# Patient Record
Sex: MALE | Race: ASIAN | NOT HISPANIC OR LATINO
[De-identification: names, ages, dates, MRNs, and addresses within clinical notes are randomized per-mention and may not be internally consistent; named-entity substitution may affect disease eponyms.]

---

## 2017-03-20 ENCOUNTER — APPOINTMENT (OUTPATIENT)
Dept: UROLOGY | Facility: CLINIC | Age: 72
End: 2017-03-20

## 2017-03-21 LAB
APPEARANCE: ABNORMAL
BACTERIA: NEGATIVE
BILIRUBIN URINE: NEGATIVE
BLOOD URINE: ABNORMAL
COLOR: ABNORMAL
CORE LAB FLUID CYTOLOGY: NORMAL
GLUCOSE QUALITATIVE U: NORMAL
HYALINE CASTS: 1 /LPF
KETONES URINE: NEGATIVE
LEUKOCYTE ESTERASE URINE: NEGATIVE
MICROSCOPIC-UA: NORMAL
NITRITE URINE: NEGATIVE
PH URINE: 6
PROTEIN URINE: 100
RED BLOOD CELLS URINE: 666 /HPF
SPECIFIC GRAVITY URINE: 1.01
SQUAMOUS EPITHELIAL CELLS: 4 /HPF
UROBILINOGEN URINE: NORMAL
WHITE BLOOD CELLS URINE: 14 /HPF

## 2017-03-22 ENCOUNTER — FORM ENCOUNTER (OUTPATIENT)
Age: 72
End: 2017-03-22

## 2017-03-22 LAB — BACTERIA UR CULT: NORMAL

## 2017-03-23 ENCOUNTER — OUTPATIENT (OUTPATIENT)
Dept: OUTPATIENT SERVICES | Facility: HOSPITAL | Age: 72
LOS: 1 days | End: 2017-03-23
Payer: MEDICAID

## 2017-03-23 ENCOUNTER — APPOINTMENT (OUTPATIENT)
Dept: CT IMAGING | Facility: IMAGING CENTER | Age: 72
End: 2017-03-23

## 2017-03-23 ENCOUNTER — APPOINTMENT (OUTPATIENT)
Dept: UROLOGY | Facility: CLINIC | Age: 72
End: 2017-03-23

## 2017-03-23 VITALS
HEART RATE: 92 BPM | RESPIRATION RATE: 18 BRPM | SYSTOLIC BLOOD PRESSURE: 145 MMHG | DIASTOLIC BLOOD PRESSURE: 74 MMHG | TEMPERATURE: 97.9 F

## 2017-03-23 DIAGNOSIS — R35.0 FREQUENCY OF MICTURITION: ICD-10-CM

## 2017-03-23 DIAGNOSIS — R31.0 GROSS HEMATURIA: ICD-10-CM

## 2017-03-23 PROCEDURE — 74178 CT ABD&PLV WO CNTR FLWD CNTR: CPT

## 2017-03-23 PROCEDURE — 52000 CYSTOURETHROSCOPY: CPT

## 2017-03-24 ENCOUNTER — MESSAGE (OUTPATIENT)
Age: 72
End: 2017-03-24

## 2017-03-28 ENCOUNTER — APPOINTMENT (OUTPATIENT)
Dept: UROLOGY | Facility: CLINIC | Age: 72
End: 2017-03-28

## 2017-03-30 DIAGNOSIS — N40.1 BENIGN PROSTATIC HYPERPLASIA WITH LOWER URINARY TRACT SYMPTOMS: ICD-10-CM

## 2017-03-30 DIAGNOSIS — R31.0 GROSS HEMATURIA: ICD-10-CM

## 2017-03-30 DIAGNOSIS — N30.20 OTHER CHRONIC CYSTITIS WITHOUT HEMATURIA: ICD-10-CM

## 2017-03-30 DIAGNOSIS — R39.89 OTHER SYMPTOMS AND SIGNS INVOLVING THE GENITOURINARY SYSTEM: ICD-10-CM

## 2017-04-06 ENCOUNTER — APPOINTMENT (OUTPATIENT)
Dept: NEUROLOGY | Facility: CLINIC | Age: 72
End: 2017-04-06

## 2017-04-13 ENCOUNTER — APPOINTMENT (OUTPATIENT)
Dept: UROLOGY | Facility: CLINIC | Age: 72
End: 2017-04-13

## 2017-04-13 PROCEDURE — 88112 CYTOPATH CELL ENHANCE TECH: CPT | Mod: 26

## 2017-04-13 RX ORDER — NAPROXEN 500 MG/1
500 TABLET ORAL
Qty: 60 | Refills: 0 | Status: COMPLETED | COMMUNITY
Start: 2017-02-22

## 2017-04-13 RX ORDER — LEVOTHYROXINE SODIUM 0.05 MG/1
50 TABLET ORAL
Qty: 30 | Refills: 0 | Status: ACTIVE | COMMUNITY
Start: 2016-10-18

## 2017-04-13 RX ORDER — GABAPENTIN 300 MG/1
300 CAPSULE ORAL
Qty: 30 | Refills: 0 | Status: COMPLETED | COMMUNITY
Start: 2016-09-29

## 2017-04-13 RX ORDER — RANITIDINE 150 MG/1
150 TABLET ORAL
Qty: 60 | Refills: 0 | Status: ACTIVE | COMMUNITY
Start: 2016-07-13

## 2017-04-13 RX ORDER — SULFAMETHOXAZOLE AND TRIMETHOPRIM 800; 160 MG/1; MG/1
800-160 TABLET ORAL
Qty: 14 | Refills: 0 | Status: COMPLETED | COMMUNITY
Start: 2017-03-20 | End: 2017-04-13

## 2017-04-13 RX ORDER — AMOXICILLIN 500 MG/1
500 TABLET, FILM COATED ORAL
Qty: 14 | Refills: 0 | Status: COMPLETED | COMMUNITY
Start: 2017-04-08

## 2017-04-14 ENCOUNTER — MESSAGE (OUTPATIENT)
Age: 72
End: 2017-04-14

## 2017-04-15 LAB
APPEARANCE: CLEAR
BACTERIA UR CULT: NORMAL
BACTERIA: NEGATIVE
BILIRUBIN URINE: NEGATIVE
BLOOD URINE: ABNORMAL
COLOR: YELLOW
CORE LAB FLUID CYTOLOGY: NORMAL
GLUCOSE QUALITATIVE U: NORMAL MG/DL
HYALINE CASTS: 4 /LPF
KETONES URINE: NEGATIVE
LEUKOCYTE ESTERASE URINE: NEGATIVE
MICROSCOPIC-UA: NORMAL
NITRITE URINE: NEGATIVE
PH URINE: 6
PROTEIN URINE: NEGATIVE MG/DL
RED BLOOD CELLS URINE: 6 /HPF
SPECIFIC GRAVITY URINE: 1.01
SQUAMOUS EPITHELIAL CELLS: 0 /HPF
UROBILINOGEN URINE: NORMAL MG/DL
WHITE BLOOD CELLS URINE: 2 /HPF

## 2017-04-19 ENCOUNTER — EMERGENCY (EMERGENCY)
Facility: HOSPITAL | Age: 72
LOS: 1 days | Discharge: HOME | End: 2017-04-19
Attending: EMERGENCY MEDICINE
Payer: MEDICARE

## 2017-04-19 VITALS
SYSTOLIC BLOOD PRESSURE: 119 MMHG | OXYGEN SATURATION: 98 % | TEMPERATURE: 98 F | RESPIRATION RATE: 20 BRPM | HEART RATE: 105 BPM | WEIGHT: 164.02 LBS | DIASTOLIC BLOOD PRESSURE: 55 MMHG | HEIGHT: 64 IN

## 2017-04-19 DIAGNOSIS — H26.9 UNSPECIFIED CATARACT: ICD-10-CM

## 2017-04-19 DIAGNOSIS — J45.901 UNSPECIFIED ASTHMA WITH (ACUTE) EXACERBATION: ICD-10-CM

## 2017-04-19 DIAGNOSIS — E03.9 HYPOTHYROIDISM, UNSPECIFIED: ICD-10-CM

## 2017-04-19 DIAGNOSIS — I10 ESSENTIAL (PRIMARY) HYPERTENSION: ICD-10-CM

## 2017-04-19 LAB
ALBUMIN SERPL ELPH-MCNC: 3.3 G/DL — LOW (ref 3.5–5)
ALP SERPL-CCNC: 75 U/L — SIGNIFICANT CHANGE UP (ref 40–120)
ALT FLD-CCNC: 31 U/L DA — SIGNIFICANT CHANGE UP (ref 10–60)
ANION GAP SERPL CALC-SCNC: 6 MMOL/L — SIGNIFICANT CHANGE UP (ref 5–17)
AST SERPL-CCNC: 17 U/L — SIGNIFICANT CHANGE UP (ref 10–40)
BASOPHILS # BLD AUTO: 0.1 K/UL — SIGNIFICANT CHANGE UP (ref 0–0.2)
BASOPHILS NFR BLD AUTO: 0.7 % — SIGNIFICANT CHANGE UP (ref 0–2)
BILIRUB SERPL-MCNC: 0.2 MG/DL — SIGNIFICANT CHANGE UP (ref 0.2–1.2)
BUN SERPL-MCNC: 17 MG/DL — SIGNIFICANT CHANGE UP (ref 7–18)
CALCIUM SERPL-MCNC: 8.9 MG/DL — SIGNIFICANT CHANGE UP (ref 8.4–10.5)
CHLORIDE SERPL-SCNC: 105 MMOL/L — SIGNIFICANT CHANGE UP (ref 96–108)
CO2 SERPL-SCNC: 30 MMOL/L — SIGNIFICANT CHANGE UP (ref 22–31)
CREAT SERPL-MCNC: 1.11 MG/DL — SIGNIFICANT CHANGE UP (ref 0.5–1.3)
EOSINOPHIL # BLD AUTO: 0.2 K/UL — SIGNIFICANT CHANGE UP (ref 0–0.5)
EOSINOPHIL NFR BLD AUTO: 2 % — SIGNIFICANT CHANGE UP (ref 0–6)
GLUCOSE SERPL-MCNC: 125 MG/DL — HIGH (ref 70–99)
HCT VFR BLD CALC: 42.8 % — SIGNIFICANT CHANGE UP (ref 39–50)
HGB BLD-MCNC: 13 G/DL — SIGNIFICANT CHANGE UP (ref 13–17)
LACTATE SERPL-SCNC: 1.5 MMOL/L — SIGNIFICANT CHANGE UP (ref 0.7–2)
LYMPHOCYTES # BLD AUTO: 1.4 K/UL — SIGNIFICANT CHANGE UP (ref 1–3.3)
LYMPHOCYTES # BLD AUTO: 15.6 % — SIGNIFICANT CHANGE UP (ref 13–44)
MCHC RBC-ENTMCNC: 25.6 PG — LOW (ref 27–34)
MCHC RBC-ENTMCNC: 30.4 GM/DL — LOW (ref 32–36)
MCV RBC AUTO: 84.4 FL — SIGNIFICANT CHANGE UP (ref 80–100)
MONOCYTES # BLD AUTO: 0.5 K/UL — SIGNIFICANT CHANGE UP (ref 0–0.9)
MONOCYTES NFR BLD AUTO: 5.7 % — SIGNIFICANT CHANGE UP (ref 2–14)
NEUTROPHILS # BLD AUTO: 6.8 K/UL — SIGNIFICANT CHANGE UP (ref 1.8–7.4)
NEUTROPHILS NFR BLD AUTO: 76 % — SIGNIFICANT CHANGE UP (ref 43–77)
PLATELET # BLD AUTO: 207 K/UL — SIGNIFICANT CHANGE UP (ref 150–400)
POTASSIUM SERPL-MCNC: 4 MMOL/L — SIGNIFICANT CHANGE UP (ref 3.5–5.3)
POTASSIUM SERPL-SCNC: 4 MMOL/L — SIGNIFICANT CHANGE UP (ref 3.5–5.3)
PROT SERPL-MCNC: 7.5 G/DL — SIGNIFICANT CHANGE UP (ref 6–8.3)
RBC # BLD: 5.07 M/UL — SIGNIFICANT CHANGE UP (ref 4.2–5.8)
RBC # FLD: 14.2 % — SIGNIFICANT CHANGE UP (ref 10.3–14.5)
SODIUM SERPL-SCNC: 141 MMOL/L — SIGNIFICANT CHANGE UP (ref 135–145)
WBC # BLD: 9 K/UL — SIGNIFICANT CHANGE UP (ref 3.8–10.5)
WBC # FLD AUTO: 9 K/UL — SIGNIFICANT CHANGE UP (ref 3.8–10.5)

## 2017-04-19 PROCEDURE — 99285 EMERGENCY DEPT VISIT HI MDM: CPT | Mod: 25

## 2017-04-19 PROCEDURE — 71020: CPT | Mod: 26

## 2017-04-20 PROCEDURE — 94640 AIRWAY INHALATION TREATMENT: CPT

## 2017-04-20 PROCEDURE — 80053 COMPREHEN METABOLIC PANEL: CPT

## 2017-04-20 PROCEDURE — 71046 X-RAY EXAM CHEST 2 VIEWS: CPT

## 2017-04-20 PROCEDURE — 99284 EMERGENCY DEPT VISIT MOD MDM: CPT | Mod: 25

## 2017-04-20 PROCEDURE — 93005 ELECTROCARDIOGRAM TRACING: CPT

## 2017-04-20 PROCEDURE — 83605 ASSAY OF LACTIC ACID: CPT

## 2017-04-20 PROCEDURE — 85027 COMPLETE CBC AUTOMATED: CPT

## 2017-04-20 RX ORDER — ALBUTEROL 90 UG/1
2.5 AEROSOL, METERED ORAL ONCE
Qty: 0 | Refills: 0 | Status: COMPLETED | OUTPATIENT
Start: 2017-04-20 | End: 2017-04-20

## 2017-04-20 RX ORDER — IPRATROPIUM/ALBUTEROL SULFATE 18-103MCG
3 AEROSOL WITH ADAPTER (GRAM) INHALATION ONCE
Qty: 0 | Refills: 0 | Status: COMPLETED | OUTPATIENT
Start: 2017-04-20 | End: 2017-04-20

## 2017-04-20 RX ADMIN — Medication 3 MILLILITER(S): at 00:53

## 2017-04-20 RX ADMIN — Medication 50 MILLIGRAM(S): at 01:31

## 2017-04-20 RX ADMIN — ALBUTEROL 2.5 MILLIGRAM(S): 90 AEROSOL, METERED ORAL at 01:31

## 2017-04-20 NOTE — ED PROVIDER NOTE - MEDICAL DECISION MAKING DETAILS
72 y/o F pt with h/o asthma with apparently treated PNA. Pt shows no sign of infection, but is in no SOB. Will treat for asthma exacerbation with albuterol, prednisone, CXR and reassess.

## 2017-04-20 NOTE — ED PROVIDER NOTE - PMH
Abdominal mass    Age-related cataract    Asthma    Cystitis    Hemangioma    Hypertension  x 5 years  Hypothyroid

## 2017-04-20 NOTE — ED PROVIDER NOTE - PROGRESS NOTE DETAILS
Pt states he is feeling somewhat better. Will order duonebs. Improving, now with some wheeze. Feels much better s/p albuterol.

## 2017-04-20 NOTE — ED PROVIDER NOTE - OBJECTIVE STATEMENT
72 y/o M pt with PMHx asthma, HTN, HLD, presents to ED c/o SOB today. Pt reports being treated for PNA and completed a 7 day course of amoxicillin with PMD. Pt has productive cough, not compliant with medication but has medicine at home. Pt denies fever, chills, CP, nausea, vomiting, diarrhea, recent travel, sick contacts, or any other complaints. NKDA. 70 y/o M pt with PMHx asthma, HTN, HLD, presents to ED c/o SOB today. Pt reports being treated for PNA and completed a 7 day course of amoxicillin via PMD. Pt has productive cough.  Has albuterol at home but has not been using it. Takes meds otherwise, including Advair.  Pt denies fever, chills, CP, nausea, vomiting, diarrhea, recent travel, sick contacts, or any other complaints. NKDA.

## 2017-05-08 ENCOUNTER — APPOINTMENT (OUTPATIENT)
Dept: UROLOGY | Facility: CLINIC | Age: 72
End: 2017-05-08

## 2017-05-10 LAB
APPEARANCE: CLEAR
BACTERIA UR CULT: NORMAL
BACTERIA: NEGATIVE
BILIRUBIN URINE: NEGATIVE
BLOOD URINE: NEGATIVE
COLOR: YELLOW
GLUCOSE QUALITATIVE U: NORMAL MG/DL
HYALINE CASTS: 0 /LPF
KETONES URINE: NEGATIVE
LEUKOCYTE ESTERASE URINE: NEGATIVE
MICROSCOPIC-UA: NORMAL
NITRITE URINE: NEGATIVE
PH URINE: 7.5
PROTEIN URINE: ABNORMAL MG/DL
RED BLOOD CELLS URINE: 1 /HPF
SPECIFIC GRAVITY URINE: 1.01
SQUAMOUS EPITHELIAL CELLS: 1 /HPF
URINE COMMENTS: NORMAL
UROBILINOGEN URINE: NORMAL MG/DL
WHITE BLOOD CELLS URINE: 3 /HPF

## 2017-05-11 LAB — CORE LAB FLUID CYTOLOGY: NORMAL

## 2017-08-23 ENCOUNTER — MEDICATION RENEWAL (OUTPATIENT)
Age: 72
End: 2017-08-23

## 2017-09-05 ENCOUNTER — APPOINTMENT (OUTPATIENT)
Dept: NEUROLOGY | Facility: CLINIC | Age: 72
End: 2017-09-05
Payer: MEDICARE

## 2017-09-05 VITALS
HEART RATE: 100 BPM | SYSTOLIC BLOOD PRESSURE: 130 MMHG | BODY MASS INDEX: 30.83 KG/M2 | WEIGHT: 174 LBS | DIASTOLIC BLOOD PRESSURE: 78 MMHG | HEIGHT: 63 IN

## 2017-09-05 PROCEDURE — 99215 OFFICE O/P EST HI 40 MIN: CPT

## 2017-10-23 ENCOUNTER — APPOINTMENT (OUTPATIENT)
Dept: UROLOGY | Facility: CLINIC | Age: 72
End: 2017-10-23
Payer: MEDICARE

## 2017-10-30 ENCOUNTER — APPOINTMENT (OUTPATIENT)
Dept: UROLOGY | Facility: CLINIC | Age: 72
End: 2017-10-30
Payer: MEDICARE

## 2017-10-30 LAB
APPEARANCE: CLEAR
BACTERIA: NEGATIVE
BILIRUBIN URINE: NEGATIVE
BLOOD URINE: NEGATIVE
COLOR: YELLOW
GLUCOSE QUALITATIVE U: NEGATIVE MG/DL
HYALINE CASTS: 1 /LPF
KETONES URINE: NEGATIVE
LEUKOCYTE ESTERASE URINE: NEGATIVE
MICROSCOPIC-UA: NORMAL
NITRITE URINE: NEGATIVE
PH URINE: 6
PROTEIN URINE: NEGATIVE MG/DL
RED BLOOD CELLS URINE: 1 /HPF
SPECIFIC GRAVITY URINE: 1.02
SQUAMOUS EPITHELIAL CELLS: 0 /HPF
UROBILINOGEN URINE: NEGATIVE MG/DL
WHITE BLOOD CELLS URINE: 2 /HPF

## 2017-10-30 PROCEDURE — 99214 OFFICE O/P EST MOD 30 MIN: CPT

## 2017-11-01 LAB — BACTERIA UR CULT: NORMAL

## 2017-11-03 LAB — CORE LAB FLUID CYTOLOGY: NORMAL

## 2017-11-08 LAB
APPEARANCE: CLEAR
BACTERIA: NEGATIVE
BILIRUBIN URINE: NEGATIVE
BLOOD URINE: NEGATIVE
COLOR: YELLOW
GLUCOSE QUALITATIVE U: NEGATIVE MG/DL
KETONES URINE: NEGATIVE
LEUKOCYTE ESTERASE URINE: NEGATIVE
MICROSCOPIC-UA: NORMAL
NITRITE URINE: NEGATIVE
PH URINE: 6.5
PROTEIN URINE: NEGATIVE MG/DL
RED BLOOD CELLS URINE: 2 /HPF
SPECIFIC GRAVITY URINE: 1.01
SQUAMOUS EPITHELIAL CELLS: 1 /HPF
UROBILINOGEN URINE: NEGATIVE MG/DL
WHITE BLOOD CELLS URINE: 1 /HPF

## 2017-11-10 LAB — CORE LAB FLUID CYTOLOGY: NORMAL

## 2017-11-15 ENCOUNTER — APPOINTMENT (OUTPATIENT)
Dept: UROLOGY | Facility: CLINIC | Age: 72
End: 2017-11-15

## 2017-12-18 ENCOUNTER — APPOINTMENT (OUTPATIENT)
Dept: NEUROLOGY | Facility: CLINIC | Age: 72
End: 2017-12-18
Payer: MEDICARE

## 2017-12-18 VITALS
HEART RATE: 90 BPM | HEIGHT: 63 IN | DIASTOLIC BLOOD PRESSURE: 78 MMHG | WEIGHT: 174 LBS | SYSTOLIC BLOOD PRESSURE: 145 MMHG | BODY MASS INDEX: 30.83 KG/M2

## 2017-12-18 DIAGNOSIS — R51 HEADACHE: ICD-10-CM

## 2017-12-18 PROCEDURE — 99215 OFFICE O/P EST HI 40 MIN: CPT

## 2018-01-22 ENCOUNTER — APPOINTMENT (OUTPATIENT)
Dept: UROLOGY | Facility: CLINIC | Age: 73
End: 2018-01-22
Payer: MEDICARE

## 2018-01-22 PROCEDURE — 99214 OFFICE O/P EST MOD 30 MIN: CPT

## 2018-01-24 LAB
ALBUMIN SERPL ELPH-MCNC: 4.3 G/DL
ALP BLD-CCNC: 66 U/L
ALT SERPL-CCNC: 21 U/L
ANION GAP SERPL CALC-SCNC: 14 MMOL/L
AST SERPL-CCNC: 24 U/L
BASOPHILS # BLD AUTO: 0.02 K/UL
BASOPHILS NFR BLD AUTO: 0.3 %
BILIRUB SERPL-MCNC: 0.3 MG/DL
BUN SERPL-MCNC: 21 MG/DL
CALCIUM SERPL-MCNC: 10.1 MG/DL
CHLORIDE SERPL-SCNC: 97 MMOL/L
CO2 SERPL-SCNC: 30 MMOL/L
CORE LAB FLUID CYTOLOGY: NORMAL
CREAT SERPL-MCNC: 0.99 MG/DL
EOSINOPHIL # BLD AUTO: 0.12 K/UL
EOSINOPHIL NFR BLD AUTO: 1.5 %
GLUCOSE SERPL-MCNC: 86 MG/DL
HCT VFR BLD CALC: 46.9 %
HGB BLD-MCNC: 14.2 G/DL
IMM GRANULOCYTES NFR BLD AUTO: 0.3 %
LYMPHOCYTES # BLD AUTO: 2.77 K/UL
LYMPHOCYTES NFR BLD AUTO: 34.8 %
MAN DIFF?: NORMAL
MCHC RBC-ENTMCNC: 26.1 PG
MCHC RBC-ENTMCNC: 30.3 GM/DL
MCV RBC AUTO: 86.1 FL
MONOCYTES # BLD AUTO: 0.77 K/UL
MONOCYTES NFR BLD AUTO: 9.7 %
NEUTROPHILS # BLD AUTO: 4.25 K/UL
NEUTROPHILS NFR BLD AUTO: 53.4 %
PLATELET # BLD AUTO: 231 K/UL
POTASSIUM SERPL-SCNC: 4.8 MMOL/L
PROT SERPL-MCNC: 7.4 G/DL
PSA SERPL-MCNC: 0.39 NG/ML
RBC # BLD: 5.45 M/UL
RBC # FLD: 15.1 %
SODIUM SERPL-SCNC: 141 MMOL/L
TESTOST SERPL-MCNC: 328 NG/DL
WBC # FLD AUTO: 7.95 K/UL

## 2018-01-25 LAB — BACTERIA UR CULT: NORMAL

## 2018-02-02 LAB
APPEARANCE: CLEAR
BACTERIA: NEGATIVE
BILIRUBIN URINE: NEGATIVE
BLOOD URINE: ABNORMAL
COLOR: YELLOW
GLUCOSE QUALITATIVE U: NEGATIVE MG/DL
HYALINE CASTS: 0 /LPF
KETONES URINE: NEGATIVE
LEUKOCYTE ESTERASE URINE: NEGATIVE
MICROSCOPIC-UA: NORMAL
NITRITE URINE: NEGATIVE
PH URINE: 6.5
PROTEIN URINE: NEGATIVE MG/DL
RED BLOOD CELLS URINE: 7 /HPF
SPECIFIC GRAVITY URINE: 1.02
SQUAMOUS EPITHELIAL CELLS: 1 /HPF
UROBILINOGEN URINE: NEGATIVE MG/DL
WHITE BLOOD CELLS URINE: 4 /HPF

## 2018-02-07 LAB
APPEARANCE: CLEAR
BACTERIA UR CULT: NORMAL
BACTERIA: NEGATIVE
BILIRUBIN URINE: NEGATIVE
BLOOD URINE: NEGATIVE
COLOR: YELLOW
CORE LAB FLUID CYTOLOGY: NORMAL
GLUCOSE QUALITATIVE U: NEGATIVE MG/DL
HYALINE CASTS: 4 /LPF
KETONES URINE: NEGATIVE
LEUKOCYTE ESTERASE URINE: NEGATIVE
MICROSCOPIC-UA: NORMAL
NITRITE URINE: NEGATIVE
PH URINE: 5.5
PROTEIN URINE: NEGATIVE MG/DL
RED BLOOD CELLS URINE: 3 /HPF
SPECIFIC GRAVITY URINE: 1.02
SQUAMOUS EPITHELIAL CELLS: 1 /HPF
UROBILINOGEN URINE: NEGATIVE MG/DL
WHITE BLOOD CELLS URINE: 15 /HPF

## 2018-03-01 ENCOUNTER — APPOINTMENT (OUTPATIENT)
Dept: UROLOGY | Facility: CLINIC | Age: 73
End: 2018-03-01
Payer: MEDICARE

## 2018-03-01 LAB
APPEARANCE: CLEAR
BACTERIA: NEGATIVE
BILIRUBIN URINE: NEGATIVE
BLOOD URINE: NEGATIVE
COLOR: YELLOW
GLUCOSE QUALITATIVE U: NEGATIVE MG/DL
HYALINE CASTS: 2 /LPF
KETONES URINE: NEGATIVE
LEUKOCYTE ESTERASE URINE: NEGATIVE
MICROSCOPIC-UA: NORMAL
NITRITE URINE: NEGATIVE
PH URINE: 6
PROTEIN URINE: NEGATIVE MG/DL
RED BLOOD CELLS URINE: 0 /HPF
SPECIFIC GRAVITY URINE: 1.01
SQUAMOUS EPITHELIAL CELLS: 1 /HPF
UROBILINOGEN URINE: NEGATIVE MG/DL
WHITE BLOOD CELLS URINE: 8 /HPF

## 2018-03-01 PROCEDURE — 99214 OFFICE O/P EST MOD 30 MIN: CPT

## 2018-03-01 RX ORDER — DESIPRAMINE 10 MG/1
10 TABLET, FILM COATED ORAL
Qty: 180 | Refills: 0 | Status: COMPLETED | COMMUNITY
Start: 2017-04-13 | End: 2018-03-01

## 2018-03-02 LAB — CORE LAB FLUID CYTOLOGY: NORMAL

## 2018-03-04 LAB — BACTERIA UR CULT: NORMAL

## 2018-04-10 ENCOUNTER — APPOINTMENT (OUTPATIENT)
Dept: UROLOGY | Facility: CLINIC | Age: 73
End: 2018-04-10
Payer: MEDICARE

## 2018-04-10 PROCEDURE — 99214 OFFICE O/P EST MOD 30 MIN: CPT

## 2018-04-21 LAB
APPEARANCE: CLEAR
BACTERIA UR CULT: NORMAL
BACTERIA: NEGATIVE
BASOPHILS # BLD AUTO: 0.01 K/UL
BASOPHILS NFR BLD AUTO: 0.1 %
BILIRUBIN URINE: NEGATIVE
BLOOD URINE: ABNORMAL
COLOR: YELLOW
CORE LAB FLUID CYTOLOGY: NORMAL
EOSINOPHIL # BLD AUTO: 0.13 K/UL
EOSINOPHIL NFR BLD AUTO: 1.9 %
FERRITIN SERPL-MCNC: 53 NG/ML
GLUCOSE QUALITATIVE U: NEGATIVE MG/DL
HCT VFR BLD CALC: 41.4 %
HGB A MFR BLD: 97.5 %
HGB A2 MFR BLD: 2.5 %
HGB BLD-MCNC: 13.4 G/DL
HGB FRACT BLD-IMP: NORMAL
IMM GRANULOCYTES NFR BLD AUTO: 0 %
IRON SATN MFR SERPL: 17 %
IRON SERPL-MCNC: 58 UG/DL
KETONES URINE: NEGATIVE
LEUKOCYTE ESTERASE URINE: NEGATIVE
LYMPHOCYTES # BLD AUTO: 2.33 K/UL
LYMPHOCYTES NFR BLD AUTO: 34.1 %
MAN DIFF?: NORMAL
MCHC RBC-ENTMCNC: 26.6 PG
MCHC RBC-ENTMCNC: 32.4 GM/DL
MCV RBC AUTO: 82.1 FL
MICROSCOPIC-UA: NORMAL
MONOCYTES # BLD AUTO: 0.8 K/UL
MONOCYTES NFR BLD AUTO: 11.7 %
NEUTROPHILS # BLD AUTO: 3.56 K/UL
NEUTROPHILS NFR BLD AUTO: 52.2 %
NITRITE URINE: NEGATIVE
PH URINE: 6.5
PLATELET # BLD AUTO: 213 K/UL
PROTEIN URINE: NEGATIVE MG/DL
RBC # BLD: 5.04 M/UL
RBC # FLD: 15.8 %
RED BLOOD CELLS URINE: 4 /HPF
SPECIFIC GRAVITY URINE: 1.01
SQUAMOUS EPITHELIAL CELLS: 0 /HPF
TIBC SERPL-MCNC: 347 UG/DL
UIBC SERPL-MCNC: 289 UG/DL
UROBILINOGEN URINE: NEGATIVE MG/DL
WBC # FLD AUTO: 6.83 K/UL
WHITE BLOOD CELLS URINE: 1 /HPF

## 2018-05-15 ENCOUNTER — MEDICATION RENEWAL (OUTPATIENT)
Age: 73
End: 2018-05-15

## 2018-05-23 ENCOUNTER — APPOINTMENT (OUTPATIENT)
Dept: UROLOGY | Facility: CLINIC | Age: 73
End: 2018-05-23
Payer: MEDICARE

## 2018-05-23 VITALS — HEART RATE: 92 BPM | DIASTOLIC BLOOD PRESSURE: 73 MMHG | SYSTOLIC BLOOD PRESSURE: 115 MMHG

## 2018-05-23 PROCEDURE — 99214 OFFICE O/P EST MOD 30 MIN: CPT

## 2018-05-26 LAB
APPEARANCE: CLEAR
BACTERIA UR CULT: NORMAL
BACTERIA: NEGATIVE
BILIRUBIN URINE: NEGATIVE
BLOOD URINE: NEGATIVE
COLOR: YELLOW
GLUCOSE QUALITATIVE U: NEGATIVE MG/DL
HYALINE CASTS: 1 /LPF
KETONES URINE: NEGATIVE
LEUKOCYTE ESTERASE URINE: NEGATIVE
MICROSCOPIC-UA: NORMAL
NITRITE URINE: NEGATIVE
PH URINE: 5.5
PROTEIN URINE: NEGATIVE MG/DL
RED BLOOD CELLS URINE: 1 /HPF
SPECIFIC GRAVITY URINE: 1.02
SQUAMOUS EPITHELIAL CELLS: 1 /HPF
UROBILINOGEN URINE: NEGATIVE MG/DL
WHITE BLOOD CELLS URINE: 4 /HPF

## 2018-05-29 LAB — CORE LAB FLUID CYTOLOGY: NORMAL

## 2018-07-12 ENCOUNTER — APPOINTMENT (OUTPATIENT)
Dept: NEUROLOGY | Facility: CLINIC | Age: 73
End: 2018-07-12
Payer: MEDICARE

## 2018-07-12 VITALS
BODY MASS INDEX: 30.83 KG/M2 | HEIGHT: 63 IN | SYSTOLIC BLOOD PRESSURE: 123 MMHG | DIASTOLIC BLOOD PRESSURE: 65 MMHG | HEART RATE: 95 BPM | WEIGHT: 174 LBS

## 2018-07-12 DIAGNOSIS — R29.898 OTHER SYMPTOMS AND SIGNS INVOLVING THE MUSCULOSKELETAL SYSTEM: ICD-10-CM

## 2018-07-12 DIAGNOSIS — M54.9 DORSALGIA, UNSPECIFIED: ICD-10-CM

## 2018-07-12 PROCEDURE — 99215 OFFICE O/P EST HI 40 MIN: CPT

## 2018-07-13 ENCOUNTER — OTHER (OUTPATIENT)
Age: 73
End: 2018-07-13

## 2018-07-23 ENCOUNTER — FORM ENCOUNTER (OUTPATIENT)
Age: 73
End: 2018-07-23

## 2018-07-24 ENCOUNTER — OUTPATIENT (OUTPATIENT)
Dept: OUTPATIENT SERVICES | Facility: HOSPITAL | Age: 73
LOS: 1 days | End: 2018-07-24
Payer: COMMERCIAL

## 2018-07-24 ENCOUNTER — APPOINTMENT (OUTPATIENT)
Dept: MRI IMAGING | Facility: IMAGING CENTER | Age: 73
End: 2018-07-24
Payer: MEDICARE

## 2018-07-24 DIAGNOSIS — R29.898 OTHER SYMPTOMS AND SIGNS INVOLVING THE MUSCULOSKELETAL SYSTEM: ICD-10-CM

## 2018-07-24 PROCEDURE — 72148 MRI LUMBAR SPINE W/O DYE: CPT | Mod: 26

## 2018-07-24 PROCEDURE — 72148 MRI LUMBAR SPINE W/O DYE: CPT

## 2018-08-28 ENCOUNTER — APPOINTMENT (OUTPATIENT)
Dept: UROLOGY | Facility: CLINIC | Age: 73
End: 2018-08-28
Payer: MEDICARE

## 2018-08-28 VITALS — DIASTOLIC BLOOD PRESSURE: 69 MMHG | SYSTOLIC BLOOD PRESSURE: 117 MMHG | HEART RATE: 89 BPM | RESPIRATION RATE: 18 BRPM

## 2018-08-28 PROCEDURE — 99214 OFFICE O/P EST MOD 30 MIN: CPT

## 2018-09-08 LAB
ALBUMIN SERPL ELPH-MCNC: 4.4 G/DL
ALP BLD-CCNC: 61 U/L
ALT SERPL-CCNC: 26 U/L
ANION GAP SERPL CALC-SCNC: 14 MMOL/L
AST SERPL-CCNC: 28 U/L
BACTERIA UR CULT: NORMAL
BASOPHILS # BLD AUTO: 0.02 K/UL
BASOPHILS NFR BLD AUTO: 0.2 %
BILIRUB SERPL-MCNC: 0.3 MG/DL
BUN SERPL-MCNC: 27 MG/DL
CALCIUM SERPL-MCNC: 9.7 MG/DL
CHLORIDE SERPL-SCNC: 100 MMOL/L
CO2 SERPL-SCNC: 24 MMOL/L
CORE LAB FLUID CYTOLOGY: NORMAL
CREAT SERPL-MCNC: 1.16 MG/DL
EOSINOPHIL # BLD AUTO: 0.2 K/UL
EOSINOPHIL NFR BLD AUTO: 2.3 %
GLUCOSE SERPL-MCNC: 88 MG/DL
HCT VFR BLD CALC: 43.3 %
HGB BLD-MCNC: 13.4 G/DL
IMM GRANULOCYTES NFR BLD AUTO: 0.2 %
LYMPHOCYTES # BLD AUTO: 2.8 K/UL
LYMPHOCYTES NFR BLD AUTO: 32.7 %
MAN DIFF?: NORMAL
MCHC RBC-ENTMCNC: 26.4 PG
MCHC RBC-ENTMCNC: 30.9 GM/DL
MCV RBC AUTO: 85.4 FL
MONOCYTES # BLD AUTO: 0.93 K/UL
MONOCYTES NFR BLD AUTO: 10.9 %
NEUTROPHILS # BLD AUTO: 4.59 K/UL
NEUTROPHILS NFR BLD AUTO: 53.7 %
PLATELET # BLD AUTO: 232 K/UL
POTASSIUM SERPL-SCNC: 4.6 MMOL/L
PROT SERPL-MCNC: 7.1 G/DL
PSA SERPL-MCNC: 0.27 NG/ML
RBC # BLD: 5.07 M/UL
RBC # FLD: 15.6 %
SODIUM SERPL-SCNC: 138 MMOL/L
TESTOST SERPL-MCNC: 223.5 NG/DL
WBC # FLD AUTO: 8.56 K/UL

## 2018-09-12 LAB
APPEARANCE: CLEAR
BACTERIA: NEGATIVE
BILIRUBIN URINE: NEGATIVE
BLOOD URINE: ABNORMAL
COLOR: YELLOW
GLUCOSE QUALITATIVE U: NEGATIVE MG/DL
HYALINE CASTS: 0 /LPF
KETONES URINE: NEGATIVE
LEUKOCYTE ESTERASE URINE: NEGATIVE
MICROSCOPIC-UA: NORMAL
NITRITE URINE: NEGATIVE
PH URINE: 6
PROTEIN URINE: NEGATIVE MG/DL
RED BLOOD CELLS URINE: 5 /HPF
SPECIFIC GRAVITY URINE: 1.02
SQUAMOUS EPITHELIAL CELLS: 1 /HPF
UROBILINOGEN URINE: NEGATIVE MG/DL
WHITE BLOOD CELLS URINE: 2 /HPF

## 2018-09-18 LAB
APPEARANCE: CLEAR
BACTERIA: NEGATIVE
BILIRUBIN URINE: NEGATIVE
BLOOD URINE: NEGATIVE
COLOR: YELLOW
GLUCOSE QUALITATIVE U: NEGATIVE MG/DL
HYALINE CASTS: 2 /LPF
KETONES URINE: NEGATIVE
LEUKOCYTE ESTERASE URINE: NEGATIVE
MICROSCOPIC-UA: NORMAL
NITRITE URINE: NEGATIVE
PH URINE: 6
PROTEIN URINE: NEGATIVE MG/DL
RED BLOOD CELLS URINE: 3 /HPF
SPECIFIC GRAVITY URINE: 1.02
SQUAMOUS EPITHELIAL CELLS: 0 /HPF
UROBILINOGEN URINE: NEGATIVE MG/DL
WHITE BLOOD CELLS URINE: 4 /HPF

## 2019-01-02 ENCOUNTER — APPOINTMENT (OUTPATIENT)
Dept: UROLOGY | Facility: CLINIC | Age: 74
End: 2019-01-02

## 2019-01-25 ENCOUNTER — EMERGENCY (EMERGENCY)
Facility: HOSPITAL | Age: 74
LOS: 1 days | Discharge: HOME | End: 2019-01-25
Attending: EMERGENCY MEDICINE
Payer: COMMERCIAL

## 2019-01-25 VITALS
HEART RATE: 89 BPM | TEMPERATURE: 98 F | RESPIRATION RATE: 18 BRPM | SYSTOLIC BLOOD PRESSURE: 130 MMHG | DIASTOLIC BLOOD PRESSURE: 76 MMHG | OXYGEN SATURATION: 97 % | WEIGHT: 173.94 LBS

## 2019-01-25 PROCEDURE — 99283 EMERGENCY DEPT VISIT LOW MDM: CPT

## 2019-01-25 PROCEDURE — 99283 EMERGENCY DEPT VISIT LOW MDM: CPT | Mod: 25

## 2019-01-25 NOTE — ED PROVIDER NOTE - OBJECTIVE STATEMENT
74 y/o M pt with a PMHx of Asthma, Bronchitis Cystitis, Hemangioma, HTN, and Hypothyroidism, and a PSHx of a Craniotomy, and b/l cataract surgery presents to ED with wife c/o cough and congestion x5 days with 2 episodes of hemoptysis. Per pt, he went to [facility] on 01/22/19 where he tested positive for the flu and had an X-ray performed showing PNA; pt was started on a Z-jyothi and steroids. Pt reports that his cough has persisted since with yellow colored sputum, but there were 2 instances where he noticed red droplets in his sputum prompting his visit to the ED. Pt denies use of blood thinners, use of ASA, CP, sore throat, fevers, or any other acute complaints. NKDA. 74 y/o M pt with a PMHx of Asthma, Bronchitis Cystitis, Hemangioma, HTN, and Hypothyroidism, and a PSHx of a Craniotomy, and b/l cataract surgery presents to ED with wife c/o cough and congestion x5 days with 2 episodes of hemoptysis. Per pt, he went to [facility] on 01/22/19 where he tested positive for the flu and had an X-ray performed showing PNA; pt was started on a Z-jyothi, tamiflu, and steroids. Pt reports that his cough has persisted since with yellow colored sputum, but there were 2 instances where he noticed red droplets in his sputum prompting his visit to the ED. Pt denies use of blood thinners, use of ASA, CP, sore throat, fevers, or any other acute complaints. NKDA.

## 2019-01-25 NOTE — ED PROVIDER NOTE - MEDICAL DECISION MAKING DETAILS
[facility] pt with minor hemoptysis- already diagnosed with flu and CAP. well appearing normal exam, normal vitals. coughing in ed. stable for dc home. f/u PMD. given strict return precautions.

## 2019-01-25 NOTE — ED PROVIDER NOTE - PMH
Abdominal mass    Age-related cataract    Asthma    Bronchitis    Cystitis    Hemangioma    Hypertension  x 5 years  Hypothyroid

## 2019-03-07 ENCOUNTER — APPOINTMENT (OUTPATIENT)
Dept: UROLOGY | Facility: CLINIC | Age: 74
End: 2019-03-07
Payer: MEDICARE

## 2019-03-07 PROCEDURE — 99214 OFFICE O/P EST MOD 30 MIN: CPT

## 2019-03-07 NOTE — ASSESSMENT
[FreeTextEntry1] : Mr Carrizales is a 73 year old man presented for follow up of gross hematuria, urinary frequency and urgency. The patient takes tamsulosin 0.4 mg, one half hour after meal twice daily, finasteride 5 mg once daily, and desipramine 10 mg at bedtime. The patient returned to the office 03/20/17 having gross hematuria with blood clot. Dr Holly Bernardo started the patient on Bactrim DS. He eventually went to the ER 03/21/17 where he had a Spann catheter placed. His creatinine in the ER was 1.01. Cystoscopy 03/23/17 found catheter trauma. Prostate protruded modest distance into the bladder. Bladder has a small capacity. Diffuse oozing of blood from bladder neck and trigone. Scraped up mucosa most likely from introduction of telescope. No bladder stones or tumors. Bladder inflammation from catheter. CT urogram was normal and found no etiology of the gross hematuria.\par 3/1/18: The patient returned and reported he has pain in his rectum area and penis. PSA from 1/24/18 was 0.39 ng/mL. 2/15/18 Creatinine was 1.01 mg/dL, hemoglobin was 14.1 g/dL. He received and abdomen ultrasound which shows a fatty liver, gallstones, and obscured pancreas. He denies any gross hematuria. He noted one day ago his GI physician put banding on his hemorrhoids. \par 4/10/18: The patient returned and reported he finished his antibiotics from last visit and noted he had another hemorrhoid and put another band by his GI. He noted pain still persists. UA from 3/1/18 shows 8 WBC/HPF. CBC from 3/1/18 showed slight anemia. \par 5/23/18: The patient returned and reported he has been taking Bactrim for his most recent prostatitis starting one week ago. PSA from  1/22/18 was 0.39 ng/mL. Currently taking Tamsulosin and Finasteride. \par 8/28/18: The patient returned and reported his urination is satisfactory. Currently taking tamsulosin and finasteride. Wakes up 3-4 times during the night to urinate. Noted he had a recent flare up of dysuria 3 days ago and took 2 days of Cipro and symptom have cleared up. \par \par 3/7/19: The patient returned today and reported that he had another flare up of prostatitis and took 2 days of Cipro for his symptoms to clear. Complains of lingering dysuria. I reminded the patient that with Cipro, there is a side effect of tendonitis. Denies gross hematuria. Currently taking Trimethoprim, Tamsulosin and Finasteride. PSA from 2/15/19 was 0.04 ng/mL.\par \par I prescribed the patient Ciprofloxacin 500 mg, one tablet twice daily. I informed the patient that Ciprofloxacin may cause sensitivity to direct sunlight and tendonitis.\par I renewed the patient Finasteride 5 mg, one tablet daily.\par I renewed the patient Tamsulosin 0.4 mg, one tablet twice daily one half hour after a meal.\par \par The patient produced a urine sample which will be sent for urinalysis, urine cytology, and urine culture.\par \par The patient will return in 2 weeks for a follow up and ZENA.

## 2019-03-07 NOTE — HISTORY OF PRESENT ILLNESS
[FreeTextEntry1] : Mr Carrizales is a 73 year old man presented for follow up of gross hematuria, urinary frequency and urgency. The patient takes tamsulosin 0.4 mg, one half hour after meal twice daily, finasteride 5 mg once daily, and desipramine 10 mg at bedtime. The patient returned to the office 03/20/17 having gross hematuria with blood clot. Dr Holly Bernardo started the patient on Bactrim DS. He eventually went to the ER 03/21/17 where he had a Spann catheter placed. His creatinine in the ER was 1.01. Cystoscopy 03/23/17 found catheter trauma. Prostate protruded modest distance into the bladder. Bladder has a small capacity. Diffuse oozing of blood from bladder neck and trigone. Scraped up mucosa most likely from introduction of telescope. No bladder stones or tumors. Bladder inflammation from catheter. CT urogram was normal and found no etiology of the gross hematuria.\par 3/1/18: The patient returned and reported he has pain in his rectum area and penis. PSA from 1/24/18 was 0.39 ng/mL. 2/15/18 Creatinine was 1.01 mg/dL, hemoglobin was 14.1 g/dL. He received and abdomen ultrasound which shows a fatty liver, gallstones, and obscured pancreas. He denies any gross hematuria. He noted one day ago his GI physician put banding on his hemorrhoids. \par 4/10/18: The patient returned and reported he finished his antibiotics from last visit and noted he had another hemorrhoid and put another band by his GI. He noted pain still persists. UA from 3/1/18 shows 8 WBC/HPF. CBC from 3/1/18 showed slight anemia. \par 5/23/18: The patient returned and reported he has been taking Bactrim for his most recent prostatitis starting one week ago. PSA from  1/22/18 was 0.39 ng/mL. Currently taking Tamsulosin and Finasteride. \par 8/28/18: The patient returned and reported his urination is satisfactory. Currently taking tamsulosin and finasteride. Wakes up 3-4 times during the night to urinate. Noted he had a recent flare up of dysuria 3 days ago and took 2 days of Cipro and symptom have cleared up. \par \par 3/7/19: The patient returned today and reported that he had another flare up of prostatitis and took 2 days of Cipro for his symptoms to clear. Complains of lingering dysuria. I reminded the patient that with Cipro, there is a side effect of tendonitis. Denies gross hematuria. Currently taking Trimethoprim, Tamsulosin and Finasteride. PSA from 2/15/19 was 0.04 ng/mL.

## 2019-03-07 NOTE — PHYSICAL EXAM
[General Appearance - Well Developed] : well developed [General Appearance - Well Nourished] : well nourished [Normal Appearance] : normal appearance [General Appearance - In No Acute Distress] : no acute distress [Abdomen Soft] : soft [Abdomen Tenderness] : non-tender [Abdomen Mass (___ Cm)] : no abdominal mass palpated [Skin Color & Pigmentation] : normal skin color and pigmentation [] : no respiratory distress [Respiration, Rhythm And Depth] : normal respiratory rhythm and effort [Exaggerated Use Of Accessory Muscles For Inspiration] : no accessory muscle use [Oriented To Time, Place, And Person] : oriented to person, place, and time [Affect] : the affect was normal [Mood] : the mood was normal [Normal Station and Gait] : the gait and station were normal for the patient's age [No Focal Deficits] : no focal deficits [No Palpable Adenopathy] : no palpable adenopathy [Cervical Lymph Nodes Enlarged Posterior Bilaterally] : posterior cervical [Cervical Lymph Nodes Enlarged Anterior Bilaterally] : anterior cervical [Supraclavicular Lymph Nodes Enlarged Bilaterally] : supraclavicular [Heart Rate And Rhythm] : Heart rate and rhythm were normal [Edema] : no peripheral edema [FreeTextEntry1] : No submandibular gland tenderness.\par

## 2019-03-07 NOTE — ADDENDUM
[FreeTextEntry1] : This note was authored by Fely Gorman working as a scribe for Dr. Collins Duron.\par I, Dr. Collins Duron, have reviewed the content of this note and confirm it is true and accurate. I personally performed the history and physical examination and made all the decisions.\par 3/7/19\par

## 2019-03-09 LAB — BACTERIA UR CULT: NORMAL

## 2019-03-10 LAB
APPEARANCE: CLEAR
BACTERIA: ABNORMAL
BILIRUBIN URINE: NEGATIVE
BLOOD URINE: ABNORMAL
COLOR: YELLOW
GLUCOSE QUALITATIVE U: NEGATIVE
HYALINE CASTS: 0 /LPF
KETONES URINE: NEGATIVE
LEUKOCYTE ESTERASE URINE: NEGATIVE
MICROSCOPIC-UA: NORMAL
NITRITE URINE: NEGATIVE
PH URINE: 6
PROTEIN URINE: NORMAL
RED BLOOD CELLS URINE: 9 /HPF
SPECIFIC GRAVITY URINE: 1.02
SQUAMOUS EPITHELIAL CELLS: 2 /HPF
URINE CYTOLOGY: NORMAL
UROBILINOGEN URINE: NORMAL
WHITE BLOOD CELLS URINE: 4 /HPF

## 2019-04-04 ENCOUNTER — APPOINTMENT (OUTPATIENT)
Dept: UROLOGY | Facility: CLINIC | Age: 74
End: 2019-04-04
Payer: MEDICARE

## 2019-04-04 PROCEDURE — 99214 OFFICE O/P EST MOD 30 MIN: CPT

## 2019-04-04 NOTE — HISTORY OF PRESENT ILLNESS
[FreeTextEntry1] : Mr Carrizales is a 73 year old man presented for follow up of gross hematuria, urinary frequency and urgency. The patient takes tamsulosin 0.4 mg, one half hour after meal twice daily, finasteride 5 mg once daily, and desipramine 10 mg at bedtime. The patient returned to the office 03/20/17 having gross hematuria with blood clot. Dr Holly Bernardo started the patient on Bactrim DS. He eventually went to the ER 03/21/17 where he had a Spann catheter placed. His creatinine in the ER was 1.01. Cystoscopy 03/23/17 found catheter trauma. Prostate protruded modest distance into the bladder. Bladder has a small capacity. Diffuse oozing of blood from bladder neck and trigone. Scraped up mucosa most likely from introduction of telescope. No bladder stones or tumors. Bladder inflammation from catheter. CT urogram was normal and found no etiology of the gross hematuria.\par 3/1/18: The patient returned and reported he has pain in his rectum area and penis. PSA from 1/24/18 was 0.39 ng/mL. 2/15/18 Creatinine was 1.01 mg/dL, hemoglobin was 14.1 g/dL. He received and abdomen ultrasound which shows a fatty liver, gallstones, and obscured pancreas. He denies any gross hematuria. He noted one day ago his GI physician put banding on his hemorrhoids. \par 4/10/18: The patient returned and reported he finished his antibiotics from last visit and noted he had another hemorrhoid and put another band by his GI. He noted pain still persists. UA from 3/1/18 shows 8 WBC/HPF. CBC from 3/1/18 showed slight anemia. \par 5/23/18: The patient returned and reported he has been taking Bactrim for his most recent prostatitis starting one week ago. PSA from  1/22/18 was 0.39 ng/mL. Currently taking Tamsulosin and Finasteride. \par 8/28/18: The patient returned and reported his urination is satisfactory. Currently taking tamsulosin and finasteride. Wakes up 3-4 times during the night to urinate. Noted he had a recent flare up of dysuria 3 days ago and took 2 days of Cipro and symptom have cleared up. \par 3/7/19: The patient returned and reported that he had another flare up of prostatitis and took 2 days of Cipro for his symptoms to clear. Complained of lingering dysuria. I reminded the patient that with Cipro, there is a side effect of tendonitis. Denied gross hematuria. Currently taking Trimethoprim, Tamsulosin and Finasteride. PSA from 2/15/19 was 0.04 ng/mL.\par \par 4/4/19: The patient returned today and reported that he another flare up of prostatitis. Complains of pain when he stands up. His pain is relieved while sitting down. Occasionally has dysuria. Wakes up twice during the night to urinate. Complains of urinary urgency during the night. Takes Tamsulosin BID, Trimethoprim and Finasteride.

## 2019-04-04 NOTE — ASSESSMENT
[FreeTextEntry1] : Mr Carrizales is a 73 year old man presented for follow up of gross hematuria, urinary frequency and urgency. The patient takes tamsulosin 0.4 mg, one half hour after meal twice daily, finasteride 5 mg once daily, and desipramine 10 mg at bedtime. The patient returned to the office 03/20/17 having gross hematuria with blood clot. Dr Holly Bernardo started the patient on Bactrim DS. He eventually went to the ER 03/21/17 where he had a Spann catheter placed. His creatinine in the ER was 1.01. Cystoscopy 03/23/17 found catheter trauma. Prostate protruded modest distance into the bladder. Bladder has a small capacity. Diffuse oozing of blood from bladder neck and trigone. Scraped up mucosa most likely from introduction of telescope. No bladder stones or tumors. Bladder inflammation from catheter. CT urogram was normal and found no etiology of the gross hematuria.\par 3/1/18: The patient returned and reported he has pain in his rectum area and penis. PSA from 1/24/18 was 0.39 ng/mL. 2/15/18 Creatinine was 1.01 mg/dL, hemoglobin was 14.1 g/dL. He received and abdomen ultrasound which shows a fatty liver, gallstones, and obscured pancreas. He denies any gross hematuria. He noted one day ago his GI physician put banding on his hemorrhoids. \par 4/10/18: The patient returned and reported he finished his antibiotics from last visit and noted he had another hemorrhoid and put another band by his GI. He noted pain still persists. UA from 3/1/18 shows 8 WBC/HPF. CBC from 3/1/18 showed slight anemia. \par 5/23/18: The patient returned and reported he has been taking Bactrim for his most recent prostatitis starting one week ago. PSA from  1/22/18 was 0.39 ng/mL. Currently taking Tamsulosin and Finasteride. \par 8/28/18: The patient returned and reported his urination is satisfactory. Currently taking tamsulosin and finasteride. Wakes up 3-4 times during the night to urinate. Noted he had a recent flare up of dysuria 3 days ago and took 2 days of Cipro and symptom have cleared up. \par 3/7/19: The patient returned and reported that he had another flare up of prostatitis and took 2 days of Cipro for his symptoms to clear. Complained of lingering dysuria. I reminded the patient that with Cipro, there is a side effect of tendonitis. Denied gross hematuria. Currently taking Trimethoprim, Tamsulosin and Finasteride. PSA from 2/15/19 was 0.04 ng/mL.\par \par 4/4/19: The patient returned today and reported that he another flare up of prostatitis. Complains of pain when he stands up. His pain is relieved while sitting down. Occasionally has dysuria. Wakes up twice during the night to urinate. Complains of urinary urgency during the night. Takes Tamsulosin BID, Trimethoprim and Finasteride.\par \par I renewed the patient Sulfamethoxazole -Trimethoprim 800 -160 mg, one tablet twice daily. The patient will discontinue use of Trimethoprim while he takes Bactrim.\par \par The patient produced a urine sample which will be sent for urinalysis, urine cytology, and urine culture.\par \par The patient will undergo a transrectal US prostate.\par \par The patient will return in 2-3 weeks for an US.

## 2019-04-04 NOTE — REVIEW OF SYSTEMS
[Constipation] : constipation [Dysuria] : dysuria [Convulsions] : convulsions [Erectile Dysfunction] : erectile dysfunction [Nocturia] : nocturia [Feeling Poorly] : not feeling poorly [Feeling Tired] : not feeling tired [Eyesight Problems] : no eyesight problems [Loss Of Hearing] : no hearing loss [Chest Pain] : no chest pain [Cough] : no cough [Incontinence] : no incontinence [Confused] : no confusion [Anxiety] : no anxiety [Easy Bleeding] : no tendency for easy bleeding

## 2019-04-04 NOTE — ADDENDUM
[FreeTextEntry1] : This note was authored by Fely Gorman working as a scribe for Dr. Collins Duron.\par I, Dr. Collins Duron, have reviewed the content of this note and confirm it is true and accurate. I personally performed the history and physical examination and made all the decisions.\par 4/4/19\par

## 2019-04-04 NOTE — PHYSICAL EXAM
[General Appearance - Well Developed] : well developed [General Appearance - Well Nourished] : well nourished [Normal Appearance] : normal appearance [General Appearance - In No Acute Distress] : no acute distress [Abdomen Soft] : soft [Abdomen Tenderness] : non-tender [Abdomen Mass (___ Cm)] : no abdominal mass palpated [Skin Color & Pigmentation] : normal skin color and pigmentation [Heart Rate And Rhythm] : Heart rate and rhythm were normal [Edema] : no peripheral edema [] : no respiratory distress [Respiration, Rhythm And Depth] : normal respiratory rhythm and effort [Exaggerated Use Of Accessory Muscles For Inspiration] : no accessory muscle use [Oriented To Time, Place, And Person] : oriented to person, place, and time [Affect] : the affect was normal [Mood] : the mood was normal [Normal Station and Gait] : the gait and station were normal for the patient's age [No Focal Deficits] : no focal deficits [No Palpable Adenopathy] : no palpable adenopathy [Cervical Lymph Nodes Enlarged Posterior Bilaterally] : posterior cervical [Cervical Lymph Nodes Enlarged Anterior Bilaterally] : anterior cervical [Supraclavicular Lymph Nodes Enlarged Bilaterally] : supraclavicular [FreeTextEntry1] : No submandibular gland tenderness.\par

## 2019-04-10 ENCOUNTER — CLINICAL ADVICE (OUTPATIENT)
Age: 74
End: 2019-04-10

## 2019-04-10 ENCOUNTER — RX CHANGE (OUTPATIENT)
Age: 74
End: 2019-04-10

## 2019-04-13 LAB
APPEARANCE: CLEAR
BACTERIA UR CULT: NORMAL
BACTERIA: NEGATIVE
BILIRUBIN URINE: NEGATIVE
BLOOD URINE: NEGATIVE
COLOR: NORMAL
GLUCOSE QUALITATIVE U: NEGATIVE
HYALINE CASTS: 0 /LPF
KETONES URINE: NEGATIVE
LEUKOCYTE ESTERASE URINE: NEGATIVE
MICROSCOPIC-UA: NORMAL
NITRITE URINE: NEGATIVE
PH URINE: 6
PROTEIN URINE: NEGATIVE
RED BLOOD CELLS URINE: 2 /HPF
SPECIFIC GRAVITY URINE: 1.01
SQUAMOUS EPITHELIAL CELLS: 0 /HPF
UROBILINOGEN URINE: NORMAL
WHITE BLOOD CELLS URINE: 4 /HPF

## 2019-04-17 ENCOUNTER — APPOINTMENT (OUTPATIENT)
Dept: COLORECTAL SURGERY | Facility: CLINIC | Age: 74
End: 2019-04-17
Payer: MEDICARE

## 2019-04-17 VITALS
HEIGHT: 64 IN | BODY MASS INDEX: 30.05 KG/M2 | HEART RATE: 64 BPM | DIASTOLIC BLOOD PRESSURE: 60 MMHG | SYSTOLIC BLOOD PRESSURE: 130 MMHG | WEIGHT: 176 LBS | RESPIRATION RATE: 14 BRPM

## 2019-04-17 DIAGNOSIS — K64.9 UNSPECIFIED HEMORRHOIDS: ICD-10-CM

## 2019-04-17 PROCEDURE — 99203 OFFICE O/P NEW LOW 30 MIN: CPT | Mod: 25

## 2019-04-17 PROCEDURE — 45300 PROCTOSIGMOIDOSCOPY DX: CPT

## 2019-04-17 RX ORDER — LEVOFLOXACIN 500 MG/1
500 TABLET, FILM COATED ORAL DAILY
Qty: 10 | Refills: 0 | Status: DISCONTINUED | COMMUNITY
Start: 2018-04-10 | End: 2019-04-17

## 2019-04-17 RX ORDER — CIPROFLOXACIN HYDROCHLORIDE 500 MG/1
500 TABLET, FILM COATED ORAL
Qty: 28 | Refills: 0 | Status: DISCONTINUED | COMMUNITY
Start: 2019-03-07 | End: 2019-04-17

## 2019-04-17 NOTE — REVIEW OF SYSTEMS
[As Noted in HPI] : as noted in HPI [Negative] : Endocrine [Chest Pain] : no chest pain [Shortness Of Breath] : no shortness of breath [Easy Bleeding] : no tendency for easy bleeding [Easy Bruising] : no tendency for easy bruising [FreeTextEntry8] : frequent episodes of prostatitis.

## 2019-04-17 NOTE — ASSESSMENT
[FreeTextEntry1] : 73-year-old gentleman presents with a chief complaint of anal pain. Patient has supposedly been treated by a gastroenterologist and has seen 2 colorectal surgeons for his anal pain. He has received hemorrhoidal banding. He did have a colonoscopy where a benign polyp was removed in the not too distant past. The patient states that his pain is worse with bowel movements. He denies rectal bleeding.\par \par Inspection of the anorectal area is unremarkable. On digital exam pain is elicited anteriorly in the anal canal. Sigmoidoscopy to 10 cm is unremarkable.  On anoscopy he appears to have an anterior midline fissure.\par \par I've asked him to increase his fluid and fiber intake. I have prescribed Anusol suppositories and nifedipine ointment.\par \par

## 2019-04-17 NOTE — HISTORY OF PRESENT ILLNESS
[FreeTextEntry1] : 72yo male with one year hx "hemorrhoidal" pain (denies perianal tissue swelling, rectal bleeding).  Hemorrhoidal banding x2 performed by GI without improvement.  Approx 4 months F/U with colorectal surgeon.  Additional banding performed.  Anal physiology studies revealed muscle weakness (biofeedback)\par \par Presents with persistent pain following daily BM (hx constipation, takes daily psyllium husk)\par \par Last colonoscopy 2016.

## 2019-04-17 NOTE — PHYSICAL EXAM
[Normal Heart Sounds] : normal heart sounds [Normal Rate and Rhythm] : normal rate and rhythm [No Edema] : No edema [Alert] : alert [Oriented to Place] : oriented to place [Oriented to Person] : oriented to person [Oriented to Time] : oriented to time [Calm] : calm [de-identified] : protruberant +BS NT/ND periumbilical scar [de-identified] : NC/AT [de-identified] : mild wheezing [de-identified] : +ROM [de-identified] : intact

## 2019-04-24 ENCOUNTER — APPOINTMENT (OUTPATIENT)
Dept: UROLOGY | Facility: CLINIC | Age: 74
End: 2019-04-24

## 2019-05-08 ENCOUNTER — APPOINTMENT (OUTPATIENT)
Dept: COLORECTAL SURGERY | Facility: CLINIC | Age: 74
End: 2019-05-08
Payer: MEDICARE

## 2019-05-08 PROCEDURE — 99213 OFFICE O/P EST LOW 20 MIN: CPT

## 2019-05-09 ENCOUNTER — APPOINTMENT (OUTPATIENT)
Dept: UROLOGY | Facility: CLINIC | Age: 74
End: 2019-05-09
Payer: MEDICARE

## 2019-05-09 ENCOUNTER — MOBILE ON CALL (OUTPATIENT)
Age: 74
End: 2019-05-09

## 2019-05-09 ENCOUNTER — OUTPATIENT (OUTPATIENT)
Dept: OUTPATIENT SERVICES | Facility: HOSPITAL | Age: 74
LOS: 1 days | End: 2019-05-09
Payer: COMMERCIAL

## 2019-05-09 DIAGNOSIS — R35.0 FREQUENCY OF MICTURITION: ICD-10-CM

## 2019-05-09 PROCEDURE — 76872 US TRANSRECTAL: CPT

## 2019-05-09 PROCEDURE — 99214 OFFICE O/P EST MOD 30 MIN: CPT | Mod: 25

## 2019-05-09 PROCEDURE — 76872 US TRANSRECTAL: CPT | Mod: 26

## 2019-05-09 NOTE — REVIEW OF SYSTEMS
[Constipation] : constipation [Dysuria] : dysuria [Nocturia] : nocturia [Convulsions] : convulsions [Erectile Dysfunction] : erectile dysfunction [Feeling Poorly] : not feeling poorly [Feeling Tired] : not feeling tired [Eyesight Problems] : no eyesight problems [Loss Of Hearing] : no hearing loss [Chest Pain] : no chest pain [Cough] : no cough [Incontinence] : no incontinence [Confused] : no confusion [Anxiety] : no anxiety [Easy Bleeding] : no tendency for easy bleeding

## 2019-05-09 NOTE — HISTORY OF PRESENT ILLNESS
[FreeTextEntry1] : Pleasant 73-year-old gentleman who presents for follow-up visit. I saw him approximately one month ago with anal pain and an anal fissure. He has been treated with Anusol suppositories and nifedipine ointment.\par \par Prior to treatment he described his pain as 10 out of 10. Currently he rates it at 4-5/10. Pain is worse with hard bowel movements.\par \par Inspection of the anorectal area is unremarkable. On digital exam some pain is elicited anteriorly.\par \par Patient's symptoms are obviously improved. I discussed surgery with him only if he can longer deal with the discomfort. This was reviewed with the patient's wife who is a physician. We discussed a sphincterotomy and time to healing. They will consider their options.

## 2019-05-09 NOTE — ASSESSMENT
[FreeTextEntry1] : Mr Carrizales is a 73 year old man presented for follow up of gross hematuria, urinary frequency and urgency. The patient takes tamsulosin 0.4 mg, one half hour after meal twice daily, finasteride 5 mg once daily, and desipramine 10 mg at bedtime. The patient returned to the office 03/20/17 having gross hematuria with blood clot. Dr Holly Bernardo started the patient on Bactrim DS. He eventually went to the ER 03/21/17 where he had a Spann catheter placed. His creatinine in the ER was 1.01. Cystoscopy 03/23/17 found catheter trauma. Prostate protruded modest distance into the bladder. Bladder has a small capacity. Diffuse oozing of blood from bladder neck and trigone. Scraped up mucosa most likely from introduction of telescope. No bladder stones or tumors. Bladder inflammation from catheter. CT urogram was normal and found no etiology of the gross hematuria.\par 3/1/18: The patient returned and reported he has pain in his rectum area and penis. PSA from 1/24/18 was 0.39 ng/mL. 2/15/18 Creatinine was 1.01 mg/dL, hemoglobin was 14.1 g/dL. He received and abdomen ultrasound which shows a fatty liver, gallstones, and obscured pancreas. He denies any gross hematuria. He noted one day ago his GI physician put banding on his hemorrhoids. \par 4/10/18: The patient returned and reported he finished his antibiotics from last visit and noted he had another hemorrhoid and put another band by his GI. He noted pain still persists. UA from 3/1/18 shows 8 WBC/HPF. CBC from 3/1/18 showed slight anemia. \par 5/23/18: The patient returned and reported he has been taking Bactrim for his most recent prostatitis starting one week ago. PSA from  1/22/18 was 0.39 ng/mL. Currently taking Tamsulosin and Finasteride. \par 8/28/18: The patient returned and reported his urination is satisfactory. Currently taking tamsulosin and finasteride. Wakes up 3-4 times during the night to urinate. Noted he had a recent flare up of dysuria 3 days ago and took 2 days of Cipro and symptom have cleared up. \par 3/7/19: The patient returned and reported that he had another flare up of prostatitis and took 2 days of Cipro for his symptoms to clear. Complained of lingering dysuria. I reminded the patient that with Cipro, there is a side effect of tendonitis. Denied gross hematuria. Currently taking Trimethoprim, Tamsulosin and Finasteride. PSA from 2/15/19 was 0.04 ng/mL.\par 4/4/19: The patient returned and reported that he another flare up of prostatitis. Complained of pain when he stands up. His pain is relieved while sitting down. Occasionally has dysuria. Wakes up twice during the night to urinate. Complained of urinary urgency during the night. Takes Tamsulosin BID, Trimethoprim and Finasteride.\par \par 5/9/19: The patient returned today for an US. Transrectal US of the prostate findings showed a prostate volume of 23.4 cc, a hypoechoic area at the left apex 1.37 x 1.25 cm in transverse image, unremarkable seminal vesicles, and no rectal masses. The hypoechoic are is suspicious for prostate cancer, but the patient has a low PSA. I recommended that the patient undergo a transperineal biopsy for the hyperechoic region, but due to an anal fissure, the patient would like to wait. Notes that he consulted with a colon surgeon about his anal fissure and is currently taking stool softeners. His urination is adequate. Complains of nocturia. Wakes up 5-6 times during the night to urinate. Complains of pain at the tip of his penis. Takes Tamsulosin BID. UA from 4/11/19 was normal. Culture from 4/11/19 showed no growth. Notes that he took Bactrim and ended up getting a rash on his LEs.\par \par The patient produced a urine sample which will be sent for urinalysis, urine cytology, and urine culture.\par Blood work today included comprehensive metabolic panel, CBC, PSA, and testosterone.\par \par The patient will return in one month for a follow up.

## 2019-05-09 NOTE — ADDENDUM
[FreeTextEntry1] : This note was authored by Fely Gorman working as a scribe for Dr. Collins Duron.\par I, Dr. Collins Duron, have reviewed the content of this note and confirm it is true and accurate. I personally performed the history and physical examination and made all the decisions.\par 5/9/19\par

## 2019-05-09 NOTE — PHYSICAL EXAM
[General Appearance - Well Developed] : well developed [Normal Appearance] : normal appearance [General Appearance - Well Nourished] : well nourished [General Appearance - In No Acute Distress] : no acute distress [Abdomen Soft] : soft [Abdomen Tenderness] : non-tender [Abdomen Mass (___ Cm)] : no abdominal mass palpated [Heart Rate And Rhythm] : Heart rate and rhythm were normal [Skin Color & Pigmentation] : normal skin color and pigmentation [] : no respiratory distress [Edema] : no peripheral edema [Oriented To Time, Place, And Person] : oriented to person, place, and time [Respiration, Rhythm And Depth] : normal respiratory rhythm and effort [Exaggerated Use Of Accessory Muscles For Inspiration] : no accessory muscle use [Mood] : the mood was normal [Normal Station and Gait] : the gait and station were normal for the patient's age [Affect] : the affect was normal [No Focal Deficits] : no focal deficits [No Palpable Adenopathy] : no palpable adenopathy [Cervical Lymph Nodes Enlarged Anterior Bilaterally] : anterior cervical [Cervical Lymph Nodes Enlarged Posterior Bilaterally] : posterior cervical [Supraclavicular Lymph Nodes Enlarged Bilaterally] : supraclavicular [FreeTextEntry1] : Smile Symmetric. Tongue is Midline. hand  5/5 bilaterally\par \par

## 2019-05-09 NOTE — PHYSICAL EXAM
[General Appearance - Well Developed] : well developed [Normal Appearance] : normal appearance [General Appearance - Well Nourished] : well nourished [General Appearance - In No Acute Distress] : no acute distress [Abdomen Soft] : soft [Abdomen Mass (___ Cm)] : no abdominal mass palpated [Abdomen Tenderness] : non-tender [Heart Rate And Rhythm] : Heart rate and rhythm were normal [Skin Color & Pigmentation] : normal skin color and pigmentation [Edema] : no peripheral edema [] : no respiratory distress [Respiration, Rhythm And Depth] : normal respiratory rhythm and effort [Oriented To Time, Place, And Person] : oriented to person, place, and time [Exaggerated Use Of Accessory Muscles For Inspiration] : no accessory muscle use [Normal Station and Gait] : the gait and station were normal for the patient's age [Mood] : the mood was normal [Affect] : the affect was normal [No Focal Deficits] : no focal deficits [No Palpable Adenopathy] : no palpable adenopathy [Cervical Lymph Nodes Enlarged Posterior Bilaterally] : posterior cervical [Cervical Lymph Nodes Enlarged Anterior Bilaterally] : anterior cervical [Supraclavicular Lymph Nodes Enlarged Bilaterally] : supraclavicular [FreeTextEntry1] : Smile Symmetric. Tongue is Midline. hand  5/5 bilaterally\par \par

## 2019-05-12 LAB
ALBUMIN SERPL ELPH-MCNC: 4.3 G/DL
ALP BLD-CCNC: 61 U/L
ALT SERPL-CCNC: 26 U/L
ANION GAP SERPL CALC-SCNC: 13 MMOL/L
APPEARANCE: CLEAR
AST SERPL-CCNC: 23 U/L
BACTERIA UR CULT: NORMAL
BACTERIA: NEGATIVE
BASOPHILS # BLD AUTO: 0.04 K/UL
BASOPHILS NFR BLD AUTO: 0.6 %
BILIRUB SERPL-MCNC: 0.3 MG/DL
BILIRUBIN URINE: NEGATIVE
BLOOD URINE: NEGATIVE
BUN SERPL-MCNC: 20 MG/DL
CALCIUM SERPL-MCNC: 9.6 MG/DL
CHLORIDE SERPL-SCNC: 100 MMOL/L
CO2 SERPL-SCNC: 24 MMOL/L
COLOR: NORMAL
CREAT SERPL-MCNC: 1.11 MG/DL
EOSINOPHIL # BLD AUTO: 0.18 K/UL
EOSINOPHIL NFR BLD AUTO: 2.7 %
GLUCOSE QUALITATIVE U: NEGATIVE
GLUCOSE SERPL-MCNC: 95 MG/DL
HCT VFR BLD CALC: 41.4 %
HGB BLD-MCNC: 12.2 G/DL
HYALINE CASTS: 1 /LPF
IMM GRANULOCYTES NFR BLD AUTO: 0.2 %
KETONES URINE: NEGATIVE
LEUKOCYTE ESTERASE URINE: NEGATIVE
LYMPHOCYTES # BLD AUTO: 2.16 K/UL
LYMPHOCYTES NFR BLD AUTO: 32.6 %
MAN DIFF?: NORMAL
MCHC RBC-ENTMCNC: 25.5 PG
MCHC RBC-ENTMCNC: 29.5 GM/DL
MCV RBC AUTO: 86.6 FL
MICROSCOPIC-UA: NORMAL
MONOCYTES # BLD AUTO: 0.61 K/UL
MONOCYTES NFR BLD AUTO: 9.2 %
NEUTROPHILS # BLD AUTO: 3.62 K/UL
NEUTROPHILS NFR BLD AUTO: 54.7 %
NITRITE URINE: NEGATIVE
PH URINE: 6
PLATELET # BLD AUTO: 236 K/UL
POTASSIUM SERPL-SCNC: 4.6 MMOL/L
PROT SERPL-MCNC: 7 G/DL
PROTEIN URINE: NEGATIVE
PSA SERPL-MCNC: 0.69 NG/ML
RBC # BLD: 4.78 M/UL
RBC # FLD: 15.2 %
RED BLOOD CELLS URINE: 1 /HPF
SODIUM SERPL-SCNC: 137 MMOL/L
SPECIFIC GRAVITY URINE: 1.01
SQUAMOUS EPITHELIAL CELLS: 0 /HPF
TESTOST SERPL-MCNC: 267 NG/DL
URINE CYTOLOGY: NORMAL
UROBILINOGEN URINE: NORMAL
WBC # FLD AUTO: 6.62 K/UL
WHITE BLOOD CELLS URINE: 3 /HPF

## 2019-05-14 DIAGNOSIS — K60.2 ANAL FISSURE, UNSPECIFIED: ICD-10-CM

## 2019-05-14 DIAGNOSIS — R39.15 URGENCY OF URINATION: ICD-10-CM

## 2019-05-14 DIAGNOSIS — K62.89 OTHER SPECIFIED DISEASES OF ANUS AND RECTUM: ICD-10-CM

## 2019-05-14 DIAGNOSIS — R31.29 OTHER MICROSCOPIC HEMATURIA: ICD-10-CM

## 2019-05-14 DIAGNOSIS — R93.89 ABNORMAL FINDINGS ON DIAGNOSTIC IMAGING OF OTHER SPECIFIED BODY STRUCTURES: ICD-10-CM

## 2019-05-14 DIAGNOSIS — N41.9 INFLAMMATORY DISEASE OF PROSTATE, UNSPECIFIED: ICD-10-CM

## 2019-05-14 DIAGNOSIS — N40.1 BENIGN PROSTATIC HYPERPLASIA WITH LOWER URINARY TRACT SYMPTOMS: ICD-10-CM

## 2019-05-14 DIAGNOSIS — N30.20 OTHER CHRONIC CYSTITIS WITHOUT HEMATURIA: ICD-10-CM

## 2019-05-14 DIAGNOSIS — R35.1 NOCTURIA: ICD-10-CM

## 2019-05-20 ENCOUNTER — MESSAGE (OUTPATIENT)
Age: 74
End: 2019-05-20

## 2019-06-10 ENCOUNTER — APPOINTMENT (OUTPATIENT)
Dept: UROLOGY | Facility: CLINIC | Age: 74
End: 2019-06-10
Payer: MEDICARE

## 2019-06-10 PROCEDURE — 99214 OFFICE O/P EST MOD 30 MIN: CPT

## 2019-06-10 NOTE — ASSESSMENT
[FreeTextEntry1] : Mr Carrizales is a 73 year old man presented for follow up of gross hematuria, urinary frequency and urgency. The patient takes tamsulosin 0.4 mg, one half hour after meal twice daily, finasteride 5 mg once daily, and desipramine 10 mg at bedtime. The patient returned to the office 03/20/17 having gross hematuria with blood clot. Dr Holly Bernardo started the patient on Bactrim DS. He eventually went to the ER 03/21/17 where he had a Spann catheter placed. His creatinine in the ER was 1.01. Cystoscopy 03/23/17 found catheter trauma. Prostate protruded modest distance into the bladder. Bladder has a small capacity. Diffuse oozing of blood from bladder neck and trigone. Scraped up mucosa most likely from introduction of telescope. No bladder stones or tumors. Bladder inflammation from catheter. CT urogram was normal and found no etiology of the gross hematuria.\par 3/1/18: The patient returned and reported he has pain in his rectum area and penis. PSA from 1/24/18 was 0.39 ng/mL. 2/15/18 Creatinine was 1.01 mg/dL, hemoglobin was 14.1 g/dL. He received and abdomen ultrasound which shows a fatty liver, gallstones, and obscured pancreas. He denies any gross hematuria. He noted one day ago his GI physician put banding on his hemorrhoids. \par 4/10/18: The patient returned and reported he finished his antibiotics from last visit and noted he had another hemorrhoid and put another band by his GI. He noted pain still persists. UA from 3/1/18 shows 8 WBC/HPF. CBC from 3/1/18 showed slight anemia. \par 5/23/18: The patient returned and reported he has been taking Bactrim for his most recent prostatitis starting one week ago. PSA from  1/22/18 was 0.39 ng/mL. Currently taking Tamsulosin and Finasteride. \par 8/28/18: The patient returned and reported his urination is satisfactory. Currently taking tamsulosin and finasteride. Wakes up 3-4 times during the night to urinate. Noted he had a recent flare up of dysuria 3 days ago and took 2 days of Cipro and symptom have cleared up. \par 3/7/19: The patient returned and reported that he had another flare up of prostatitis and took 2 days of Cipro for his symptoms to clear. Complained of lingering dysuria. I reminded the patient that with Cipro, there is a side effect of tendonitis. Denied gross hematuria. Currently taking Trimethoprim, Tamsulosin and Finasteride. PSA from 2/15/19 was 0.04 ng/mL.\par 4/4/19: The patient returned and reported that he another flare up of prostatitis. Complained of pain when he stands up. His pain is relieved while sitting down. Occasionally has dysuria. Wakes up twice during the night to urinate. Complained of urinary urgency during the night. Takes Tamsulosin BID, Trimethoprim and Finasteride.\par 5/9/19: The patient returned today for an US. Transrectal US of the prostate findings showed a prostate volume of 23.4 cc, a hypoechoic area at the left apex 1.37 x 1.25 cm in transverse image, unremarkable seminal vesicles, and no rectal masses. The hypoechoic are is suspicious for prostate cancer, but the patient has a low PSA. I recommended that the patient undergo a transperineal biopsy for the hyperechoic region, but due to an anal fissure, the patient would like to wait. Notes that he consulted with a colon surgeon about his anal fissure and is currently taking stool softeners. His urination is adequate. Complains of nocturia. Wakes up 5-6 times during the night to urinate. Complains of pain at the tip of his penis. Takes Tamsulosin BID. UA from 4/11/19 was normal. Culture from 4/11/19 showed no growth. Notes that he took Bactrim and ended up getting a rash on his LEs.\par \par 6/10/19: Patient presents today for a follow up. Today he reports that he is scheduled for an anal fissure operation for 6/21/19. It has been advised that he will need 4 weeks to heal following the operation. Regarding his urination he reports that during the day there are no urinary issues. Nocturia is reported and he will have to wake up 5-6x a night to urinate. Finasteride and Tamsulosin are taken as directed. \par \par The patient produced a urine sample which will be sent for urinalysis, urine cytology and urine culture. \par A blood test is to be completed next week due to desmopressin being initiated. \par \par Desmopressin is to be initiated at this time. I believe this will help decrease the frequency of the reported nocturia.  \par \par A biopsy of the prostate is still to be completed. \par \par He is to follow up 3 weeks after the surgery for an office visit and then another week after that to have the biopsy completed.

## 2019-06-10 NOTE — REVIEW OF SYSTEMS
[Constipation] : constipation [Dysuria] : dysuria [Nocturia] : nocturia [Convulsions] : convulsions [Erectile Dysfunction] : erectile dysfunction [Feeling Poorly] : not feeling poorly [Eyesight Problems] : no eyesight problems [Feeling Tired] : not feeling tired [Loss Of Hearing] : no hearing loss [Chest Pain] : no chest pain [Confused] : no confusion [Cough] : no cough [Incontinence] : no incontinence [Anxiety] : no anxiety [Easy Bleeding] : no tendency for easy bleeding

## 2019-06-10 NOTE — ADDENDUM
[FreeTextEntry1] : This note was authored by Tin Jarrett working as scribe for Dr. Collins Duron. I, Dr. Collins Duron, have reviewed the content of this note and confirm it is true and accurate. I personally performed the history and physical examination and made all the decisions.\par 6/10/19

## 2019-06-10 NOTE — HISTORY OF PRESENT ILLNESS
[FreeTextEntry1] : Patient with low PSA . Hypoechoic area present and patient agreed to prostate Bx after he has  his fistula  repaired on June 21st.\par \par Currently on Flomax twice a day and Proscar. Stat s that he has to strain to initiate urination and has often slow  urine flow. Bothersome nocturia times 5-6.

## 2019-06-10 NOTE — PHYSICAL EXAM
[General Appearance - Well Developed] : well developed [General Appearance - Well Nourished] : well nourished [General Appearance - In No Acute Distress] : no acute distress [Normal Appearance] : normal appearance [Abdomen Tenderness] : non-tender [Abdomen Soft] : soft [Abdomen Mass (___ Cm)] : no abdominal mass palpated [Skin Color & Pigmentation] : normal skin color and pigmentation [Heart Rate And Rhythm] : Heart rate and rhythm were normal [] : no respiratory distress [Respiration, Rhythm And Depth] : normal respiratory rhythm and effort [Exaggerated Use Of Accessory Muscles For Inspiration] : no accessory muscle use [Oriented To Time, Place, And Person] : oriented to person, place, and time [Normal Station and Gait] : the gait and station were normal for the patient's age [Mood] : the mood was normal [Affect] : the affect was normal [No Focal Deficits] : no focal deficits [No Palpable Adenopathy] : no palpable adenopathy [Cervical Lymph Nodes Enlarged Posterior Bilaterally] : posterior cervical [Cervical Lymph Nodes Enlarged Anterior Bilaterally] : anterior cervical [Supraclavicular Lymph Nodes Enlarged Bilaterally] : supraclavicular [FreeTextEntry1] : No submandibular gland tenderness.\par

## 2019-06-10 NOTE — HISTORY OF PRESENT ILLNESS
[FreeTextEntry1] : Mr Carrizales is a 73 year old man presented for follow up of gross hematuria, urinary frequency and urgency. The patient takes tamsulosin 0.4 mg, one half hour after meal twice daily, finasteride 5 mg once daily, and desipramine 10 mg at bedtime. The patient returned to the office 03/20/17 having gross hematuria with blood clot. Dr Holly Bernardo started the patient on Bactrim DS. He eventually went to the ER 03/21/17 where he had a Spann catheter placed. His creatinine in the ER was 1.01. Cystoscopy 03/23/17 found catheter trauma. Prostate protruded modest distance into the bladder. Bladder has a small capacity. Diffuse oozing of blood from bladder neck and trigone. Scraped up mucosa most likely from introduction of telescope. No bladder stones or tumors. Bladder inflammation from catheter. CT urogram was normal and found no etiology of the gross hematuria.\par 3/1/18: The patient returned and reported he has pain in his rectum area and penis. PSA from 1/24/18 was 0.39 ng/mL. 2/15/18 Creatinine was 1.01 mg/dL, hemoglobin was 14.1 g/dL. He received and abdomen ultrasound which shows a fatty liver, gallstones, and obscured pancreas. He denies any gross hematuria. He noted one day ago his GI physician put banding on his hemorrhoids. \par 4/10/18: The patient returned and reported he finished his antibiotics from last visit and noted he had another hemorrhoid and put another band by his GI. He noted pain still persists. UA from 3/1/18 shows 8 WBC/HPF. CBC from 3/1/18 showed slight anemia. \par 5/23/18: The patient returned and reported he has been taking Bactrim for his most recent prostatitis starting one week ago. PSA from  1/22/18 was 0.39 ng/mL. Currently taking Tamsulosin and Finasteride. \par 8/28/18: The patient returned and reported his urination is satisfactory. Currently taking tamsulosin and finasteride. Wakes up 3-4 times during the night to urinate. Noted he had a recent flare up of dysuria 3 days ago and took 2 days of Cipro and symptom have cleared up. \par 3/7/19: The patient returned and reported that he had another flare up of prostatitis and took 2 days of Cipro for his symptoms to clear. Complained of lingering dysuria. I reminded the patient that with Cipro, there is a side effect of tendonitis. Denied gross hematuria. Currently taking Trimethoprim, Tamsulosin and Finasteride. PSA from 2/15/19 was 0.04 ng/mL.\par 4/4/19: The patient returned and reported that he another flare up of prostatitis. Complained of pain when he stands up. His pain is relieved while sitting down. Occasionally has dysuria. Wakes up twice during the night to urinate. Complained of urinary urgency during the night. Takes Tamsulosin BID, Trimethoprim and Finasteride.\par 5/9/19: The patient returned today for an US. Transrectal US of the prostate findings showed a prostate volume of 23.4 cc, a hypoechoic area at the left apex 1.37 x 1.25 cm in transverse image, unremarkable seminal vesicles, and no rectal masses. The hypoechoic are is suspicious for prostate cancer, but the patient has a low PSA. I recommended that the patient undergo a transperineal biopsy for the hyperechoic region, but due to an anal fissure, the patient would like to wait. Notes that he consulted with a colon surgeon about his anal fissure and is currently taking stool softeners. His urination is adequate. Complains of nocturia. Wakes up 5-6 times during the night to urinate. Complains of pain at the tip of his penis. Takes Tamsulosin BID. UA from 4/11/19 was normal. Culture from 4/11/19 showed no growth. Notes that he took Bactrim and ended up getting a rash on his LEs.\par \par 6/10/19: Patient presents today for a follow up. Today he reports that he is scheduled for an anal fissure operation for 6/21/19. It has been advised that he will need 4 weeks to heal following the operation. Regarding his urination he reports that during the day there are no urinary issues. Nocturia is reported and he will have to wake up 5-6x a night to urinate. Finasteride and Tamsulosin are taken as directed.

## 2019-06-12 ENCOUNTER — OUTPATIENT (OUTPATIENT)
Dept: OUTPATIENT SERVICES | Facility: HOSPITAL | Age: 74
LOS: 1 days | End: 2019-06-12
Payer: MEDICARE

## 2019-06-12 VITALS
OXYGEN SATURATION: 97 % | HEART RATE: 79 BPM | DIASTOLIC BLOOD PRESSURE: 76 MMHG | TEMPERATURE: 98 F | HEIGHT: 64 IN | SYSTOLIC BLOOD PRESSURE: 129 MMHG | RESPIRATION RATE: 16 BRPM | WEIGHT: 175.05 LBS

## 2019-06-12 DIAGNOSIS — K60.2 ANAL FISSURE, UNSPECIFIED: ICD-10-CM

## 2019-06-12 DIAGNOSIS — Z01.818 ENCOUNTER FOR OTHER PREPROCEDURAL EXAMINATION: ICD-10-CM

## 2019-06-12 DIAGNOSIS — Z90.49 ACQUIRED ABSENCE OF OTHER SPECIFIED PARTS OF DIGESTIVE TRACT: Chronic | ICD-10-CM

## 2019-06-12 DIAGNOSIS — E03.9 HYPOTHYROIDISM, UNSPECIFIED: ICD-10-CM

## 2019-06-12 DIAGNOSIS — I10 ESSENTIAL (PRIMARY) HYPERTENSION: ICD-10-CM

## 2019-06-12 LAB
ANION GAP SERPL CALC-SCNC: 10 MMOL/L — SIGNIFICANT CHANGE UP (ref 5–17)
BUN SERPL-MCNC: 15 MG/DL — SIGNIFICANT CHANGE UP (ref 7–23)
CALCIUM SERPL-MCNC: 10 MG/DL — SIGNIFICANT CHANGE UP (ref 8.4–10.5)
CHLORIDE SERPL-SCNC: 102 MMOL/L — SIGNIFICANT CHANGE UP (ref 96–108)
CO2 SERPL-SCNC: 27 MMOL/L — SIGNIFICANT CHANGE UP (ref 22–31)
CREAT SERPL-MCNC: 0.96 MG/DL — SIGNIFICANT CHANGE UP (ref 0.5–1.3)
GLUCOSE SERPL-MCNC: 111 MG/DL — HIGH (ref 70–99)
HCT VFR BLD CALC: 43.2 % — SIGNIFICANT CHANGE UP (ref 39–50)
HGB BLD-MCNC: 13.1 G/DL — SIGNIFICANT CHANGE UP (ref 13–17)
MCHC RBC-ENTMCNC: 25.3 PG — LOW (ref 27–34)
MCHC RBC-ENTMCNC: 30.3 GM/DL — LOW (ref 32–36)
MCV RBC AUTO: 83.6 FL — SIGNIFICANT CHANGE UP (ref 80–100)
PLATELET # BLD AUTO: 255 K/UL — SIGNIFICANT CHANGE UP (ref 150–400)
POTASSIUM SERPL-MCNC: 4.2 MMOL/L — SIGNIFICANT CHANGE UP (ref 3.5–5.3)
POTASSIUM SERPL-SCNC: 4.2 MMOL/L — SIGNIFICANT CHANGE UP (ref 3.5–5.3)
RBC # BLD: 5.17 M/UL — SIGNIFICANT CHANGE UP (ref 4.2–5.8)
RBC # FLD: 15.4 % — HIGH (ref 10.3–14.5)
SODIUM SERPL-SCNC: 139 MMOL/L — SIGNIFICANT CHANGE UP (ref 135–145)
WBC # BLD: 7.73 K/UL — SIGNIFICANT CHANGE UP (ref 3.8–10.5)
WBC # FLD AUTO: 7.73 K/UL — SIGNIFICANT CHANGE UP (ref 3.8–10.5)

## 2019-06-12 PROCEDURE — 80048 BASIC METABOLIC PNL TOTAL CA: CPT

## 2019-06-12 PROCEDURE — G0463: CPT

## 2019-06-12 PROCEDURE — 85027 COMPLETE CBC AUTOMATED: CPT

## 2019-06-12 RX ORDER — CELECOXIB 200 MG/1
200 CAPSULE ORAL ONCE
Refills: 0 | Status: DISCONTINUED | OUTPATIENT
Start: 2019-06-21 | End: 2019-07-06

## 2019-06-12 RX ORDER — LIDOCAINE HCL 20 MG/ML
0.2 VIAL (ML) INJECTION ONCE
Refills: 0 | Status: DISCONTINUED | OUTPATIENT
Start: 2019-06-21 | End: 2019-07-06

## 2019-06-12 RX ORDER — ACETAMINOPHEN 500 MG
1000 TABLET ORAL ONCE
Refills: 0 | Status: DISCONTINUED | OUTPATIENT
Start: 2019-06-21 | End: 2019-07-06

## 2019-06-12 RX ORDER — SODIUM CHLORIDE 9 MG/ML
3 INJECTION INTRAMUSCULAR; INTRAVENOUS; SUBCUTANEOUS EVERY 8 HOURS
Refills: 0 | Status: DISCONTINUED | OUTPATIENT
Start: 2019-06-21 | End: 2019-07-06

## 2019-06-12 NOTE — H&P PST ADULT - NSANTHOSAYNRD_GEN_A_CORE
No. RHEA screening performed.  STOP BANG Legend: 0-2 = LOW Risk; 3-4 = INTERMEDIATE Risk; 5-8 = HIGH Risk

## 2019-06-12 NOTE — H&P PST ADULT - NEGATIVE NEUROLOGICAL SYMPTOMS
no paresthesias/no focal seizures/no loss of sensation/no difficulty walking/no loss of consciousness/no generalized seizures/no tremors/no hemiparesis/no facial palsy/no weakness/no syncope/no vertigo/no headache/no confusion

## 2019-06-12 NOTE — REVIEW OF SYSTEMS
[Constipation] : constipation [Convulsions] : convulsions [Erectile Dysfunction] : erectile dysfunction [Dysuria] : dysuria [Feeling Poorly] : not feeling poorly [Feeling Tired] : not feeling tired [Eyesight Problems] : no eyesight problems [Loss Of Hearing] : no hearing loss no [Chest Pain] : no chest pain [Cough] : no cough [Incontinence] : no incontinence [Confused] : no confusion [Anxiety] : no anxiety [Easy Bleeding] : no tendency for easy bleeding

## 2019-06-12 NOTE — H&P PST ADULT - NSICDXPASTSURGICALHX_GEN_ALL_CORE_FT
PAST SURGICAL HISTORY:  History of appendectomy 2012    History of craniotomy, resection of meningioma Jan 2012    S/P B/L cataract surgery 2002

## 2019-06-12 NOTE — H&P PST ADULT - HISTORY OF PRESENT ILLNESS
74 yo male with PMH of HTN, hypothyroidism, asthma( Stable), s/p meningioma resection(2012) c/o rectal pain & pressure x 3 months. Pt had surgical consult revealed anal fissure. Pt scheduled for sphincterotomy on 06/21/2019. Pt denies any fever, chills or rectal bleeding.

## 2019-06-12 NOTE — H&P PST ADULT - NSICDXFAMILYHX_GEN_ALL_CORE_FT
FAMILY HISTORY:  Mother  Still living? Unknown  Family history of throat cancer, Age at diagnosis: Age Unknown    Grandparent  Still living? Unknown  Family history of asthma, Age at diagnosis: Age Unknown

## 2019-06-12 NOTE — H&P PST ADULT - NSICDXPROBLEM_GEN_ALL_CORE_FT
PROBLEM DIAGNOSES  Problem: Anal fissure  Assessment and Plan: Sphincterotomy  labs- CBC, BMP  Pre op instructions discussed    Problem: Hypothyroidism  Assessment and Plan: continue with levothyroxine    Problem: Benign hypertension  Assessment and Plan: Continue with metoprolol

## 2019-06-12 NOTE — H&P PST ADULT - NSICDXPASTMEDICALHX_GEN_ALL_CORE_FT
PAST MEDICAL HISTORY:  Age-related cataract     Asthma controlled- 2/2 cold weather    Bronchitis 03/2019    Cystitis     Hemangioma benign - stable last Neuro eval on 12/2019    Hypertension x 5 years    Hypothyroid     Overactive bladder PAST MEDICAL HISTORY:  Age-related cataract     Asthma controlled- 2/2 cold weather    Bronchitis 03/2019    Cystitis     Generalized OA     Hemangioma benign - stable last Neuro eval on 12/2019    Hypertension x 5 years    Hypothyroid     Overactive bladder

## 2019-06-19 ENCOUNTER — APPOINTMENT (OUTPATIENT)
Dept: UROLOGY | Facility: CLINIC | Age: 74
End: 2019-06-19

## 2019-06-20 ENCOUNTER — TRANSCRIPTION ENCOUNTER (OUTPATIENT)
Age: 74
End: 2019-06-20

## 2019-06-21 ENCOUNTER — OUTPATIENT (OUTPATIENT)
Dept: OUTPATIENT SERVICES | Facility: HOSPITAL | Age: 74
LOS: 1 days | End: 2019-06-21
Payer: MEDICARE

## 2019-06-21 ENCOUNTER — APPOINTMENT (OUTPATIENT)
Dept: COLORECTAL SURGERY | Facility: HOSPITAL | Age: 74
End: 2019-06-21

## 2019-06-21 VITALS
RESPIRATION RATE: 16 BRPM | TEMPERATURE: 98 F | SYSTOLIC BLOOD PRESSURE: 129 MMHG | OXYGEN SATURATION: 97 % | HEART RATE: 79 BPM | HEIGHT: 64 IN | DIASTOLIC BLOOD PRESSURE: 76 MMHG | WEIGHT: 175.05 LBS

## 2019-06-21 VITALS
RESPIRATION RATE: 16 BRPM | TEMPERATURE: 97 F | HEART RATE: 66 BPM | OXYGEN SATURATION: 100 % | DIASTOLIC BLOOD PRESSURE: 63 MMHG | SYSTOLIC BLOOD PRESSURE: 130 MMHG

## 2019-06-21 DIAGNOSIS — K60.2 ANAL FISSURE, UNSPECIFIED: ICD-10-CM

## 2019-06-21 DIAGNOSIS — Z90.49 ACQUIRED ABSENCE OF OTHER SPECIFIED PARTS OF DIGESTIVE TRACT: Chronic | ICD-10-CM

## 2019-06-21 DIAGNOSIS — Z01.818 ENCOUNTER FOR OTHER PREPROCEDURAL EXAMINATION: ICD-10-CM

## 2019-06-21 PROBLEM — N32.81 OVERACTIVE BLADDER: Chronic | Status: ACTIVE | Noted: 2019-06-12

## 2019-06-21 PROBLEM — M15.9 POLYOSTEOARTHRITIS, UNSPECIFIED: Chronic | Status: ACTIVE | Noted: 2019-06-12

## 2019-06-21 PROBLEM — J40 BRONCHITIS, NOT SPECIFIED AS ACUTE OR CHRONIC: Chronic | Status: ACTIVE | Noted: 2019-01-25

## 2019-06-21 PROCEDURE — 46080 SPHNCTROTMY ANAL DIV SPHNCTR: CPT

## 2019-06-21 RX ORDER — OXYCODONE HYDROCHLORIDE 5 MG/1
5 TABLET ORAL ONCE
Refills: 0 | Status: DISCONTINUED | OUTPATIENT
Start: 2019-06-21 | End: 2019-06-21

## 2019-06-21 RX ORDER — OXYCODONE HYDROCHLORIDE 5 MG/1
10 TABLET ORAL ONCE
Refills: 0 | Status: DISCONTINUED | OUTPATIENT
Start: 2019-06-21 | End: 2019-06-21

## 2019-06-21 RX ORDER — ONDANSETRON 8 MG/1
4 TABLET, FILM COATED ORAL ONCE
Refills: 0 | Status: DISCONTINUED | OUTPATIENT
Start: 2019-06-21 | End: 2019-07-06

## 2019-06-21 RX ORDER — SODIUM CHLORIDE 9 MG/ML
1000 INJECTION, SOLUTION INTRAVENOUS
Refills: 0 | Status: DISCONTINUED | OUTPATIENT
Start: 2019-06-21 | End: 2019-07-06

## 2019-06-21 RX ORDER — CELECOXIB 200 MG/1
200 CAPSULE ORAL ONCE
Refills: 0 | Status: DISCONTINUED | OUTPATIENT
Start: 2019-06-21 | End: 2019-07-06

## 2019-06-21 RX ORDER — OXYCODONE HYDROCHLORIDE 5 MG/1
1 TABLET ORAL
Qty: 5 | Refills: 0
Start: 2019-06-21

## 2019-06-21 NOTE — ASU DISCHARGE PLAN (ADULT/PEDIATRIC) - CALL YOUR DOCTOR IF YOU HAVE ANY OF THE FOLLOWING:
Wound/Surgical Site with redness, or foul smelling discharge or pus/Bleeding that does not stop/Unable to urinate

## 2019-06-21 NOTE — ASU PATIENT PROFILE, ADULT - PSH
History of appendectomy  2012  History of craniotomy, resection of meningioma  Jan 2012  S/P B/L cataract surgery  2002

## 2019-06-21 NOTE — ASU PATIENT PROFILE, ADULT - PMH
Age-related cataract    Asthma  controlled- 2/2 cold weather  Bronchitis  03/2019  Cystitis    Generalized OA    Hemangioma  benign - stable last Neuro eval on 12/2019  Hypertension  x 5 years  Hypothyroid    Overactive bladder

## 2019-06-25 PROCEDURE — C1889: CPT

## 2019-06-25 PROCEDURE — 46200 REMOVAL OF ANAL FISSURE: CPT

## 2019-07-03 ENCOUNTER — APPOINTMENT (OUTPATIENT)
Dept: COLORECTAL SURGERY | Facility: CLINIC | Age: 74
End: 2019-07-03
Payer: MEDICARE

## 2019-07-03 VITALS
HEART RATE: 86 BPM | SYSTOLIC BLOOD PRESSURE: 124 MMHG | OXYGEN SATURATION: 95 % | DIASTOLIC BLOOD PRESSURE: 70 MMHG | TEMPERATURE: 98.3 F

## 2019-07-03 PROCEDURE — 99024 POSTOP FOLLOW-UP VISIT: CPT

## 2019-07-03 RX ORDER — NYSTATIN 100000 1/G
100000 POWDER TOPICAL
Qty: 1 | Refills: 0 | Status: ACTIVE | COMMUNITY
Start: 2019-07-03 | End: 1900-01-01

## 2019-07-09 NOTE — HISTORY OF PRESENT ILLNESS
[FreeTextEntry1] : 73-year-old gentleman who presents for his first postoperative visit. The patient is 10 days status post sphincterotomy for an anal fissure.\par \par He complains of intermittent anorectal pain. He is moving his bowels and denies bleeding.\par \par Inspection of the perianal area reveals moisture and significant irritation. There is no evidence of infection.\par \par Patient has not been using a gauze pad as I had instructed him and this is resulting in perianal irritation related to moisture from his sphincterotomy site.\par \par He was advised once again to use a dry gauze in between the crevice of his buttocks and apply Mycostatin powder to the area twice daily. I will see him in one month.

## 2019-07-17 ENCOUNTER — APPOINTMENT (OUTPATIENT)
Dept: COLORECTAL SURGERY | Facility: CLINIC | Age: 74
End: 2019-07-17
Payer: MEDICARE

## 2019-07-17 PROCEDURE — 99212 OFFICE O/P EST SF 10 MIN: CPT

## 2019-07-18 ENCOUNTER — APPOINTMENT (OUTPATIENT)
Dept: UROLOGY | Facility: CLINIC | Age: 74
End: 2019-07-18
Payer: MEDICARE

## 2019-07-18 LAB — OSMOLALITY SERPL: 293 MOSMOL/KG

## 2019-07-18 PROCEDURE — 99214 OFFICE O/P EST MOD 30 MIN: CPT

## 2019-07-18 NOTE — HISTORY OF PRESENT ILLNESS
[FreeTextEntry1] : 73-year-old gentleman who is 3 weeks status post sphincterotomy for fissure.\par \par Patient complains of intermittent significant pain. He is having soft bowel movements. He is taking Metamucil daily and 3 stool softeners. He denies any caffeinated product intake.\par \par Inspection of the anorectal area reveals some very superficial skin fissuring perianally. His sphincterotomy site seems to be healing nicely.\par \par I asked the patient to use Mycostatin powder to the affected area twice daily. I want him to simply give the area more time to fully heal.\par \par His wife states that he has an elevated PSA and the urologist wants to perform a biopsy. I would not advocate a transrectal biopsy as this would exacerbate his underlying fissure and postoperative state. If possible, I would recommend a transperineal prostate biopsy.

## 2019-07-19 ENCOUNTER — MEDICATION RENEWAL (OUTPATIENT)
Age: 74
End: 2019-07-19

## 2019-07-23 LAB
ANION GAP SERPL CALC-SCNC: 14 MMOL/L
APPEARANCE: CLEAR
BACTERIA UR CULT: NORMAL
BACTERIA: NEGATIVE
BILIRUBIN URINE: NEGATIVE
BLOOD URINE: NEGATIVE
BUN SERPL-MCNC: 17 MG/DL
CALCIUM SERPL-MCNC: 9.6 MG/DL
CHLORIDE SERPL-SCNC: 100 MMOL/L
CO2 SERPL-SCNC: 26 MMOL/L
COLOR: NORMAL
CREAT SERPL-MCNC: 1.07 MG/DL
GLUCOSE QUALITATIVE U: NEGATIVE
GLUCOSE SERPL-MCNC: 66 MG/DL
HYALINE CASTS: 0 /LPF
KETONES URINE: NEGATIVE
LEUKOCYTE ESTERASE URINE: NEGATIVE
MICROSCOPIC-UA: NORMAL
NITRITE URINE: NEGATIVE
PH URINE: 7
POTASSIUM SERPL-SCNC: 4.3 MMOL/L
PROTEIN URINE: NEGATIVE
RED BLOOD CELLS URINE: 0 /HPF
SODIUM SERPL-SCNC: 140 MMOL/L
SPECIFIC GRAVITY URINE: 1.01
SQUAMOUS EPITHELIAL CELLS: 0 /HPF
UROBILINOGEN URINE: NORMAL
WHITE BLOOD CELLS URINE: 1 /HPF

## 2019-07-24 ENCOUNTER — CLINICAL ADVICE (OUTPATIENT)
Age: 74
End: 2019-07-24

## 2019-07-30 NOTE — ADDENDUM
[FreeTextEntry1] : This note was authored by Tin Jarrett working as scribe for Dr. Collins Duron. I, Dr. Collins Duron, have reviewed the content of this note and confirm it is true and accurate. I personally performed the history and physical examination and made all the decisions.\par 7/18/19

## 2019-07-30 NOTE — REVIEW OF SYSTEMS
[Constipation] : constipation [Dysuria] : dysuria [Nocturia] : nocturia [Convulsions] : convulsions [Erectile Dysfunction] : erectile dysfunction [Feeling Poorly] : not feeling poorly [Eyesight Problems] : no eyesight problems [Feeling Tired] : not feeling tired [Loss Of Hearing] : no hearing loss [Chest Pain] : no chest pain [Cough] : no cough [Incontinence] : no incontinence [Confused] : no confusion [Anxiety] : no anxiety [Easy Bleeding] : no tendency for easy bleeding

## 2019-07-30 NOTE — ASSESSMENT
[FreeTextEntry1] : Mr Carrizales is a 73 year old man presented for follow up of gross hematuria, urinary frequency and urgency. The patient takes tamsulosin 0.4 mg, one half hour after meal twice daily, finasteride 5 mg once daily, and desipramine 10 mg at bedtime. The patient returned to the office 03/20/17 having gross hematuria with blood clot. Dr Holly Bernardo started the patient on Bactrim DS. He eventually went to the ER 03/21/17 where he had a Spann catheter placed. His creatinine in the ER was 1.01. Cystoscopy 03/23/17 found catheter trauma. Prostate protruded modest distance into the bladder. Bladder has a small capacity. Diffuse oozing of blood from bladder neck and trigone. Scraped up mucosa most likely from introduction of telescope. No bladder stones or tumors. Bladder inflammation from catheter. CT urogram was normal and found no etiology of the gross hematuria.\par 3/1/18: The patient returned and reported he has pain in his rectum area and penis. PSA from 1/24/18 was 0.39 ng/mL. 2/15/18 Creatinine was 1.01 mg/dL, hemoglobin was 14.1 g/dL. He received and abdomen ultrasound which shows a fatty liver, gallstones, and obscured pancreas. He denies any gross hematuria. He noted one day ago his GI physician put banding on his hemorrhoids. \par 4/10/18: The patient returned and reported he finished his antibiotics from last visit and noted he had another hemorrhoid and put another band by his GI. He noted pain still persists. UA from 3/1/18 shows 8 WBC/HPF. CBC from 3/1/18 showed slight anemia. \par 5/23/18: The patient returned and reported he has been taking Bactrim for his most recent prostatitis starting one week ago. PSA from  1/22/18 was 0.39 ng/mL. Currently taking Tamsulosin and Finasteride. \par 8/28/18: The patient returned and reported his urination is satisfactory. Currently taking tamsulosin and finasteride. Wakes up 3-4 times during the night to urinate. Noted he had a recent flare up of dysuria 3 days ago and took 2 days of Cipro and symptom have cleared up. \par 3/7/19: The patient returned and reported that he had another flare up of prostatitis and took 2 days of Cipro for his symptoms to clear. Complained of lingering dysuria. I reminded the patient that with Cipro, there is a side effect of tendonitis. Denied gross hematuria. Currently taking Trimethoprim, Tamsulosin and Finasteride. PSA from 2/15/19 was 0.04 ng/mL.\par 4/4/19: The patient returned and reported that he another flare up of prostatitis. Complained of pain when he stands up. His pain is relieved while sitting down. Occasionally has dysuria. Wakes up twice during the night to urinate. Complained of urinary urgency during the night. Takes Tamsulosin BID, Trimethoprim and Finasteride.\par 5/9/19: The patient returned today for an US. Transrectal US of the prostate findings showed a prostate volume of 23.4 cc, a hypoechoic area at the left apex 1.37 x 1.25 cm in transverse image, unremarkable seminal vesicles, and no rectal masses. The hypoechoic are is suspicious for prostate cancer, but the patient has a low PSA. I recommended that the patient undergo a transperineal biopsy for the hyperechoic region, but due to an anal fissure, the patient would like to wait. Notes that he consulted with a colon surgeon about his anal fissure and is currently taking stool softeners. His urination is adequate. Complains of nocturia. Wakes up 5-6 times during the night to urinate. Complains of pain at the tip of his penis. Takes Tamsulosin BID. UA from 4/11/19 was normal. Culture from 4/11/19 showed no growth. Notes that he took Bactrim and ended up getting a rash on his LEs.\par 6/10/19: Patient presents today for a follow up. Today he reports that he is scheduled for an anal fissure operation for 6/21/19. It has been advised that he will need 4 weeks to heal following the operation. Regarding his urination he reports that during the day there are no urinary issues. Nocturia is reported and he will have to wake up 5-6x a night to urinate. Finasteride and Tamsulosin are taken as directed.  \par \par 7/18/19: Patient presents today for a follow up. 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 Patient with hypoechoic lesion of the prostate seen on recent TRUS. PSA 0.690 on 5/2109. Patient on Flomax /Proscar with both irritative and obstructive LUTS. Needs to strain to initiate urination. Nocturia times 5- 6. Denies dysuria or hematuria. Patient states that he feels that he has prostatitis based on familiar symptoms. Additionally he no longer takes Lisinopril but now is on Metoprolol 10 mg.  \par \par The patient produced a urine sample which will be sent for urinalysis, urine cytology and urine culture. \par \par Bactrim 800-160 mg is prescribed at this time. He is to take one tablet BID for two weeks. I believe this will relieve the symptoms he is experiencing. \par \par Once his symptoms have resolved a transperineal prostate biopsy will be completed. It is my opinion that the biopsy is necessary to investigate the hypoechoic lesion present. We discussed the procedure in detail including both the in office procedure and the OR procedure. He is made aware that the OR procedure would require presurgical testing and that he would be unable to eat before hand. If he chooses to move forward with the in office procedure, Valium would be prescribed and it is advised that he cannot drive to or from the procedure that day. He would not need presurgical testing and can eat that day. The risk of infection for the procedure overall is minimal. Following our discussion he feels comfortable moving forward doing the biopsy in the OR. \par \par He is to follow up in 2 weeks or sooner if clinically indicated.

## 2019-07-30 NOTE — PHYSICAL EXAM
[General Appearance - Well Developed] : well developed [General Appearance - Well Nourished] : well nourished [Normal Appearance] : normal appearance [General Appearance - In No Acute Distress] : no acute distress [Abdomen Soft] : soft [Abdomen Tenderness] : non-tender [Abdomen Mass (___ Cm)] : no abdominal mass palpated [Skin Color & Pigmentation] : normal skin color and pigmentation [Heart Rate And Rhythm] : Heart rate and rhythm were normal [] : no respiratory distress [Respiration, Rhythm And Depth] : normal respiratory rhythm and effort [Exaggerated Use Of Accessory Muscles For Inspiration] : no accessory muscle use [Oriented To Time, Place, And Person] : oriented to person, place, and time [Affect] : the affect was normal [Mood] : the mood was normal [Normal Station and Gait] : the gait and station were normal for the patient's age [No Focal Deficits] : no focal deficits [No Palpable Adenopathy] : no palpable adenopathy [Cervical Lymph Nodes Enlarged Posterior Bilaterally] : posterior cervical [Cervical Lymph Nodes Enlarged Anterior Bilaterally] : anterior cervical [Supraclavicular Lymph Nodes Enlarged Bilaterally] : supraclavicular [FreeTextEntry1] : Smile Symmetric. Tongue is Midline. hand  5/5 bilaterally\par \par

## 2019-07-30 NOTE — HISTORY OF PRESENT ILLNESS
[FreeTextEntry1] : Mr Carrizales is a 73 year old man presented for follow up of gross hematuria, urinary frequency and urgency. The patient takes tamsulosin 0.4 mg, one half hour after meal twice daily, finasteride 5 mg once daily, and desipramine 10 mg at bedtime. The patient returned to the office 03/20/17 having gross hematuria with blood clot. Dr Holly Bernardo started the patient on Bactrim DS. He eventually went to the ER 03/21/17 where he had a Spann catheter placed. His creatinine in the ER was 1.01. Cystoscopy 03/23/17 found catheter trauma. Prostate protruded modest distance into the bladder. Bladder has a small capacity. Diffuse oozing of blood from bladder neck and trigone. Scraped up mucosa most likely from introduction of telescope. No bladder stones or tumors. Bladder inflammation from catheter. CT urogram was normal and found no etiology of the gross hematuria.\par 3/1/18: The patient returned and reported he has pain in his rectum area and penis. PSA from 1/24/18 was 0.39 ng/mL. 2/15/18 Creatinine was 1.01 mg/dL, hemoglobin was 14.1 g/dL. He received and abdomen ultrasound which shows a fatty liver, gallstones, and obscured pancreas. He denies any gross hematuria. He noted one day ago his GI physician put banding on his hemorrhoids. \par 4/10/18: The patient returned and reported he finished his antibiotics from last visit and noted he had another hemorrhoid and put another band by his GI. He noted pain still persists. UA from 3/1/18 shows 8 WBC/HPF. CBC from 3/1/18 showed slight anemia. \par 5/23/18: The patient returned and reported he has been taking Bactrim for his most recent prostatitis starting one week ago. PSA from  1/22/18 was 0.39 ng/mL. Currently taking Tamsulosin and Finasteride. \par 8/28/18: The patient returned and reported his urination is satisfactory. Currently taking tamsulosin and finasteride. Wakes up 3-4 times during the night to urinate. Noted he had a recent flare up of dysuria 3 days ago and took 2 days of Cipro and symptom have cleared up. \par 3/7/19: The patient returned and reported that he had another flare up of prostatitis and took 2 days of Cipro for his symptoms to clear. Complained of lingering dysuria. I reminded the patient that with Cipro, there is a side effect of tendonitis. Denied gross hematuria. Currently taking Trimethoprim, Tamsulosin and Finasteride. PSA from 2/15/19 was 0.04 ng/mL.\par 4/4/19: The patient returned and reported that he another flare up of prostatitis. Complained of pain when he stands up. His pain is relieved while sitting down. Occasionally has dysuria. Wakes up twice during the night to urinate. Complained of urinary urgency during the night. Takes Tamsulosin BID, Trimethoprim and Finasteride.\par 5/9/19: The patient returned today for an US. Transrectal US of the prostate findings showed a prostate volume of 23.4 cc, a hypoechoic area at the left apex 1.37 x 1.25 cm in transverse image, unremarkable seminal vesicles, and no rectal masses. The hypoechoic are is suspicious for prostate cancer, but the patient has a low PSA. I recommended that the patient undergo a transperineal biopsy for the hyperechoic region, but due to an anal fissure, the patient would like to wait. Notes that he consulted with a colon surgeon about his anal fissure and is currently taking stool softeners. His urination is adequate. Complains of nocturia. Wakes up 5-6 times during the night to urinate. Complains of pain at the tip of his penis. Takes Tamsulosin BID. UA from 4/11/19 was normal. Culture from 4/11/19 showed no growth. Notes that he took Bactrim and ended up getting a rash on his LEs.\par 6/10/19: Patient presents today for a follow up. Today he reports that he is scheduled for an anal fissure operation for 6/21/19. It has been advised that he will need 4 weeks to heal following the operation. Regarding his urination he reports that during the day there are no urinary issues. Nocturia is reported and he will have to wake up 5-6x a night to urinate. Finasteride and Tamsulosin are taken as directed.  \par \par 7/18/19: Patient presents today for a follow up. 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 Patient with hypoechoic lesion of the prostate seen on recent TRUS. PSA 0.690 on 5/2109. Patient on Flomax /Proscar with both irritative and obstructive LUTS. Needs to strain to initiate urination. Nocturia times 5- 6. Denies dysuria or hematuria. Patient states that he feels that he has prostatitis based on familiar symptoms. Additionally he no longer takes Lisinopril but now is on Metoprolol 10 mg.

## 2019-08-07 NOTE — ASSESSMENT
[FreeTextEntry1] : Mr Carrizales is a 73 year old man presented for follow up of gross hematuria, urinary frequency and urgency. The patient takes tamsulosin 0.4 mg, one half hour after meal twice daily, finasteride 5 mg once daily, and desipramine 10 mg at bedtime. The patient returned to the office 03/20/17 having gross hematuria with blood clot. Dr Holly Bernardo started the patient on Bactrim DS. He eventually went to the ER 03/21/17 where he had a Spann catheter placed. His creatinine in the ER was 1.01. Cystoscopy 03/23/17 found catheter trauma. Prostate protruded modest distance into the bladder. Bladder has a small capacity. Diffuse oozing of blood from bladder neck and trigone. Scraped up mucosa most likely from introduction of telescope. No bladder stones or tumors. Bladder inflammation from catheter. CT urogram was normal and found no etiology of the gross hematuria.\par 3/1/18: The patient returned and reported he has pain in his rectum area and penis. PSA from 1/24/18 was 0.39 ng/mL. 2/15/18 Creatinine was 1.01 mg/dL, hemoglobin was 14.1 g/dL. He received and abdomen ultrasound which shows a fatty liver, gallstones, and obscured pancreas. He denies any gross hematuria. He noted one day ago his GI physician put banding on his hemorrhoids. \par 4/10/18: The patient returned and reported he finished his antibiotics from last visit and noted he had another hemorrhoid and put another band by his GI. He noted pain still persists. UA from 3/1/18 shows 8 WBC/HPF. CBC from 3/1/18 showed slight anemia. \par 5/23/18: The patient returned and reported he has been taking Bactrim for his most recent prostatitis starting one week ago. PSA from  1/22/18 was 0.39 ng/mL. Currently taking Tamsulosin and Finasteride. \par 8/28/18: The patient returned and reported his urination is satisfactory. Currently taking tamsulosin and finasteride. Wakes up 3-4 times during the night to urinate. Noted he had a recent flare up of dysuria 3 days ago and took 2 days of Cipro and symptom have cleared up. \par 3/7/19: The patient returned and reported that he had another flare up of prostatitis and took 2 days of Cipro for his symptoms to clear. Complained of lingering dysuria. I reminded the patient that with Cipro, there is a side effect of tendonitis. Denied gross hematuria. Currently taking Trimethoprim, Tamsulosin and Finasteride. PSA from 2/15/19 was 0.04 ng/mL.\par 4/4/19: The patient returned and reported that he another flare up of prostatitis. Complained of pain when he stands up. His pain is relieved while sitting down. Occasionally has dysuria. Wakes up twice during the night to urinate. Complained of urinary urgency during the night. Takes Tamsulosin BID, Trimethoprim and Finasteride.\par 5/9/19: The patient returned today for an US. Transrectal US of the prostate findings showed a prostate volume of 23.4 cc, a hypoechoic area at the left apex 1.37 x 1.25 cm in transverse image, unremarkable seminal vesicles, and no rectal masses. The hypoechoic are is suspicious for prostate cancer, but the patient has a low PSA. I recommended that the patient undergo a transperineal biopsy for the hyperechoic region, but due to an anal fissure, the patient would like to wait. Notes that he consulted with a colon surgeon about his anal fissure and is currently taking stool softeners. His urination is adequate. Complains of nocturia. Wakes up 5-6 times during the night to urinate. Complains of pain at the tip of his penis. Takes Tamsulosin BID. UA from 4/11/19 was normal. Culture from 4/11/19 showed no growth. Notes that he took Bactrim and ended up getting a rash on his LEs.\par 6/10/19: Patient presents today for a follow up. Today he reports that he is scheduled for an anal fissure operation for 6/21/19. It has been advised that he will need 4 weeks to heal following the operation. Regarding his urination he reports that during the day there are no urinary issues. Nocturia is reported and he will have to wake up 5-6x a night to urinate. Finasteride and Tamsulosin are taken as directed.  \par 7/18/19: Patient presents today for a follow up. 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 Patient with hypoechoic lesion of the prostate seen on recent TRUS. PSA 0.690 on 5/2109. Patient on Flomax /Proscar with both irritative and obstructive LUTS. Needs to strain to initiate urination. Nocturia times 5- 6. Denies dysuria or hematuria. Patient states that he feels that he has prostatitis based on familiar symptoms. Additionally he no longer takes Lisinopril but now is on Metoprolol 10 mg.  \par \par 8/7/19: Patient presents today for a follow up. He states that the Bactrim prescribed at his last visit was not providing any relief. He still c/o discomfort when sitting but denies dysuria. He had Ciprofloxacin at home and had taken two tablets over the past two days. He noticed relief and inquired about having the prescription refilled today. \par \par Ciprofloxacin 500 mg is prescribed today. He is to take one tablet every 12 hours until finished. I believe this will relieve his current perineal discomfort upon sitting. \par \par A prostate biopsy under general anesthesia is scheduled for September 13th. He is to follow up for this procedure as directed.

## 2019-08-07 NOTE — HISTORY OF PRESENT ILLNESS
[FreeTextEntry1] : Mr Carrizales is a 73 year old man presented for follow up of gross hematuria, urinary frequency and urgency. The patient takes tamsulosin 0.4 mg, one half hour after meal twice daily, finasteride 5 mg once daily, and desipramine 10 mg at bedtime. The patient returned to the office 03/20/17 having gross hematuria with blood clot. Dr Holly Bernardo started the patient on Bactrim DS. He eventually went to the ER 03/21/17 where he had a Spann catheter placed. His creatinine in the ER was 1.01. Cystoscopy 03/23/17 found catheter trauma. Prostate protruded modest distance into the bladder. Bladder has a small capacity. Diffuse oozing of blood from bladder neck and trigone. Scraped up mucosa most likely from introduction of telescope. No bladder stones or tumors. Bladder inflammation from catheter. CT urogram was normal and found no etiology of the gross hematuria.\par 3/1/18: The patient returned and reported he has pain in his rectum area and penis. PSA from 1/24/18 was 0.39 ng/mL. 2/15/18 Creatinine was 1.01 mg/dL, hemoglobin was 14.1 g/dL. He received and abdomen ultrasound which shows a fatty liver, gallstones, and obscured pancreas. He denies any gross hematuria. He noted one day ago his GI physician put banding on his hemorrhoids. \par 4/10/18: The patient returned and reported he finished his antibiotics from last visit and noted he had another hemorrhoid and put another band by his GI. He noted pain still persists. UA from 3/1/18 shows 8 WBC/HPF. CBC from 3/1/18 showed slight anemia. \par 5/23/18: The patient returned and reported he has been taking Bactrim for his most recent prostatitis starting one week ago. PSA from  1/22/18 was 0.39 ng/mL. Currently taking Tamsulosin and Finasteride. \par 8/28/18: The patient returned and reported his urination is satisfactory. Currently taking tamsulosin and finasteride. Wakes up 3-4 times during the night to urinate. Noted he had a recent flare up of dysuria 3 days ago and took 2 days of Cipro and symptom have cleared up. \par 3/7/19: The patient returned and reported that he had another flare up of prostatitis and took 2 days of Cipro for his symptoms to clear. Complained of lingering dysuria. I reminded the patient that with Cipro, there is a side effect of tendonitis. Denied gross hematuria. Currently taking Trimethoprim, Tamsulosin and Finasteride. PSA from 2/15/19 was 0.04 ng/mL.\par 4/4/19: The patient returned and reported that he another flare up of prostatitis. Complained of pain when he stands up. His pain is relieved while sitting down. Occasionally has dysuria. Wakes up twice during the night to urinate. Complained of urinary urgency during the night. Takes Tamsulosin BID, Trimethoprim and Finasteride.\par 5/9/19: The patient returned today for an US. Transrectal US of the prostate findings showed a prostate volume of 23.4 cc, a hypoechoic area at the left apex 1.37 x 1.25 cm in transverse image, unremarkable seminal vesicles, and no rectal masses. The hypoechoic are is suspicious for prostate cancer, but the patient has a low PSA. I recommended that the patient undergo a transperineal biopsy for the hyperechoic region, but due to an anal fissure, the patient would like to wait. Notes that he consulted with a colon surgeon about his anal fissure and is currently taking stool softeners. His urination is adequate. Complains of nocturia. Wakes up 5-6 times during the night to urinate. Complains of pain at the tip of his penis. Takes Tamsulosin BID. UA from 4/11/19 was normal. Culture from 4/11/19 showed no growth. Notes that he took Bactrim and ended up getting a rash on his LEs.\par 6/10/19: Patient presents today for a follow up. Today he reports that he is scheduled for an anal fissure operation for 6/21/19. It has been advised that he will need 4 weeks to heal following the operation. Regarding his urination he reports that during the day there are no urinary issues. Nocturia is reported and he will have to wake up 5-6x a night to urinate. Finasteride and Tamsulosin are taken as directed.  \par 7/18/19: Patient presents today for a follow up. 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 Patient with hypoechoic lesion of the prostate seen on recent TRUS. PSA 0.690 on 5/2109. Patient on Flomax /Proscar with both irritative and obstructive LUTS. Needs to strain to initiate urination. Nocturia times 5- 6. Denies dysuria or hematuria. Patient states that he feels that he has prostatitis based on familiar symptoms. Additionally he no longer takes Lisinopril but now is on Metoprolol 10 mg.  \par \par 8/7/19: Patient presents today for a follow up. He states that the Bactrim prescribed at his last visit was not providing any relief. He still c/o discomfort when sitting but denies dysuria. He had Ciprofloxacin at home and had taken two tablets over the past two days. He noticed relief and inquired about having the prescription refilled today.

## 2019-08-07 NOTE — PHYSICAL EXAM
[General Appearance - Well Developed] : well developed [Normal Appearance] : normal appearance [General Appearance - In No Acute Distress] : no acute distress [General Appearance - Well Nourished] : well nourished [Abdomen Soft] : soft [Abdomen Tenderness] : non-tender [Abdomen Mass (___ Cm)] : no abdominal mass palpated [Skin Color & Pigmentation] : normal skin color and pigmentation [Heart Rate And Rhythm] : Heart rate and rhythm were normal [] : no respiratory distress [Respiration, Rhythm And Depth] : normal respiratory rhythm and effort [Exaggerated Use Of Accessory Muscles For Inspiration] : no accessory muscle use [Oriented To Time, Place, And Person] : oriented to person, place, and time [Affect] : the affect was normal [Mood] : the mood was normal [Normal Station and Gait] : the gait and station were normal for the patient's age [No Focal Deficits] : no focal deficits [No Palpable Adenopathy] : no palpable adenopathy [Cervical Lymph Nodes Enlarged Posterior Bilaterally] : posterior cervical [Cervical Lymph Nodes Enlarged Anterior Bilaterally] : anterior cervical [Supraclavicular Lymph Nodes Enlarged Bilaterally] : supraclavicular [FreeTextEntry1] : No submandibular gland tenderness.\par

## 2019-08-07 NOTE — ADDENDUM
[FreeTextEntry1] : This note was authored by Tin Jarrett working as scribe for Dr. Collins Duron. I, Dr. Collins Duron, have reviewed the content of this note and confirm it is true and accurate. I personally performed the history and physical examination and made all the decisions.\par 8/7/19

## 2019-08-28 ENCOUNTER — APPOINTMENT (OUTPATIENT)
Dept: COLORECTAL SURGERY | Facility: CLINIC | Age: 74
End: 2019-08-28
Payer: MEDICARE

## 2019-08-28 PROCEDURE — 99211 OFF/OP EST MAY X REQ PHY/QHP: CPT

## 2019-09-03 ENCOUNTER — OUTPATIENT (OUTPATIENT)
Dept: OUTPATIENT SERVICES | Facility: HOSPITAL | Age: 74
LOS: 1 days | End: 2019-09-03
Payer: MEDICARE

## 2019-09-03 VITALS
HEIGHT: 63.5 IN | SYSTOLIC BLOOD PRESSURE: 128 MMHG | RESPIRATION RATE: 16 BRPM | WEIGHT: 173.06 LBS | DIASTOLIC BLOOD PRESSURE: 70 MMHG | OXYGEN SATURATION: 98 % | TEMPERATURE: 97 F | HEART RATE: 77 BPM

## 2019-09-03 DIAGNOSIS — Z90.49 ACQUIRED ABSENCE OF OTHER SPECIFIED PARTS OF DIGESTIVE TRACT: Chronic | ICD-10-CM

## 2019-09-03 DIAGNOSIS — R93.89 ABNORMAL FINDINGS ON DIAGNOSTIC IMAGING OF OTHER SPECIFIED BODY STRUCTURES: ICD-10-CM

## 2019-09-03 DIAGNOSIS — Z87.19 PERSONAL HISTORY OF OTHER DISEASES OF THE DIGESTIVE SYSTEM: Chronic | ICD-10-CM

## 2019-09-03 LAB
HCT VFR BLD CALC: 43.4 % — SIGNIFICANT CHANGE UP (ref 39–50)
HGB BLD-MCNC: 13.2 G/DL — SIGNIFICANT CHANGE UP (ref 13–17)
MCHC RBC-ENTMCNC: 24.9 PG — LOW (ref 27–34)
MCHC RBC-ENTMCNC: 30.4 % — LOW (ref 32–36)
MCV RBC AUTO: 81.7 FL — SIGNIFICANT CHANGE UP (ref 80–100)
NRBC # FLD: 0 K/UL — SIGNIFICANT CHANGE UP (ref 0–0)
PLATELET # BLD AUTO: 254 K/UL — SIGNIFICANT CHANGE UP (ref 150–400)
PMV BLD: 11.3 FL — SIGNIFICANT CHANGE UP (ref 7–13)
RBC # BLD: 5.31 M/UL — SIGNIFICANT CHANGE UP (ref 4.2–5.8)
RBC # FLD: 15.1 % — HIGH (ref 10.3–14.5)
WBC # BLD: 8.56 K/UL — SIGNIFICANT CHANGE UP (ref 3.8–10.5)
WBC # FLD AUTO: 8.56 K/UL — SIGNIFICANT CHANGE UP (ref 3.8–10.5)

## 2019-09-03 PROCEDURE — 93010 ELECTROCARDIOGRAM REPORT: CPT

## 2019-09-03 RX ORDER — RANITIDINE HYDROCHLORIDE 150 MG/1
1 TABLET, FILM COATED ORAL
Qty: 0 | Refills: 0 | DISCHARGE

## 2019-09-03 RX ORDER — FLUTICASONE PROPIONATE AND SALMETEROL 50; 250 UG/1; UG/1
1 POWDER ORAL; RESPIRATORY (INHALATION)
Qty: 0 | Refills: 0 | DISCHARGE

## 2019-09-03 RX ORDER — FINASTERIDE 5 MG/1
1 TABLET, FILM COATED ORAL
Qty: 0 | Refills: 0 | DISCHARGE

## 2019-09-03 RX ORDER — METOPROLOL TARTRATE 50 MG
1 TABLET ORAL
Qty: 0 | Refills: 0 | DISCHARGE

## 2019-09-03 RX ORDER — TAMSULOSIN HYDROCHLORIDE 0.4 MG/1
1 CAPSULE ORAL
Qty: 0 | Refills: 0 | DISCHARGE

## 2019-09-03 NOTE — H&P PST ADULT - HISTORY OF PRESENT ILLNESS
72 yo male with PMH of HTN, hypothyroidism, asthma( Stable), s/p meningioma resection(2012) c/o rectal pain & pressure x 3 months. Pt had surgical consult revealed anal fissure. Pt is s/p sphincterotomy on 06/21/2019. Now presents to PST w/ a preop dx of abnormal findings on diagnotic imaging of other specified body structures and to be evaluated for a scheduled transperineal prostate biopsy on 9/13/19. Pt is a poor historian allscripts info noted. Pt had 72 yo male with PMH of HTN, hypothyroidism, asthma( Stable), s/p meningioma resection(2012), hemorrhoids and anal fissure,  s/p sphincterotomy on 06/21/2019.   Now presents to PST w/ a preop dx of abnormal findings on diagnotic imaging of other specified body structures and to be evaluated for a scheduled transperineal prostate biopsy on 9/13/19.   Pt is a poor historian, allscripts info noted. in regards to current condition Pt had hematuria episodes since 2017. Evaluated/monitored, prostatitis dx on 2018 w/ repeated episodes on 2019, axb treatment done. Pt w/ constant perianal discomfort and dysuria, re evaluated on 8/2019 and recommended prostate bx at this time.

## 2019-09-03 NOTE — H&P PST ADULT - GENERAL GENITOURINARY SYMPTOMS
resolved recently/hematuria hematuria/increased urinary frequency/symptoms not current and resolved recently/dysuria/urinary hesitancy

## 2019-09-03 NOTE — H&P PST ADULT - NSICDXPROBLEM_GEN_ALL_CORE_FT
PROBLEM DIAGNOSES  Problem: Abnormal findings on diagnostic imaging of other specified body structures  Assessment and Plan: PROBLEM DIAGNOSES  Problem: Abnormal findings on diagnostic imaging of other specified body structures  Assessment and Plan: pt is scheduled for a transperineal prostate biopsy on 9/13/19. preop instructions provided including npo status, Pepcid. c/w metoprolol, levothyroxine, tamsulosin and finasteride as ordered qam and take I ma  dos w/ a sip of water. verbalized understanding. Has MC scheduled on 9/4/19. form provided, ekg for comparison in chart. lat cardiology note requested w/ prior echo stress done as pt reported was done due to abnormal ekg noted.

## 2019-09-03 NOTE — H&P PST ADULT - NSICDXPASTMEDICALHX_GEN_ALL_CORE_FT
PAST MEDICAL HISTORY:  Age-related cataract     Asthma controlled- 2/2 cold weather    Bronchitis 03/2019    Cystitis     Generalized OA     Hemangioma benign - stable last Neuro eval on 12/2019    Hypertension x 5 years    Hypothyroid     Overactive bladder

## 2019-09-03 NOTE — H&P PST ADULT - NEGATIVE CARDIOVASCULAR SYMPTOMS
no chest pain/no orthopnea/no paroxysmal nocturnal dyspnea/no claudication/no peripheral edema/no dyspnea on exertion/no palpitations

## 2019-09-03 NOTE — H&P PST ADULT - NEGATIVE GENERAL SYMPTOMS
no weight gain/no polyphagia/no polyuria/no malaise/no fatigue/no fever/no sweating/no weight loss/no chills/no anorexia/no polydipsia

## 2019-09-03 NOTE — H&P PST ADULT - NEGATIVE GASTROINTESTINAL SYMPTOMS
no steatorrhea/no vomiting/no diarrhea/no hematochezia/no jaundice/no hiccoughs/no change in bowel habits/no flatulence/no nausea

## 2019-09-03 NOTE — H&P PST ADULT - RS GEN PE MLT RESP DETAILS PC
no rales/airway patent/clear to auscultation bilaterally/no chest wall tenderness/no intercostal retractions/no subcutaneous emphysema/no wheezes/good air movement/breath sounds equal/no rhonchi

## 2019-09-03 NOTE — H&P PST ADULT - NEGATIVE NEUROLOGICAL SYMPTOMS
no transient paralysis/no paresthesias/no syncope/no loss of consciousness/no hemiparesis/no facial palsy/no vertigo/no difficulty walking/no generalized seizures/no confusion/no loss of sensation/no headache/no focal seizures/no tremors/no weakness

## 2019-09-03 NOTE — H&P PST ADULT - NEGATIVE MUSCULOSKELETAL SYMPTOMS
no muscle cramps/no muscle weakness/no neck pain/no leg pain R/no arthralgia/no joint swelling/no myalgia/no leg pain L/no arm pain L/no arm pain R

## 2019-09-03 NOTE — H&P PST ADULT - NEGATIVE MALE-SPECIFIC SYMPTOMS
no urethral discharge/no ejaculatory dysfunction/no scrotal mass L/no scrotal mass R/no impotence/no erectile dysfunction/no undescended testicle R/no genital sores/no undescended testicle L/not applicable

## 2019-09-03 NOTE — H&P PST ADULT - NEGATIVE GENERAL GENITOURINARY SYMPTOMS
no urinary hesitancy/no flank pain R/no bladder infections/normal libido/no flank pain L/normal urinary frequency/no urine discoloration/no incontinence/no nocturia/no dysuria normal urinary frequency/no flank pain R/no urine discoloration/no incontinence/no nocturia/no flank pain L/no bladder infections/normal libido/no urinary hesitancy

## 2019-09-03 NOTE — H&P PST ADULT - GASTROINTESTINAL DETAILS
no masses palpable/soft/normal/bowel sounds normal/no bruit/no rebound tenderness/no organomegaly/no guarding/nontender/no distention/no rigidity

## 2019-09-03 NOTE — H&P PST ADULT - NSICDXPASTSURGICALHX_GEN_ALL_CORE_FT
PAST SURGICAL HISTORY:  History of anal fissures s/p sphincterocotomy 6/2019    History of appendectomy 2012    History of craniotomy, resection of meningioma Jan 2012    S/P B/L cataract surgery 2002 PAST SURGICAL HISTORY:  History of anal fissures s/p sphincterectomy 6/2019    History of appendectomy 2012    History of craniotomy, resection of meningioma Jan 2012    S/P B/L cataract surgery 2002

## 2019-09-09 ENCOUNTER — APPOINTMENT (OUTPATIENT)
Dept: COLORECTAL SURGERY | Facility: CLINIC | Age: 74
End: 2019-09-09
Payer: MEDICARE

## 2019-09-09 PROCEDURE — 46600 DIAGNOSTIC ANOSCOPY SPX: CPT

## 2019-09-09 PROCEDURE — 99214 OFFICE O/P EST MOD 30 MIN: CPT | Mod: 25

## 2019-09-09 NOTE — HISTORY OF PRESENT ILLNESS
[FreeTextEntry1] : 73-year-old male status post sphincterotomy for anal fissure presents for second opinion regarding rectal pain after a bowel movement. The pain is worst at night and comes on slowly approximately one hour after having a bowel movement the patient has excellent continence the stool and gas.  He is scheduled for a prostate biopsy in the near future.

## 2019-09-09 NOTE — PHYSICAL EXAM
[Exam Deferred] : exam was deferred [None] : no anal fissures seen [Normal] : was normal [Normal Breath Sounds] : Normal breath sounds [Normal Heart Sounds] : normal heart sounds [Normal Rate and Rhythm] : normal rate and rhythm [No Rash or Lesion] : No rash or lesion [Alert] : alert [Oriented to Person] : oriented to person [Oriented to Time] : oriented to time [Oriented to Place] : oriented to place [Calm] : calm [Abdomen Masses] : No abdominal masses [Abdomen Tenderness] : ~T No ~M abdominal tenderness [Tender] : nontender [Excoriation] : no perianal excoriation [Multiple Sinus Tracts] : no perianal sinus tracts [Fistula] : no fistulas [Wart] : no warts [Ulcer ___ cm] : no ulcers [Pilonidal Cyst] : no pilonidal cysts [Pilonidal Sinus] : no pilonidal sinus [Pilonidal Sinus Draining] : no pilonidal sinus drainage [Tender, Swollen] : nontender, non-swollen [JVD] : no jugular venous distention  [Wheezing] : no wheezing was heard [Purpura] : no purpura  [Petechiae] : no petechiae [Skin Ulcer] : no ulcer [Skin Induration] : no induration [de-identified] : Benign [de-identified] : anoscopy does not reveal any anaorectal pathology [de-identified] : No apparent distress [de-identified] : Pupils equal round and reactive to light and accommodation

## 2019-09-09 NOTE — ASSESSMENT
[FreeTextEntry1] : 73-year-old male with no obvious cause for his rectal pain after a bowel movement. The patient will undergo his prostate biopsy and followup afterwards. We will continue to supplement his diet with fluid and fiber.

## 2019-09-12 ENCOUNTER — TRANSCRIPTION ENCOUNTER (OUTPATIENT)
Age: 74
End: 2019-09-12

## 2019-09-13 ENCOUNTER — RESULT REVIEW (OUTPATIENT)
Age: 74
End: 2019-09-13

## 2019-09-13 ENCOUNTER — OUTPATIENT (OUTPATIENT)
Dept: OUTPATIENT SERVICES | Facility: HOSPITAL | Age: 74
LOS: 1 days | Discharge: HOME | End: 2019-09-13
Payer: MEDICARE

## 2019-09-13 ENCOUNTER — APPOINTMENT (OUTPATIENT)
Dept: UROLOGY | Facility: AMBULATORY SURGERY CENTER | Age: 74
End: 2019-09-13

## 2019-09-13 VITALS
DIASTOLIC BLOOD PRESSURE: 59 MMHG | HEIGHT: 63.5 IN | OXYGEN SATURATION: 98 % | TEMPERATURE: 98 F | WEIGHT: 173.06 LBS | RESPIRATION RATE: 16 BRPM | SYSTOLIC BLOOD PRESSURE: 141 MMHG | HEART RATE: 73 BPM

## 2019-09-13 VITALS — HEART RATE: 67 BPM | DIASTOLIC BLOOD PRESSURE: 70 MMHG | SYSTOLIC BLOOD PRESSURE: 141 MMHG

## 2019-09-13 DIAGNOSIS — R93.89 ABNORMAL FINDINGS ON DIAGNOSTIC IMAGING OF OTHER SPECIFIED BODY STRUCTURES: ICD-10-CM

## 2019-09-13 DIAGNOSIS — Z90.49 ACQUIRED ABSENCE OF OTHER SPECIFIED PARTS OF DIGESTIVE TRACT: Chronic | ICD-10-CM

## 2019-09-13 DIAGNOSIS — Z87.19 PERSONAL HISTORY OF OTHER DISEASES OF THE DIGESTIVE SYSTEM: Chronic | ICD-10-CM

## 2019-09-13 PROCEDURE — 88305 TISSUE EXAM BY PATHOLOGIST: CPT | Mod: 26

## 2019-09-13 PROCEDURE — 76872 US TRANSRECTAL: CPT | Mod: 26

## 2019-09-13 PROCEDURE — 55700: CPT

## 2019-09-13 RX ORDER — TAMSULOSIN HYDROCHLORIDE 0.4 MG/1
1 CAPSULE ORAL
Qty: 0 | Refills: 0 | DISCHARGE

## 2019-09-13 RX ORDER — FINASTERIDE 5 MG/1
1 TABLET, FILM COATED ORAL
Qty: 0 | Refills: 0 | DISCHARGE

## 2019-09-13 RX ORDER — METOPROLOL TARTRATE 50 MG
1 TABLET ORAL
Qty: 0 | Refills: 0 | DISCHARGE

## 2019-09-13 RX ORDER — LEVOTHYROXINE SODIUM 125 MCG
1 TABLET ORAL
Qty: 0 | Refills: 0 | DISCHARGE

## 2019-09-13 NOTE — ASU DISCHARGE PLAN (ADULT/PEDIATRIC) - CALL YOUR DOCTOR IF YOU HAVE ANY OF THE FOLLOWING:
Bleeding that does not stop/Pain not relieved by Medications/Inability to tolerate liquids or foods/Nausea and vomiting that does not stop/Unable to urinate/Excessive diarrhea/Swelling that gets worse/Fever greater than (need to indicate Fahrenheit or Celsius)/Wound/Surgical Site with redness, or foul smelling discharge or pus/Increased irritability or sluggishness

## 2019-09-13 NOTE — ASU PREOP CHECKLIST - AS BP NONINV METHOD
Nursing Note by Kera Griffiths CMA at 09/26/17 03:48 PM     Author:  Kera Griffiths CMA Service:  (none) Author Type:  Certified Medical Assistant     Filed:  09/26/17 03:48 PM Encounter Date:  9/26/2017 Status:  Signed     :  Kera Griffiths CMA (Certified Medical Assistant)            Time out performed by provider, staff and patient in exam room.  Pt instructed to keep dressing on biopsy site for 24 hours.  Pt instructed to cleanse area with antibacterial soap after 24 hours, apply Vaseline to wound and keep covered with a bandage for 5 days.  Pt provided antibacterial soap and Vaseline for wound care.  Pt advised to call if there were any problems or concerns.  Pt aware that they will be notified of pathology results either via letter or phone call depending on results.[DJ1.1T]          Revision History        User Key Date/Time User Provider Type Action    > DJ1.1 09/26/17 03:48 PM Kera Griffiths CMA Certified Medical Assistant Sign    T - Template             electronic

## 2019-09-13 NOTE — ASU DISCHARGE PLAN (ADULT/PEDIATRIC) - CARE PROVIDER_API CALL
Collins Duron)  Urology  [address] [place] [address], [place] [address]  Phone: [phone]  Fax: [phone]  Follow Up Time: 1 week

## 2019-09-17 LAB — SURGICAL PATHOLOGY STUDY: SIGNIFICANT CHANGE UP

## 2019-10-02 ENCOUNTER — APPOINTMENT (OUTPATIENT)
Dept: COLORECTAL SURGERY | Facility: CLINIC | Age: 74
End: 2019-10-02
Payer: MEDICARE

## 2019-10-02 PROCEDURE — 99211 OFF/OP EST MAY X REQ PHY/QHP: CPT

## 2019-10-02 NOTE — HISTORY OF PRESENT ILLNESS
[FreeTextEntry1] : 72-year-old male who is well known to me. I performed a sphincterotomy and fissurectomy on him. He continues to complain of burning and itching and spasm of the anal region.\par \par External inspection is unremarkable. There is no evidence of significant skin irritation or hemorrhoids. Anal effacement reveals no fissure.  Anoscopy reveals no fissure. The patient denies significant caffeinated product intake.\par \par I am unclear as to the etiology of his symptomatology. I will treat him with Lotrisone cream and Mycostatin powder for his burning. I will also give him a prescription for physical therapy for the possibility of paradoxical puborectalis contractions explaining his sense of spasms.\par \par

## 2019-10-10 ENCOUNTER — APPOINTMENT (OUTPATIENT)
Dept: UROLOGY | Facility: CLINIC | Age: 74
End: 2019-10-10
Payer: MEDICARE

## 2019-10-10 PROCEDURE — 99214 OFFICE O/P EST MOD 30 MIN: CPT

## 2019-10-10 NOTE — ADDENDUM
[FreeTextEntry1] : This note was authored by Tin Jarrett working as scribe for Dr. Collins Duron. I, Dr. Collins Duron, have reviewed the content of this note and confirm it is true and accurate. I personally performed the history and physical examination and made all the decisions.\par 10/10/19

## 2019-10-10 NOTE — HISTORY OF PRESENT ILLNESS
[FreeTextEntry1] : Mr Carrizales is a 73 year old man presented for follow up of gross hematuria, urinary frequency and urgency. The patient takes tamsulosin 0.4 mg, one half hour after meal twice daily, finasteride 5 mg once daily, and desipramine 10 mg at bedtime. The patient returned to the office 03/20/17 having gross hematuria with blood clot. Dr Holly Bernardo started the patient on Bactrim DS. He eventually went to the ER 03/21/17 where he had a Spann catheter placed. His creatinine in the ER was 1.01. Cystoscopy 03/23/17 found catheter trauma. Prostate protruded modest distance into the bladder. Bladder has a small capacity. Diffuse oozing of blood from bladder neck and trigone. Scraped up mucosa most likely from introduction of telescope. No bladder stones or tumors. Bladder inflammation from catheter. CT urogram was normal and found no etiology of the gross hematuria.\par 3/1/18: The patient returned and reported he has pain in his rectum area and penis. PSA from 1/24/18 was 0.39 ng/mL. 2/15/18 Creatinine was 1.01 mg/dL, hemoglobin was 14.1 g/dL. He received and abdomen ultrasound which shows a fatty liver, gallstones, and obscured pancreas. He denies any gross hematuria. He noted one day ago his GI physician put banding on his hemorrhoids. \par 4/10/18: The patient returned and reported he finished his antibiotics from last visit and noted he had another hemorrhoid and put another band by his GI. He noted pain still persists. UA from 3/1/18 shows 8 WBC/HPF. CBC from 3/1/18 showed slight anemia. \par 5/23/18: The patient returned and reported he has been taking Bactrim for his most recent prostatitis starting one week ago. PSA from  1/22/18 was 0.39 ng/mL. Currently taking Tamsulosin and Finasteride. \par 8/28/18: The patient returned and reported his urination is satisfactory. Currently taking tamsulosin and finasteride. Wakes up 3-4 times during the night to urinate. Noted he had a recent flare up of dysuria 3 days ago and took 2 days of Cipro and symptom have cleared up. \par 3/7/19: The patient returned and reported that he had another flare up of prostatitis and took 2 days of Cipro for his symptoms to clear. Complained of lingering dysuria. I reminded the patient that with Cipro, there is a side effect of tendonitis. Denied gross hematuria. Currently taking Trimethoprim, Tamsulosin and Finasteride. PSA from 2/15/19 was 0.04 ng/mL.\par 4/4/19: The patient returned and reported that he another flare up of prostatitis. Complained of pain when he stands up. His pain is relieved while sitting down. Occasionally has dysuria. Wakes up twice during the night to urinate. Complained of urinary urgency during the night. Takes Tamsulosin BID, Trimethoprim and Finasteride.\par 5/9/19: The patient returned today for an US. Transrectal US of the prostate findings showed a prostate volume of 23.4 cc, a hypoechoic area at the left apex 1.37 x 1.25 cm in transverse image, unremarkable seminal vesicles, and no rectal masses. The hypoechoic are is suspicious for prostate cancer, but the patient has a low PSA. I recommended that the patient undergo a transperineal biopsy for the hyperechoic region, but due to an anal fissure, the patient would like to wait. Notes that he consulted with a colon surgeon about his anal fissure and is currently taking stool softeners. His urination is adequate. Complains of nocturia. Wakes up 5-6 times during the night to urinate. Complains of pain at the tip of his penis. Takes Tamsulosin BID. UA from 4/11/19 was normal. Culture from 4/11/19 showed no growth. Notes that he took Bactrim and ended up getting a rash on his LEs.\par 6/10/19: Patient presents today for a follow up. Today he reports that he is scheduled for an anal fissure operation for 6/21/19. It has been advised that he will need 4 weeks to heal following the operation. Regarding his urination he reports that during the day there are no urinary issues. Nocturia is reported and he will have to wake up 5-6x a night to urinate. Finasteride and Tamsulosin are taken as directed.  \par 7/18/19: Patient presents today for a follow up. 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 Patient with hypoechoic lesion of the prostate seen on recent TRUS. PSA 0.690 on 5/2109. Patient on Flomax /Proscar with both irritative and obstructive LUTS. Needs to strain to initiate urination. Nocturia times 5- 6. Denies dysuria or hematuria. Patient states that he feels that he has prostatitis based on familiar symptoms. Additionally he no longer takes Lisinopril but now is on Metoprolol 10 mg.  \par 8/7/19: Patient presents today for a follow up. He states that the Bactrim prescribed at his last visit was not providing any relief. He still c/o discomfort when sitting but denies dysuria. He had Ciprofloxacin at home and had taken two tablets over the past two days. He noticed relief and inquired about having the prescription refilled today. \par \par 10/10/19: Patient presents today for a follow up. On 9/13/19 a needle biopsy of the prostate was completed under general anesthesia. He states that after the biopsy his chronic prostatitis began to cause discomfort. Ciprofloxacin 500 mg was used as directed and the discomfort is no longer present. Additionally it is reported that his sexual function decreased following the procedure but that it is starting to improve again. The results of the pathology report are to be discussed with him today.

## 2019-10-10 NOTE — REVIEW OF SYSTEMS
[Constipation] : constipation [Dysuria] : dysuria [Nocturia] : nocturia [Convulsions] : convulsions [Erectile Dysfunction] : erectile dysfunction [Feeling Poorly] : not feeling poorly [Feeling Tired] : not feeling tired [Eyesight Problems] : no eyesight problems [Loss Of Hearing] : no hearing loss [Chest Pain] : no chest pain [Cough] : no cough [Confused] : no confusion [Incontinence] : no incontinence [Anxiety] : no anxiety [Easy Bleeding] : no tendency for easy bleeding

## 2019-10-10 NOTE — PHYSICAL EXAM
[General Appearance - Well Developed] : well developed [General Appearance - Well Nourished] : well nourished [Normal Appearance] : normal appearance [General Appearance - In No Acute Distress] : no acute distress [Abdomen Soft] : soft [Abdomen Tenderness] : non-tender [Abdomen Mass (___ Cm)] : no abdominal mass palpated [Skin Color & Pigmentation] : normal skin color and pigmentation [Heart Rate And Rhythm] : Heart rate and rhythm were normal [] : no respiratory distress [Respiration, Rhythm And Depth] : normal respiratory rhythm and effort [Oriented To Time, Place, And Person] : oriented to person, place, and time [Exaggerated Use Of Accessory Muscles For Inspiration] : no accessory muscle use [Mood] : the mood was normal [Affect] : the affect was normal [Normal Station and Gait] : the gait and station were normal for the patient's age [No Focal Deficits] : no focal deficits [No Palpable Adenopathy] : no palpable adenopathy [Cervical Lymph Nodes Enlarged Posterior Bilaterally] : posterior cervical [Cervical Lymph Nodes Enlarged Anterior Bilaterally] : anterior cervical [Supraclavicular Lymph Nodes Enlarged Bilaterally] : supraclavicular [FreeTextEntry1] : No submandibular gland tenderness.\par

## 2019-10-10 NOTE — ASSESSMENT
[FreeTextEntry1] : Mr Carrizales is a 73 year old man presented for follow up of gross hematuria, urinary frequency and urgency. The patient takes tamsulosin 0.4 mg, one half hour after meal twice daily, finasteride 5 mg once daily, and desipramine 10 mg at bedtime. The patient returned to the office 03/20/17 having gross hematuria with blood clot. Dr Holly Bernardo started the patient on Bactrim DS. He eventually went to the ER 03/21/17 where he had a Spann catheter placed. His creatinine in the ER was 1.01. Cystoscopy 03/23/17 found catheter trauma. Prostate protruded modest distance into the bladder. Bladder has a small capacity. Diffuse oozing of blood from bladder neck and trigone. Scraped up mucosa most likely from introduction of telescope. No bladder stones or tumors. Bladder inflammation from catheter. CT urogram was normal and found no etiology of the gross hematuria.\par 3/1/18: The patient returned and reported he has pain in his rectum area and penis. PSA from 1/24/18 was 0.39 ng/mL. 2/15/18 Creatinine was 1.01 mg/dL, hemoglobin was 14.1 g/dL. He received and abdomen ultrasound which shows a fatty liver, gallstones, and obscured pancreas. He denies any gross hematuria. He noted one day ago his GI physician put banding on his hemorrhoids. \par 4/10/18: The patient returned and reported he finished his antibiotics from last visit and noted he had another hemorrhoid and put another band by his GI. He noted pain still persists. UA from 3/1/18 shows 8 WBC/HPF. CBC from 3/1/18 showed slight anemia. \par 5/23/18: The patient returned and reported he has been taking Bactrim for his most recent prostatitis starting one week ago. PSA from  1/22/18 was 0.39 ng/mL. Currently taking Tamsulosin and Finasteride. \par 8/28/18: The patient returned and reported his urination is satisfactory. Currently taking tamsulosin and finasteride. Wakes up 3-4 times during the night to urinate. Noted he had a recent flare up of dysuria 3 days ago and took 2 days of Cipro and symptom have cleared up. \par 3/7/19: The patient returned and reported that he had another flare up of prostatitis and took 2 days of Cipro for his symptoms to clear. Complained of lingering dysuria. I reminded the patient that with Cipro, there is a side effect of tendonitis. Denied gross hematuria. Currently taking Trimethoprim, Tamsulosin and Finasteride. PSA from 2/15/19 was 0.04 ng/mL.\par 4/4/19: The patient returned and reported that he another flare up of prostatitis. Complained of pain when he stands up. His pain is relieved while sitting down. Occasionally has dysuria. Wakes up twice during the night to urinate. Complained of urinary urgency during the night. Takes Tamsulosin BID, Trimethoprim and Finasteride.\par 5/9/19: The patient returned today for an US. Transrectal US of the prostate findings showed a prostate volume of 23.4 cc, a hypoechoic area at the left apex 1.37 x 1.25 cm in transverse image, unremarkable seminal vesicles, and no rectal masses. The hypoechoic are is suspicious for prostate cancer, but the patient has a low PSA. I recommended that the patient undergo a transperineal biopsy for the hyperechoic region, but due to an anal fissure, the patient would like to wait. Notes that he consulted with a colon surgeon about his anal fissure and is currently taking stool softeners. His urination is adequate. Complains of nocturia. Wakes up 5-6 times during the night to urinate. Complains of pain at the tip of his penis. Takes Tamsulosin BID. UA from 4/11/19 was normal. Culture from 4/11/19 showed no growth. Notes that he took Bactrim and ended up getting a rash on his LEs.\par 6/10/19: Patient presents today for a follow up. Today he reports that he is scheduled for an anal fissure operation for 6/21/19. It has been advised that he will need 4 weeks to heal following the operation. Regarding his urination he reports that during the day there are no urinary issues. Nocturia is reported and he will have to wake up 5-6x a night to urinate. Finasteride and Tamsulosin are taken as directed.  \par 7/18/19: Patient presents today for a follow up. 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 Patient with hypoechoic lesion of the prostate seen on recent TRUS. PSA 0.690 on 5/2109. Patient on Flomax /Proscar with both irritative and obstructive LUTS. Needs to strain to initiate urination. Nocturia times 5- 6. Denies dysuria or hematuria. Patient states that he feels that he has prostatitis based on familiar symptoms. Additionally he no longer takes Lisinopril but now is on Metoprolol 10 mg.  \par 8/7/19: Patient presents today for a follow up. He states that the Bactrim prescribed at his last visit was not providing any relief. He still c/o discomfort when sitting but denies dysuria. He had Ciprofloxacin at home and had taken two tablets over the past two days. He noticed relief and inquired about having the prescription refilled today. \par \par 10/10/19: Patient presents today for a follow up. On 9/13/19 a needle biopsy of the prostate was completed under general anesthesia. He states that after the biopsy his chronic prostatitis began to cause discomfort. Ciprofloxacin 500 mg was used as directed and the discomfort is no longer present. Additionally it is reported that his sexual function decreased following the procedure but that it is starting to improve again. The results of the pathology report are to be discussed with him today. \par \par Upon review of the pathology report it is determined that: all samples taken during the biopsy were benign prostate tissue. Tissue from left posterior lateral apex did not survive processing but this area was also sampled with left lateral peripheral base biopsies and was benign.\par \par I have advised that the decreased sexual function should improve in another week or so. The local anesthesia used anesthetized the muscles that aid with erections and the medications wares off gradually. If sexual function does not return to normal he should return for a follow up. \par \par He is to follow up in 3 months or sooner if clinically indicated.

## 2019-10-14 NOTE — ASU PREOP CHECKLIST - PATIENT SENT AT
Vitamin D refilled for 1 month.  Patient needs vitamin D level drawn before further refills..  There is a future order for this.   13-Sep-2019 13:09

## 2019-10-30 ENCOUNTER — APPOINTMENT (OUTPATIENT)
Dept: UROLOGY | Facility: CLINIC | Age: 74
End: 2019-10-30

## 2019-12-18 ENCOUNTER — APPOINTMENT (OUTPATIENT)
Dept: MRI IMAGING | Facility: IMAGING CENTER | Age: 74
End: 2019-12-18

## 2019-12-31 ENCOUNTER — APPOINTMENT (OUTPATIENT)
Dept: UROLOGY | Facility: CLINIC | Age: 74
End: 2019-12-31
Payer: MEDICARE

## 2019-12-31 LAB
APPEARANCE: CLEAR
BACTERIA: NEGATIVE
BILIRUBIN URINE: NEGATIVE
BLOOD URINE: ABNORMAL
COLOR: NORMAL
GLUCOSE QUALITATIVE U: NEGATIVE
HYALINE CASTS: 1 /LPF
KETONES URINE: NEGATIVE
LEUKOCYTE ESTERASE URINE: ABNORMAL
MICROSCOPIC-UA: NORMAL
NITRITE URINE: NEGATIVE
PH URINE: 6
PROTEIN URINE: NORMAL
RED BLOOD CELLS URINE: 11 /HPF
SPECIFIC GRAVITY URINE: 1.02
SQUAMOUS EPITHELIAL CELLS: 4 /HPF
TESTOST SERPL-MCNC: 390 NG/DL
UROBILINOGEN URINE: NORMAL
WHITE BLOOD CELLS URINE: 47 /HPF

## 2019-12-31 PROCEDURE — 99214 OFFICE O/P EST MOD 30 MIN: CPT

## 2019-12-31 NOTE — REVIEW OF SYSTEMS
[Constipation] : constipation [Dysuria] : dysuria [Nocturia] : nocturia [Convulsions] : convulsions [Erectile Dysfunction] : erectile dysfunction [Feeling Tired] : not feeling tired [Feeling Poorly] : not feeling poorly [Eyesight Problems] : no eyesight problems [Loss Of Hearing] : no hearing loss [Chest Pain] : no chest pain [Cough] : no cough [Confused] : no confusion [Incontinence] : no incontinence [Anxiety] : no anxiety [Easy Bleeding] : no tendency for easy bleeding

## 2019-12-31 NOTE — ADDENDUM
[FreeTextEntry1] : This note was authored by Cristina Núñez working as scribe for Dr. Collins Duron. I, Dr. Collins Duron, have reviewed the content of this note and confirm it is true and accurate. I personally performed the history and physical examination and made all the decisions.\par 12/31/2019

## 2019-12-31 NOTE — ASSESSMENT
[FreeTextEntry1] : Mr Carrizales is a 74 year old man presented for follow up of gross hematuria, urinary frequency and urgency. The patient takes tamsulosin 0.4 mg, one half hour after meal twice daily, finasteride 5 mg once daily, and desipramine 10 mg at bedtime. The patient returned to the office 03/20/17 having gross hematuria with blood clot. Dr Holly Bernardo started the patient on Bactrim DS. He eventually went to the ER 03/21/17 where he had a Spann catheter placed. His creatinine in the ER was 1.01. Cystoscopy 03/23/17 found catheter trauma. Prostate protruded modest distance into the bladder. Bladder has a small capacity. Diffuse oozing of blood from bladder neck and trigone. Scraped up mucosa most likely from introduction of telescope. No bladder stones or tumors. Bladder inflammation from catheter. CT urogram was normal and found no etiology of the gross hematuria.\par 3/1/18: The patient returned and reported he has pain in his rectum area and penis. PSA from 1/24/18 was 0.39 ng/mL. 2/15/18 Creatinine was 1.01 mg/dL, hemoglobin was 14.1 g/dL. He received and abdomen ultrasound which shows a fatty liver, gallstones, and obscured pancreas. He denies any gross hematuria. He noted one day ago his GI physician put banding on his hemorrhoids. \par 4/10/18: The patient returned and reported he finished his antibiotics from last visit and noted he had another hemorrhoid and put another band by his GI. He noted pain still persists. UA from 3/1/18 shows 8 WBC/HPF. CBC from 3/1/18 showed slight anemia. \par 5/23/18: The patient returned and reported he has been taking Bactrim for his most recent prostatitis starting one week ago. PSA from  1/22/18 was 0.39 ng/mL. Currently taking Tamsulosin and Finasteride. \par 8/28/18: The patient returned and reported his urination is satisfactory. Currently taking tamsulosin and finasteride. Wakes up 3-4 times during the night to urinate. Noted he had a recent flare up of dysuria 3 days ago and took 2 days of Cipro and symptom have cleared up. \par 3/7/19: The patient returned and reported that he had another flare up of prostatitis and took 2 days of Cipro for his symptoms to clear. Complained of lingering dysuria. I reminded the patient that with Cipro, there is a side effect of tendonitis. Denied gross hematuria. Currently taking Trimethoprim, Tamsulosin and Finasteride. PSA from 2/15/19 was 0.04 ng/mL.\par 4/4/19: The patient returned and reported that he another flare up of prostatitis. Complained of pain when he stands up. His pain is relieved while sitting down. Occasionally has dysuria. Wakes up twice during the night to urinate. Complained of urinary urgency during the night. Takes Tamsulosin BID, Trimethoprim and Finasteride.\par 5/9/19: The patient returned today for an US. Transrectal US of the prostate findings showed a prostate volume of 23.4 cc, a hypoechoic area at the left apex 1.37 x 1.25 cm in transverse image, unremarkable seminal vesicles, and no rectal masses. The hypoechoic are is suspicious for prostate cancer, but the patient has a low PSA. I recommended that the patient undergo a transperineal biopsy for the hyperechoic region, but due to an anal fissure, the patient would like to wait. Notes that he consulted with a colon surgeon about his anal fissure and is currently taking stool softeners. His urination is adequate. Complains of nocturia. Wakes up 5-6 times during the night to urinate. Complains of pain at the tip of his penis. Takes Tamsulosin BID. UA from 4/11/19 was normal. Culture from 4/11/19 showed no growth. Notes that he took Bactrim and ended up getting a rash on his LEs.\par 6/10/19: Patient presents today for a follow up. Today he reports that he is scheduled for an anal fissure operation for 6/21/19. It has been advised that he will need 4 weeks to heal following the operation. Regarding his urination he reports that during the day there are no urinary issues. Nocturia is reported and he will have to wake up 5-6x a night to urinate. Finasteride and Tamsulosin are taken as directed.  \par 7/18/19: Patient presents today for a follow up. 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 Patient with hypoechoic lesion of the prostate seen on recent TRUS. PSA 0.690 on 5/2109. Patient on Flomax /Proscar with both irritative and obstructive LUTS. Needs to strain to initiate urination. Nocturia times 5- 6. Denies dysuria or hematuria. Patient states that he feels that he has prostatitis based on familiar symptoms. Additionally he no longer takes Lisinopril but now is on Metoprolol 10 mg.  \par 8/7/19: Patient presents today for a follow up. He states that the Bactrim prescribed at his last visit was not providing any relief. He still c/o discomfort when sitting but denies dysuria. He had Ciprofloxacin at home and had taken two tablets over the past two days. He noticed relief and inquired about having the prescription refilled today. \par 10/10/19: Patient presents today for a follow up. On 9/13/19 a needle biopsy of the prostate was completed under general anesthesia. He states that after the biopsy his chronic prostatitis began to cause discomfort. Ciprofloxacin 500 mg was used as directed and the discomfort is no longer present. Additionally it is reported that his sexual function decreased following the procedure but that it is starting to improve again. The results of the pathology report are to be discussed with him today. Upon review of the pathology report it is determined that: all samples taken during the biopsy were benign prostate tissue. Tissue from left posterior lateral apex did not survive processing but this area was also sampled with left lateral peripheral base biopsies and was benign. I have advised that the decreased sexual function should improve in another week or so. The local anesthesia used anesthetized the muscles that aid with erections and the medications wares off gradually. If sexual function does not return to normal he should return for a follow up. He is to follow up in 3 months or sooner if clinically indicated. \par \par 12/31/2019: Patient presents today for a follow up. Pt reports pain on standing. He has an inflamed prostate. When the bladder is fill he also experiences pain and the pain goes away after he voids. Pt took Cipro to 7 days, finishing last week. While he took it, he felt better. Pt reports allergy to sulfur drugs and develops rashing. \par \par Pt will provide a urine sample today for culture and analysis. \par \par Prescribed LevoFLOXacin 500mg, 1 tablet daily. \par \par Referred pt to Dr. Miranda Tobar.\par \par Pt will RTO in 3 weeks for rectal exam.

## 2019-12-31 NOTE — HISTORY OF PRESENT ILLNESS
[FreeTextEntry1] : Mr Carrizales is a 74 year old man presented for follow up of gross hematuria, urinary frequency and urgency. The patient takes tamsulosin 0.4 mg, one half hour after meal twice daily, finasteride 5 mg once daily, and desipramine 10 mg at bedtime. The patient returned to the office 03/20/17 having gross hematuria with blood clot. Dr Holly Bernardo started the patient on Bactrim DS. He eventually went to the ER 03/21/17 where he had a Spann catheter placed. His creatinine in the ER was 1.01. Cystoscopy 03/23/17 found catheter trauma. Prostate protruded modest distance into the bladder. Bladder has a small capacity. Diffuse oozing of blood from bladder neck and trigone. Scraped up mucosa most likely from introduction of telescope. No bladder stones or tumors. Bladder inflammation from catheter. CT urogram was normal and found no etiology of the gross hematuria.\par 3/1/18: The patient returned and reported he has pain in his rectum area and penis. PSA from 1/24/18 was 0.39 ng/mL. 2/15/18 Creatinine was 1.01 mg/dL, hemoglobin was 14.1 g/dL. He received and abdomen ultrasound which shows a fatty liver, gallstones, and obscured pancreas. He denies any gross hematuria. He noted one day ago his GI physician put banding on his hemorrhoids. \par 4/10/18: The patient returned and reported he finished his antibiotics from last visit and noted he had another hemorrhoid and put another band by his GI. He noted pain still persists. UA from 3/1/18 shows 8 WBC/HPF. CBC from 3/1/18 showed slight anemia. \par 5/23/18: The patient returned and reported he has been taking Bactrim for his most recent prostatitis starting one week ago. PSA from  1/22/18 was 0.39 ng/mL. Currently taking Tamsulosin and Finasteride. \par 8/28/18: The patient returned and reported his urination is satisfactory. Currently taking tamsulosin and finasteride. Wakes up 3-4 times during the night to urinate. Noted he had a recent flare up of dysuria 3 days ago and took 2 days of Cipro and symptom have cleared up. \par 3/7/19: The patient returned and reported that he had another flare up of prostatitis and took 2 days of Cipro for his symptoms to clear. Complained of lingering dysuria. I reminded the patient that with Cipro, there is a side effect of tendonitis. Denied gross hematuria. Currently taking Trimethoprim, Tamsulosin and Finasteride. PSA from 2/15/19 was 0.04 ng/mL.\par 4/4/19: The patient returned and reported that he another flare up of prostatitis. Complained of pain when he stands up. His pain is relieved while sitting down. Occasionally has dysuria. Wakes up twice during the night to urinate. Complained of urinary urgency during the night. Takes Tamsulosin BID, Trimethoprim and Finasteride.\par 5/9/19: The patient returned today for an US. Transrectal US of the prostate findings showed a prostate volume of 23.4 cc, a hypoechoic area at the left apex 1.37 x 1.25 cm in transverse image, unremarkable seminal vesicles, and no rectal masses. The hypoechoic are is suspicious for prostate cancer, but the patient has a low PSA. I recommended that the patient undergo a transperineal biopsy for the hyperechoic region, but due to an anal fissure, the patient would like to wait. Notes that he consulted with a colon surgeon about his anal fissure and is currently taking stool softeners. His urination is adequate. Complains of nocturia. Wakes up 5-6 times during the night to urinate. Complains of pain at the tip of his penis. Takes Tamsulosin BID. UA from 4/11/19 was normal. Culture from 4/11/19 showed no growth. Notes that he took Bactrim and ended up getting a rash on his LEs.\par 6/10/19: Patient presents today for a follow up. Today he reports that he is scheduled for an anal fissure operation for 6/21/19. It has been advised that he will need 4 weeks to heal following the operation. Regarding his urination he reports that during the day there are no urinary issues. Nocturia is reported and he will have to wake up 5-6x a night to urinate. Finasteride and Tamsulosin are taken as directed.  \par 7/18/19: Patient presents today for a follow up. 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 Patient with hypoechoic lesion of the prostate seen on recent TRUS. PSA 0.690 on 5/2109. Patient on Flomax /Proscar with both irritative and obstructive LUTS. Needs to strain to initiate urination. Nocturia times 5- 6. Denies dysuria or hematuria. Patient states that he feels that he has prostatitis based on familiar symptoms. Additionally he no longer takes Lisinopril but now is on Metoprolol 10 mg.  \par 8/7/19: Patient presents today for a follow up. He states that the Bactrim prescribed at his last visit was not providing any relief. He still c/o discomfort when sitting but denies dysuria. He had Ciprofloxacin at home and had taken two tablets over the past two days. He noticed relief and inquired about having the prescription refilled today. \par 10/10/19: Patient presents today for a follow up. On 9/13/19 a needle biopsy of the prostate was completed under general anesthesia. He states that after the biopsy his chronic prostatitis began to cause discomfort. Ciprofloxacin 500 mg was used as directed and the discomfort is no longer present. Additionally it is reported that his sexual function decreased following the procedure but that it is starting to improve again. The results of the pathology report are to be discussed with him today. \par \par 12/31/2019: Patient presents today for a follow up. Pt reports pain on standing. He has an inflamed prostate. When the bladder is fill he also experiences pain and the pain goes away after he voids. Pt took Cipro to 7 days, finishing last week. While he took it, he felt better. Pt reports allergy to sulfur drugs and develops rashing.

## 2020-01-01 LAB — BACTERIA UR CULT: NORMAL

## 2020-01-02 ENCOUNTER — APPOINTMENT (OUTPATIENT)
Dept: UROLOGY | Facility: CLINIC | Age: 75
End: 2020-01-02

## 2020-01-02 LAB
PSA FREE FLD-MCNC: 23 %
PSA FREE SERPL-MCNC: 0.05 NG/ML
PSA SERPL-MCNC: 0.24 NG/ML

## 2020-01-04 LAB — URINE CYTOLOGY: NORMAL

## 2020-01-21 ENCOUNTER — APPOINTMENT (OUTPATIENT)
Dept: UROLOGY | Facility: CLINIC | Age: 75
End: 2020-01-21

## 2020-01-23 ENCOUNTER — APPOINTMENT (OUTPATIENT)
Dept: MRI IMAGING | Facility: CLINIC | Age: 75
End: 2020-01-23
Payer: MEDICARE

## 2020-01-23 ENCOUNTER — OUTPATIENT (OUTPATIENT)
Dept: OUTPATIENT SERVICES | Facility: HOSPITAL | Age: 75
LOS: 1 days | End: 2020-01-23
Payer: MEDICARE

## 2020-01-23 DIAGNOSIS — Z87.19 PERSONAL HISTORY OF OTHER DISEASES OF THE DIGESTIVE SYSTEM: Chronic | ICD-10-CM

## 2020-01-23 DIAGNOSIS — Z90.49 ACQUIRED ABSENCE OF OTHER SPECIFIED PARTS OF DIGESTIVE TRACT: Chronic | ICD-10-CM

## 2020-01-23 DIAGNOSIS — Z00.8 ENCOUNTER FOR OTHER GENERAL EXAMINATION: ICD-10-CM

## 2020-01-23 PROCEDURE — 72197 MRI PELVIS W/O & W/DYE: CPT

## 2020-01-23 PROCEDURE — A9585: CPT

## 2020-01-23 PROCEDURE — 72197 MRI PELVIS W/O & W/DYE: CPT | Mod: 26

## 2020-02-05 ENCOUNTER — APPOINTMENT (OUTPATIENT)
Dept: ORTHOPEDIC SURGERY | Facility: CLINIC | Age: 75
End: 2020-02-05
Payer: MEDICARE

## 2020-02-05 VITALS
BODY MASS INDEX: 29.02 KG/M2 | WEIGHT: 170 LBS | HEIGHT: 64 IN | HEART RATE: 86 BPM | DIASTOLIC BLOOD PRESSURE: 75 MMHG | SYSTOLIC BLOOD PRESSURE: 135 MMHG

## 2020-02-05 PROCEDURE — 99203 OFFICE O/P NEW LOW 30 MIN: CPT

## 2020-02-05 NOTE — PHYSICAL EXAM
[de-identified] : Examination of the lumbar spine reveals no midline tenderness palpation, step-offs, or skin lesions. Decreased range of motion with respect to flexion, extension, lateral bending, and rotation. No tenderness to palpation of the sciatic notch. No tenderness palpation of the bilateral greater trochanters. No pain with passive internal/external rotation of the hips. No instability of bilateral lower extremities.  Negative BILL. Negative straight leg raise bilaterally. No bowstring. Negative femoral stretch. 5 out of 5 iliopsoas, hip abductors, hips adductors, quadriceps, hamstrings, gastrocsoleus, tibialis anterior, extensor hallucis longus, peroneals. Grossly intact sensation to light touch bilateral lower extremities. 1+ patellar and Achilles reflexes. Downgoing Babinski. No clonus. Intact proprioception. Palpable pulses. No skin lesion and no edema on the right and left lower extremities. [de-identified] : Reviewed lumbar MRI ([facility]) from 12/19 Reveals L4-5 lateral recess stenosis and some foraminal stenosis. He also has a left-sided disc herniation.

## 2020-02-05 NOTE — DISCUSSION/SUMMARY
[de-identified] : We discussed nonsurgical and surgical options. He will try some physical therapy and be reevaluated for possible epidural based injections. Followup after the

## 2020-02-05 NOTE — HISTORY OF PRESENT ILLNESS
[de-identified] : Mr. VIVIENNE HANCOCK  is a 74 year old male who presents with back and bilateral leg pain (right > left).   Normal bowel and bladder control.   Denies any recent fevers, chills, sweats, weight loss, or infection.  He has done multiple injections and is going to begin physical therapy.\par \par The patients past medical history, past surgical history, medications, allergies, and social history were reviewed by me today with the patient and documented accordingly.  In addition, the patient's family history, which is noncontributory to their visit, was also reviewed.\par

## 2020-02-10 ENCOUNTER — APPOINTMENT (OUTPATIENT)
Dept: UROLOGY | Facility: CLINIC | Age: 75
End: 2020-02-10
Payer: MEDICARE

## 2020-02-10 PROCEDURE — 99214 OFFICE O/P EST MOD 30 MIN: CPT

## 2020-02-10 NOTE — REVIEW OF SYSTEMS
[Constipation] : constipation [Dysuria] : dysuria [Nocturia] : nocturia [Convulsions] : convulsions [Erectile Dysfunction] : erectile dysfunction [Feeling Poorly] : not feeling poorly [Feeling Tired] : not feeling tired [Eyesight Problems] : no eyesight problems [Loss Of Hearing] : no hearing loss [Chest Pain] : no chest pain [Cough] : no cough [Anxiety] : no anxiety [Incontinence] : no incontinence [Confused] : no confusion [Easy Bleeding] : no tendency for easy bleeding

## 2020-02-10 NOTE — PHYSICAL EXAM
[General Appearance - Well Developed] : well developed [Normal Appearance] : normal appearance [General Appearance - Well Nourished] : well nourished [General Appearance - In No Acute Distress] : no acute distress [Abdomen Soft] : soft [Abdomen Mass (___ Cm)] : no abdominal mass palpated [Abdomen Tenderness] : non-tender [Skin Color & Pigmentation] : normal skin color and pigmentation [Heart Rate And Rhythm] : Heart rate and rhythm were normal [] : no respiratory distress [Exaggerated Use Of Accessory Muscles For Inspiration] : no accessory muscle use [Respiration, Rhythm And Depth] : normal respiratory rhythm and effort [Affect] : the affect was normal [Oriented To Time, Place, And Person] : oriented to person, place, and time [Mood] : the mood was normal [No Focal Deficits] : no focal deficits [Normal Station and Gait] : the gait and station were normal for the patient's age [No Palpable Adenopathy] : no palpable adenopathy [Cervical Lymph Nodes Enlarged Anterior Bilaterally] : anterior cervical [Cervical Lymph Nodes Enlarged Posterior Bilaterally] : posterior cervical [Supraclavicular Lymph Nodes Enlarged Bilaterally] : supraclavicular [FreeTextEntry1] : Smile Symmetric. Tongue is Midline. hand  5/5 bilaterally\par \par

## 2020-02-10 NOTE — ASSESSMENT
[FreeTextEntry1] : 2/10/20: Patient presents today for a follow up. He had an MRI of the pelvis 1/23/20 which showed no evidence of rectal fistula could be identified. Pt has inflamed prostate\par Pt reports constipation for which he takes stool softner. He admits to dysuria and hematuria with mild pain at the tip of the penis yesterday. Most recent PSA was 0.24, Testosterone is 390.0. \par \par Digital rectal exam found no suspicious rectal masses. Anal tone is normal. The prostate is non tender, with a normal texture, Borders are partially obscured  and no nodules. It is a 15 gram transurethral resection size. No gross blood on examining finger. \par \par Pt will provide a urine sample today for culture and analysis. \par \par Start doxycycline 100 mg 1 tablet every 12 hours. \par \par RTC in 1 month. \par \par

## 2020-02-10 NOTE — HISTORY OF PRESENT ILLNESS
[FreeTextEntry1] : Mr Carrizales is a 74 year old man presented for follow up of gross hematuria, urinary frequency and urgency. The patient takes tamsulosin 0.4 mg, one half hour after meal twice daily, finasteride 5 mg once daily, and desipramine 10 mg at bedtime. The patient returned to the office 03/20/17 having gross hematuria with blood clot. Dr Holly Bernardo started the patient on Bactrim DS. He eventually went to the ER 03/21/17 where he had a Spann catheter placed. His creatinine in the ER was 1.01. Cystoscopy 03/23/17 found catheter trauma. Prostate protruded modest distance into the bladder. Bladder has a small capacity. Diffuse oozing of blood from bladder neck and trigone. Scraped up mucosa most likely from introduction of telescope. No bladder stones or tumors. Bladder inflammation from catheter. CT urogram was normal and found no etiology of the gross hematuria.\par 3/1/18: The patient returned and reported he has pain in his rectum area and penis. PSA from 1/24/18 was 0.39 ng/mL. 2/15/18 Creatinine was 1.01 mg/dL, hemoglobin was 14.1 g/dL. He received and abdomen ultrasound which shows a fatty liver, gallstones, and obscured pancreas. He denies any gross hematuria. He noted one day ago his GI physician put banding on his hemorrhoids. \par 4/10/18: The patient returned and reported he finished his antibiotics from last visit and noted he had another hemorrhoid and put another band by his GI. He noted pain still persists. UA from 3/1/18 shows 8 WBC/HPF. CBC from 3/1/18 showed slight anemia. \par 5/23/18: The patient returned and reported he has been taking Bactrim for his most recent prostatitis starting one week ago. PSA from  1/22/18 was 0.39 ng/mL. Currently taking Tamsulosin and Finasteride. \par 8/28/18: The patient returned and reported his urination is satisfactory. Currently taking tamsulosin and finasteride. Wakes up 3-4 times during the night to urinate. Noted he had a recent flare up of dysuria 3 days ago and took 2 days of Cipro and symptom have cleared up. \par 3/7/19: The patient returned and reported that he had another flare up of prostatitis and took 2 days of Cipro for his symptoms to clear. Complained of lingering dysuria. I reminded the patient that with Cipro, there is a side effect of tendonitis. Denied gross hematuria. Currently taking Trimethoprim, Tamsulosin and Finasteride. PSA from 2/15/19 was 0.04 ng/mL.\par 4/4/19: The patient returned and reported that he another flare up of prostatitis. Complained of pain when he stands up. His pain is relieved while sitting down. Occasionally has dysuria. Wakes up twice during the night to urinate. Complained of urinary urgency during the night. Takes Tamsulosin BID, Trimethoprim and Finasteride.\par 5/9/19: The patient returned today for an US. Transrectal US of the prostate findings showed a prostate volume of 23.4 cc, a hypoechoic area at the left apex 1.37 x 1.25 cm in transverse image, unremarkable seminal vesicles, and no rectal masses. The hypoechoic are is suspicious for prostate cancer, but the patient has a low PSA. I recommended that the patient undergo a transperineal biopsy for the hyperechoic region, but due to an anal fissure, the patient would like to wait. Notes that he consulted with a colon surgeon about his anal fissure and is currently taking stool softeners. His urination is adequate. Complains of nocturia. Wakes up 5-6 times during the night to urinate. Complains of pain at the tip of his penis. Takes Tamsulosin BID. UA from 4/11/19 was normal. Culture from 4/11/19 showed no growth. Notes that he took Bactrim and ended up getting a rash on his LEs.\par 6/10/19: Patient presents today for a follow up. Today he reports that he is scheduled for an anal fissure operation for 6/21/19. It has been advised that he will need 4 weeks to heal following the operation. Regarding his urination he reports that during the day there are no urinary issues. Nocturia is reported and he will have to wake up 5-6x a night to urinate. Finasteride and Tamsulosin are taken as directed.  \par 7/18/19: Patient presents today for a follow up. 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 Patient with hypoechoic lesion of the prostate seen on recent TRUS. PSA 0.690 on 5/2109. Patient on Flomax /Proscar with both irritative and obstructive LUTS. Needs to strain to initiate urination. Nocturia times 5- 6. Denies dysuria or hematuria. Patient states that he feels that he has prostatitis based on familiar symptoms. Additionally he no longer takes Lisinopril but now is on Metoprolol 10 mg.  \par 8/7/19: Patient presents today for a follow up. He states that the Bactrim prescribed at his last visit was not providing any relief. He still c/o discomfort when sitting but denies dysuria. He had Ciprofloxacin at home and had taken two tablets over the past two days. He noticed relief and inquired about having the prescription refilled today. \par 10/10/19: Patient presents today for a follow up. On 9/13/19 a needle biopsy of the prostate was completed under general anesthesia. He states that after the biopsy his chronic prostatitis began to cause discomfort. Ciprofloxacin 500 mg was used as directed and the discomfort is no longer present. Additionally it is reported that his sexual function decreased following the procedure but that it is starting to improve again. The results of the pathology report are to be discussed with him today. \par 12/31/2019: Patient presents today for a follow up. Pt reports pain on standing. He has an inflamed prostate. When the bladder is fill he also experiences pain and the pain goes away after he voids. Pt took Cipro to 7 days, finishing last week. While he took it, he felt better. Pt reports allergy to sulfur drugs and develops rash.\par \par 2/10/20: Patient presents today for a follow up. He had an MRI of the pelvis 1/23/20 which showed no evidence of rectal fistula could be identified. Pt has inflamed prostate\par Pt reports constipation for which he takes stool softner. He admits to dysuria and hematuria with mild pain at the tip of the penis yesterday. Most recent PSA was 0.24, Testosterone is 390.0.

## 2020-02-10 NOTE — ADDENDUM
[FreeTextEntry1] : This note was authored by Jenifer Tran working as a scribe for Dr. Collins Duron.\par \par I, Dr. Collins Duron, have reviewed the content of this note and confirm it is true and accurate. I personally performed the history and physical examination and made all the decisions.\par

## 2020-02-11 LAB
APPEARANCE: CLEAR
BACTERIA: NEGATIVE
BILIRUBIN URINE: NEGATIVE
BLOOD URINE: NORMAL
COLOR: NORMAL
GLUCOSE QUALITATIVE U: NEGATIVE
HYALINE CASTS: 0 /LPF
KETONES URINE: NEGATIVE
LEUKOCYTE ESTERASE URINE: NEGATIVE
MICROSCOPIC-UA: NORMAL
NITRITE URINE: NEGATIVE
PH URINE: 6
PROTEIN URINE: NEGATIVE
RED BLOOD CELLS URINE: 2 /HPF
SPECIFIC GRAVITY URINE: 1.01
SQUAMOUS EPITHELIAL CELLS: 0 /HPF
UROBILINOGEN URINE: NORMAL
WHITE BLOOD CELLS URINE: 3 /HPF

## 2020-02-16 LAB
BACTERIA UR CULT: NORMAL
URINE CYTOLOGY: NORMAL

## 2020-02-27 ENCOUNTER — LABORATORY RESULT (OUTPATIENT)
Age: 75
End: 2020-02-27

## 2020-02-27 ENCOUNTER — APPOINTMENT (OUTPATIENT)
Dept: UROLOGY | Facility: CLINIC | Age: 75
End: 2020-02-27
Payer: MEDICARE

## 2020-02-27 PROCEDURE — 99214 OFFICE O/P EST MOD 30 MIN: CPT

## 2020-02-27 NOTE — ADDENDUM
[FreeTextEntry1] : This note was authored by Noemi Dias, working as scribe for Dr. Collins Duron.\par \par I, Dr. Collins Duron, have have reviewed the content of this note and confirm it is true and accurate. I personally performed the history and physical examination and made all the decisions. \par \par 02/27/2020

## 2020-02-27 NOTE — REVIEW OF SYSTEMS
[Dysuria] : dysuria [Constipation] : constipation [Nocturia] : nocturia [Convulsions] : convulsions [Erectile Dysfunction] : erectile dysfunction [Feeling Poorly] : not feeling poorly [Feeling Tired] : not feeling tired [Loss Of Hearing] : no hearing loss [Eyesight Problems] : no eyesight problems [Chest Pain] : no chest pain [Cough] : no cough [Incontinence] : no incontinence [Easy Bleeding] : no tendency for easy bleeding [Anxiety] : no anxiety [Confused] : no confusion

## 2020-02-27 NOTE — HISTORY OF PRESENT ILLNESS
[FreeTextEntry1] : Mr Carrizales is a 74 year old man presented for follow up of gross hematuria, urinary frequency and urgency. The patient takes tamsulosin 0.4 mg, one half hour after meal twice daily, finasteride 5 mg once daily, and desipramine 10 mg at bedtime. The patient returned to the office 03/20/17 having gross hematuria with blood clot. Dr Holly Bernardo started the patient on Bactrim DS. He eventually went to the ER 03/21/17 where he had a Spann catheter placed. His creatinine in the ER was 1.01. Cystoscopy 03/23/17 found catheter trauma. Prostate protruded modest distance into the bladder. Bladder has a small capacity. Diffuse oozing of blood from bladder neck and trigone. Scraped up mucosa most likely from introduction of telescope. No bladder stones or tumors. Bladder inflammation from catheter. CT urogram was normal and found no etiology of the gross hematuria.\par 3/1/18: The patient returned and reported he has pain in his rectum area and penis. PSA from 1/24/18 was 0.39 ng/mL. 2/15/18 Creatinine was 1.01 mg/dL, hemoglobin was 14.1 g/dL. He received and abdomen ultrasound which shows a fatty liver, gallstones, and obscured pancreas. He denies any gross hematuria. He noted one day ago his GI physician put banding on his hemorrhoids. \par 4/10/18: The patient returned and reported he finished his antibiotics from last visit and noted he had another hemorrhoid and put another band by his GI. He noted pain still persists. UA from 3/1/18 shows 8 WBC/HPF. CBC from 3/1/18 showed slight anemia. \par 5/23/18: The patient returned and reported he has been taking Bactrim for his most recent prostatitis starting one week ago. PSA from  1/22/18 was 0.39 ng/mL. Currently taking Tamsulosin and Finasteride. \par 8/28/18: The patient returned and reported his urination is satisfactory. Currently taking tamsulosin and finasteride. Wakes up 3-4 times during the night to urinate. Noted he had a recent flare up of dysuria 3 days ago and took 2 days of Cipro and symptom have cleared up. \par 3/7/19: The patient returned and reported that he had another flare up of prostatitis and took 2 days of Cipro for his symptoms to clear. Complained of lingering dysuria. I reminded the patient that with Cipro, there is a side effect of tendonitis. Denied gross hematuria. Currently taking Trimethoprim, Tamsulosin and Finasteride. PSA from 2/15/19 was 0.04 ng/mL.\par 4/4/19: The patient returned and reported that he another flare up of prostatitis. Complained of pain when he stands up. His pain is relieved while sitting down. Occasionally has dysuria. Wakes up twice during the night to urinate. Complained of urinary urgency during the night. Takes Tamsulosin BID, Trimethoprim and Finasteride.\par 5/9/19: The patient returned today for an US. Transrectal US of the prostate findings showed a prostate volume of 23.4 cc, a hypoechoic area at the left apex 1.37 x 1.25 cm in transverse image, unremarkable seminal vesicles, and no rectal masses. The hypoechoic are is suspicious for prostate cancer, but the patient has a low PSA. I recommended that the patient undergo a transperineal biopsy for the hyperechoic region, but due to an anal fissure, the patient would like to wait. Notes that he consulted with a colon surgeon about his anal fissure and is currently taking stool softeners. His urination is adequate. Complains of nocturia. Wakes up 5-6 times during the night to urinate. Complains of pain at the tip of his penis. Takes Tamsulosin BID. UA from 4/11/19 was normal. Culture from 4/11/19 showed no growth. Notes that he took Bactrim and ended up getting a rash on his LEs.\par 6/10/19: Patient presents today for a follow up. Today he reports that he is scheduled for an anal fissure operation for 6/21/19. It has been advised that he will need 4 weeks to heal following the operation. Regarding his urination he reports that during the day there are no urinary issues. Nocturia is reported and he will have to wake up 5-6x a night to urinate. Finasteride and Tamsulosin are taken as directed.  \par 7/18/19: Patient presents today for a follow up. 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 Patient with hypoechoic lesion of the prostate seen on recent TRUS. PSA 0.690 on 5/2109. Patient on Flomax /Proscar with both irritative and obstructive LUTS. Needs to strain to initiate urination. Nocturia times 5- 6. Denies dysuria or hematuria. Patient states that he feels that he has prostatitis based on familiar symptoms. Additionally he no longer takes Lisinopril but now is on Metoprolol 10 mg.  \par 8/7/19: Patient presents today for a follow up. He states that the Bactrim prescribed at his last visit was not providing any relief. He still c/o discomfort when sitting but denies dysuria. He had Ciprofloxacin at home and had taken two tablets over the past two days. He noticed relief and inquired about having the prescription refilled today. \par 10/10/19: Patient presents today for a follow up. On 9/13/19 a needle biopsy of the prostate was completed under general anesthesia. He states that after the biopsy his chronic prostatitis began to cause discomfort. Ciprofloxacin 500 mg was used as directed and the discomfort is no longer present. Additionally it is reported that his sexual function decreased following the procedure but that it is starting to improve again. The results of the pathology report are to be discussed with him today. \par 12/31/2019: Patient presents today for a follow up. Pt reports pain on standing. He has an inflamed prostate. When the bladder is fill he also experiences pain and the pain goes away after he voids. Pt took Cipro to 7 days, finishing last week. While he took it, he felt better. Pt reports allergy to sulfur drugs and develops rash.\par 2/10/20: Patient presents today for a follow up. He had an MRI of the pelvis 1/23/20 which showed no evidence of rectal fistula could be identified. Pt has inflamed prostate\par Pt reports constipation for which he takes stool softner. He admits to dysuria and hematuria with mild pain at the tip of the penis yesterday. Most recent PSA was 0.24, Testosterone is 390.0. \par \par 02/27/2020: Patient presents today for a follow up. At the conclusion of the last visit,  Doxycycline 100 mg was prescribed. The patient states he noticed no improvement. Today he reports having nighttime pain in the rectum which is alleviated after urination. Wakes up 4-5 times during the night to urinate. He has satisfactory day time urination though he reports associated strain. Denies chest pain. He currently takes a stool softener for constipation. The patient also complains of lower back pain for which he will get an injection shortly. A urine culture from 2/10/2020 showed no growth. His most recent creatinine level from 7/11/2019 was 1.07.

## 2020-02-27 NOTE — ASSESSMENT
[FreeTextEntry1] : Mr Carrizales is a 74 year old man presented for follow up of gross hematuria, urinary frequency and urgency. The patient takes tamsulosin 0.4 mg, one half hour after meal twice daily, finasteride 5 mg once daily, and desipramine 10 mg at bedtime. The patient returned to the office 03/20/17 having gross hematuria with blood clot. Dr Holly Bernardo started the patient on Bactrim DS. He eventually went to the ER 03/21/17 where he had a Spann catheter placed. His creatinine in the ER was 1.01. Cystoscopy 03/23/17 found catheter trauma. Prostate protruded modest distance into the bladder. Bladder has a small capacity. Diffuse oozing of blood from bladder neck and trigone. Scraped up mucosa most likely from introduction of telescope. No bladder stones or tumors. Bladder inflammation from catheter. CT urogram was normal and found no etiology of the gross hematuria.\par 3/1/18: The patient returned and reported he has pain in his rectum area and penis. PSA from 1/24/18 was 0.39 ng/mL. 2/15/18 Creatinine was 1.01 mg/dL, hemoglobin was 14.1 g/dL. He received and abdomen ultrasound which shows a fatty liver, gallstones, and obscured pancreas. He denies any gross hematuria. He noted one day ago his GI physician put banding on his hemorrhoids. \par 4/10/18: The patient returned and reported he finished his antibiotics from last visit and noted he had another hemorrhoid and put another band by his GI. He noted pain still persists. UA from 3/1/18 shows 8 WBC/HPF. CBC from 3/1/18 showed slight anemia. \par 5/23/18: The patient returned and reported he has been taking Bactrim for his most recent prostatitis starting one week ago. PSA from  1/22/18 was 0.39 ng/mL. Currently taking Tamsulosin and Finasteride. \par 8/28/18: The patient returned and reported his urination is satisfactory. Currently taking tamsulosin and finasteride. Wakes up 3-4 times during the night to urinate. Noted he had a recent flare up of dysuria 3 days ago and took 2 days of Cipro and symptom have cleared up. \par 3/7/19: The patient returned and reported that he had another flare up of prostatitis and took 2 days of Cipro for his symptoms to clear. Complained of lingering dysuria. I reminded the patient that with Cipro, there is a side effect of tendonitis. Denied gross hematuria. Currently taking Trimethoprim, Tamsulosin and Finasteride. PSA from 2/15/19 was 0.04 ng/mL.\par 4/4/19: The patient returned and reported that he another flare up of prostatitis. Complained of pain when he stands up. His pain is relieved while sitting down. Occasionally has dysuria. Wakes up twice during the night to urinate. Complained of urinary urgency during the night. Takes Tamsulosin BID, Trimethoprim and Finasteride.\par 5/9/19: The patient returned today for an US. Transrectal US of the prostate findings showed a prostate volume of 23.4 cc, a hypoechoic area at the left apex 1.37 x 1.25 cm in transverse image, unremarkable seminal vesicles, and no rectal masses. The hypoechoic are is suspicious for prostate cancer, but the patient has a low PSA. I recommended that the patient undergo a transperineal biopsy for the hyperechoic region, but due to an anal fissure, the patient would like to wait. Notes that he consulted with a colon surgeon about his anal fissure and is currently taking stool softeners. His urination is adequate. Complains of nocturia. Wakes up 5-6 times during the night to urinate. Complains of pain at the tip of his penis. Takes Tamsulosin BID. UA from 4/11/19 was normal. Culture from 4/11/19 showed no growth. Notes that he took Bactrim and ended up getting a rash on his LEs.\par 6/10/19: Patient presents today for a follow up. Today he reports that he is scheduled for an anal fissure operation for 6/21/19. It has been advised that he will need 4 weeks to heal following the operation. Regarding his urination he reports that during the day there are no urinary issues. Nocturia is reported and he will have to wake up 5-6x a night to urinate. Finasteride and Tamsulosin are taken as directed.  \par 7/18/19: Patient presents today for a follow up. 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 Patient with hypoechoic lesion of the prostate seen on recent TRUS. PSA 0.690 on 5/2109. Patient on Flomax /Proscar with both irritative and obstructive LUTS. Needs to strain to initiate urination. Nocturia times 5- 6. Denies dysuria or hematuria. Patient states that he feels that he has prostatitis based on familiar symptoms. Additionally he no longer takes Lisinopril but now is on Metoprolol 10 mg.  \par 8/7/19: Patient presents today for a follow up. He states that the Bactrim prescribed at his last visit was not providing any relief. He still c/o discomfort when sitting but denies dysuria. He had Ciprofloxacin at home and had taken two tablets over the past two days. He noticed relief and inquired about having the prescription refilled today. \par 10/10/19: Patient presents today for a follow up. On 9/13/19 a needle biopsy of the prostate was completed under general anesthesia. He states that after the biopsy his chronic prostatitis began to cause discomfort. Ciprofloxacin 500 mg was used as directed and the discomfort is no longer present. Additionally it is reported that his sexual function decreased following the procedure but that it is starting to improve again. The results of the pathology report are to be discussed with him today. \par 12/31/2019: Patient presents today for a follow up. Pt reports pain on standing. He has an inflamed prostate. When the bladder is fill he also experiences pain and the pain goes away after he voids. Pt took Cipro to 7 days, finishing last week. While he took it, he felt better. Pt reports allergy to sulfur drugs and develops rash.\par 2/10/20: Patient presents today for a follow up. He had an MRI of the pelvis 1/23/20 which showed no evidence of rectal fistula could be identified. Pt has inflamed prostate\par Pt reports constipation for which he takes stool softner. He admits to dysuria and hematuria with mild pain at the tip of the penis yesterday. Most recent PSA was 0.24, Testosterone is 390.0. \par \par 02/27/2020: Patient presents today for a follow up. At the conclusion of the last visit,  Doxycycline 100 mg was prescribed. The patient states he noticed no improvement. Today he reports having nighttime pain in the rectum which is alleviated after urination. Wakes up 4-5 times during the night to urinate. He has satisfactory day time urination though he reports associated strain. Denies chest pain. He currently takes a stool softener for constipation. The patient also complains of lower back pain for which he will get an injection shortly. A urine culture from 2/10/2020 showed no growth. His most recent creatinine level from 7/11/2019 was 1.07.\par \par The patient produced a urine sample which will be sent for urinalysis, urine cytology, and urine culture. \par \par Ciprofloxacin 500mg was prescribed today to take, 1 tablet every 12hrs daily. \par \par Patient should follow up in March or sooner if he experiences urinary difficulties.

## 2020-02-27 NOTE — PHYSICAL EXAM
[General Appearance - Well Developed] : well developed [Normal Appearance] : normal appearance [General Appearance - Well Nourished] : well nourished [General Appearance - In No Acute Distress] : no acute distress [Abdomen Soft] : soft [Abdomen Mass (___ Cm)] : no abdominal mass palpated [Abdomen Tenderness] : non-tender [Skin Color & Pigmentation] : normal skin color and pigmentation [Heart Rate And Rhythm] : Heart rate and rhythm were normal [] : no respiratory distress [Exaggerated Use Of Accessory Muscles For Inspiration] : no accessory muscle use [Respiration, Rhythm And Depth] : normal respiratory rhythm and effort [Oriented To Time, Place, And Person] : oriented to person, place, and time [Affect] : the affect was normal [Mood] : the mood was normal [No Focal Deficits] : no focal deficits [Normal Station and Gait] : the gait and station were normal for the patient's age [No Palpable Adenopathy] : no palpable adenopathy [Cervical Lymph Nodes Enlarged Anterior Bilaterally] : anterior cervical [Cervical Lymph Nodes Enlarged Posterior Bilaterally] : posterior cervical [Supraclavicular Lymph Nodes Enlarged Bilaterally] : supraclavicular [FreeTextEntry1] : No submandibular gland tenderness.\par

## 2020-03-01 LAB
APPEARANCE: CLEAR
BILIRUBIN URINE: NEGATIVE
BLOOD URINE: NEGATIVE
COLOR: NORMAL
GLUCOSE QUALITATIVE U: NEGATIVE
KETONES URINE: NEGATIVE
LEUKOCYTE ESTERASE URINE: ABNORMAL
NITRITE URINE: NEGATIVE
PH URINE: 6
PROTEIN URINE: NORMAL
SPECIFIC GRAVITY URINE: 1.01
URINE CYTOLOGY: NORMAL
UROBILINOGEN URINE: NORMAL

## 2020-03-18 ENCOUNTER — APPOINTMENT (OUTPATIENT)
Dept: UROLOGY | Facility: CLINIC | Age: 75
End: 2020-03-18

## 2020-04-02 ENCOUNTER — RX RENEWAL (OUTPATIENT)
Age: 75
End: 2020-04-02

## 2020-04-03 ENCOUNTER — RX RENEWAL (OUTPATIENT)
Age: 75
End: 2020-04-03

## 2020-05-07 ENCOUNTER — APPOINTMENT (OUTPATIENT)
Dept: UROLOGY | Facility: CLINIC | Age: 75
End: 2020-05-07

## 2020-07-28 ENCOUNTER — APPOINTMENT (OUTPATIENT)
Dept: UROLOGY | Facility: CLINIC | Age: 75
End: 2020-07-28
Payer: MEDICARE

## 2020-07-28 VITALS — SYSTOLIC BLOOD PRESSURE: 125 MMHG | DIASTOLIC BLOOD PRESSURE: 71 MMHG | HEART RATE: 62 BPM

## 2020-07-28 VITALS — TEMPERATURE: 98 F

## 2020-07-28 PROCEDURE — 99214 OFFICE O/P EST MOD 30 MIN: CPT

## 2020-07-29 LAB
ALP BLD-CCNC: 63 U/L
ANION GAP SERPL CALC-SCNC: 14 MMOL/L
BUN SERPL-MCNC: 19 MG/DL
CALCIUM SERPL-MCNC: 9.5 MG/DL
CHLORIDE SERPL-SCNC: 103 MMOL/L
CO2 SERPL-SCNC: 25 MMOL/L
CREAT SERPL-MCNC: 1.07 MG/DL
GLUCOSE SERPL-MCNC: 97 MG/DL
OSMOLALITY SERPL: 294 MOSMOL/KG
POTASSIUM SERPL-SCNC: 4.3 MMOL/L
PSA FREE FLD-MCNC: 29 %
PSA FREE SERPL-MCNC: 0.06 NG/ML
PSA SERPL-MCNC: 0.21 NG/ML
SODIUM SERPL-SCNC: 142 MMOL/L
TESTOST SERPL-MCNC: 266 NG/DL

## 2020-07-31 LAB
APPEARANCE: CLEAR
BILIRUBIN URINE: NEGATIVE
BLOOD URINE: NEGATIVE
COLOR: NORMAL
GLUCOSE QUALITATIVE U: NEGATIVE
KETONES URINE: NEGATIVE
LEUKOCYTE ESTERASE URINE: NEGATIVE
NITRITE URINE: NEGATIVE
PH URINE: 6.5
PROTEIN URINE: NEGATIVE
SPECIFIC GRAVITY URINE: 1.01
URINE CYTOLOGY: NORMAL
UROBILINOGEN URINE: NORMAL

## 2020-08-01 NOTE — ADDENDUM
[FreeTextEntry1] : This note was authored by Critsina Núñez working as scribe for Dr. Collins Duron. I, Dr. Collins Duron, have reviewed the content of this note and confirm it is true and accurate. I personally performed the history and physical examination and made all the decisions.\par 07/28/2020

## 2020-08-01 NOTE — PHYSICAL EXAM
[General Appearance - Well Developed] : well developed [Normal Appearance] : normal appearance [General Appearance - Well Nourished] : well nourished [General Appearance - In No Acute Distress] : no acute distress [Abdomen Soft] : soft [Abdomen Tenderness] : non-tender [Abdomen Mass (___ Cm)] : no abdominal mass palpated [Skin Color & Pigmentation] : normal skin color and pigmentation [Heart Rate And Rhythm] : Heart rate and rhythm were normal [] : no respiratory distress [Respiration, Rhythm And Depth] : normal respiratory rhythm and effort [Exaggerated Use Of Accessory Muscles For Inspiration] : no accessory muscle use [Oriented To Time, Place, And Person] : oriented to person, place, and time [Affect] : the affect was normal [Mood] : the mood was normal [No Focal Deficits] : no focal deficits [Normal Station and Gait] : the gait and station were normal for the patient's age [No Palpable Adenopathy] : no palpable adenopathy [Cervical Lymph Nodes Enlarged Posterior Bilaterally] : posterior cervical [Cervical Lymph Nodes Enlarged Anterior Bilaterally] : anterior cervical [Supraclavicular Lymph Nodes Enlarged Bilaterally] : supraclavicular [FreeTextEntry1] : No submandibular gland tenderness.\par

## 2020-08-01 NOTE — ASSESSMENT
[FreeTextEntry1] : Mr Carrizales is a 74 year old man presented for follow up of gross hematuria, urinary frequency and urgency. The patient takes tamsulosin 0.4 mg, one half hour after meal twice daily, finasteride 5 mg once daily, and desipramine 10 mg at bedtime. The patient returned to the office 03/20/17 having gross hematuria with blood clot. Dr Holly Bernardo started the patient on Bactrim DS. He eventually went to the ER 03/21/17 where he had a Spann catheter placed. His creatinine in the ER was 1.01. Cystoscopy 03/23/17 found catheter trauma. Prostate protruded modest distance into the bladder. Bladder has a small capacity. Diffuse oozing of blood from bladder neck and trigone. Scraped up mucosa most likely from introduction of telescope. No bladder stones or tumors. Bladder inflammation from catheter. CT urogram was normal and found no etiology of the gross hematuria.\par 3/1/18: The patient returned and reported he has pain in his rectum area and penis. PSA from 1/24/18 was 0.39 ng/mL. 2/15/18 Creatinine was 1.01 mg/dL, hemoglobin was 14.1 g/dL. He received and abdomen ultrasound which shows a fatty liver, gallstones, and obscured pancreas. He denies any gross hematuria. He noted one day ago his GI physician put banding on his hemorrhoids. \par 4/10/18: The patient returned and reported he finished his antibiotics from last visit and noted he had another hemorrhoid and put another band by his GI. He noted pain still persists. UA from 3/1/18 shows 8 WBC/HPF. CBC from 3/1/18 showed slight anemia. \par 5/23/18: The patient returned and reported he has been taking Bactrim for his most recent prostatitis starting one week ago. PSA from  1/22/18 was 0.39 ng/mL. Currently taking Tamsulosin and Finasteride. \par 8/28/18: The patient returned and reported his urination is satisfactory. Currently taking tamsulosin and finasteride. Wakes up 3-4 times during the night to urinate. Noted he had a recent flare up of dysuria 3 days ago and took 2 days of Cipro and symptom have cleared up. \par 3/7/19: The patient returned and reported that he had another flare up of prostatitis and took 2 days of Cipro for his symptoms to clear. Complained of lingering dysuria. I reminded the patient that with Cipro, there is a side effect of tendonitis. Denied gross hematuria. Currently taking Trimethoprim, Tamsulosin and Finasteride. PSA from 2/15/19 was 0.04 ng/mL.\par 4/4/19: The patient returned and reported that he another flare up of prostatitis. Complained of pain when he stands up. His pain is relieved while sitting down. Occasionally has dysuria. Wakes up twice during the night to urinate. Complained of urinary urgency during the night. Takes Tamsulosin BID, Trimethoprim and Finasteride.\par 5/9/19: The patient returned today for an US. Transrectal US of the prostate findings showed a prostate volume of 23.4 cc, a hypoechoic area at the left apex 1.37 x 1.25 cm in transverse image, unremarkable seminal vesicles, and no rectal masses. The hypoechoic are is suspicious for prostate cancer, but the patient has a low PSA. I recommended that the patient undergo a transperineal biopsy for the hyperechoic region, but due to an anal fissure, the patient would like to wait. Notes that he consulted with a colon surgeon about his anal fissure and is currently taking stool softeners. His urination is adequate. Complains of nocturia. Wakes up 5-6 times during the night to urinate. Complains of pain at the tip of his penis. Takes Tamsulosin BID. UA from 4/11/19 was normal. Culture from 4/11/19 showed no growth. Notes that he took Bactrim and ended up getting a rash on his LEs.\par 6/10/19: Patient presents today for a follow up. Today he reports that he is scheduled for an anal fissure operation for 6/21/19. It has been advised that he will need 4 weeks to heal following the operation. Regarding his urination he reports that during the day there are no urinary issues. Nocturia is reported and he will have to wake up 5-6x a night to urinate. Finasteride and Tamsulosin are taken as directed.  \par 7/18/19: Patient presents today for a follow up. 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 Patient with hypoechoic lesion of the prostate seen on recent TRUS. PSA 0.690 on 5/2109. Patient on Flomax /Proscar with both irritative and obstructive LUTS. Needs to strain to initiate urination. Nocturia times 5- 6. Denies dysuria or hematuria. Patient states that he feels that he has prostatitis based on familiar symptoms. Additionally he no longer takes Lisinopril but now is on Metoprolol 10 mg.  \par 8/7/19: Patient presents today for a follow up. He states that the Bactrim prescribed at his last visit was not providing any relief. He still c/o discomfort when sitting but denies dysuria. He had Ciprofloxacin at home and had taken two tablets over the past two days. He noticed relief and inquired about having the prescription refilled today. \par 10/10/19: Patient presents today for a follow up. On 9/13/19 a needle biopsy of the prostate was completed under general anesthesia. He states that after the biopsy his chronic prostatitis began to cause discomfort. Ciprofloxacin 500 mg was used as directed and the discomfort is no longer present. Additionally it is reported that his sexual function decreased following the procedure but that it is starting to improve again. The results of the pathology report are to be discussed with him today. \par 12/31/2019: Patient presents today for a follow up. Pt reports pain on standing. He has an inflamed prostate. When the bladder is fill he also experiences pain and the pain goes away after he voids. Pt took Cipro to 7 days, finishing last week. While he took it, he felt better. Pt reports allergy to sulfur drugs and develops rash.\par 2/10/20: Patient presents today for a follow up. He had an MRI of the pelvis 1/23/20 which showed no evidence of rectal fistula could be identified. Pt has inflamed prostate\par Pt reports constipation for which he takes stool softner. He admits to dysuria and hematuria with mild pain at the tip of the penis yesterday. Most recent PSA was 0.24, Testosterone is 390.0. \par \par 02/27/2020: Patient presents today for a follow up. At the conclusion of the last visit,  Doxycycline 100 mg was prescribed. The patient states he noticed no improvement. Today he reports having nighttime pain in the rectum which is alleviated after urination. Wakes up 4-5 times during the night to urinate. He has satisfactory day time urination though he reports associated strain. Denies chest pain. He currently takes a stool softener for constipation. The patient also complains of lower back pain for which he will get an injection shortly. A urine culture from 2/10/2020 showed no growth. His most recent creatinine level from 7/11/2019 was 1.07. The patient produced a urine sample which will be sent for urinalysis, urine cytology, and urine culture. Ciprofloxacin 500mg was prescribed today to take, 1 tablet every 12hrs daily. Patient should follow up in March or sooner if he experiences urinary difficulties. \par \par 07/28/2020: Patient presents today for a follow up. Reports pain in the rectum and pain with fullness of the bladder. Pt had a US at [place]. Was given suppository which helped. Nocturia x4-5. On Tamsulosin BID. No burning. Frequency at night. Pt must push and staring. He experiences difficulty with his flow and voiding in general. Wife reports fowl smelling urine. \par \par Prescribed Desmopressin. \par \par Pt will provide a urine sample today for culture, cytology and analysis. Bloodwork today will include ALK PHOS, BMP, osmolality serum, PSA and testosterone. \par \par RTO in 2-3 weeks.

## 2020-08-01 NOTE — HISTORY OF PRESENT ILLNESS
[FreeTextEntry1] : Mr Carrizales is a 74 year old man presented for follow up of gross hematuria, urinary frequency and urgency. The patient takes tamsulosin 0.4 mg, one half hour after meal twice daily, finasteride 5 mg once daily, and desipramine 10 mg at bedtime. The patient returned to the office 03/20/17 having gross hematuria with blood clot. Dr Holly Bernardo started the patient on Bactrim DS. He eventually went to the ER 03/21/17 where he had a Spann catheter placed. His creatinine in the ER was 1.01. Cystoscopy 03/23/17 found catheter trauma. Prostate protruded modest distance into the bladder. Bladder has a small capacity. Diffuse oozing of blood from bladder neck and trigone. Scraped up mucosa most likely from introduction of telescope. No bladder stones or tumors. Bladder inflammation from catheter. CT urogram was normal and found no etiology of the gross hematuria.\par 3/1/18: The patient returned and reported he has pain in his rectum area and penis. PSA from 1/24/18 was 0.39 ng/mL. 2/15/18 Creatinine was 1.01 mg/dL, hemoglobin was 14.1 g/dL. He received and abdomen ultrasound which shows a fatty liver, gallstones, and obscured pancreas. He denies any gross hematuria. He noted one day ago his GI physician put banding on his hemorrhoids. \par 4/10/18: The patient returned and reported he finished his antibiotics from last visit and noted he had another hemorrhoid and put another band by his GI. He noted pain still persists. UA from 3/1/18 shows 8 WBC/HPF. CBC from 3/1/18 showed slight anemia. \par 5/23/18: The patient returned and reported he has been taking Bactrim for his most recent prostatitis starting one week ago. PSA from  1/22/18 was 0.39 ng/mL. Currently taking Tamsulosin and Finasteride. \par 8/28/18: The patient returned and reported his urination is satisfactory. Currently taking tamsulosin and finasteride. Wakes up 3-4 times during the night to urinate. Noted he had a recent flare up of dysuria 3 days ago and took 2 days of Cipro and symptom have cleared up. \par 3/7/19: The patient returned and reported that he had another flare up of prostatitis and took 2 days of Cipro for his symptoms to clear. Complained of lingering dysuria. I reminded the patient that with Cipro, there is a side effect of tendonitis. Denied gross hematuria. Currently taking Trimethoprim, Tamsulosin and Finasteride. PSA from 2/15/19 was 0.04 ng/mL.\par 4/4/19: The patient returned and reported that he another flare up of prostatitis. Complained of pain when he stands up. His pain is relieved while sitting down. Occasionally has dysuria. Wakes up twice during the night to urinate. Complained of urinary urgency during the night. Takes Tamsulosin BID, Trimethoprim and Finasteride.\par 5/9/19: The patient returned today for an US. Transrectal US of the prostate findings showed a prostate volume of 23.4 cc, a hypoechoic area at the left apex 1.37 x 1.25 cm in transverse image, unremarkable seminal vesicles, and no rectal masses. The hypoechoic are is suspicious for prostate cancer, but the patient has a low PSA. I recommended that the patient undergo a transperineal biopsy for the hyperechoic region, but due to an anal fissure, the patient would like to wait. Notes that he consulted with a colon surgeon about his anal fissure and is currently taking stool softeners. His urination is adequate. Complains of nocturia. Wakes up 5-6 times during the night to urinate. Complains of pain at the tip of his penis. Takes Tamsulosin BID. UA from 4/11/19 was normal. Culture from 4/11/19 showed no growth. Notes that he took Bactrim and ended up getting a rash on his LEs.\par 6/10/19: Patient presents today for a follow up. Today he reports that he is scheduled for an anal fissure operation for 6/21/19. It has been advised that he will need 4 weeks to heal following the operation. Regarding his urination he reports that during the day there are no urinary issues. Nocturia is reported and he will have to wake up 5-6x a night to urinate. Finasteride and Tamsulosin are taken as directed.  \par 7/18/19: Patient presents today for a follow up. 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 Patient with hypoechoic lesion of the prostate seen on recent TRUS. PSA 0.690 on 5/2109. Patient on Flomax /Proscar with both irritative and obstructive LUTS. Needs to strain to initiate urination. Nocturia times 5- 6. Denies dysuria or hematuria. Patient states that he feels that he has prostatitis based on familiar symptoms. Additionally he no longer takes Lisinopril but now is on Metoprolol 10 mg.  \par 8/7/19: Patient presents today for a follow up. He states that the Bactrim prescribed at his last visit was not providing any relief. He still c/o discomfort when sitting but denies dysuria. He had Ciprofloxacin at home and had taken two tablets over the past two days. He noticed relief and inquired about having the prescription refilled today. \par 10/10/19: Patient presents today for a follow up. On 9/13/19 a needle biopsy of the prostate was completed under general anesthesia. He states that after the biopsy his chronic prostatitis began to cause discomfort. Ciprofloxacin 500 mg was used as directed and the discomfort is no longer present. Additionally it is reported that his sexual function decreased following the procedure but that it is starting to improve again. The results of the pathology report are to be discussed with him today. \par 12/31/2019: Patient presents today for a follow up. Pt reports pain on standing. He has an inflamed prostate. When the bladder is fill he also experiences pain and the pain goes away after he voids. Pt took Cipro to 7 days, finishing last week. While he took it, he felt better. Pt reports allergy to sulfur drugs and develops rash.\par 2/10/20: Patient presents today for a follow up. He had an MRI of the pelvis 1/23/20 which showed no evidence of rectal fistula could be identified. Pt has inflamed prostate\par Pt reports constipation for which he takes stool softner. He admits to dysuria and hematuria with mild pain at the tip of the penis yesterday. Most recent PSA was 0.24, Testosterone is 390.0. \par 02/27/2020: Patient presents today for a follow up. At the conclusion of the last visit,  Doxycycline 100 mg was prescribed. The patient states he noticed no improvement. Today he reports having nighttime pain in the rectum which is alleviated after urination. Wakes up 4-5 times during the night to urinate. He has satisfactory day time urination though he reports associated strain. Denies chest pain. He currently takes a stool softener for constipation. The patient also complains of lower back pain for which he will get an injection shortly. A urine culture from 2/10/2020 showed no growth. His most recent creatinine level from 7/11/2019 was 1.07.\par \par 07/28/2020: Patient presents today for a follow up. Reports pain in the rectum and pain with fullness of the bladder. Pt had a US at [place]. Was given suppository which helped. Noxturia x4-5. On Tamsulosin BID. No burning. Frequency at night. Pt must push and staring. He experiences difficulty with his flow and voiding in general. Wife reports fowl smelling urine.

## 2020-08-01 NOTE — REVIEW OF SYSTEMS
[Constipation] : constipation [Dysuria] : dysuria [Nocturia] : nocturia [Convulsions] : convulsions [Erectile Dysfunction] : erectile dysfunction [Eyesight Problems] : no eyesight problems [Feeling Poorly] : not feeling poorly [Feeling Tired] : not feeling tired [Loss Of Hearing] : no hearing loss [Chest Pain] : no chest pain [Incontinence] : no incontinence [Confused] : no confusion [Cough] : no cough [Anxiety] : no anxiety [Easy Bleeding] : no tendency for easy bleeding

## 2020-08-25 ENCOUNTER — APPOINTMENT (OUTPATIENT)
Dept: UROLOGY | Facility: CLINIC | Age: 75
End: 2020-08-25
Payer: MEDICARE

## 2020-08-25 VITALS — DIASTOLIC BLOOD PRESSURE: 73 MMHG | HEART RATE: 77 BPM | RESPIRATION RATE: 16 BRPM | SYSTOLIC BLOOD PRESSURE: 129 MMHG

## 2020-08-25 PROCEDURE — 99214 OFFICE O/P EST MOD 30 MIN: CPT

## 2020-08-26 LAB
APPEARANCE: CLEAR
BILIRUBIN URINE: NEGATIVE
BLOOD URINE: NEGATIVE
COLOR: NORMAL
GLUCOSE QUALITATIVE U: NEGATIVE
KETONES URINE: NEGATIVE
LEUKOCYTE ESTERASE URINE: NEGATIVE
NITRITE URINE: NEGATIVE
PH URINE: 6
PROTEIN URINE: NORMAL
SPECIFIC GRAVITY URINE: 1.01
UROBILINOGEN URINE: NORMAL

## 2020-09-06 LAB — URINE CYTOLOGY: NORMAL

## 2020-09-06 NOTE — ASSESSMENT
[FreeTextEntry1] : Mr Carrizales is a 74 year old man presented for follow up of gross hematuria, urinary frequency and urgency. The patient takes tamsulosin 0.4 mg, one half hour after meal twice daily, finasteride 5 mg once daily, and desipramine 10 mg at bedtime. The patient returned to the office 03/20/17 having gross hematuria with blood clot. Dr Holly Bernardo started the patient on Bactrim DS. He eventually went to the ER 03/21/17 where he had a Spann catheter placed. His creatinine in the ER was 1.01. Cystoscopy 03/23/17 found catheter trauma. Prostate protruded modest distance into the bladder. Bladder has a small capacity. Diffuse oozing of blood from bladder neck and trigone. Scraped up mucosa most likely from introduction of telescope. No bladder stones or tumors. Bladder inflammation from catheter. CT urogram was normal and found no etiology of the gross hematuria.\par 3/1/18: The patient returned and reported he has pain in his rectum area and penis. PSA from 1/24/18 was 0.39 ng/mL. 2/15/18 Creatinine was 1.01 mg/dL, hemoglobin was 14.1 g/dL. He received and abdomen ultrasound which shows a fatty liver, gallstones, and obscured pancreas. He denies any gross hematuria. He noted one day ago his GI physician put banding on his hemorrhoids. \par 4/10/18: The patient returned and reported he finished his antibiotics from last visit and noted he had another hemorrhoid and put another band by his GI. He noted pain still persists. UA from 3/1/18 shows 8 WBC/HPF. CBC from 3/1/18 showed slight anemia. \par 5/23/18: The patient returned and reported he has been taking Bactrim for his most recent prostatitis starting one week ago. PSA from  1/22/18 was 0.39 ng/mL. Currently taking Tamsulosin and Finasteride. \par 8/28/18: The patient returned and reported his urination is satisfactory. Currently taking tamsulosin and finasteride. Wakes up 3-4 times during the night to urinate. Noted he had a recent flare up of dysuria 3 days ago and took 2 days of Cipro and symptom have cleared up. \par 3/7/19: The patient returned and reported that he had another flare up of prostatitis and took 2 days of Cipro for his symptoms to clear. Complained of lingering dysuria. I reminded the patient that with Cipro, there is a side effect of tendonitis. Denied gross hematuria. Currently taking Trimethoprim, Tamsulosin and Finasteride. PSA from 2/15/19 was 0.04 ng/mL.\par 4/4/19: The patient returned and reported that he another flare up of prostatitis. Complained of pain when he stands up. His pain is relieved while sitting down. Occasionally has dysuria. Wakes up twice during the night to urinate. Complained of urinary urgency during the night. Takes Tamsulosin BID, Trimethoprim and Finasteride.\par 5/9/19: The patient returned today for an US. Transrectal US of the prostate findings showed a prostate volume of 23.4 cc, a hypoechoic area at the left apex 1.37 x 1.25 cm in transverse image, unremarkable seminal vesicles, and no rectal masses. The hypoechoic are is suspicious for prostate cancer, but the patient has a low PSA. I recommended that the patient undergo a transperineal biopsy for the hyperechoic region, but due to an anal fissure, the patient would like to wait. Notes that he consulted with a colon surgeon about his anal fissure and is currently taking stool softeners. His urination is adequate. Complains of nocturia. Wakes up 5-6 times during the night to urinate. Complains of pain at the tip of his penis. Takes Tamsulosin BID. UA from 4/11/19 was normal. Culture from 4/11/19 showed no growth. Notes that he took Bactrim and ended up getting a rash on his LEs.\par 6/10/19: Patient presents today for a follow up. Today he reports that he is scheduled for an anal fissure operation for 6/21/19. It has been advised that he will need 4 weeks to heal following the operation. Regarding his urination he reports that during the day there are no urinary issues. Nocturia is reported and he will have to wake up 5-6x a night to urinate. Finasteride and Tamsulosin are taken as directed.  \par 7/18/19: Patient presents today for a follow up. 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 Patient with hypoechoic lesion of the prostate seen on recent TRUS. PSA 0.690 on 5/2109. Patient on Flomax /Proscar with both irritative and obstructive LUTS. Needs to strain to initiate urination. Nocturia times 5- 6. Denies dysuria or hematuria. Patient states that he feels that he has prostatitis based on familiar symptoms. Additionally he no longer takes Lisinopril but now is on Metoprolol 10 mg.  \par 8/7/19: Patient presents today for a follow up. He states that the Bactrim prescribed at his last visit was not providing any relief. He still c/o discomfort when sitting but denies dysuria. He had Ciprofloxacin at home and had taken two tablets over the past two days. He noticed relief and inquired about having the prescription refilled today. \par 10/10/19: Patient presents today for a follow up. On 9/13/19 a needle biopsy of the prostate was completed under general anesthesia. He states that after the biopsy his chronic prostatitis began to cause discomfort. Ciprofloxacin 500 mg was used as directed and the discomfort is no longer present. Additionally it is reported that his sexual function decreased following the procedure but that it is starting to improve again. The results of the pathology report are to be discussed with him today. \par 12/31/2019: Patient presents today for a follow up. Pt reports pain on standing. He has an inflamed prostate. When the bladder is fill he also experiences pain and the pain goes away after he voids. Pt took Cipro to 7 days, finishing last week. While he took it, he felt better. Pt reports allergy to sulfur drugs and develops rash.\par 2/10/20: Patient presents today for a follow up. He had an MRI of the pelvis 1/23/20 which showed no evidence of rectal fistula could be identified. Pt has inflamed prostate\par Pt reports constipation for which he takes stool softner. He admits to dysuria and hematuria with mild pain at the tip of the penis yesterday. Most recent PSA was 0.24, Testosterone is 390.0. \par \par 02/27/2020: Patient presents today for a follow up. At the conclusion of the last visit,  Doxycycline 100 mg was prescribed. The patient states he noticed no improvement. Today he reports having nighttime pain in the rectum which is alleviated after urination. Wakes up 4-5 times during the night to urinate. He has satisfactory day time urination though he reports associated strain. Denies chest pain. He currently takes a stool softener for constipation. The patient also complains of lower back pain for which he will get an injection shortly. A urine culture from 2/10/2020 showed no growth. His most recent creatinine level from 7/11/2019 was 1.07. The patient produced a urine sample which will be sent for urinalysis, urine cytology, and urine culture. Ciprofloxacin 500mg was prescribed today to take, 1 tablet every 12hrs daily. Patient should follow up in March or sooner if he experiences urinary difficulties. \par \par 07/28/2020: Patient presents today for a follow up. Reports pain in the rectum and pain with fullness of the bladder. Pt had a US at [place]. Was given suppository which helped. Nocturia x4-5. On Tamsulosin BID. No burning. Frequency at night. Pt must push and staring. He experiences difficulty with his flow and voiding in general. Wife reports fowl smelling urine. \par \par 8/25/2020: Patient presents today for follow up on nocturia. Patient states he has not yet taken Desmopressin as he does not want to take too many medications. Still experiencing pain in rectum and with fullness of bladder. Was given Valium which helped. Last lab work on 7/28/2020 was within normal limits. Osmolality was normal at 294. \par \par The patient produced a urine sample which will be sent for urinalysis, urine cytology, and urine culture. \par \par Patient to start Desmopressin 0.1mg once before bedtime. \par \par Patient will RTO in 2 weeks.

## 2020-09-06 NOTE — HISTORY OF PRESENT ILLNESS
[FreeTextEntry1] : Mr Carrizales is a 74 year old man presented for follow up of gross hematuria, urinary frequency and urgency. The patient takes tamsulosin 0.4 mg, one half hour after meal twice daily, finasteride 5 mg once daily, and desipramine 10 mg at bedtime. The patient returned to the office 03/20/17 having gross hematuria with blood clot. Dr Holly Bernardo started the patient on Bactrim DS. He eventually went to the ER 03/21/17 where he had a Spann catheter placed. His creatinine in the ER was 1.01. Cystoscopy 03/23/17 found catheter trauma. Prostate protruded modest distance into the bladder. Bladder has a small capacity. Diffuse oozing of blood from bladder neck and trigone. Scraped up mucosa most likely from introduction of telescope. No bladder stones or tumors. Bladder inflammation from catheter. CT urogram was normal and found no etiology of the gross hematuria.\par 3/1/18: The patient returned and reported he has pain in his rectum area and penis. PSA from 1/24/18 was 0.39 ng/mL. 2/15/18 Creatinine was 1.01 mg/dL, hemoglobin was 14.1 g/dL. He received and abdomen ultrasound which shows a fatty liver, gallstones, and obscured pancreas. He denies any gross hematuria. He noted one day ago his GI physician put banding on his hemorrhoids. \par 4/10/18: The patient returned and reported he finished his antibiotics from last visit and noted he had another hemorrhoid and put another band by his GI. He noted pain still persists. UA from 3/1/18 shows 8 WBC/HPF. CBC from 3/1/18 showed slight anemia. \par 5/23/18: The patient returned and reported he has been taking Bactrim for his most recent prostatitis starting one week ago. PSA from  1/22/18 was 0.39 ng/mL. Currently taking Tamsulosin and Finasteride. \par 8/28/18: The patient returned and reported his urination is satisfactory. Currently taking tamsulosin and finasteride. Wakes up 3-4 times during the night to urinate. Noted he had a recent flare up of dysuria 3 days ago and took 2 days of Cipro and symptom have cleared up. \par 3/7/19: The patient returned and reported that he had another flare up of prostatitis and took 2 days of Cipro for his symptoms to clear. Complained of lingering dysuria. I reminded the patient that with Cipro, there is a side effect of tendonitis. Denied gross hematuria. Currently taking Trimethoprim, Tamsulosin and Finasteride. PSA from 2/15/19 was 0.04 ng/mL.\par 4/4/19: The patient returned and reported that he another flare up of prostatitis. Complained of pain when he stands up. His pain is relieved while sitting down. Occasionally has dysuria. Wakes up twice during the night to urinate. Complained of urinary urgency during the night. Takes Tamsulosin BID, Trimethoprim and Finasteride.\par 5/9/19: The patient returned today for an US. Transrectal US of the prostate findings showed a prostate volume of 23.4 cc, a hypoechoic area at the left apex 1.37 x 1.25 cm in transverse image, unremarkable seminal vesicles, and no rectal masses. The hypoechoic are is suspicious for prostate cancer, but the patient has a low PSA. I recommended that the patient undergo a transperineal biopsy for the hyperechoic region, but due to an anal fissure, the patient would like to wait. Notes that he consulted with a colon surgeon about his anal fissure and is currently taking stool softeners. His urination is adequate. Complains of nocturia. Wakes up 5-6 times during the night to urinate. Complains of pain at the tip of his penis. Takes Tamsulosin BID. UA from 4/11/19 was normal. Culture from 4/11/19 showed no growth. Notes that he took Bactrim and ended up getting a rash on his LEs.\par 6/10/19: Patient presents today for a follow up. Today he reports that he is scheduled for an anal fissure operation for 6/21/19. It has been advised that he will need 4 weeks to heal following the operation. Regarding his urination he reports that during the day there are no urinary issues. Nocturia is reported and he will have to wake up 5-6x a night to urinate. Finasteride and Tamsulosin are taken as directed.  \par 7/18/19: Patient presents today for a follow up. 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 Patient with hypoechoic lesion of the prostate seen on recent TRUS. PSA 0.690 on 5/2109. Patient on Flomax /Proscar with both irritative and obstructive LUTS. Needs to strain to initiate urination. Nocturia times 5- 6. Denies dysuria or hematuria. Patient states that he feels that he has prostatitis based on familiar symptoms. Additionally he no longer takes Lisinopril but now is on Metoprolol 10 mg.  \par 8/7/19: Patient presents today for a follow up. He states that the Bactrim prescribed at his last visit was not providing any relief. He still c/o discomfort when sitting but denies dysuria. He had Ciprofloxacin at home and had taken two tablets over the past two days. He noticed relief and inquired about having the prescription refilled today. \par 10/10/19: Patient presents today for a follow up. On 9/13/19 a needle biopsy of the prostate was completed under general anesthesia. He states that after the biopsy his chronic prostatitis began to cause discomfort. Ciprofloxacin 500 mg was used as directed and the discomfort is no longer present. Additionally it is reported that his sexual function decreased following the procedure but that it is starting to improve again. The results of the pathology report are to be discussed with him today. \par 12/31/2019: Patient presents today for a follow up. Pt reports pain on standing. He has an inflamed prostate. When the bladder is fill he also experiences pain and the pain goes away after he voids. Pt took Cipro to 7 days, finishing last week. While he took it, he felt better. Pt reports allergy to sulfur drugs and develops rash.\par 2/10/20: Patient presents today for a follow up. He had an MRI of the pelvis 1/23/20 which showed no evidence of rectal fistula could be identified. Pt has inflamed prostate\par Pt reports constipation for which he takes stool softner. He admits to dysuria and hematuria with mild pain at the tip of the penis yesterday. Most recent PSA was 0.24, Testosterone is 390.0. \par 02/27/2020: Patient presents today for a follow up. At the conclusion of the last visit,  Doxycycline 100 mg was prescribed. The patient states he noticed no improvement. Today he reports having nighttime pain in the rectum which is alleviated after urination. Wakes up 4-5 times during the night to urinate. He has satisfactory day time urination though he reports associated strain. Denies chest pain. He currently takes a stool softener for constipation. The patient also complains of lower back pain for which he will get an injection shortly. A urine culture from 2/10/2020 showed no growth. His most recent creatinine level from 7/11/2019 was 1.07.\par \par 07/28/2020: Patient presents today for a follow up. Reports pain in the rectum and pain with fullness of the bladder. Pt had a US at [place]. Was given suppository which helped. Noxturia x4-5. On Tamsulosin BID. No burning. Frequency at night. Pt must push and staring. He experiences difficulty with his flow and voiding in general. Wife reports fowl smelling urine. \par \par 8/25/2020: Patient presents today for follow up on nocturia. Patient states he has not yet taken Desmopressin as he does not want to take too many medications. Still experiencing pain in rectum and with fullness of bladder. Was given Valium which helped. Last lab work on 7/28/2020 was within normal limits. Osmolality was normal at 294.

## 2020-09-06 NOTE — PHYSICAL EXAM
[General Appearance - Well Developed] : well developed [Normal Appearance] : normal appearance [General Appearance - In No Acute Distress] : no acute distress [Skin Color & Pigmentation] : normal skin color and pigmentation [] : no respiratory distress [Oriented To Time, Place, And Person] : oriented to person, place, and time [Mood] : the mood was normal [Affect] : the affect was normal [No Focal Deficits] : no focal deficits [Abdomen Tenderness] : non-tender [Abdomen Soft] : soft [FreeTextEntry1] : ambulates with a cane

## 2020-09-06 NOTE — ADDENDUM
[FreeTextEntry1] : I, Lisa Allenin, acted solely as a scribe for Dr. Collins Duron on this date 08/25/2020.\par \par All medical record entries made by the Scribe were at my, Dr. Collins Duron, direction and personally dictated by me on 08/25/2020. I have reviewed the chart and agree that the record accurately reflects my personal performance of the history, physical exam, assessment and plan.  I have also personally directed, reviewed and agreed with the chart.

## 2020-09-15 ENCOUNTER — APPOINTMENT (OUTPATIENT)
Dept: UROLOGY | Facility: CLINIC | Age: 75
End: 2020-09-15
Payer: MEDICARE

## 2020-09-15 VITALS — SYSTOLIC BLOOD PRESSURE: 128 MMHG | DIASTOLIC BLOOD PRESSURE: 67 MMHG | HEART RATE: 82 BPM

## 2020-09-15 DIAGNOSIS — A49.9 URINARY TRACT INFECTION, SITE NOT SPECIFIED: ICD-10-CM

## 2020-09-15 DIAGNOSIS — N39.0 URINARY TRACT INFECTION, SITE NOT SPECIFIED: ICD-10-CM

## 2020-09-15 PROCEDURE — 99214 OFFICE O/P EST MOD 30 MIN: CPT

## 2020-09-15 NOTE — PHYSICAL EXAM
[General Appearance - Well Developed] : well developed [Normal Appearance] : normal appearance [General Appearance - In No Acute Distress] : no acute distress [Abdomen Soft] : soft [Skin Color & Pigmentation] : normal skin color and pigmentation [Abdomen Tenderness] : non-tender [Edema] : no peripheral edema [] : no respiratory distress [Respiration, Rhythm And Depth] : normal respiratory rhythm and effort [Exaggerated Use Of Accessory Muscles For Inspiration] : no accessory muscle use [Oriented To Time, Place, And Person] : oriented to person, place, and time [Mood] : the mood was normal [Affect] : the affect was normal [Not Anxious] : not anxious [Normal Station and Gait] : the gait and station were normal for the patient's age [No Focal Deficits] : no focal deficits

## 2020-09-19 NOTE — ADDENDUM
[FreeTextEntry1] : I, Lisa Allenin, acted solely as a scribe for Dr. Collins Duron on this date 09/15/2020.\par \par All medical record entries made by the Scribe were at my, Dr. Collins Duron, direction and personally dictated by me on 09/15/2020. I have reviewed the chart and agree that the record accurately reflects my personal performance of the history, physical exam, assessment and plan.  I have also personally directed, reviewed and agreed with the chart.

## 2020-09-19 NOTE — HISTORY OF PRESENT ILLNESS
[FreeTextEntry1] : Mr Carrizales is a 74 year old man presented for follow up of gross hematuria, urinary frequency and urgency. The patient takes tamsulosin 0.4 mg, one half hour after meal twice daily, finasteride 5 mg once daily, and desipramine 10 mg at bedtime. The patient returned to the office 03/20/17 having gross hematuria with blood clot. Dr Holly Bernardo started the patient on Bactrim DS. He eventually went to the ER 03/21/17 where he had a Spann catheter placed. His creatinine in the ER was 1.01. Cystoscopy 03/23/17 found catheter trauma. Prostate protruded modest distance into the bladder. Bladder has a small capacity. Diffuse oozing of blood from bladder neck and trigone. Scraped up mucosa most likely from introduction of telescope. No bladder stones or tumors. Bladder inflammation from catheter. CT urogram was normal and found no etiology of the gross hematuria.\par 3/1/18: The patient returned and reported he has pain in his rectum area and penis. PSA from 1/24/18 was 0.39 ng/mL. 2/15/18 Creatinine was 1.01 mg/dL, hemoglobin was 14.1 g/dL. He received and abdomen ultrasound which shows a fatty liver, gallstones, and obscured pancreas. He denies any gross hematuria. He noted one day ago his GI physician put banding on his hemorrhoids. \par 4/10/18: The patient returned and reported he finished his antibiotics from last visit and noted he had another hemorrhoid and put another band by his GI. He noted pain still persists. UA from 3/1/18 shows 8 WBC/HPF. CBC from 3/1/18 showed slight anemia. \par 5/23/18: The patient returned and reported he has been taking Bactrim for his most recent prostatitis starting one week ago. PSA from  1/22/18 was 0.39 ng/mL. Currently taking Tamsulosin and Finasteride. \par 8/28/18: The patient returned and reported his urination is satisfactory. Currently taking tamsulosin and finasteride. Wakes up 3-4 times during the night to urinate. Noted he had a recent flare up of dysuria 3 days ago and took 2 days of Cipro and symptom have cleared up. \par 3/7/19: The patient returned and reported that he had another flare up of prostatitis and took 2 days of Cipro for his symptoms to clear. Complained of lingering dysuria. I reminded the patient that with Cipro, there is a side effect of tendonitis. Denied gross hematuria. Currently taking Trimethoprim, Tamsulosin and Finasteride. PSA from 2/15/19 was 0.04 ng/mL.\par 4/4/19: The patient returned and reported that he another flare up of prostatitis. Complained of pain when he stands up. His pain is relieved while sitting down. Occasionally has dysuria. Wakes up twice during the night to urinate. Complained of urinary urgency during the night. Takes Tamsulosin BID, Trimethoprim and Finasteride.\par 5/9/19: The patient returned today for an US. Transrectal US of the prostate findings showed a prostate volume of 23.4 cc, a hypoechoic area at the left apex 1.37 x 1.25 cm in transverse image, unremarkable seminal vesicles, and no rectal masses. The hypoechoic are is suspicious for prostate cancer, but the patient has a low PSA. I recommended that the patient undergo a transperineal biopsy for the hyperechoic region, but due to an anal fissure, the patient would like to wait. Notes that he consulted with a colon surgeon about his anal fissure and is currently taking stool softeners. His urination is adequate. Complains of nocturia. Wakes up 5-6 times during the night to urinate. Complains of pain at the tip of his penis. Takes Tamsulosin BID. UA from 4/11/19 was normal. Culture from 4/11/19 showed no growth. Notes that he took Bactrim and ended up getting a rash on his LEs.\par 6/10/19: Patient presents today for a follow up. Today he reports that he is scheduled for an anal fissure operation for 6/21/19. It has been advised that he will need 4 weeks to heal following the operation. Regarding his urination he reports that during the day there are no urinary issues. Nocturia is reported and he will have to wake up 5-6x a night to urinate. Finasteride and Tamsulosin are taken as directed.  \par 7/18/19: Patient presents today for a follow up. 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 Patient with hypoechoic lesion of the prostate seen on recent TRUS. PSA 0.690 on 5/2109. Patient on Flomax /Proscar with both irritative and obstructive LUTS. Needs to strain to initiate urination. Nocturia times 5- 6. Denies dysuria or hematuria. Patient states that he feels that he has prostatitis based on familiar symptoms. Additionally he no longer takes Lisinopril but now is on Metoprolol 10 mg.  \par 8/7/19: Patient presents today for a follow up. He states that the Bactrim prescribed at his last visit was not providing any relief. He still c/o discomfort when sitting but denies dysuria. He had Ciprofloxacin at home and had taken two tablets over the past two days. He noticed relief and inquired about having the prescription refilled today. \par 10/10/19: Patient presents today for a follow up. On 9/13/19 a needle biopsy of the prostate was completed under general anesthesia. He states that after the biopsy his chronic prostatitis began to cause discomfort. Ciprofloxacin 500 mg was used as directed and the discomfort is no longer present. Additionally it is reported that his sexual function decreased following the procedure but that it is starting to improve again. The results of the pathology report are to be discussed with him today. \par 12/31/2019: Patient presents today for a follow up. Pt reports pain on standing. He has an inflamed prostate. When the bladder is fill he also experiences pain and the pain goes away after he voids. Pt took Cipro to 7 days, finishing last week. While he took it, he felt better. Pt reports allergy to sulfur drugs and develops rash.\par 2/10/20: Patient presents today for a follow up. He had an MRI of the pelvis 1/23/20 which showed no evidence of rectal fistula could be identified. Pt has inflamed prostate\par Pt reports constipation for which he takes stool softner. He admits to dysuria and hematuria with mild pain at the tip of the penis yesterday. Most recent PSA was 0.24, Testosterone is 390.0. \par 02/27/2020: Patient presents today for a follow up. At the conclusion of the last visit,  Doxycycline 100 mg was prescribed. The patient states he noticed no improvement. Today he reports having nighttime pain in the rectum which is alleviated after urination. Wakes up 4-5 times during the night to urinate. He has satisfactory day time urination though he reports associated strain. Denies chest pain. He currently takes a stool softener for constipation. The patient also complains of lower back pain for which he will get an injection shortly. A urine culture from 2/10/2020 showed no growth. His most recent creatinine level from 7/11/2019 was 1.07.\par \par 07/28/2020: Patient presents today for a follow up. Reports pain in the rectum and pain with fullness of the bladder. Pt had a US at [place]. Was given suppository which helped. Noxturia x4-5. On Tamsulosin BID. No burning. Frequency at night. Pt must push and staring. He experiences difficulty with his flow and voiding in general. Wife reports fowl smelling urine. \par \par 8/25/2020: Patient presents today for follow up on nocturia. Patient states he has not yet taken Desmopressin as he does not want to take too many medications. Still experiencing pain in rectum and with fullness of bladder. Was given Valium which helped. Last lab work on 7/28/2020 was within normal limits. Osmolality was normal at 294. Patient to start Desmopressin 0.1mg once before bedtime. \par \par 09/15/2020: Patient presents today for follow up. Pt was having pain in rectum and finished 5 days of Cipro which helped resolve pain, but causes constipation. Takes Colace for constipation. Urinalysis on 8/25/20 was normal. Still has pain in rectum at night, and uses Metronidazole gel to relieve it. Wakes up every 2 hours at night to urinate.  Has some urgency.

## 2020-09-20 LAB
APPEARANCE: CLEAR
BACTERIA UR CULT: NORMAL
BACTERIA: NEGATIVE
BILIRUBIN URINE: NEGATIVE
BLOOD URINE: NEGATIVE
COLOR: NORMAL
GLUCOSE QUALITATIVE U: NEGATIVE
HYALINE CASTS: 1 /LPF
KETONES URINE: NEGATIVE
LEUKOCYTE ESTERASE URINE: NEGATIVE
MICROSCOPIC-UA: NORMAL
NITRITE URINE: NEGATIVE
PH URINE: 6.5
PROTEIN URINE: NEGATIVE
RED BLOOD CELLS URINE: 1 /HPF
SPECIFIC GRAVITY URINE: 1.01
SQUAMOUS EPITHELIAL CELLS: 1 /HPF
URINE CYTOLOGY: NORMAL
UROBILINOGEN URINE: NORMAL
WHITE BLOOD CELLS URINE: 7 /HPF

## 2020-11-17 ENCOUNTER — APPOINTMENT (OUTPATIENT)
Dept: UROLOGY | Facility: CLINIC | Age: 75
End: 2020-11-17
Payer: MEDICARE

## 2020-11-17 VITALS
HEART RATE: 72 BPM | SYSTOLIC BLOOD PRESSURE: 111 MMHG | BODY MASS INDEX: 29.02 KG/M2 | DIASTOLIC BLOOD PRESSURE: 73 MMHG | WEIGHT: 170 LBS | HEIGHT: 64 IN | RESPIRATION RATE: 17 BRPM

## 2020-11-17 VITALS — TEMPERATURE: 98.4 F

## 2020-11-17 PROCEDURE — 99214 OFFICE O/P EST MOD 30 MIN: CPT

## 2020-11-17 RX ORDER — CIPROFLOXACIN HYDROCHLORIDE 500 MG/1
500 TABLET, FILM COATED ORAL TWICE DAILY
Qty: 14 | Refills: 0 | Status: COMPLETED | COMMUNITY
Start: 2019-12-10 | End: 2020-11-17

## 2020-11-17 RX ORDER — SULFAMETHOXAZOLE AND TRIMETHOPRIM 800; 160 MG/1; MG/1
800-160 TABLET ORAL
Qty: 28 | Refills: 0 | Status: COMPLETED | COMMUNITY
Start: 2018-03-01 | End: 2020-11-17

## 2020-11-17 RX ORDER — DESMOPRESSIN ACETATE 0.1 MG/1
0.1 TABLET ORAL
Qty: 30 | Refills: 11 | Status: COMPLETED | COMMUNITY
Start: 2020-07-28 | End: 2020-11-17

## 2020-11-17 RX ORDER — TRIMETHOPRIM 100 MG/1
100 TABLET ORAL
Qty: 90 | Refills: 3 | Status: COMPLETED | COMMUNITY
Start: 2018-05-23 | End: 2020-11-17

## 2020-11-17 RX ORDER — CIPROFLOXACIN HYDROCHLORIDE 500 MG/1
500 TABLET, FILM COATED ORAL
Qty: 20 | Refills: 0 | Status: COMPLETED | COMMUNITY
Start: 2017-10-30 | End: 2020-11-17

## 2020-11-17 RX ORDER — CEFDINIR 300 MG/1
300 CAPSULE ORAL TWICE DAILY
Qty: 20 | Refills: 0 | Status: COMPLETED | COMMUNITY
Start: 2019-04-10 | End: 2020-11-17

## 2020-11-17 RX ORDER — CEFDINIR 300 MG/1
300 CAPSULE ORAL
Qty: 28 | Refills: 0 | Status: COMPLETED | COMMUNITY
Start: 2019-07-25 | End: 2020-11-17

## 2020-11-17 RX ORDER — DOXYCYCLINE HYCLATE 100 MG/1
100 TABLET ORAL
Qty: 14 | Refills: 0 | Status: COMPLETED | COMMUNITY
Start: 2020-02-10 | End: 2020-11-17

## 2020-11-17 RX ORDER — CIPROFLOXACIN HYDROCHLORIDE 500 MG/1
500 TABLET, FILM COATED ORAL
Qty: 28 | Refills: 0 | Status: COMPLETED | COMMUNITY
Start: 2019-09-13 | End: 2020-11-17

## 2020-11-17 RX ORDER — DOXYCYCLINE HYCLATE 100 MG/1
100 TABLET ORAL
Qty: 28 | Refills: 0 | Status: COMPLETED | COMMUNITY
Start: 2019-05-20 | End: 2020-11-17

## 2020-11-17 RX ORDER — CIPROFLOXACIN HYDROCHLORIDE 500 MG/1
500 TABLET, FILM COATED ORAL
Qty: 28 | Refills: 0 | Status: COMPLETED | COMMUNITY
Start: 2019-08-07 | End: 2020-11-17

## 2020-11-17 RX ORDER — GABAPENTIN 300 MG/1
300 CAPSULE ORAL
Qty: 90 | Refills: 11 | Status: COMPLETED | COMMUNITY
Start: 2020-02-27 | End: 2020-11-17

## 2020-11-17 RX ORDER — SULFAMETHOXAZOLE AND TRIMETHOPRIM 800; 160 MG/1; MG/1
800-160 TABLET ORAL
Qty: 28 | Refills: 0 | Status: COMPLETED | COMMUNITY
Start: 2019-04-04 | End: 2020-11-17

## 2020-11-17 RX ORDER — HYDROCORTISONE ACETATE 25 MG/1
25 SUPPOSITORY RECTAL
Qty: 12 | Refills: 2 | Status: COMPLETED | COMMUNITY
Start: 2019-04-17 | End: 2020-11-17

## 2020-11-17 RX ORDER — CIPROFLOXACIN HYDROCHLORIDE 500 MG/1
500 TABLET, FILM COATED ORAL
Qty: 28 | Refills: 0 | Status: COMPLETED | COMMUNITY
Start: 2020-02-27 | End: 2020-11-17

## 2020-11-17 RX ORDER — CEFPODOXIME PROXETIL 200 MG/1
200 TABLET, FILM COATED ORAL
Qty: 28 | Refills: 0 | Status: COMPLETED | COMMUNITY
Start: 2020-03-19 | End: 2020-11-17

## 2020-11-17 RX ORDER — HYDROCORTISONE ACETATE 25 MG/1
25 SUPPOSITORY RECTAL
Qty: 1 | Refills: 2 | Status: COMPLETED | COMMUNITY
Start: 2019-05-20 | End: 2020-11-17

## 2020-11-18 LAB
ALP BLD-CCNC: 63 U/L
ANION GAP SERPL CALC-SCNC: 12 MMOL/L
APPEARANCE: CLEAR
BACTERIA UR CULT: NORMAL
BACTERIA: NEGATIVE
BILIRUBIN URINE: NEGATIVE
BLOOD URINE: NEGATIVE
BUN SERPL-MCNC: 17 MG/DL
CALCIUM SERPL-MCNC: 9.2 MG/DL
CHLORIDE SERPL-SCNC: 101 MMOL/L
CO2 SERPL-SCNC: 26 MMOL/L
COLOR: NORMAL
CREAT SERPL-MCNC: 1.08 MG/DL
GLUCOSE QUALITATIVE U: NEGATIVE
GLUCOSE SERPL-MCNC: 92 MG/DL
HYALINE CASTS: 0 /LPF
KETONES URINE: NEGATIVE
LEUKOCYTE ESTERASE URINE: NEGATIVE
MICROSCOPIC-UA: NORMAL
NITRITE URINE: NEGATIVE
PH URINE: 6
POTASSIUM SERPL-SCNC: 3.8 MMOL/L
PROTEIN URINE: NEGATIVE
PSA SERPL-MCNC: 0.21 NG/ML
RED BLOOD CELLS URINE: 2 /HPF
SODIUM SERPL-SCNC: 139 MMOL/L
SPECIFIC GRAVITY URINE: 1.02
SQUAMOUS EPITHELIAL CELLS: 1 /HPF
TESTOST SERPL-MCNC: 304 NG/DL
UROBILINOGEN URINE: NORMAL
WHITE BLOOD CELLS URINE: 5 /HPF

## 2020-11-20 LAB — URINE CYTOLOGY: NORMAL

## 2020-11-22 NOTE — ASSESSMENT
[FreeTextEntry1] : Mr Carrizales is a 74 year old man presented for follow up of gross hematuria, urinary frequency and urgency. The patient takes tamsulosin 0.4 mg, one half hour after meal twice daily, finasteride 5 mg once daily, and desipramine 10 mg at bedtime. The patient returned to the office 03/20/17 having gross hematuria with blood clot. Dr Holly Bernardo started the patient on Bactrim DS. He eventually went to the ER 03/21/17 where he had a Spann catheter placed. His creatinine in the ER was 1.01. Cystoscopy 03/23/17 found catheter trauma. Prostate protruded modest distance into the bladder. Bladder has a small capacity. Diffuse oozing of blood from bladder neck and trigone. Scraped up mucosa most likely from introduction of telescope. No bladder stones or tumors. Bladder inflammation from catheter. CT urogram was normal and found no etiology of the gross hematuria.\par 3/1/18: The patient returned and reported he has pain in his rectum area and penis. PSA from 1/24/18 was 0.39 ng/mL. 2/15/18 Creatinine was 1.01 mg/dL, hemoglobin was 14.1 g/dL. He received and abdomen ultrasound which shows a fatty liver, gallstones, and obscured pancreas. He denies any gross hematuria. He noted one day ago his GI physician put banding on his hemorrhoids. \par 4/10/18: The patient returned and reported he finished his antibiotics from last visit and noted he had another hemorrhoid and put another band by his GI. He noted pain still persists. UA from 3/1/18 shows 8 WBC/HPF. CBC from 3/1/18 showed slight anemia. \par 5/23/18: The patient returned and reported he has been taking Bactrim for his most recent prostatitis starting one week ago. PSA from  1/22/18 was 0.39 ng/mL. Currently taking Tamsulosin and Finasteride. \par 8/28/18: The patient returned and reported his urination is satisfactory. Currently taking tamsulosin and finasteride. Wakes up 3-4 times during the night to urinate. Noted he had a recent flare up of dysuria 3 days ago and took 2 days of Cipro and symptom have cleared up. \par 3/7/19: The patient returned and reported that he had another flare up of prostatitis and took 2 days of Cipro for his symptoms to clear. Complained of lingering dysuria. I reminded the patient that with Cipro, there is a side effect of tendonitis. Denied gross hematuria. Currently taking Trimethoprim, Tamsulosin and Finasteride. PSA from 2/15/19 was 0.04 ng/mL.\par 4/4/19: The patient returned and reported that he another flare up of prostatitis. Complained of pain when he stands up. His pain is relieved while sitting down. Occasionally has dysuria. Wakes up twice during the night to urinate. Complained of urinary urgency during the night. Takes Tamsulosin BID, Trimethoprim and Finasteride.\par 5/9/19: The patient returned today for an US. Transrectal US of the prostate findings showed a prostate volume of 23.4 cc, a hypoechoic area at the left apex 1.37 x 1.25 cm in transverse image, unremarkable seminal vesicles, and no rectal masses. The hypoechoic are is suspicious for prostate cancer, but the patient has a low PSA. I recommended that the patient undergo a transperineal biopsy for the hyperechoic region, but due to an anal fissure, the patient would like to wait. Notes that he consulted with a colon surgeon about his anal fissure and is currently taking stool softeners. His urination is adequate. Complains of nocturia. Wakes up 5-6 times during the night to urinate. Complains of pain at the tip of his penis. Takes Tamsulosin BID. UA from 4/11/19 was normal. Culture from 4/11/19 showed no growth. Notes that he took Bactrim and ended up getting a rash on his LEs.\par 6/10/19: Patient presents today for a follow up. Today he reports that he is scheduled for an anal fissure operation for 6/21/19. It has been advised that he will need 4 weeks to heal following the operation. Regarding his urination he reports that during the day there are no urinary issues. Nocturia is reported and he will have to wake up 5-6x a night to urinate. Finasteride and Tamsulosin are taken as directed.  \par 7/18/19: Patient presents today for a follow up. 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 Patient with hypoechoic lesion of the prostate seen on recent TRUS. PSA 0.690 on 5/2109. Patient on Flomax /Proscar with both irritative and obstructive LUTS. Needs to strain to initiate urination. Nocturia times 5- 6. Denies dysuria or hematuria. Patient states that he feels that he has prostatitis based on familiar symptoms. Additionally he no longer takes Lisinopril but now is on Metoprolol 10 mg.  \par 8/7/19: Patient presents today for a follow up. He states that the Bactrim prescribed at his last visit was not providing any relief. He still c/o discomfort when sitting but denies dysuria. He had Ciprofloxacin at home and had taken two tablets over the past two days. He noticed relief and inquired about having the prescription refilled today. \par 10/10/19: Patient presents today for a follow up. On 9/13/19 a needle biopsy of the prostate was completed under general anesthesia. He states that after the biopsy his chronic prostatitis began to cause discomfort. Ciprofloxacin 500 mg was used as directed and the discomfort is no longer present. Additionally it is reported that his sexual function decreased following the procedure but that it is starting to improve again. The results of the pathology report are to be discussed with him today. \par 12/31/2019: Patient presents today for a follow up. Pt reports pain on standing. He has an inflamed prostate. When the bladder is fill he also experiences pain and the pain goes away after he voids. Pt took Cipro to 7 days, finishing last week. While he took it, he felt better. Pt reports allergy to sulfur drugs and develops rash.\par 2/10/20: Patient presents today for a follow up. He had an MRI of the pelvis 1/23/20 which showed no evidence of rectal fistula could be identified. Pt has inflamed prostate\par Pt reports constipation for which he takes stool softner. He admits to dysuria and hematuria with mild pain at the tip of the penis yesterday. Most recent PSA was 0.24, Testosterone is 390.0. \par \par 02/27/2020: Patient presents today for a follow up. At the conclusion of the last visit,  Doxycycline 100 mg was prescribed. The patient states he noticed no improvement. Today he reports having nighttime pain in the rectum which is alleviated after urination. Wakes up 4-5 times during the night to urinate. He has satisfactory day time urination though he reports associated strain. Denies chest pain. He currently takes a stool softener for constipation. The patient also complains of lower back pain for which he will get an injection shortly. A urine culture from 2/10/2020 showed no growth. His most recent creatinine level from 7/11/2019 was 1.07. The patient produced a urine sample which will be sent for urinalysis, urine cytology, and urine culture. Ciprofloxacin 500mg was prescribed today to take, 1 tablet every 12hrs daily. Patient should follow up in March or sooner if he experiences urinary difficulties. \par \par 07/28/2020: Patient presents today for a follow up. Reports pain in the rectum and pain with fullness of the bladder. Pt had a US at [facility]. Was given suppository which helped. Nocturia x4-5. On Tamsulosin BID. No burning. Frequency at night. Pt must push and staring. He experiences difficulty with his flow and voiding in general. Wife reports fowl smelling urine. \par \par 8/25/2020: Patient presents today for follow up on nocturia. Patient states he has not yet taken Desmopressin as he does not want to take too many medications. Still experiencing pain in rectum and with fullness of bladder. Was given Valium which helped. Last lab work on 7/28/2020 was within normal limits. Osmolality was normal at 294. Patient to start Desmopressin 0.1mg once before bedtime. \par \par 09/15/2020: Patient presents today for follow up. Pt was having pain in rectum and finished 5 days of Cipro which helped resolve pain, but causes constipation. Takes Colace for constipation. Urinalysis on 8/25/20 was normal. Still has pain in rectum at night, and uses Metronidazole gel to relieve it. Wakes up every 2 hours at night to urinate.  Has some urgency. \par \par The patient produced a urine sample which will be sent for urinalysis, urine cytology, and urine culture. I recommend the patient take MiraLAX every other day when he is on Cipro.Patient will RTO in 3 months for follow up. \par \par 11/17/2020: The patient presents today for a follow up. He is not taking desmopressin anymore. His last blood work on 11/6/2020 showed his creatinine was good at 1.0. He has no burning. He has some pain in the rectum and when his bladder is full. He is still taking finasteride 5 mg and trimethoprim 100 mg. At night he gets a lot of pain when his bladder is full and has urinary urgency about every 2 hours. After he urinates, the pain goes away. \par \par The knee-chest position was used for the ZENA. Digital rectal exam found a small rectal nodule. Anal tone is normal. The prostate is non tender, with normal texture, discrete borders, and no nodules. It is a 15 gram transurethral resection size. No gross blood on the examining finger. \par \par The patient produced a urine sample which will be sent for urinalysis, urine cytology, and urine culture. Blood work today included BMP, alkaline phosphatase, PSA, and testosterone. I am prescribing duloxetine 20 mg once daily at night time for the bladder pain. In two weeks if he notices no improvement, I will increase the dose. I advised the patient to see his gastroenterologist who performed his rectal procedure for a small rectal nodule that I felt during his ZENA. The patient will RTO in 3 months or sooner if any problems arise.

## 2020-11-22 NOTE — HISTORY OF PRESENT ILLNESS
[FreeTextEntry1] : Mr Carrizales is a 74 year old man presented for follow up of gross hematuria, urinary frequency and urgency. The patient takes tamsulosin 0.4 mg, one half hour after meal twice daily, finasteride 5 mg once daily, and desipramine 10 mg at bedtime. The patient returned to the office 03/20/17 having gross hematuria with blood clot. Dr Holly Bernardo started the patient on Bactrim DS. He eventually went to the ER 03/21/17 where he had a Spann catheter placed. His creatinine in the ER was 1.01. Cystoscopy 03/23/17 found catheter trauma. Prostate protruded modest distance into the bladder. Bladder has a small capacity. Diffuse oozing of blood from bladder neck and trigone. Scraped up mucosa most likely from introduction of telescope. No bladder stones or tumors. Bladder inflammation from catheter. CT urogram was normal and found no etiology of the gross hematuria.\par 3/1/18: The patient returned and reported he has pain in his rectum area and penis. PSA from 1/24/18 was 0.39 ng/mL. 2/15/18 Creatinine was 1.01 mg/dL, hemoglobin was 14.1 g/dL. He received and abdomen ultrasound which shows a fatty liver, gallstones, and obscured pancreas. He denies any gross hematuria. He noted one day ago his GI physician put banding on his hemorrhoids. \par 4/10/18: The patient returned and reported he finished his antibiotics from last visit and noted he had another hemorrhoid and put another band by his GI. He noted pain still persists. UA from 3/1/18 shows 8 WBC/HPF. CBC from 3/1/18 showed slight anemia. \par 5/23/18: The patient returned and reported he has been taking Bactrim for his most recent prostatitis starting one week ago. PSA from  1/22/18 was 0.39 ng/mL. Currently taking Tamsulosin and Finasteride. \par 8/28/18: The patient returned and reported his urination is satisfactory. Currently taking tamsulosin and finasteride. Wakes up 3-4 times during the night to urinate. Noted he had a recent flare up of dysuria 3 days ago and took 2 days of Cipro and symptom have cleared up. \par 3/7/19: The patient returned and reported that he had another flare up of prostatitis and took 2 days of Cipro for his symptoms to clear. Complained of lingering dysuria. I reminded the patient that with Cipro, there is a side effect of tendonitis. Denied gross hematuria. Currently taking Trimethoprim, Tamsulosin and Finasteride. PSA from 2/15/19 was 0.04 ng/mL.\par 4/4/19: The patient returned and reported that he another flare up of prostatitis. Complained of pain when he stands up. His pain is relieved while sitting down. Occasionally has dysuria. Wakes up twice during the night to urinate. Complained of urinary urgency during the night. Takes Tamsulosin BID, Trimethoprim and Finasteride.\par 5/9/19: The patient returned today for an US. Transrectal US of the prostate findings showed a prostate volume of 23.4 cc, a hypoechoic area at the left apex 1.37 x 1.25 cm in transverse image, unremarkable seminal vesicles, and no rectal masses. The hypoechoic are is suspicious for prostate cancer, but the patient has a low PSA. I recommended that the patient undergo a transperineal biopsy for the hyperechoic region, but due to an anal fissure, the patient would like to wait. Notes that he consulted with a colon surgeon about his anal fissure and is currently taking stool softeners. His urination is adequate. Complains of nocturia. Wakes up 5-6 times during the night to urinate. Complains of pain at the tip of his penis. Takes Tamsulosin BID. UA from 4/11/19 was normal. Culture from 4/11/19 showed no growth. Notes that he took Bactrim and ended up getting a rash on his LEs.\par 6/10/19: Patient presents today for a follow up. Today he reports that he is scheduled for an anal fissure operation for 6/21/19. It has been advised that he will need 4 weeks to heal following the operation. Regarding his urination he reports that during the day there are no urinary issues. Nocturia is reported and he will have to wake up 5-6x a night to urinate. Finasteride and Tamsulosin are taken as directed.  \par 7/18/19: Patient presents today for a follow up. 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 Patient with hypoechoic lesion of the prostate seen on recent TRUS. PSA 0.690 on 5/2109. Patient on Flomax /Proscar with both irritative and obstructive LUTS. Needs to strain to initiate urination. Nocturia times 5- 6. Denies dysuria or hematuria. Patient states that he feels that he has prostatitis based on familiar symptoms. Additionally he no longer takes Lisinopril but now is on Metoprolol 10 mg.  \par 8/7/19: Patient presents today for a follow up. He states that the Bactrim prescribed at his last visit was not providing any relief. He still c/o discomfort when sitting but denies dysuria. He had Ciprofloxacin at home and had taken two tablets over the past two days. He noticed relief and inquired about having the prescription refilled today. \par 10/10/19: Patient presents today for a follow up. On 9/13/19 a needle biopsy of the prostate was completed under general anesthesia. He states that after the biopsy his chronic prostatitis began to cause discomfort. Ciprofloxacin 500 mg was used as directed and the discomfort is no longer present. Additionally it is reported that his sexual function decreased following the procedure but that it is starting to improve again. The results of the pathology report are to be discussed with him today. \par 12/31/2019: Patient presents today for a follow up. Pt reports pain on standing. He has an inflamed prostate. When the bladder is fill he also experiences pain and the pain goes away after he voids. Pt took Cipro to 7 days, finishing last week. While he took it, he felt better. Pt reports allergy to sulfur drugs and develops rash.\par 2/10/20: Patient presents today for a follow up. He had an MRI of the pelvis 1/23/20 which showed no evidence of rectal fistula could be identified. Pt has inflamed prostate\par Pt reports constipation for which he takes stool softner. He admits to dysuria and hematuria with mild pain at the tip of the penis yesterday. Most recent PSA was 0.24, Testosterone is 390.0. \par 02/27/2020: Patient presents today for a follow up. At the conclusion of the last visit,  Doxycycline 100 mg was prescribed. The patient states he noticed no improvement. Today he reports having nighttime pain in the rectum which is alleviated after urination. Wakes up 4-5 times during the night to urinate. He has satisfactory day time urination though he reports associated strain. Denies chest pain. He currently takes a stool softener for constipation. The patient also complains of lower back pain for which he will get an injection shortly. A urine culture from 2/10/2020 showed no growth. His most recent creatinine level from 7/11/2019 was 1.07.\par \par 07/28/2020: Patient presents today for a follow up. Reports pain in the rectum and pain with fullness of the bladder. Pt had a US at [place]. Was given suppository which helped. Noxturia x4-5. On Tamsulosin BID. No burning. Frequency at night. Pt must push and staring. He experiences difficulty with his flow and voiding in general. Wife reports fowl smelling urine. \par \par 8/25/2020: Patient presents today for follow up on nocturia. Patient states he has not yet taken Desmopressin as he does not want to take too many medications. Still experiencing pain in rectum and with fullness of bladder. Was given Valium which helped. Last lab work on 7/28/2020 was within normal limits. Osmolality was normal at 294. Patient to start Desmopressin 0.1mg once before bedtime. \par \par 09/15/2020: Patient presents today for follow up. Pt was having pain in rectum and finished 5 days of Cipro which helped resolve pain, but causes constipation. Takes Colace for constipation. Urinalysis on 8/25/20 was normal. Still has pain in rectum at night, and uses Metronidazole gel to relieve it. Wakes up every 2 hours at night to urinate.  Has some urgency. \par \par 11/17/2020: The patient presents today for a follow up. He is not taking desmopressin anymore. His last blood work on 11/6/2020 showed his creatinine was good at 1.0. He has no burning. He has some pain in the rectum and when his bladder is full. He is still taking finasteride 5 mg and trimethoprim 100 mg. At night he gets a lot of pain when his bladder is full and has urinary urgency about every 2 hours. After he urinates, the pain goes away.

## 2020-11-22 NOTE — PHYSICAL EXAM
[General Appearance - Well Developed] : well developed [Normal Appearance] : normal appearance [General Appearance - In No Acute Distress] : no acute distress [Abdomen Soft] : soft [Abdomen Tenderness] : non-tender [Edema] : no peripheral edema [] : no respiratory distress [Respiration, Rhythm And Depth] : normal respiratory rhythm and effort [Exaggerated Use Of Accessory Muscles For Inspiration] : no accessory muscle use [Oriented To Time, Place, And Person] : oriented to person, place, and time [Affect] : the affect was normal [Mood] : the mood was normal [Not Anxious] : not anxious [Normal Station and Gait] : the gait and station were normal for the patient's age [No Focal Deficits] : no focal deficits [FreeTextEntry1] : The knee-chest position was used for the [facility]. Digital rectal exam found a small rectal nodule. Anal tone is normal. The prostate is non tender, with normal texture, discrete borders, and no nodules. It is a 15 gram transurethral resection size. No gross blood on the examining finger.

## 2020-11-22 NOTE — ADDENDUM
[FreeTextEntry1] : This note was authored by Guerline Gold working as a scribe for Dr.Gary Duron. I, Dr. Collins Duron have reviewed the content of this note and confirm it is true and accurate. I personally performed the history and physical examination and made all the decisions 11/17/2020.

## 2020-12-21 ENCOUNTER — APPOINTMENT (OUTPATIENT)
Dept: COLORECTAL SURGERY | Facility: CLINIC | Age: 75
End: 2020-12-21
Payer: MEDICARE

## 2020-12-21 PROCEDURE — 99072 ADDL SUPL MATRL&STAF TM PHE: CPT

## 2020-12-21 PROCEDURE — 99211 OFF/OP EST MAY X REQ PHY/QHP: CPT | Mod: 25

## 2020-12-21 PROCEDURE — 45300 PROCTOSIGMOIDOSCOPY DX: CPT

## 2021-01-05 NOTE — HISTORY OF PRESENT ILLNESS
[FreeTextEntry1] : 75-year-old gentleman who is well-known to me. I've treated him in the past for a fissure. Patient has had chronic rectal pain with an unclear etiology. He's been seen extensively by urology and treated for chronic prostatitis. He did have a prostate biopsy performed last year which was negative. Apparently during a recent visit with urology the possibility of a small rectal nodule was raised.\par \par Inspection of the anorectal area is unremarkable. On digital rectal examination I appreciate no significant nodule or pathology. Sigmoidoscopy and anoscopy are unremarkable.\par \par I reassured the patient and his wife that at this point I detect no significant anorectal pathology. He has no evidence of a recurrent fistula or fissure.

## 2021-01-19 NOTE — BRIEF OPERATIVE NOTE - NSICDXBRIEFPROCEDURE_GEN_ALL_CORE_FT
Chart reviewed.  OV 11/19/20 for evaluation of vaginal discharge and odor.   GC/chlamydia=neg.  Swabone=yeast, given Rx for Diflucan.     12/2/20=treated for suspected BV with Flagyl.     Call to patient.  Sx do not seem like BV.  Feels more like a UTI, also having some pelvic pain, ? Bladder pain.  Urinary urgency and dysuria.  Feels a burning after she is done urinating.  Vaginal discharge is cloudy, thicker.  No odor.  Denies itching.   Patient working until 6:00 pm.    Advised patient that she should be evaluated today and encouraged her to go to Urgent Care today.  Patient verbalized understanding and will do this after work today.     PROCEDURES:  Internal anal sphincterotomy 21-Jun-2019 07:54:35  Hannah Nichols

## 2021-03-09 ENCOUNTER — APPOINTMENT (OUTPATIENT)
Dept: UROLOGY | Facility: CLINIC | Age: 76
End: 2021-03-09
Payer: MEDICARE

## 2021-03-09 VITALS
RESPIRATION RATE: 17 BRPM | SYSTOLIC BLOOD PRESSURE: 130 MMHG | WEIGHT: 180 LBS | HEIGHT: 64 IN | TEMPERATURE: 97.8 F | DIASTOLIC BLOOD PRESSURE: 77 MMHG | BODY MASS INDEX: 30.73 KG/M2 | HEART RATE: 87 BPM

## 2021-03-09 PROCEDURE — 99214 OFFICE O/P EST MOD 30 MIN: CPT

## 2021-03-09 PROCEDURE — 99072 ADDL SUPL MATRL&STAF TM PHE: CPT

## 2021-03-09 RX ORDER — DULOXETINE HYDROCHLORIDE 20 MG/1
20 CAPSULE, DELAYED RELEASE PELLETS ORAL
Qty: 30 | Refills: 11 | Status: COMPLETED | COMMUNITY
Start: 2020-11-17 | End: 2021-03-09

## 2021-03-10 LAB
ALBUMIN SERPL ELPH-MCNC: 4.1 G/DL
ALP BLD-CCNC: 60 U/L
ALT SERPL-CCNC: 17 U/L
ANION GAP SERPL CALC-SCNC: 11 MMOL/L
APPEARANCE: CLEAR
AST SERPL-CCNC: 22 U/L
BACTERIA UR CULT: NORMAL
BACTERIA: NEGATIVE
BASOPHILS # BLD AUTO: 0.03 K/UL
BASOPHILS NFR BLD AUTO: 0.4 %
BILIRUB SERPL-MCNC: 0.3 MG/DL
BILIRUBIN URINE: NEGATIVE
BLOOD URINE: ABNORMAL
BUN SERPL-MCNC: 20 MG/DL
CALCIUM SERPL-MCNC: 9.3 MG/DL
CHLORIDE SERPL-SCNC: 102 MMOL/L
CO2 SERPL-SCNC: 27 MMOL/L
COLOR: YELLOW
CREAT SERPL-MCNC: 1.03 MG/DL
EOSINOPHIL # BLD AUTO: 0.21 K/UL
EOSINOPHIL NFR BLD AUTO: 2.9 %
GLUCOSE QUALITATIVE U: NEGATIVE
GLUCOSE SERPL-MCNC: 102 MG/DL
HCT VFR BLD CALC: 41.6 %
HGB BLD-MCNC: 11.9 G/DL
HYALINE CASTS: 1 /LPF
IMM GRANULOCYTES NFR BLD AUTO: 0.3 %
KETONES URINE: NEGATIVE
LEUKOCYTE ESTERASE URINE: NEGATIVE
LYMPHOCYTES # BLD AUTO: 1.7 K/UL
LYMPHOCYTES NFR BLD AUTO: 23.9 %
MAN DIFF?: NORMAL
MCHC RBC-ENTMCNC: 23.4 PG
MCHC RBC-ENTMCNC: 28.6 GM/DL
MCV RBC AUTO: 81.7 FL
MICROSCOPIC-UA: NORMAL
MONOCYTES # BLD AUTO: 0.68 K/UL
MONOCYTES NFR BLD AUTO: 9.6 %
NEUTROPHILS # BLD AUTO: 4.48 K/UL
NEUTROPHILS NFR BLD AUTO: 62.9 %
NITRITE URINE: NEGATIVE
PH URINE: 6
PLATELET # BLD AUTO: 228 K/UL
POTASSIUM SERPL-SCNC: 4 MMOL/L
PROT SERPL-MCNC: 6.7 G/DL
PROTEIN URINE: NORMAL
PSA SERPL-MCNC: 0.32 NG/ML
RBC # BLD: 5.09 M/UL
RBC # FLD: 17.7 %
RED BLOOD CELLS URINE: 2 /HPF
SODIUM SERPL-SCNC: 140 MMOL/L
SPECIFIC GRAVITY URINE: 1.01
SQUAMOUS EPITHELIAL CELLS: 1 /HPF
TESTOST SERPL-MCNC: 260 NG/DL
UROBILINOGEN URINE: NORMAL
WBC # FLD AUTO: 7.12 K/UL
WHITE BLOOD CELLS URINE: 5 /HPF

## 2021-03-11 ENCOUNTER — NON-APPOINTMENT (OUTPATIENT)
Age: 76
End: 2021-03-11

## 2021-03-13 LAB — URINE CYTOLOGY: NORMAL

## 2021-03-14 NOTE — REVIEW OF SYSTEMS
[Urinary Stream Is Smaller] : smaller urine stream [Initiating Urination Req. Strain] : initiating urination requires straining [Fever] : no fever [Chills] : no chills [Feeling Poorly] : not feeling poorly [Loss Of Hearing] : no hearing loss [Chest Pain] : no chest pain [Shortness Of Breath] : no shortness of breath [Abdominal Pain] : no abdominal pain [Dysuria] : no dysuria [Arthralgias] : no arthralgias [Skin Lesions] : no skin lesions [Suicidal] : not suicidal [Proptosis] : no proptosis [Easy Bleeding] : no tendency for easy bleeding

## 2021-03-14 NOTE — HISTORY OF PRESENT ILLNESS
[Urinary Frequency] : urinary frequency [Nocturia] : nocturia [FreeTextEntry1] : 75 yr  history of BPH, prostatitis , overactive bladder with nocturnal frequency.  Rectal area pains, last visit [facility] November 17, 2020 showing rectal mucosa nodule. To follow GI. \par patient said he is taking gabapentin and tamsulosin and finasteride. Pain is worse when bladder is full and less when he voids. \par Nocturnal frequency Q2 hr\par ED since 2019 \par Patient is seeing  for his rectal pain.   [Urinary Incontinence] : no urinary incontinence

## 2021-03-14 NOTE — PHYSICAL EXAM
[Abdomen Tenderness] : non-tender [Skin Color & Pigmentation] : normal skin color and pigmentation [] : no respiratory distress [Oriented To Time, Place, And Person] : oriented to person, place, and time [Normal Station and Gait] : the gait and station were normal for the patient's age [Edema] : no peripheral edema [No Focal Deficits] : no focal deficits [FreeTextEntry1] : overweight

## 2021-03-14 NOTE — ADDENDUM
[FreeTextEntry1] : This note was authored by Guerline Gold working as a scribe for Dr.Gary Duron. I, Dr. Collins Duron have reviewed the content of this note and confirm it is true and accurate. I personally performed the history and physical examination and made all the decisions 03/09/2021.

## 2021-05-24 ENCOUNTER — APPOINTMENT (OUTPATIENT)
Dept: UROLOGY | Facility: CLINIC | Age: 76
End: 2021-05-24
Payer: MEDICARE

## 2021-05-24 ENCOUNTER — NON-APPOINTMENT (OUTPATIENT)
Age: 76
End: 2021-05-24

## 2021-05-24 PROCEDURE — 99214 OFFICE O/P EST MOD 30 MIN: CPT

## 2021-05-24 NOTE — PHYSICAL EXAM
[General Appearance - Well Developed] : well developed [Normal Appearance] : normal appearance [General Appearance - In No Acute Distress] : no acute distress [Abdomen Tenderness] : non-tender [Skin Color & Pigmentation] : normal skin color and pigmentation [Edema] : no peripheral edema [] : no respiratory distress [Oriented To Time, Place, And Person] : oriented to person, place, and time [No Focal Deficits] : no focal deficits [FreeTextEntry1] : overweight

## 2021-05-24 NOTE — ASSESSMENT
[FreeTextEntry1] : Mr Carrizales is a 75 year old man presented for follow up of gross hematuria, urinary frequency and urgency. The patient takes tamsulosin 0.4 mg, one half hour after meal twice daily, finasteride 5 mg once daily, and desipramine 10 mg at bedtime. The patient returned to the office 03/20/17 having gross hematuria with blood clot. Dr Holly Bernardo started the patient on Bactrim DS. He eventually went to the ER 03/21/17 where he had a Spann catheter placed. His creatinine in the ER was 1.01. Cystoscopy 03/23/17 found catheter trauma. Prostate protruded modest distance into the bladder. Bladder has a small capacity. Diffuse oozing of blood from bladder neck and trigone. Scraped up mucosa most likely from introduction of telescope. No bladder stones or tumors. Bladder inflammation from catheter. CT urogram was normal and found no etiology of the gross hematuria.\par 3/1/18: The patient returned and reported he has pain in his rectum area and penis. PSA from 1/24/18 was 0.39 ng/mL. 2/15/18 Creatinine was 1.01 mg/dL, hemoglobin was 14.1 g/dL. He received and abdomen ultrasound which shows a fatty liver, gallstones, and obscured pancreas. He denies any gross hematuria. He noted one day ago his GI physician put banding on his hemorrhoids. \par 4/10/18: The patient returned and reported he finished his antibiotics from last visit and noted he had another hemorrhoid and put another band by his GI. He noted pain still persists. UA from 3/1/18 shows 8 WBC/HPF. CBC from 3/1/18 showed slight anemia. \par 5/23/18: The patient returned and reported he has been taking Bactrim for his most recent prostatitis starting one week ago. PSA from 1/22/18 was 0.39 ng/mL. Currently taking Tamsulosin and Finasteride. \par 8/28/18: The patient returned and reported his urination is satisfactory. Currently taking tamsulosin and finasteride. Wakes up 3-4 times during the night to urinate. Noted he had a recent flare up of dysuria 3 days ago and took 2 days of Cipro and symptom have cleared up. \par 3/7/19: The patient returned and reported that he had another flare up of prostatitis and took 2 days of Cipro for his symptoms to clear. Complained of lingering dysuria. I reminded the patient that with Cipro, there is a side effect of tendonitis. Denied gross hematuria. Currently taking Trimethoprim, Tamsulosin and Finasteride. PSA from 2/15/19 was 0.04 ng/mL.\par 4/4/19: The patient returned and reported that he another flare up of prostatitis. Complained of pain when he stands up. His pain is relieved while sitting down. Occasionally has dysuria. Wakes up twice during the night to urinate. Complained of urinary urgency during the night. Takes Tamsulosin BID, Trimethoprim and Finasteride.\par 5/9/19: The patient returned today for an US. Transrectal US of the prostate findings showed a prostate volume of 23.4 cc, a hypoechoic area at the left apex 1.37 x 1.25 cm in transverse image, unremarkable seminal vesicles, and no rectal masses. The hypoechoic are is suspicious for prostate cancer, but the patient has a low PSA. I recommended that the patient undergo a transperineal biopsy for the hyperechoic region, but due to an anal fissure, the patient would like to wait. Notes that he consulted with a colon surgeon about his anal fissure and is currently taking stool softeners. His urination is adequate. Complains of nocturia. Wakes up 5-6 times during the night to urinate. Complains of pain at the tip of his penis. Takes Tamsulosin BID. UA from 4/11/19 was normal. Culture from 4/11/19 showed no growth. Notes that he took Bactrim and ended up getting a rash on his LEs.\par 6/10/19: Patient presents today for a follow up. Today he reports that he is scheduled for an anal fissure operation for 6/21/19. It has been advised that he will need 4 weeks to heal following the operation. Regarding his urination he reports that during the day there are no urinary issues. Nocturia is reported and he will have to wake up 5-6x a night to urinate. Finasteride and Tamsulosin are taken as directed. \par 7/18/19: Patient presents today for a follow up. 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 Patient with hypoechoic lesion of the prostate seen on recent TRUS. PSA 0.690 on 5/2109. Patient on Flomax /Proscar with both irritative and obstructive LUTS. Needs to strain to initiate urination. Nocturia times 5- 6. Denies dysuria or hematuria. Patient states that he feels that he has prostatitis based on familiar symptoms. Additionally he no longer takes Lisinopril but now is on Metoprolol 10 mg. \par 8/7/19: Patient presents today for a follow up. He states that the Bactrim prescribed at his last visit was not providing any relief. He still c/o discomfort when sitting but denies dysuria. He had Ciprofloxacin at home and had taken two tablets over the past two days. He noticed relief and inquired about having the prescription refilled today. \par 10/10/19: Patient presents today for a follow up. On 9/13/19 a needle biopsy of the prostate was completed under general anesthesia. He states that after the biopsy his chronic prostatitis began to cause discomfort. Ciprofloxacin 500 mg was used as directed and the discomfort is no longer present. Additionally it is reported that his sexual function decreased following the procedure but that it is starting to improve again. The results of the pathology report are to be discussed with him today. \par 12/31/2019: Patient presents today for a follow up. Pt reports pain on standing. He has an inflamed prostate. When the bladder is fill he also experiences pain and the pain goes away after he voids. Pt took Cipro to 7 days, finishing last week. While he took it, he felt better. Pt reports allergy to sulfur drugs and develops rash.\par 2/10/20: Patient presents today for a follow up. He had an MRI of the pelvis 1/23/20 which showed no evidence of rectal fistula could be identified. Pt has inflamed prostate\par Pt reports constipation for which he takes stool softner. He admits to dysuria and hematuria with mild pain at the tip of the penis yesterday. Most recent PSA was 0.24, Testosterone is 390.0. \par 02/27/2020: Patient presents today for a follow up. At the conclusion of the last visit, Doxycycline 100 mg was prescribed. The patient states he noticed no improvement. Today he reports having nighttime pain in the rectum which is alleviated after urination. Wakes up 4-5 times during the night to urinate. He has satisfactory day time urination though he reports associated strain. Denies chest pain. He currently takes a stool softener for constipation. The patient also complains of lower back pain for which he will get an injection shortly. A urine culture from 2/10/2020 showed no growth. His most recent creatinine level from 7/11/2019 was 1.07.\par \par 07/28/2020: Patient presents today for a follow up. Reports pain in the rectum and pain with fullness of the bladder. Pt had a US at [place]. Was given suppository which helped. Noxturia x4-5. On Tamsulosin BID. No burning. Frequency at night. Pt must push and staring. He experiences difficulty with his flow and voiding in general. Wife reports fowl smelling urine. \par \par 8/25/2020: Patient presents today for follow up on nocturia. Patient states he has not yet taken Desmopressin as he does not want to take too many medications. Still experiencing pain in rectum and with fullness of bladder. Was given Valium which helped. Last lab work on 7/28/2020 was within normal limits. Osmolality was normal at 294. Patient to start Desmopressin 0.1mg once before bedtime. \par \par 09/15/2020: Patient presents today for follow up. Pt was having pain in rectum and finished 5 days of Cipro which helped resolve pain, but causes constipation. Takes Colace for constipation. Urinalysis on 8/25/20 was normal. Still has pain in rectum at night, and uses Metronidazole gel to relieve it. Wakes up every 2 hours at night to urinate. Has some urgency. \par \par 11/17/2020: The patient presents today for a follow up. He is not taking desmopressin anymore. His last blood work on 11/6/2020 showed his creatinine was good at 1.0. He has no burning. He has some pain in the rectum and when his bladder is full. He is still taking finasteride 5 mg and trimethoprim 100 mg. At night he gets a lot of pain when his bladder is full and has urinary urgency about every 2 hours. After he urinates, the pain goes away. \par \par 03/09/2021: 75 yr male  BPH, nocturia , straining .  Chronic prostatitis, chronic cystitis \par \par 05/24/2021: Patient presents today for follow up. Patient went to a rectal surgeon for his rectal pain and had pelvic MRI 4/29/21 which showed no evidence of perianal fistula. States he continues to have pain in rectum after sitting for a long time and then standing. Patient was instructed to take Gabapentin 400mg TID, but skips afternoon dose as it makes him sleepy. Does have constipation. Has not started Tadalafil. No hx of prostate cancer. \par \par I suggest he take Gabapentin 400mg in the morning and 800mg in the evening for his rectal pain. \par \par I instructed the patient to  Tadalafil 5mg from [employer] or [employer] using [employer] and to take once daily for his BPH symptoms. \par \par Recommend taking stool softeners two pills BID for his constipation. \par \par The patient produced a urine sample which will be sent for urinalysis, urine cytology, and urine culture. \par \par RTO in 3 weeks for follow up. \par \par Preparation, in-person office visit, and coordination of care took: 30 minutes

## 2021-05-24 NOTE — ADDENDUM
[FreeTextEntry1] : I, Lisa Allenin, acted solely as a scribe for Dr. Collins Duron on this date 05/24/2021.\par \par All medical record entries made by the Scribe were at my, Dr. Collins Duron, direction and personally dictated by me on 05/24/2021. I have reviewed the chart and agree that the record accurately reflects my personal performance of the history, physical exam, assessment and plan.  I have also personally directed, reviewed and agreed with the chart.

## 2021-05-24 NOTE — REVIEW OF SYSTEMS
[Urinary Stream Is Smaller] : smaller urine stream [Initiating Urination Req. Strain] : initiating urination requires straining [Constipation] : constipation [Fever] : no fever [Chills] : no chills [Feeling Poorly] : not feeling poorly [Loss Of Hearing] : no hearing loss [Chest Pain] : no chest pain [Shortness Of Breath] : no shortness of breath [Abdominal Pain] : no abdominal pain [Dysuria] : no dysuria [Arthralgias] : no arthralgias [Skin Lesions] : no skin lesions [Suicidal] : not suicidal [Proptosis] : no proptosis [Easy Bleeding] : no tendency for easy bleeding

## 2021-05-24 NOTE — HISTORY OF PRESENT ILLNESS
[FreeTextEntry1] : Mr Carrizales is a 75 year old man presented for follow up of gross hematuria, urinary frequency and urgency. The patient takes tamsulosin 0.4 mg, one half hour after meal twice daily, finasteride 5 mg once daily, and desipramine 10 mg at bedtime. The patient returned to the office 03/20/17 having gross hematuria with blood clot. Dr Holly Bernardo started the patient on Bactrim DS. He eventually went to the ER 03/21/17 where he had a Spann catheter placed. His creatinine in the ER was 1.01. Cystoscopy 03/23/17 found catheter trauma. Prostate protruded modest distance into the bladder. Bladder has a small capacity. Diffuse oozing of blood from bladder neck and trigone. Scraped up mucosa most likely from introduction of telescope. No bladder stones or tumors. Bladder inflammation from catheter. CT urogram was normal and found no etiology of the gross hematuria.\par 3/1/18: The patient returned and reported he has pain in his rectum area and penis. PSA from 1/24/18 was 0.39 ng/mL. 2/15/18 Creatinine was 1.01 mg/dL, hemoglobin was 14.1 g/dL. He received and abdomen ultrasound which shows a fatty liver, gallstones, and obscured pancreas. He denies any gross hematuria. He noted one day ago his GI physician put banding on his hemorrhoids. \par 4/10/18: The patient returned and reported he finished his antibiotics from last visit and noted he had another hemorrhoid and put another band by his GI. He noted pain still persists. UA from 3/1/18 shows 8 WBC/HPF. CBC from 3/1/18 showed slight anemia. \par 5/23/18: The patient returned and reported he has been taking Bactrim for his most recent prostatitis starting one week ago. PSA from 1/22/18 was 0.39 ng/mL. Currently taking Tamsulosin and Finasteride. \par 8/28/18: The patient returned and reported his urination is satisfactory. Currently taking tamsulosin and finasteride. Wakes up 3-4 times during the night to urinate. Noted he had a recent flare up of dysuria 3 days ago and took 2 days of Cipro and symptom have cleared up. \par 3/7/19: The patient returned and reported that he had another flare up of prostatitis and took 2 days of Cipro for his symptoms to clear. Complained of lingering dysuria. I reminded the patient that with Cipro, there is a side effect of tendonitis. Denied gross hematuria. Currently taking Trimethoprim, Tamsulosin and Finasteride. PSA from 2/15/19 was 0.04 ng/mL.\par 4/4/19: The patient returned and reported that he another flare up of prostatitis. Complained of pain when he stands up. His pain is relieved while sitting down. Occasionally has dysuria. Wakes up twice during the night to urinate. Complained of urinary urgency during the night. Takes Tamsulosin BID, Trimethoprim and Finasteride.\par 5/9/19: The patient returned today for an US. Transrectal US of the prostate findings showed a prostate volume of 23.4 cc, a hypoechoic area at the left apex 1.37 x 1.25 cm in transverse image, unremarkable seminal vesicles, and no rectal masses. The hypoechoic are is suspicious for prostate cancer, but the patient has a low PSA. I recommended that the patient undergo a transperineal biopsy for the hyperechoic region, but due to an anal fissure, the patient would like to wait. Notes that he consulted with a colon surgeon about his anal fissure and is currently taking stool softeners. His urination is adequate. Complains of nocturia. Wakes up 5-6 times during the night to urinate. Complains of pain at the tip of his penis. Takes Tamsulosin BID. UA from 4/11/19 was normal. Culture from 4/11/19 showed no growth. Notes that he took Bactrim and ended up getting a rash on his LEs.\par 6/10/19: Patient presents today for a follow up. Today he reports that he is scheduled for an anal fissure operation for 6/21/19. It has been advised that he will need 4 weeks to heal following the operation. Regarding his urination he reports that during the day there are no urinary issues. Nocturia is reported and he will have to wake up 5-6x a night to urinate. Finasteride and Tamsulosin are taken as directed. \par 7/18/19: Patient presents today for a follow up. 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 Patient with hypoechoic lesion of the prostate seen on recent TRUS. PSA 0.690 on 5/2109. Patient on Flomax /Proscar with both irritative and obstructive LUTS. Needs to strain to initiate urination. Nocturia times 5- 6. Denies dysuria or hematuria. Patient states that he feels that he has prostatitis based on familiar symptoms. Additionally he no longer takes Lisinopril but now is on Metoprolol 10 mg. \par 8/7/19: Patient presents today for a follow up. He states that the Bactrim prescribed at his last visit was not providing any relief. He still c/o discomfort when sitting but denies dysuria. He had Ciprofloxacin at home and had taken two tablets over the past two days. He noticed relief and inquired about having the prescription refilled today. \par 10/10/19: Patient presents today for a follow up. On 9/13/19 a needle biopsy of the prostate was completed under general anesthesia. He states that after the biopsy his chronic prostatitis began to cause discomfort. Ciprofloxacin 500 mg was used as directed and the discomfort is no longer present. Additionally it is reported that his sexual function decreased following the procedure but that it is starting to improve again. The results of the pathology report are to be discussed with him today. \par 12/31/2019: Patient presents today for a follow up. Pt reports pain on standing. He has an inflamed prostate. When the bladder is fill he also experiences pain and the pain goes away after he voids. Pt took Cipro to 7 days, finishing last week. While he took it, he felt better. Pt reports allergy to sulfur drugs and develops rash.\par 2/10/20: Patient presents today for a follow up. He had an MRI of the pelvis 1/23/20 which showed no evidence of rectal fistula could be identified. Pt has inflamed prostate\par Pt reports constipation for which he takes stool softner. He admits to dysuria and hematuria with mild pain at the tip of the penis yesterday. Most recent PSA was 0.24, Testosterone is 390.0. \par 02/27/2020: Patient presents today for a follow up. At the conclusion of the last visit, Doxycycline 100 mg was prescribed. The patient states he noticed no improvement. Today he reports having nighttime pain in the rectum which is alleviated after urination. Wakes up 4-5 times during the night to urinate. He has satisfactory day time urination though he reports associated strain. Denies chest pain. He currently takes a stool softener for constipation. The patient also complains of lower back pain for which he will get an injection shortly. A urine culture from 2/10/2020 showed no growth. His most recent creatinine level from 7/11/2019 was 1.07.\par \par 07/28/2020: Patient presents today for a follow up. Reports pain in the rectum and pain with fullness of the bladder. Pt had a US at [place]. Was given suppository which helped. Noxturia x4-5. On Tamsulosin BID. No burning. Frequency at night. Pt must push and staring. He experiences difficulty with his flow and voiding in general. Wife reports fowl smelling urine. \par \par 8/25/2020: Patient presents today for follow up on nocturia. Patient states he has not yet taken Desmopressin as he does not want to take too many medications. Still experiencing pain in rectum and with fullness of bladder. Was given Valium which helped. Last lab work on 7/28/2020 was within normal limits. Osmolality was normal at 294. Patient to start Desmopressin 0.1mg once before bedtime. \par \par 09/15/2020: Patient presents today for follow up. Pt was having pain in rectum and finished 5 days of Cipro which helped resolve pain, but causes constipation. Takes Colace for constipation. Urinalysis on 8/25/20 was normal. Still has pain in rectum at night, and uses Metronidazole gel to relieve it. Wakes up every 2 hours at night to urinate. Has some urgency. \par \par 11/17/2020: The patient presents today for a follow up. He is not taking desmopressin anymore. His last blood work on 11/6/2020 showed his creatinine was good at 1.0. He has no burning. He has some pain in the rectum and when his bladder is full. He is still taking finasteride 5 mg and trimethoprim 100 mg. At night he gets a lot of pain when his bladder is full and has urinary urgency about every 2 hours. After he urinates, the pain goes away. \par \par 03/09/2021: 75 yr  history of BPH, prostatitis , overactive bladder with nocturnal frequency.  Rectal area pains, last visit [facility] November 17, 2020 showing rectal mucosa nodule. To follow GI. \par patient said he is taking gabapentin and tamsulosin and finasteride. Pain is worse when bladder is full and less when he voids. \par Nocturnal frequency Q2 hr\par ED since 2019 \par Patient is seeing  for his rectal pain.  \par \par 05/24/2021: Patient presents today for follow up. Patient went to a rectal surgeon for his rectal pain and had pelvic MRI 4/29/21 which showed no evidence of perianal fistula. States he continues to have pain in rectum after sitting for a long time and then standing. Patient was instructed to take Gabapentin 400mg TID, but skips afternoon dose as it makes him sleepy. Does have constipation. Has not started Tadalafil. No hx of prostate cancer.

## 2021-05-25 LAB
APPEARANCE: CLEAR
BACTERIA: NEGATIVE
BILIRUBIN URINE: NEGATIVE
BLOOD URINE: NEGATIVE
COLOR: NORMAL
GLUCOSE QUALITATIVE U: NEGATIVE
HYALINE CASTS: 1 /LPF
KETONES URINE: NEGATIVE
LEUKOCYTE ESTERASE URINE: NEGATIVE
MICROSCOPIC-UA: NORMAL
NITRITE URINE: NEGATIVE
PH URINE: 6.5
PROTEIN URINE: NEGATIVE
RED BLOOD CELLS URINE: 1 /HPF
SPECIFIC GRAVITY URINE: 1.01
SQUAMOUS EPITHELIAL CELLS: 0 /HPF
UROBILINOGEN URINE: NORMAL
WHITE BLOOD CELLS URINE: 1 /HPF

## 2021-05-26 LAB — BACTERIA UR CULT: NORMAL

## 2021-05-28 LAB — URINE CYTOLOGY: NORMAL

## 2021-06-15 ENCOUNTER — APPOINTMENT (OUTPATIENT)
Dept: UROLOGY | Facility: CLINIC | Age: 76
End: 2021-06-15
Payer: MEDICARE

## 2021-06-15 PROCEDURE — 99214 OFFICE O/P EST MOD 30 MIN: CPT

## 2021-06-18 NOTE — HISTORY OF PRESENT ILLNESS
[FreeTextEntry1] : Mr Carrizales is a 75 year old man presented for follow up of gross hematuria, urinary frequency and urgency. The patient takes tamsulosin 0.4 mg, one half hour after meal twice daily, finasteride 5 mg once daily, and desipramine 10 mg at bedtime. The patient returned to the office 03/20/17 having gross hematuria with blood clot. Dr Holly Bernardo started the patient on Bactrim DS. He eventually went to the ER 03/21/17 where he had a Spann catheter placed. His creatinine in the ER was 1.01. Cystoscopy 03/23/17 found catheter trauma. Prostate protruded modest distance into the bladder. Bladder has a small capacity. Diffuse oozing of blood from bladder neck and trigone. Scraped up mucosa most likely from introduction of telescope. No bladder stones or tumors. Bladder inflammation from catheter. CT urogram was normal and found no etiology of the gross hematuria.\par 3/1/18: The patient returned and reported he has pain in his rectum area and penis. PSA from 1/24/18 was 0.39 ng/mL. 2/15/18 Creatinine was 1.01 mg/dL, hemoglobin was 14.1 g/dL. He received and abdomen ultrasound which shows a fatty liver, gallstones, and obscured pancreas. He denies any gross hematuria. He noted one day ago his GI physician put banding on his hemorrhoids. \par 4/10/18: The patient returned and reported he finished his antibiotics from last visit and noted he had another hemorrhoid and put another band by his GI. He noted pain still persists. UA from 3/1/18 shows 8 WBC/HPF. CBC from 3/1/18 showed slight anemia. \par 5/23/18: The patient returned and reported he has been taking Bactrim for his most recent prostatitis starting one week ago. PSA from 1/22/18 was 0.39 ng/mL. Currently taking Tamsulosin and Finasteride. \par 8/28/18: The patient returned and reported his urination is satisfactory. Currently taking tamsulosin and finasteride. Wakes up 3-4 times during the night to urinate. Noted he had a recent flare up of dysuria 3 days ago and took 2 days of Cipro and symptom have cleared up. \par 3/7/19: The patient returned and reported that he had another flare up of prostatitis and took 2 days of Cipro for his symptoms to clear. Complained of lingering dysuria. I reminded the patient that with Cipro, there is a side effect of tendonitis. Denied gross hematuria. Currently taking Trimethoprim, Tamsulosin and Finasteride. PSA from 2/15/19 was 0.04 ng/mL.\par 4/4/19: The patient returned and reported that he another flare up of prostatitis. Complained of pain when he stands up. His pain is relieved while sitting down. Occasionally has dysuria. Wakes up twice during the night to urinate. Complained of urinary urgency during the night. Takes Tamsulosin BID, Trimethoprim and Finasteride.\par 5/9/19: The patient returned today for an US. Transrectal US of the prostate findings showed a prostate volume of 23.4 cc, a hypoechoic area at the left apex 1.37 x 1.25 cm in transverse image, unremarkable seminal vesicles, and no rectal masses. The hypoechoic are is suspicious for prostate cancer, but the patient has a low PSA. I recommended that the patient undergo a transperineal biopsy for the hyperechoic region, but due to an anal fissure, the patient would like to wait. Notes that he consulted with a colon surgeon about his anal fissure and is currently taking stool softeners. His urination is adequate. Complains of nocturia. Wakes up 5-6 times during the night to urinate. Complains of pain at the tip of his penis. Takes Tamsulosin BID. UA from 4/11/19 was normal. Culture from 4/11/19 showed no growth. Notes that he took Bactrim and ended up getting a rash on his LEs.\par 6/10/19: Patient presents today for a follow up. Today he reports that he is scheduled for an anal fissure operation for 6/21/19. It has been advised that he will need 4 weeks to heal following the operation. Regarding his urination he reports that during the day there are no urinary issues. Nocturia is reported and he will have to wake up 5-6x a night to urinate. Finasteride and Tamsulosin are taken as directed. \par 7/18/19: Patient presents today for a follow up. 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 Patient with hypoechoic lesion of the prostate seen on recent TRUS. PSA 0.690 on 5/2109. Patient on Flomax /Proscar with both irritative and obstructive LUTS. Needs to strain to initiate urination. Nocturia times 5- 6. Denies dysuria or hematuria. Patient states that he feels that he has prostatitis based on familiar symptoms. Additionally he no longer takes Lisinopril but now is on Metoprolol 10 mg. \par 8/7/19: Patient presents today for a follow up. He states that the Bactrim prescribed at his last visit was not providing any relief. He still c/o discomfort when sitting but denies dysuria. He had Ciprofloxacin at home and had taken two tablets over the past two days. He noticed relief and inquired about having the prescription refilled today. \par 10/10/19: Patient presents today for a follow up. On 9/13/19 a needle biopsy of the prostate was completed under general anesthesia. He states that after the biopsy his chronic prostatitis began to cause discomfort. Ciprofloxacin 500 mg was used as directed and the discomfort is no longer present. Additionally it is reported that his sexual function decreased following the procedure but that it is starting to improve again. The results of the pathology report are to be discussed with him today. \par 12/31/2019: Patient presents today for a follow up. Pt reports pain on standing. He has an inflamed prostate. When the bladder is fill he also experiences pain and the pain goes away after he voids. Pt took Cipro to 7 days, finishing last week. While he took it, he felt better. Pt reports allergy to sulfur drugs and develops rash.\par 2/10/20: Patient presents today for a follow up. He had an MRI of the pelvis 1/23/20 which showed no evidence of rectal fistula could be identified. Pt has inflamed prostate\par Pt reports constipation for which he takes stool softner. He admits to dysuria and hematuria with mild pain at the tip of the penis yesterday. Most recent PSA was 0.24, Testosterone is 390.0. \par 02/27/2020: Patient presents today for a follow up. At the conclusion of the last visit, Doxycycline 100 mg was prescribed. The patient states he noticed no improvement. Today he reports having nighttime pain in the rectum which is alleviated after urination. Wakes up 4-5 times during the night to urinate. He has satisfactory day time urination though he reports associated strain. Denies chest pain. He currently takes a stool softener for constipation. The patient also complains of lower back pain for which he will get an injection shortly. A urine culture from 2/10/2020 showed no growth. His most recent creatinine level from 7/11/2019 was 1.07.\par \par 07/28/2020: Patient presents today for a follow up. Reports pain in the rectum and pain with fullness of the bladder. Pt had a US at [place]. Was given suppository which helped. Noxturia x4-5. On Tamsulosin BID. No burning. Frequency at night. Pt must push and staring. He experiences difficulty with his flow and voiding in general. Wife reports fowl smelling urine. \par \par 8/25/2020: Patient presents today for follow up on nocturia. Patient states he has not yet taken Desmopressin as he does not want to take too many medications. Still experiencing pain in rectum and with fullness of bladder. Was given Valium which helped. Last lab work on 7/28/2020 was within normal limits. Osmolality was normal at 294. Patient to start Desmopressin 0.1mg once before bedtime. \par \par 09/15/2020: Patient presents today for follow up. Pt was having pain in rectum and finished 5 days of Cipro which helped resolve pain, but causes constipation. Takes Colace for constipation. Urinalysis on 8/25/20 was normal. Still has pain in rectum at night, and uses Metronidazole gel to relieve it. Wakes up every 2 hours at night to urinate. Has some urgency. \par \par 11/17/2020: The patient presents today for a follow up. He is not taking desmopressin anymore. His last blood work on 11/6/2020 showed his creatinine was good at 1.0. He has no burning. He has some pain in the rectum and when his bladder is full. He is still taking finasteride 5 mg and trimethoprim 100 mg. At night he gets a lot of pain when his bladder is full and has urinary urgency about every 2 hours. After he urinates, the pain goes away. \par \par 03/09/2021: 75 yr  history of BPH, prostatitis , overactive bladder with nocturnal frequency.  Rectal area pains, last visit [facility] November 17, 2020 showing rectal mucosa nodule. To follow GI. \par patient said he is taking gabapentin and tamsulosin and finasteride. Pain is worse when bladder is full and less when he voids. \par Nocturnal frequency Q2 hr\par ED since 2019 \par Patient is seeing  for his rectal pain.  \par \par 05/24/2021: Patient presents today for follow up. Patient went to a rectal surgeon for his rectal pain and had pelvic MRI 4/29/21 which showed no evidence of perianal fistula. States he continues to have pain in rectum after sitting for a long time and then standing. Patient was instructed to take Gabapentin 400mg TID, but skips afternoon dose as it makes him sleepy. Does have constipation. Has not started Tadalafil. No hx of prostate cancer. \par \par 06/15/2021: Patient presents today for a follow up. He reports that last week he had an episode of prostatitis for which he took Cipro for. Currently wakes every 2 hours during the night to urinate. Has tried desipramine and desmopressin in the past which did not improve his symptoms. Daytime urination remains stable. Stream is strong. Pt states he went to the cardiologist today and an EKG was done which was all normal. Currently takes iron supplements.

## 2021-06-18 NOTE — ASSESSMENT
[FreeTextEntry1] : Mr Carrizales is a 75 year old man presented for follow up of gross hematuria, urinary frequency and urgency. The patient takes tamsulosin 0.4 mg, one half hour after meal twice daily, finasteride 5 mg once daily, and desipramine 10 mg at bedtime. The patient returned to the office 03/20/17 having gross hematuria with blood clot. Dr Holly Bernardo started the patient on Bactrim DS. He eventually went to the ER 03/21/17 where he had a Spann catheter placed. His creatinine in the ER was 1.01. Cystoscopy 03/23/17 found catheter trauma. Prostate protruded modest distance into the bladder. Bladder has a small capacity. Diffuse oozing of blood from bladder neck and trigone. Scraped up mucosa most likely from introduction of telescope. No bladder stones or tumors. Bladder inflammation from catheter. CT urogram was normal and found no etiology of the gross hematuria.\par 3/1/18: The patient returned and reported he has pain in his rectum area and penis. PSA from 1/24/18 was 0.39 ng/mL. 2/15/18 Creatinine was 1.01 mg/dL, hemoglobin was 14.1 g/dL. He received and abdomen ultrasound which shows a fatty liver, gallstones, and obscured pancreas. He denies any gross hematuria. He noted one day ago his GI physician put banding on his hemorrhoids. \par 4/10/18: The patient returned and reported he finished his antibiotics from last visit and noted he had another hemorrhoid and put another band by his GI. He noted pain still persists. UA from 3/1/18 shows 8 WBC/HPF. CBC from 3/1/18 showed slight anemia. \par 5/23/18: The patient returned and reported he has been taking Bactrim for his most recent prostatitis starting one week ago. PSA from 1/22/18 was 0.39 ng/mL. Currently taking Tamsulosin and Finasteride. \par 8/28/18: The patient returned and reported his urination is satisfactory. Currently taking tamsulosin and finasteride. Wakes up 3-4 times during the night to urinate. Noted he had a recent flare up of dysuria 3 days ago and took 2 days of Cipro and symptom have cleared up. \par 3/7/19: The patient returned and reported that he had another flare up of prostatitis and took 2 days of Cipro for his symptoms to clear. Complained of lingering dysuria. I reminded the patient that with Cipro, there is a side effect of tendonitis. Denied gross hematuria. Currently taking Trimethoprim, Tamsulosin and Finasteride. PSA from 2/15/19 was 0.04 ng/mL.\par 4/4/19: The patient returned and reported that he another flare up of prostatitis. Complained of pain when he stands up. His pain is relieved while sitting down. Occasionally has dysuria. Wakes up twice during the night to urinate. Complained of urinary urgency during the night. Takes Tamsulosin BID, Trimethoprim and Finasteride.\par 5/9/19: The patient returned today for an US. Transrectal US of the prostate findings showed a prostate volume of 23.4 cc, a hypoechoic area at the left apex 1.37 x 1.25 cm in transverse image, unremarkable seminal vesicles, and no rectal masses. The hypoechoic are is suspicious for prostate cancer, but the patient has a low PSA. I recommended that the patient undergo a transperineal biopsy for the hyperechoic region, but due to an anal fissure, the patient would like to wait. Notes that he consulted with a colon surgeon about his anal fissure and is currently taking stool softeners. His urination is adequate. Complains of nocturia. Wakes up 5-6 times during the night to urinate. Complains of pain at the tip of his penis. Takes Tamsulosin BID. UA from 4/11/19 was normal. Culture from 4/11/19 showed no growth. Notes that he took Bactrim and ended up getting a rash on his LEs.\par 6/10/19: Patient presents today for a follow up. Today he reports that he is scheduled for an anal fissure operation for 6/21/19. It has been advised that he will need 4 weeks to heal following the operation. Regarding his urination he reports that during the day there are no urinary issues. Nocturia is reported and he will have to wake up 5-6x a night to urinate. Finasteride and Tamsulosin are taken as directed. \par 7/18/19: Patient presents today for a follow up. 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 Patient with hypoechoic lesion of the prostate seen on recent TRUS. PSA 0.690 on 5/2109. Patient on Flomax /Proscar with both irritative and obstructive LUTS. Needs to strain to initiate urination. Nocturia times 5- 6. Denies dysuria or hematuria. Patient states that he feels that he has prostatitis based on familiar symptoms. Additionally he no longer takes Lisinopril but now is on Metoprolol 10 mg. \par 8/7/19: Patient presents today for a follow up. He states that the Bactrim prescribed at his last visit was not providing any relief. He still c/o discomfort when sitting but denies dysuria. He had Ciprofloxacin at home and had taken two tablets over the past two days. He noticed relief and inquired about having the prescription refilled today. \par 10/10/19: Patient presents today for a follow up. On 9/13/19 a needle biopsy of the prostate was completed under general anesthesia. He states that after the biopsy his chronic prostatitis began to cause discomfort. Ciprofloxacin 500 mg was used as directed and the discomfort is no longer present. Additionally it is reported that his sexual function decreased following the procedure but that it is starting to improve again. The results of the pathology report are to be discussed with him today. \par 12/31/2019: Patient presents today for a follow up. Pt reports pain on standing. He has an inflamed prostate. When the bladder is fill he also experiences pain and the pain goes away after he voids. Pt took Cipro to 7 days, finishing last week. While he took it, he felt better. Pt reports allergy to sulfur drugs and develops rash.\par 2/10/20: Patient presents today for a follow up. He had an MRI of the pelvis 1/23/20 which showed no evidence of rectal fistula could be identified. Pt has inflamed prostate\par Pt reports constipation for which he takes stool softner. He admits to dysuria and hematuria with mild pain at the tip of the penis yesterday. Most recent PSA was 0.24, Testosterone is 390.0. \par 02/27/2020: Patient presents today for a follow up. At the conclusion of the last visit, Doxycycline 100 mg was prescribed. The patient states he noticed no improvement. Today he reports having nighttime pain in the rectum which is alleviated after urination. Wakes up 4-5 times during the night to urinate. He has satisfactory day time urination though he reports associated strain. Denies chest pain. He currently takes a stool softener for constipation. The patient also complains of lower back pain for which he will get an injection shortly. A urine culture from 2/10/2020 showed no growth. His most recent creatinine level from 7/11/2019 was 1.07.\par \par 07/28/2020: Patient presents today for a follow up. Reports pain in the rectum and pain with fullness of the bladder. Pt had a US at [place]. Was given suppository which helped. Noxturia x4-5. On Tamsulosin BID. No burning. Frequency at night. Pt must push and staring. He experiences difficulty with his flow and voiding in general. Wife reports fowl smelling urine. \par \par 8/25/2020: Patient presents today for follow up on nocturia. Patient states he has not yet taken Desmopressin as he does not want to take too many medications. Still experiencing pain in rectum and with fullness of bladder. Was given Valium which helped. Last lab work on 7/28/2020 was within normal limits. Osmolality was normal at 294. Patient to start Desmopressin 0.1mg once before bedtime. \par \par 09/15/2020: Patient presents today for follow up. Pt was having pain in rectum and finished 5 days of Cipro which helped resolve pain, but causes constipation. Takes Colace for constipation. Urinalysis on 8/25/20 was normal. Still has pain in rectum at night, and uses Metronidazole gel to relieve it. Wakes up every 2 hours at night to urinate. Has some urgency. \par \par 11/17/2020: The patient presents today for a follow up. He is not taking desmopressin anymore. His last blood work on 11/6/2020 showed his creatinine was good at 1.0. He has no burning. He has some pain in the rectum and when his bladder is full. He is still taking finasteride 5 mg and trimethoprim 100 mg. At night he gets a lot of pain when his bladder is full and has urinary urgency about every 2 hours. After he urinates, the pain goes away. \par \par 03/09/2021: 75 yr male  BPH, nocturia , straining .  Chronic prostatitis, chronic cystitis \par \par 05/24/2021: Patient presents today for follow up. Patient went to a rectal surgeon for his rectal pain and had pelvic MRI 4/29/21 which showed no evidence of perianal fistula. States he continues to have pain in rectum after sitting for a long time and then standing. Patient was instructed to take Gabapentin 400mg TID, but skips afternoon dose as it makes him sleepy. Does have constipation. Has not started Tadalafil. No hx of prostate cancer. \par \par I suggest he take Gabapentin 400mg in the morning and 800mg in the evening for his rectal pain. \par I instructed the patient to  Tadalafil 5mg from [employer] or [employer] using [employer] and to take once daily for his BPH symptoms. \par Recommend taking stool softeners two pills BID for his constipation. \par The patient produced a urine sample which will be sent for urinalysis, urine cytology, and urine culture. \par RTO in 3 weeks for follow up. \par \par  06/15/2021: Patient presents today for a follow up. He reports that last week he had an episode of prostatitis for which he took Cipro for. Currently wakes every 2 hours during the night to urinate. Has tried desipramine and desmopressin in the past which did not improve his symptoms. Daytime urination remains stable. Stream is strong. Pt states he went to the cardiologist today and an EKG was done which was all normal. Currently takes iron supplements. \par \par Reviewed blood work from 3/9/2021 which demonstrated creatinine was 1.03 and PSA was 0.32. Urine tests from 5/24/2021 were all normal. \par \par I prescribed the patient Trospium 20 mg, one tablet once daily 2 hours prior to bedtime to control his nocturia. urinary symptoms. I informed the patient there may be constipation as a side effect. \par \par I prescribed the patient Ciprofloxacin 500 mg, one tablet twice a day to keep in his house if he is to have an episode of prostatitis again. I informed the patient once again that Ciprofloxacin may cause sensitivity to direct sunlight. Instructed the patient to call me if he has to take it for more than 3 days. \par \par Advised the patient to continue to take his iron supplements but to take Colace to manage his constipation. \par \par Patient will RTO in 2 weeks for reassessment on Trospium. \par \par Preparation, in person office visit, and coordination of care: 30 minutes.

## 2021-06-18 NOTE — REVIEW OF SYSTEMS
[Constipation] : constipation [Nocturia] : nocturia [Fever] : no fever [Chills] : no chills [Feeling Poorly] : not feeling poorly [Loss Of Hearing] : no hearing loss [Chest Pain] : no chest pain [Shortness Of Breath] : no shortness of breath [Abdominal Pain] : no abdominal pain [Dysuria] : no dysuria [Urinary Stream Is Smaller] : urine stream was not smaller [Initiating Urination Req. Strain] : initiating urination does not require straining [Arthralgias] : no arthralgias [Skin Lesions] : no skin lesions [Difficulty Walking] : no difficulty walking [Suicidal] : not suicidal [Proptosis] : no proptosis [Easy Bleeding] : no tendency for easy bleeding

## 2021-06-18 NOTE — ADDENDUM
[FreeTextEntry1] : This note was authored by Guerline Gold working as a scribe for Dr.Gary Duron. I, Dr. Collins Duron have reviewed the content of this note and confirm it is true and accurate. I personally performed the history and physical examination and made all the decisions 06/15/2021.

## 2021-07-07 NOTE — HISTORY OF PRESENT ILLNESS
Care Transition Team Assessment    Spoke with patient and fiance at bedside with verbal consent and verified all information. Lives with alee and 2 kids under 18 in single story house. PCP Manny Jain. Uses no DME. Uses [facility] in [place]. Has [place] [employer]. Fisaima will be ride @ D/C. Anticipate no needs @ present time.    Information Source  Orientation Level: Oriented X4  Information Given By: Patient    Readmission Evaluation  Is this a readmission?: No    Interdisciplinary Discharge Planning  Primary Care Physician: Manny Jain  Lives with - Patient's Self Care Capacity: Spouse, Child Less than 18 Years of Age  Patient or legal guardian wants to designate a caregiver: No  Support Systems: Children, Spouse / Significant Other  Housing / Facility: [address]  Do You Take your Prescribed Medications Regularly: Yes  Able to Return to Previous ADL's: Yes  Mobility Issues: No  Prior Services: Home-Independent  Patient Prefers to be Discharged to:: Home  Assistance Needed: No  Durable Medical Equipment: Not Applicable    Discharge Preparedness  What are your discharge supports?: Child, Spouse  Prior Functional Level: Ambulatory    Functional Assesment  Prior Functional Level: Ambulatory    Finances  Prescription Coverage: Yes    Anticipated Discharge Information  Discharge Address: [address] [place] [address] Court  Discharge Contact Phone Number: [phone]        
[FreeTextEntry1] : Patient with hypoechoic area. Opting for prostate biopsy after his fistula surgery in June 21st. 2019.\par \par Currently on Flomax twice a day and Proscar. States that he has to strain in order to initiate urination. Flow slow occasionally. Most bothersome symptom is  nocturia times 5-6. Denies dysuria or hematuria.
[FreeTextEntry1] : Patient with hypoechoic area. Opting for prostate biopsy after his fistula surgery in June 21st. 2019.\par \par Currently on Flomax twice a day and Proscar. States that he has to strain in order to initiate urination. Flow slow occasionally. Most bothersome symptom is  nocturia times 5-6. Denies dysuria or hematuria.

## 2021-08-09 ENCOUNTER — APPOINTMENT (OUTPATIENT)
Dept: UROLOGY | Facility: CLINIC | Age: 76
End: 2021-08-09

## 2021-09-01 ENCOUNTER — APPOINTMENT (OUTPATIENT)
Dept: UROLOGY | Facility: CLINIC | Age: 76
End: 2021-09-01
Payer: MEDICARE

## 2021-09-01 PROCEDURE — 99214 OFFICE O/P EST MOD 30 MIN: CPT

## 2021-09-01 RX ORDER — TROSPIUM CHLORIDE 20 MG/1
20 TABLET, FILM COATED ORAL
Qty: 30 | Refills: 11 | Status: COMPLETED | COMMUNITY
Start: 2021-06-15 | End: 2021-09-01

## 2021-09-04 LAB
APPEARANCE: CLEAR
BACTERIA UR CULT: NORMAL
BACTERIA: NEGATIVE
BILIRUBIN URINE: NEGATIVE
BLOOD URINE: NORMAL
COLOR: YELLOW
GLUCOSE QUALITATIVE U: NEGATIVE
HYALINE CASTS: 1 /LPF
KETONES URINE: NEGATIVE
LEUKOCYTE ESTERASE URINE: NEGATIVE
MICROSCOPIC-UA: NORMAL
NITRITE URINE: NEGATIVE
PH URINE: 6.5
PROTEIN URINE: NORMAL
PSA SERPL-MCNC: 0.2 NG/ML
RED BLOOD CELLS URINE: 12 /HPF
SPECIFIC GRAVITY URINE: 1.02
SQUAMOUS EPITHELIAL CELLS: 0 /HPF
TESTOST SERPL-MCNC: 237 NG/DL
URINE CYTOLOGY: NORMAL
UROBILINOGEN URINE: NORMAL
WHITE BLOOD CELLS URINE: 4 /HPF

## 2021-09-05 NOTE — HISTORY OF PRESENT ILLNESS
[FreeTextEntry1] : Mr Carrizales is a 75 year old man presented for follow up of gross hematuria, urinary frequency and urgency. The patient takes tamsulosin 0.4 mg, one half hour after meal twice daily, finasteride 5 mg once daily, and desipramine 10 mg at bedtime. The patient returned to the office 03/20/17 having gross hematuria with blood clot. Dr Holly Bernardo started the patient on Bactrim DS. He eventually went to the ER 03/21/17 where he had a Spann catheter placed. His creatinine in the ER was 1.01. Cystoscopy 03/23/17 found catheter trauma. Prostate protruded modest distance into the bladder. Bladder has a small capacity. Diffuse oozing of blood from bladder neck and trigone. Scraped up mucosa most likely from introduction of telescope. No bladder stones or tumors. Bladder inflammation from catheter. CT urogram was normal and found no etiology of the gross hematuria.\par 3/1/18: The patient returned and reported he has pain in his rectum area and penis. PSA from 1/24/18 was 0.39 ng/mL. 2/15/18 Creatinine was 1.01 mg/dL, hemoglobin was 14.1 g/dL. He received and abdomen ultrasound which shows a fatty liver, gallstones, and obscured pancreas. He denies any gross hematuria. He noted one day ago his GI physician put banding on his hemorrhoids. \par 4/10/18: The patient returned and reported he finished his antibiotics from last visit and noted he had another hemorrhoid and put another band by his GI. He noted pain still persists. UA from 3/1/18 shows 8 WBC/HPF. CBC from 3/1/18 showed slight anemia. \par 5/23/18: The patient returned and reported he has been taking Bactrim for his most recent prostatitis starting one week ago. PSA from 1/22/18 was 0.39 ng/mL. Currently taking Tamsulosin and Finasteride. \par 8/28/18: The patient returned and reported his urination is satisfactory. Currently taking tamsulosin and finasteride. Wakes up 3-4 times during the night to urinate. Noted he had a recent flare up of dysuria 3 days ago and took 2 days of Cipro and symptom have cleared up. \par 3/7/19: The patient returned and reported that he had another flare up of prostatitis and took 2 days of Cipro for his symptoms to clear. Complained of lingering dysuria. I reminded the patient that with Cipro, there is a side effect of tendonitis. Denied gross hematuria. Currently taking Trimethoprim, Tamsulosin and Finasteride. PSA from 2/15/19 was 0.04 ng/mL.\par 4/4/19: The patient returned and reported that he another flare up of prostatitis. Complained of pain when he stands up. His pain is relieved while sitting down. Occasionally has dysuria. Wakes up twice during the night to urinate. Complained of urinary urgency during the night. Takes Tamsulosin BID, Trimethoprim and Finasteride.\par 5/9/19: The patient returned today for an US. Transrectal US of the prostate findings showed a prostate volume of 23.4 cc, a hypoechoic area at the left apex 1.37 x 1.25 cm in transverse image, unremarkable seminal vesicles, and no rectal masses. The hypoechoic are is suspicious for prostate cancer, but the patient has a low PSA. I recommended that the patient undergo a transperineal biopsy for the hyperechoic region, but due to an anal fissure, the patient would like to wait. Notes that he consulted with a colon surgeon about his anal fissure and is currently taking stool softeners. His urination is adequate. Complains of nocturia. Wakes up 5-6 times during the night to urinate. Complains of pain at the tip of his penis. Takes Tamsulosin BID. UA from 4/11/19 was normal. Culture from 4/11/19 showed no growth. Notes that he took Bactrim and ended up getting a rash on his LEs.\par 6/10/19: Patient presents today for a follow up. Today he reports that he is scheduled for an anal fissure operation for 6/21/19. It has been advised that he will need 4 weeks to heal following the operation. Regarding his urination he reports that during the day there are no urinary issues. Nocturia is reported and he will have to wake up 5-6x a night to urinate. Finasteride and Tamsulosin are taken as directed. \par 7/18/19: Patient presents today for a follow up. 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 Patient with hypoechoic lesion of the prostate seen on recent TRUS. PSA 0.690 on 5/2109. Patient on Flomax /Proscar with both irritative and obstructive LUTS. Needs to strain to initiate urination. Nocturia times 5- 6. Denies dysuria or hematuria. Patient states that he feels that he has prostatitis based on familiar symptoms. Additionally he no longer takes Lisinopril but now is on Metoprolol 10 mg. \par 8/7/19: Patient presents today for a follow up. He states that the Bactrim prescribed at his last visit was not providing any relief. He still c/o discomfort when sitting but denies dysuria. He had Ciprofloxacin at home and had taken two tablets over the past two days. He noticed relief and inquired about having the prescription refilled today. \par 10/10/19: Patient presents today for a follow up. On 9/13/19 a needle biopsy of the prostate was completed under general anesthesia. He states that after the biopsy his chronic prostatitis began to cause discomfort. Ciprofloxacin 500 mg was used as directed and the discomfort is no longer present. Additionally it is reported that his sexual function decreased following the procedure but that it is starting to improve again. The results of the pathology report are to be discussed with him today. \par 12/31/2019: Patient presents today for a follow up. Pt reports pain on standing. He has an inflamed prostate. When the bladder is fill he also experiences pain and the pain goes away after he voids. Pt took Cipro to 7 days, finishing last week. While he took it, he felt better. Pt reports allergy to sulfur drugs and develops rash.\par 2/10/20: Patient presents today for a follow up. He had an MRI of the pelvis 1/23/20 which showed no evidence of rectal fistula could be identified. Pt has inflamed prostate\par Pt reports constipation for which he takes stool softner. He admits to dysuria and hematuria with mild pain at the tip of the penis yesterday. Most recent PSA was 0.24, Testosterone is 390.0. \par 02/27/2020: Patient presents today for a follow up. At the conclusion of the last visit, Doxycycline 100 mg was prescribed. The patient states he noticed no improvement. Today he reports having nighttime pain in the rectum which is alleviated after urination. Wakes up 4-5 times during the night to urinate. He has satisfactory day time urination though he reports associated strain. Denies chest pain. He currently takes a stool softener for constipation. The patient also complains of lower back pain for which he will get an injection shortly. A urine culture from 2/10/2020 showed no growth. His most recent creatinine level from 7/11/2019 was 1.07.\par \par 07/28/2020: Patient presents today for a follow up. Reports pain in the rectum and pain with fullness of the bladder. Pt had a US at [place]. Was given suppository which helped. Noxturia x4-5. On Tamsulosin BID. No burning. Frequency at night. Pt must push and staring. He experiences difficulty with his flow and voiding in general. Wife reports fowl smelling urine. \par \par 8/25/2020: Patient presents today for follow up on nocturia. Patient states he has not yet taken Desmopressin as he does not want to take too many medications. Still experiencing pain in rectum and with fullness of bladder. Was given Valium which helped. Last lab work on 7/28/2020 was within normal limits. Osmolality was normal at 294. Patient to start Desmopressin 0.1mg once before bedtime. \par \par 09/15/2020: Patient presents today for follow up. Pt was having pain in rectum and finished 5 days of Cipro which helped resolve pain, but causes constipation. Takes Colace for constipation. Urinalysis on 8/25/20 was normal. Still has pain in rectum at night, and uses Metronidazole gel to relieve it. Wakes up every 2 hours at night to urinate. Has some urgency. \par \par 11/17/2020: The patient presents today for a follow up. He is not taking desmopressin anymore. His last blood work on 11/6/2020 showed his creatinine was good at 1.0. He has no burning. He has some pain in the rectum and when his bladder is full. He is still taking finasteride 5 mg and trimethoprim 100 mg. At night he gets a lot of pain when his bladder is full and has urinary urgency about every 2 hours. After he urinates, the pain goes away. \par \par 03/09/2021: 75 yr  history of BPH, prostatitis , overactive bladder with nocturnal frequency.  Rectal area pains, last visit [facility] November 17, 2020 showing rectal mucosa nodule. To follow GI. \par patient said he is taking gabapentin and tamsulosin and finasteride. Pain is worse when bladder is full and less when he voids. \par Nocturnal frequency Q2 hr\par ED since 2019 \par Patient is seeing  for his rectal pain.  \par \par 05/24/2021: Patient presents today for follow up. Patient went to a rectal surgeon for his rectal pain and had pelvic MRI 4/29/21 which showed no evidence of perianal fistula. States he continues to have pain in rectum after sitting for a long time and then standing. Patient was instructed to take Gabapentin 400mg TID, but skips afternoon dose as it makes him sleepy. Does have constipation. Has not started Tadalafil. No hx of prostate cancer. \par \par 06/15/2021: Patient presents today for a follow up. He reports that last week he had an episode of prostatitis for which he took Cipro for. Currently wakes every 2 hours during the night to urinate. Has tried desipramine and desmopressin in the past which did not improve his symptoms. Daytime urination remains stable. Stream is strong. Pt states he went to the cardiologist today and an EKG was done which was all normal. Currently takes iron supplements. \par \par 09/01/2021: Patient presents today for follow up. Today complains of straining and pushing to urinate. Denies dysuria. Denies infection of penis. Requests renewal of Finasteride and Tamsulosin. Not taking Trospium. Stopped Tadalafil due to side effect.

## 2021-09-05 NOTE — ADDENDUM
[FreeTextEntry1] : I, Lisa Allenin, acted solely as a scribe for Dr. Collins Duron on this date 09/01/2021.\par \par All medical record entries made by the Scribe were at my, Dr. Collins Duron, direction and personally dictated by me on 09/01/2021. I have reviewed the chart and agree that the record accurately reflects my personal performance of the history, physical exam, assessment and plan.  I have also personally directed, reviewed and agreed with the chart.

## 2021-09-05 NOTE — ASSESSMENT
[FreeTextEntry1] : Mr Carrizales is a 75 year old man presented for follow up of gross hematuria, urinary frequency and urgency. The patient takes tamsulosin 0.4 mg, one half hour after meal twice daily, finasteride 5 mg once daily, and desipramine 10 mg at bedtime. The patient returned to the office 03/20/17 having gross hematuria with blood clot. Dr Holly Bernardo started the patient on Bactrim DS. He eventually went to the ER 03/21/17 where he had a Spann catheter placed. His creatinine in the ER was 1.01. Cystoscopy 03/23/17 found catheter trauma. Prostate protruded modest distance into the bladder. Bladder has a small capacity. Diffuse oozing of blood from bladder neck and trigone. Scraped up mucosa most likely from introduction of telescope. No bladder stones or tumors. Bladder inflammation from catheter. CT urogram was normal and found no etiology of the gross hematuria.\par 3/1/18: The patient returned and reported he has pain in his rectum area and penis. PSA from 1/24/18 was 0.39 ng/mL. 2/15/18 Creatinine was 1.01 mg/dL, hemoglobin was 14.1 g/dL. He received and abdomen ultrasound which shows a fatty liver, gallstones, and obscured pancreas. He denies any gross hematuria. He noted one day ago his GI physician put banding on his hemorrhoids. \par 4/10/18: The patient returned and reported he finished his antibiotics from last visit and noted he had another hemorrhoid and put another band by his GI. He noted pain still persists. UA from 3/1/18 shows 8 WBC/HPF. CBC from 3/1/18 showed slight anemia. \par 5/23/18: The patient returned and reported he has been taking Bactrim for his most recent prostatitis starting one week ago. PSA from 1/22/18 was 0.39 ng/mL. Currently taking Tamsulosin and Finasteride. \par 8/28/18: The patient returned and reported his urination is satisfactory. Currently taking tamsulosin and finasteride. Wakes up 3-4 times during the night to urinate. Noted he had a recent flare up of dysuria 3 days ago and took 2 days of Cipro and symptom have cleared up. \par 3/7/19: The patient returned and reported that he had another flare up of prostatitis and took 2 days of Cipro for his symptoms to clear. Complained of lingering dysuria. I reminded the patient that with Cipro, there is a side effect of tendonitis. Denied gross hematuria. Currently taking Trimethoprim, Tamsulosin and Finasteride. PSA from 2/15/19 was 0.04 ng/mL.\par 4/4/19: The patient returned and reported that he another flare up of prostatitis. Complained of pain when he stands up. His pain is relieved while sitting down. Occasionally has dysuria. Wakes up twice during the night to urinate. Complained of urinary urgency during the night. Takes Tamsulosin BID, Trimethoprim and Finasteride.\par 5/9/19: The patient returned today for an US. Transrectal US of the prostate findings showed a prostate volume of 23.4 cc, a hypoechoic area at the left apex 1.37 x 1.25 cm in transverse image, unremarkable seminal vesicles, and no rectal masses. The hypoechoic are is suspicious for prostate cancer, but the patient has a low PSA. I recommended that the patient undergo a transperineal biopsy for the hyperechoic region, but due to an anal fissure, the patient would like to wait. Notes that he consulted with a colon surgeon about his anal fissure and is currently taking stool softeners. His urination is adequate. Complains of nocturia. Wakes up 5-6 times during the night to urinate. Complains of pain at the tip of his penis. Takes Tamsulosin BID. UA from 4/11/19 was normal. Culture from 4/11/19 showed no growth. Notes that he took Bactrim and ended up getting a rash on his LEs.\par 6/10/19: Patient presents today for a follow up. Today he reports that he is scheduled for an anal fissure operation for 6/21/19. It has been advised that he will need 4 weeks to heal following the operation. Regarding his urination he reports that during the day there are no urinary issues. Nocturia is reported and he will have to wake up 5-6x a night to urinate. Finasteride and Tamsulosin are taken as directed. \par 7/18/19: Patient presents today for a follow up. 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 Patient with hypoechoic lesion of the prostate seen on recent TRUS. PSA 0.690 on 5/2109. Patient on Flomax /Proscar with both irritative and obstructive LUTS. Needs to strain to initiate urination. Nocturia times 5- 6. Denies dysuria or hematuria. Patient states that he feels that he has prostatitis based on familiar symptoms. Additionally he no longer takes Lisinopril but now is on Metoprolol 10 mg. \par 8/7/19: Patient presents today for a follow up. He states that the Bactrim prescribed at his last visit was not providing any relief. He still c/o discomfort when sitting but denies dysuria. He had Ciprofloxacin at home and had taken two tablets over the past two days. He noticed relief and inquired about having the prescription refilled today. \par 10/10/19: Patient presents today for a follow up. On 9/13/19 a needle biopsy of the prostate was completed under general anesthesia. He states that after the biopsy his chronic prostatitis began to cause discomfort. Ciprofloxacin 500 mg was used as directed and the discomfort is no longer present. Additionally it is reported that his sexual function decreased following the procedure but that it is starting to improve again. The results of the pathology report are to be discussed with him today. \par 12/31/2019: Patient presents today for a follow up. Pt reports pain on standing. He has an inflamed prostate. When the bladder is fill he also experiences pain and the pain goes away after he voids. Pt took Cipro to 7 days, finishing last week. While he took it, he felt better. Pt reports allergy to sulfur drugs and develops rash.\par 2/10/20: Patient presents today for a follow up. He had an MRI of the pelvis 1/23/20 which showed no evidence of rectal fistula could be identified. Pt has inflamed prostate\par Pt reports constipation for which he takes stool softner. He admits to dysuria and hematuria with mild pain at the tip of the penis yesterday. Most recent PSA was 0.24, Testosterone is 390.0. \par 02/27/2020: Patient presents today for a follow up. At the conclusion of the last visit, Doxycycline 100 mg was prescribed. The patient states he noticed no improvement. Today he reports having nighttime pain in the rectum which is alleviated after urination. Wakes up 4-5 times during the night to urinate. He has satisfactory day time urination though he reports associated strain. Denies chest pain. He currently takes a stool softener for constipation. The patient also complains of lower back pain for which he will get an injection shortly. A urine culture from 2/10/2020 showed no growth. His most recent creatinine level from 7/11/2019 was 1.07.\par \par 07/28/2020: Patient presents today for a follow up. Reports pain in the rectum and pain with fullness of the bladder. Pt had a US at [place]. Was given suppository which helped. Noxturia x4-5. On Tamsulosin BID. No burning. Frequency at night. Pt must push and staring. He experiences difficulty with his flow and voiding in general. Wife reports fowl smelling urine. \par \par 8/25/2020: Patient presents today for follow up on nocturia. Patient states he has not yet taken Desmopressin as he does not want to take too many medications. Still experiencing pain in rectum and with fullness of bladder. Was given Valium which helped. Last lab work on 7/28/2020 was within normal limits. Osmolality was normal at 294. Patient to start Desmopressin 0.1mg once before bedtime. \par \par 09/15/2020: Patient presents today for follow up. Pt was having pain in rectum and finished 5 days of Cipro which helped resolve pain, but causes constipation. Takes Colace for constipation. Urinalysis on 8/25/20 was normal. Still has pain in rectum at night, and uses Metronidazole gel to relieve it. Wakes up every 2 hours at night to urinate. Has some urgency. \par \par 11/17/2020: The patient presents today for a follow up. He is not taking desmopressin anymore. His last blood work on 11/6/2020 showed his creatinine was good at 1.0. He has no burning. He has some pain in the rectum and when his bladder is full. He is still taking finasteride 5 mg and trimethoprim 100 mg. At night he gets a lot of pain when his bladder is full and has urinary urgency about every 2 hours. After he urinates, the pain goes away. \par \par 03/09/2021: 75 yr male  BPH, nocturia , straining .  Chronic prostatitis, chronic cystitis \par \par 05/24/2021: Patient presents today for follow up. Patient went to a rectal surgeon for his rectal pain and had pelvic MRI 4/29/21 which showed no evidence of perianal fistula. States he continues to have pain in rectum after sitting for a long time and then standing. Patient was instructed to take Gabapentin 400mg TID, but skips afternoon dose as it makes him sleepy. Does have constipation. Has not started Tadalafil. No hx of prostate cancer. \par \par 06/15/2021: Patient presents today for a follow up. He reports that last week he had an episode of prostatitis for which he took Cipro for. Currently wakes every 2 hours during the night to urinate. Has tried desipramine and desmopressin in the past which did not improve his symptoms. Daytime urination remains stable. Stream is strong. Pt states he went to the cardiologist today and an EKG was done which was all normal. Currently takes iron supplements. \par \par 09/01/2021: Patient presents today for follow up. Today complains of straining and pushing to urinate. Denies dysuria. Denies infection of penis. Requests renewal of Finasteride and Tamsulosin. Not taking Trospium. Stopped Tadalafil due to side effect. \par \par Continue Tamsulosin BID and Finasteride. \par \par I prescribed the patient Ciprofloxacin 500mg one tablet twice daily x 10 days. I informed the patient that Ciprofloxacin may cause sensitivity to direct sunlight. \par \par The patient produced a urine sample which will be sent for urinalysis, urine cytology, and urine culture. \par \par RTO in 2-4 weeks for cystoscopy and dilitation after finishing antibiotics. \par \par Preparation, in-person office visit, and coordination of care took: 30 minutes

## 2021-09-07 ENCOUNTER — NON-APPOINTMENT (OUTPATIENT)
Age: 76
End: 2021-09-07

## 2021-09-10 LAB
APPEARANCE: CLEAR
BACTERIA UR CULT: NORMAL
BACTERIA: NEGATIVE
BILIRUBIN URINE: NEGATIVE
BLOOD URINE: ABNORMAL
COLOR: YELLOW
GLUCOSE QUALITATIVE U: NEGATIVE
HYALINE CASTS: 0 /LPF
KETONES URINE: NEGATIVE
LEUKOCYTE ESTERASE URINE: NEGATIVE
MICROSCOPIC-UA: NORMAL
NITRITE URINE: NEGATIVE
PH URINE: 6.5
PROTEIN URINE: NEGATIVE
RED BLOOD CELLS URINE: 3 /HPF
SPECIFIC GRAVITY URINE: 1.01
SQUAMOUS EPITHELIAL CELLS: 0 /HPF
UROBILINOGEN URINE: NORMAL
WHITE BLOOD CELLS URINE: 0 /HPF

## 2021-09-29 ENCOUNTER — APPOINTMENT (OUTPATIENT)
Dept: UROLOGY | Facility: CLINIC | Age: 76
End: 2021-09-29
Payer: MEDICARE

## 2021-09-29 PROCEDURE — 99214 OFFICE O/P EST MOD 30 MIN: CPT

## 2021-09-29 NOTE — ADDENDUM
[FreeTextEntry1] : I, Lisa Allenin, acted solely as a scribe for Dr. Collins Duron on this date 09/29/2021.\par \par All medical record entries made by the Scribe were at my, Dr. Collins Duron, direction and personally dictated by me on 09/29/2021. I have reviewed the chart and agree that the record accurately reflects my personal performance of the history, physical exam, assessment and plan.  I have also personally directed, reviewed and agreed with the chart.

## 2021-09-29 NOTE — ASSESSMENT
[FreeTextEntry1] : Mr Carrizales is a 75 year old man presented for follow up of gross hematuria, urinary frequency and urgency. The patient takes tamsulosin 0.4 mg, one half hour after meal twice daily, finasteride 5 mg once daily, and desipramine 10 mg at bedtime. The patient returned to the office 03/20/17 having gross hematuria with blood clot. Dr Holly Bernardo started the patient on Bactrim DS. He eventually went to the ER 03/21/17 where he had a Spann catheter placed. His creatinine in the ER was 1.01. Cystoscopy 03/23/17 found catheter trauma. Prostate protruded modest distance into the bladder. Bladder has a small capacity. Diffuse oozing of blood from bladder neck and trigone. Scraped up mucosa most likely from introduction of telescope. No bladder stones or tumors. Bladder inflammation from catheter. CT urogram was normal and found no etiology of the gross hematuria.\par 3/1/18: The patient returned and reported he has pain in his rectum area and penis. PSA from 1/24/18 was 0.39 ng/mL. 2/15/18 Creatinine was 1.01 mg/dL, hemoglobin was 14.1 g/dL. He received and abdomen ultrasound which shows a fatty liver, gallstones, and obscured pancreas. He denies any gross hematuria. He noted one day ago his GI physician put banding on his hemorrhoids. \par 4/10/18: The patient returned and reported he finished his antibiotics from last visit and noted he had another hemorrhoid and put another band by his GI. He noted pain still persists. UA from 3/1/18 shows 8 WBC/HPF. CBC from 3/1/18 showed slight anemia. \par 5/23/18: The patient returned and reported he has been taking Bactrim for his most recent prostatitis starting one week ago. PSA from 1/22/18 was 0.39 ng/mL. Currently taking Tamsulosin and Finasteride. \par 8/28/18: The patient returned and reported his urination is satisfactory. Currently taking tamsulosin and finasteride. Wakes up 3-4 times during the night to urinate. Noted he had a recent flare up of dysuria 3 days ago and took 2 days of Cipro and symptom have cleared up. \par 3/7/19: The patient returned and reported that he had another flare up of prostatitis and took 2 days of Cipro for his symptoms to clear. Complained of lingering dysuria. I reminded the patient that with Cipro, there is a side effect of tendonitis. Denied gross hematuria. Currently taking Trimethoprim, Tamsulosin and Finasteride. PSA from 2/15/19 was 0.04 ng/mL.\par 4/4/19: The patient returned and reported that he another flare up of prostatitis. Complained of pain when he stands up. His pain is relieved while sitting down. Occasionally has dysuria. Wakes up twice during the night to urinate. Complained of urinary urgency during the night. Takes Tamsulosin BID, Trimethoprim and Finasteride.\par 5/9/19: The patient returned today for an US. Transrectal US of the prostate findings showed a prostate volume of 23.4 cc, a hypoechoic area at the left apex 1.37 x 1.25 cm in transverse image, unremarkable seminal vesicles, and no rectal masses. The hypoechoic are is suspicious for prostate cancer, but the patient has a low PSA. I recommended that the patient undergo a transperineal biopsy for the hyperechoic region, but due to an anal fissure, the patient would like to wait. Notes that he consulted with a colon surgeon about his anal fissure and is currently taking stool softeners. His urination is adequate. Complains of nocturia. Wakes up 5-6 times during the night to urinate. Complains of pain at the tip of his penis. Takes Tamsulosin BID. UA from 4/11/19 was normal. Culture from 4/11/19 showed no growth. Notes that he took Bactrim and ended up getting a rash on his LEs.\par 6/10/19: Patient presents today for a follow up. Today he reports that he is scheduled for an anal fissure operation for 6/21/19. It has been advised that he will need 4 weeks to heal following the operation. Regarding his urination he reports that during the day there are no urinary issues. Nocturia is reported and he will have to wake up 5-6x a night to urinate. Finasteride and Tamsulosin are taken as directed. \par 7/18/19: Patient presents today for a follow up. 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 Patient with hypoechoic lesion of the prostate seen on recent TRUS. PSA 0.690 on 5/2109. Patient on Flomax /Proscar with both irritative and obstructive LUTS. Needs to strain to initiate urination. Nocturia times 5- 6. Denies dysuria or hematuria. Patient states that he feels that he has prostatitis based on familiar symptoms. Additionally he no longer takes Lisinopril but now is on Metoprolol 10 mg. \par 8/7/19: Patient presents today for a follow up. He states that the Bactrim prescribed at his last visit was not providing any relief. He still c/o discomfort when sitting but denies dysuria. He had Ciprofloxacin at home and had taken two tablets over the past two days. He noticed relief and inquired about having the prescription refilled today. \par 10/10/19: Patient presents today for a follow up. On 9/13/19 a needle biopsy of the prostate was completed under general anesthesia. He states that after the biopsy his chronic prostatitis began to cause discomfort. Ciprofloxacin 500 mg was used as directed and the discomfort is no longer present. Additionally it is reported that his sexual function decreased following the procedure but that it is starting to improve again. The results of the pathology report are to be discussed with him today. \par 12/31/2019: Patient presents today for a follow up. Pt reports pain on standing. He has an inflamed prostate. When the bladder is fill he also experiences pain and the pain goes away after he voids. Pt took Cipro to 7 days, finishing last week. While he took it, he felt better. Pt reports allergy to sulfur drugs and develops rash.\par 2/10/20: Patient presents today for a follow up. He had an MRI of the pelvis 1/23/20 which showed no evidence of rectal fistula could be identified. Pt has inflamed prostate\par Pt reports constipation for which he takes stool softner. He admits to dysuria and hematuria with mild pain at the tip of the penis yesterday. Most recent PSA was 0.24, Testosterone is 390.0. \par 02/27/2020: Patient presents today for a follow up. At the conclusion of the last visit, Doxycycline 100 mg was prescribed. The patient states he noticed no improvement. Today he reports having nighttime pain in the rectum which is alleviated after urination. Wakes up 4-5 times during the night to urinate. He has satisfactory day time urination though he reports associated strain. Denies chest pain. He currently takes a stool softener for constipation. The patient also complains of lower back pain for which he will get an injection shortly. A urine culture from 2/10/2020 showed no growth. His most recent creatinine level from 7/11/2019 was 1.07.\par \par 07/28/2020: Patient presents today for a follow up. Reports pain in the rectum and pain with fullness of the bladder. Pt had a US at [facility]. Was given suppository which helped. Noxturia x4-5. On Tamsulosin BID. No burning. Frequency at night. Pt must push and staring. He experiences difficulty with his flow and voiding in general. Wife reports fowl smelling urine. \par \par 8/25/2020: Patient presents today for follow up on nocturia. Patient states he has not yet taken Desmopressin as he does not want to take too many medications. Still experiencing pain in rectum and with fullness of bladder. Was given Valium which helped. Last lab work on 7/28/2020 was within normal limits. Osmolality was normal at 294. Patient to start Desmopressin 0.1mg once before bedtime. \par \par 09/15/2020: Patient presents today for follow up. Pt was having pain in rectum and finished 5 days of Cipro which helped resolve pain, but causes constipation. Takes Colace for constipation. Urinalysis on 8/25/20 was normal. Still has pain in rectum at night, and uses Metronidazole gel to relieve it. Wakes up every 2 hours at night to urinate. Has some urgency. \par \par 11/17/2020: The patient presents today for a follow up. He is not taking desmopressin anymore. His last blood work on 11/6/2020 showed his creatinine was good at 1.0. He has no burning. He has some pain in the rectum and when his bladder is full. He is still taking finasteride 5 mg and trimethoprim 100 mg. At night he gets a lot of pain when his bladder is full and has urinary urgency about every 2 hours. After he urinates, the pain goes away. \par \par 03/09/2021: 75 yr male  BPH, nocturia , straining .  Chronic prostatitis, chronic cystitis \par \par 05/24/2021: Patient presents today for follow up. Patient went to a rectal surgeon for his rectal pain and had pelvic MRI 4/29/21 which showed no evidence of perianal fistula. States he continues to have pain in rectum after sitting for a long time and then standing. Patient was instructed to take Gabapentin 400mg TID, but skips afternoon dose as it makes him sleepy. Does have constipation. Has not started Tadalafil. No hx of prostate cancer. \par \par 06/15/2021: Patient presents today for a follow up. He reports that last week he had an episode of prostatitis for which he took Cipro for. Currently wakes every 2 hours during the night to urinate. Has tried desipramine and desmopressin in the past which did not improve his symptoms. Daytime urination remains stable. Stream is strong. Pt states he went to the cardiologist today and an EKG was done which was all normal. Currently takes iron supplements. \par \par 09/01/2021: Patient presents today for follow up. Today complains of straining and pushing to urinate. Denies dysuria. Denies infection of penis. Requests renewal of Finasteride and Tamsulosin. Not taking Trospium. Stopped Tadalafil due to side effect. \par \par 09/29/2021: Patient presents today for follow up. 9/1/21 UA showed 12 RBC/HPF and trace blood by dipstick. On repeat 9/9/21, he had only 3 RBC/HPF and small blood by dripstick which normal. Urine cytology 9/1/21 negative for high grade urothelial carcinoma. Provided blood work from 9/17/21 done by PCP which showed UA slightly cloudy, 3-5 RBC/HPF. PSA 0.2. Wakes 4x at night to urinate. States he has rectal pain when bladder is full. \par \par Digital rectal exam found no suspicious rectal masses. No rectal mucosal lesions. Anal tone is normal. The prostate is non tender, with normal texture, and no nodules. Prostate borders a bit obscure particularly on the right. It is a 35 gram transurethral resection size. No gross blood on examining fingers. \par \par Unable to provide urine sample today and will provide at a later time. \par \par RTO in 6 months for follow up or sooner if new urinary symptoms develop. \par \par Preparation, in-person office visit, and coordination of care took: 30 minutes

## 2021-09-29 NOTE — HISTORY OF PRESENT ILLNESS
[FreeTextEntry1] : Mr Carrizales is a 75 year old man presented for follow up of gross hematuria, urinary frequency and urgency. The patient takes tamsulosin 0.4 mg, one half hour after meal twice daily, finasteride 5 mg once daily, and desipramine 10 mg at bedtime. The patient returned to the office 03/20/17 having gross hematuria with blood clot. Dr Holly Bernardo started the patient on Bactrim DS. He eventually went to the ER 03/21/17 where he had a Spann catheter placed. His creatinine in the ER was 1.01. Cystoscopy 03/23/17 found catheter trauma. Prostate protruded modest distance into the bladder. Bladder has a small capacity. Diffuse oozing of blood from bladder neck and trigone. Scraped up mucosa most likely from introduction of telescope. No bladder stones or tumors. Bladder inflammation from catheter. CT urogram was normal and found no etiology of the gross hematuria.\par 3/1/18: The patient returned and reported he has pain in his rectum area and penis. PSA from 1/24/18 was 0.39 ng/mL. 2/15/18 Creatinine was 1.01 mg/dL, hemoglobin was 14.1 g/dL. He received and abdomen ultrasound which shows a fatty liver, gallstones, and obscured pancreas. He denies any gross hematuria. He noted one day ago his GI physician put banding on his hemorrhoids. \par 4/10/18: The patient returned and reported he finished his antibiotics from last visit and noted he had another hemorrhoid and put another band by his GI. He noted pain still persists. UA from 3/1/18 shows 8 WBC/HPF. CBC from 3/1/18 showed slight anemia. \par 5/23/18: The patient returned and reported he has been taking Bactrim for his most recent prostatitis starting one week ago. PSA from 1/22/18 was 0.39 ng/mL. Currently taking Tamsulosin and Finasteride. \par 8/28/18: The patient returned and reported his urination is satisfactory. Currently taking tamsulosin and finasteride. Wakes up 3-4 times during the night to urinate. Noted he had a recent flare up of dysuria 3 days ago and took 2 days of Cipro and symptom have cleared up. \par 3/7/19: The patient returned and reported that he had another flare up of prostatitis and took 2 days of Cipro for his symptoms to clear. Complained of lingering dysuria. I reminded the patient that with Cipro, there is a side effect of tendonitis. Denied gross hematuria. Currently taking Trimethoprim, Tamsulosin and Finasteride. PSA from 2/15/19 was 0.04 ng/mL.\par 4/4/19: The patient returned and reported that he another flare up of prostatitis. Complained of pain when he stands up. His pain is relieved while sitting down. Occasionally has dysuria. Wakes up twice during the night to urinate. Complained of urinary urgency during the night. Takes Tamsulosin BID, Trimethoprim and Finasteride.\par 5/9/19: The patient returned today for an US. Transrectal US of the prostate findings showed a prostate volume of 23.4 cc, a hypoechoic area at the left apex 1.37 x 1.25 cm in transverse image, unremarkable seminal vesicles, and no rectal masses. The hypoechoic are is suspicious for prostate cancer, but the patient has a low PSA. I recommended that the patient undergo a transperineal biopsy for the hyperechoic region, but due to an anal fissure, the patient would like to wait. Notes that he consulted with a colon surgeon about his anal fissure and is currently taking stool softeners. His urination is adequate. Complains of nocturia. Wakes up 5-6 times during the night to urinate. Complains of pain at the tip of his penis. Takes Tamsulosin BID. UA from 4/11/19 was normal. Culture from 4/11/19 showed no growth. Notes that he took Bactrim and ended up getting a rash on his LEs.\par 6/10/19: Patient presents today for a follow up. Today he reports that he is scheduled for an anal fissure operation for 6/21/19. It has been advised that he will need 4 weeks to heal following the operation. Regarding his urination he reports that during the day there are no urinary issues. Nocturia is reported and he will have to wake up 5-6x a night to urinate. Finasteride and Tamsulosin are taken as directed. \par 7/18/19: Patient presents today for a follow up. 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 Patient with hypoechoic lesion of the prostate seen on recent TRUS. PSA 0.690 on 5/2109. Patient on Flomax /Proscar with both irritative and obstructive LUTS. Needs to strain to initiate urination. Nocturia times 5- 6. Denies dysuria or hematuria. Patient states that he feels that he has prostatitis based on familiar symptoms. Additionally he no longer takes Lisinopril but now is on Metoprolol 10 mg. \par 8/7/19: Patient presents today for a follow up. He states that the Bactrim prescribed at his last visit was not providing any relief. He still c/o discomfort when sitting but denies dysuria. He had Ciprofloxacin at home and had taken two tablets over the past two days. He noticed relief and inquired about having the prescription refilled today. \par 10/10/19: Patient presents today for a follow up. On 9/13/19 a needle biopsy of the prostate was completed under general anesthesia. He states that after the biopsy his chronic prostatitis began to cause discomfort. Ciprofloxacin 500 mg was used as directed and the discomfort is no longer present. Additionally it is reported that his sexual function decreased following the procedure but that it is starting to improve again. The results of the pathology report are to be discussed with him today. \par 12/31/2019: Patient presents today for a follow up. Pt reports pain on standing. He has an inflamed prostate. When the bladder is fill he also experiences pain and the pain goes away after he voids. Pt took Cipro to 7 days, finishing last week. While he took it, he felt better. Pt reports allergy to sulfur drugs and develops rash.\par 2/10/20: Patient presents today for a follow up. He had an MRI of the pelvis 1/23/20 which showed no evidence of rectal fistula could be identified. Pt has inflamed prostate\par Pt reports constipation for which he takes stool softner. He admits to dysuria and hematuria with mild pain at the tip of the penis yesterday. Most recent PSA was 0.24, Testosterone is 390.0. \par 02/27/2020: Patient presents today for a follow up. At the conclusion of the last visit, Doxycycline 100 mg was prescribed. The patient states he noticed no improvement. Today he reports having nighttime pain in the rectum which is alleviated after urination. Wakes up 4-5 times during the night to urinate. He has satisfactory day time urination though he reports associated strain. Denies chest pain. He currently takes a stool softener for constipation. The patient also complains of lower back pain for which he will get an injection shortly. A urine culture from 2/10/2020 showed no growth. His most recent creatinine level from 7/11/2019 was 1.07.\par \par 07/28/2020: Patient presents today for a follow up. Reports pain in the rectum and pain with fullness of the bladder. Pt had a US at [place] [facility]. Was given suppository which helped. Noxturia x4-5. On Tamsulosin BID. No burning. Frequency at night. Pt must push and staring. He experiences difficulty with his flow and voiding in general. Wife reports fowl smelling urine. \par \par 8/25/2020: Patient presents today for follow up on nocturia. Patient states he has not yet taken Desmopressin as he does not want to take too many medications. Still experiencing pain in rectum and with fullness of bladder. Was given Valium which helped. Last lab work on 7/28/2020 was within normal limits. Osmolality was normal at 294. Patient to start Desmopressin 0.1mg once before bedtime. \par \par 09/15/2020: Patient presents today for follow up. Pt was having pain in rectum and finished 5 days of Cipro which helped resolve pain, but causes constipation. Takes Colace for constipation. Urinalysis on 8/25/20 was normal. Still has pain in rectum at night, and uses Metronidazole gel to relieve it. Wakes up every 2 hours at night to urinate. Has some urgency. \par \par 11/17/2020: The patient presents today for a follow up. He is not taking desmopressin anymore. His last blood work on 11/6/2020 showed his creatinine was good at 1.0. He has no burning. He has some pain in the rectum and when his bladder is full. He is still taking finasteride 5 mg and trimethoprim 100 mg. At night he gets a lot of pain when his bladder is full and has urinary urgency about every 2 hours. After he urinates, the pain goes away. \par \par 03/09/2021: 75 yr  history of BPH, prostatitis , overactive bladder with nocturnal frequency.  Rectal area pains, last visit [facility] November 17, 2020 showing rectal mucosa nodule. To follow GI. \par patient said he is taking gabapentin and tamsulosin and finasteride. Pain is worse when bladder is full and less when he voids. \par Nocturnal frequency Q2 hr\par ED since 2019 \par Patient is seeing  for his rectal pain.  \par \par 05/24/2021: Patient presents today for follow up. Patient went to a rectal surgeon for his rectal pain and had pelvic MRI 4/29/21 which showed no evidence of perianal fistula. States he continues to have pain in rectum after sitting for a long time and then standing. Patient was instructed to take Gabapentin 400mg TID, but skips afternoon dose as it makes him sleepy. Does have constipation. Has not started Tadalafil. No hx of prostate cancer. \par \par 06/15/2021: Patient presents today for a follow up. He reports that last week he had an episode of prostatitis for which he took Cipro for. Currently wakes every 2 hours during the night to urinate. Has tried desipramine and desmopressin in the past which did not improve his symptoms. Daytime urination remains stable. Stream is strong. Pt states he went to the cardiologist today and an EKG was done which was all normal. Currently takes iron supplements. \par \par 09/01/2021: Patient presents today for follow up. Today complains of straining and pushing to urinate. Denies dysuria. Denies infection of penis. Requests renewal of Finasteride and Tamsulosin. Not taking Trospium. Stopped Tadalafil due to side effect. \par \par 09/29/2021: Patient presents today for follow up. 9/1/21 UA showed 12 RBC/HPF and trace blood by dipstick. On repeat 9/9/21, he had only 3 RBC/HPF and small blood by dripstick which normal. Urine cytology 9/1/21 negative for high grade urothelial carcinoma. Provided blood work from 9/17/21 done by PCP which showed UA slightly cloudy, 3-5 RBC/HPF. PSA 0.2. Wakes 4x at night to urinate. States he has rectal pain when bladder is full.

## 2021-09-29 NOTE — PHYSICAL EXAM
[General Appearance - Well Developed] : well developed [General Appearance - Well Nourished] : well nourished [Normal Appearance] : normal appearance [Well Groomed] : well groomed [General Appearance - In No Acute Distress] : no acute distress [Abdomen Soft] : soft [Abdomen Tenderness] : non-tender [Skin Color & Pigmentation] : normal skin color and pigmentation [Edema] : no peripheral edema [] : no respiratory distress [Respiration, Rhythm And Depth] : normal respiratory rhythm and effort [Exaggerated Use Of Accessory Muscles For Inspiration] : no accessory muscle use [Oriented To Time, Place, And Person] : oriented to person, place, and time [Affect] : the affect was normal [Mood] : the mood was normal [Not Anxious] : not anxious [Normal Station and Gait] : the gait and station were normal for the patient's age [No Focal Deficits] : no focal deficits [FreeTextEntry1] : Digital rectal exam found no suspicious rectal masses. No rectal mucosal lesions. Anal tone is normal. The prostate is non tender, with normal texture, and no nodules. Prostate borders a bit obscure particularly on the right. It is a 35 gram transurethral resection size. No gross blood on examining fingers.

## 2021-10-01 LAB
APPEARANCE: CLEAR
BACTERIA UR CULT: NORMAL
BACTERIA: NEGATIVE
BILIRUBIN URINE: NEGATIVE
BLOOD URINE: NEGATIVE
COLOR: NORMAL
GLUCOSE QUALITATIVE U: NEGATIVE
HYALINE CASTS: 0 /LPF
KETONES URINE: NEGATIVE
LEUKOCYTE ESTERASE URINE: NEGATIVE
MICROSCOPIC-UA: NORMAL
NITRITE URINE: NEGATIVE
PH URINE: 6
PROTEIN URINE: NEGATIVE
RED BLOOD CELLS URINE: 4 /HPF
SPECIFIC GRAVITY URINE: 1.01
SQUAMOUS EPITHELIAL CELLS: 0 /HPF
URINE CYTOLOGY: NORMAL
UROBILINOGEN URINE: NORMAL
WHITE BLOOD CELLS URINE: 2 /HPF

## 2021-11-07 LAB
APPEARANCE: CLEAR
BACTERIA UR CULT: NORMAL
BACTERIA: NEGATIVE
BILIRUBIN URINE: NEGATIVE
BLOOD URINE: NEGATIVE
COLOR: NORMAL
GLUCOSE QUALITATIVE U: NEGATIVE
HYALINE CASTS: 0 /LPF
KETONES URINE: NEGATIVE
LEUKOCYTE ESTERASE URINE: NEGATIVE
MICROSCOPIC-UA: NORMAL
NITRITE URINE: NEGATIVE
PH URINE: 6.5
PROTEIN URINE: NEGATIVE
RED BLOOD CELLS URINE: 2 /HPF
SPECIFIC GRAVITY URINE: 1.01
SQUAMOUS EPITHELIAL CELLS: 1 /HPF
UROBILINOGEN URINE: NORMAL
WHITE BLOOD CELLS URINE: 10 /HPF

## 2021-12-15 LAB
APPEARANCE: CLEAR
BACTERIA UR CULT: NORMAL
BACTERIA: NEGATIVE
BILIRUBIN URINE: NEGATIVE
BLOOD URINE: NORMAL
COLOR: NORMAL
GLUCOSE QUALITATIVE U: NEGATIVE
HYALINE CASTS: 1 /LPF
KETONES URINE: NEGATIVE
LEUKOCYTE ESTERASE URINE: ABNORMAL
MICROSCOPIC-UA: NORMAL
NITRITE URINE: NEGATIVE
PH URINE: 6
PROTEIN URINE: NEGATIVE
PSA SERPL-MCNC: 0.2 NG/ML
RED BLOOD CELLS URINE: 7 /HPF
SPECIFIC GRAVITY URINE: 1.01
SQUAMOUS EPITHELIAL CELLS: 1 /HPF
URINE CYTOLOGY: NORMAL
UROBILINOGEN URINE: NORMAL
WHITE BLOOD CELLS URINE: 7 /HPF

## 2021-12-16 ENCOUNTER — NON-APPOINTMENT (OUTPATIENT)
Age: 76
End: 2021-12-16

## 2021-12-23 ENCOUNTER — NON-APPOINTMENT (OUTPATIENT)
Age: 76
End: 2021-12-23

## 2021-12-23 LAB
APPEARANCE: CLEAR
BACTERIA UR CULT: NORMAL
BACTERIA: NEGATIVE
BILIRUBIN URINE: NEGATIVE
BLOOD URINE: NEGATIVE
COLOR: YELLOW
GLUCOSE QUALITATIVE U: NEGATIVE
HYALINE CASTS: 0 /LPF
KETONES URINE: NEGATIVE
LEUKOCYTE ESTERASE URINE: ABNORMAL
MICROSCOPIC-UA: NORMAL
NITRITE URINE: NEGATIVE
PH URINE: 7
PROTEIN URINE: NORMAL
RED BLOOD CELLS URINE: 2 /HPF
SPECIFIC GRAVITY URINE: 1.02
SQUAMOUS EPITHELIAL CELLS: 4 /HPF
UROBILINOGEN URINE: NORMAL
WHITE BLOOD CELLS URINE: 33 /HPF

## 2022-02-02 ENCOUNTER — APPOINTMENT (OUTPATIENT)
Dept: UROLOGY | Facility: CLINIC | Age: 77
End: 2022-02-02
Payer: MEDICARE

## 2022-02-02 VITALS
OXYGEN SATURATION: 95 % | RESPIRATION RATE: 16 BRPM | BODY MASS INDEX: 30.39 KG/M2 | HEIGHT: 64 IN | WEIGHT: 178 LBS | HEART RATE: 94 BPM | SYSTOLIC BLOOD PRESSURE: 135 MMHG | DIASTOLIC BLOOD PRESSURE: 74 MMHG

## 2022-02-02 PROCEDURE — 99215 OFFICE O/P EST HI 40 MIN: CPT

## 2022-02-02 NOTE — HISTORY OF PRESENT ILLNESS
[FreeTextEntry1] : Mr Carrizales is a 76 year old man presented for follow up of gross hematuria, urinary frequency and urgency. The patient takes tamsulosin 0.4 mg, one half hour after meal twice daily, finasteride 5 mg once daily, and desipramine 10 mg at bedtime. The patient returned to the office 03/20/17 having gross hematuria with blood clot. Dr Holly Bernardo started the patient on Bactrim DS. He eventually went to the ER 03/21/17 where he had a Spann catheter placed. His creatinine in the ER was 1.01. Cystoscopy 03/23/17 found catheter trauma. Prostate protruded modest distance into the bladder. Bladder has a small capacity. Diffuse oozing of blood from bladder neck and trigone. Scraped up mucosa most likely from introduction of telescope. No bladder stones or tumors. Bladder inflammation from catheter. CT urogram was normal and found no etiology of the gross hematuria.\par 3/1/18: The patient returned and reported he has pain in his rectum area and penis. PSA from 1/24/18 was 0.39 ng/mL. 2/15/18 Creatinine was 1.01 mg/dL, hemoglobin was 14.1 g/dL. He received and abdomen ultrasound which shows a fatty liver, gallstones, and obscured pancreas. He denies any gross hematuria. He noted one day ago his GI physician put banding on his hemorrhoids. \par 4/10/18: The patient returned and reported he finished his antibiotics from last visit and noted he had another hemorrhoid and put another band by his GI. He noted pain still persists. UA from 3/1/18 shows 8 WBC/HPF. CBC from 3/1/18 showed slight anemia. \par 5/23/18: The patient returned and reported he has been taking Bactrim for his most recent prostatitis starting one week ago. PSA from 1/22/18 was 0.39 ng/mL. Currently taking Tamsulosin and Finasteride. \par 8/28/18: The patient returned and reported his urination is satisfactory. Currently taking tamsulosin and finasteride. Wakes up 3-4 times during the night to urinate. Noted he had a recent flare up of dysuria 3 days ago and took 2 days of Cipro and symptom have cleared up. \par 3/7/19: The patient returned and reported that he had another flare up of prostatitis and took 2 days of Cipro for his symptoms to clear. Complained of lingering dysuria. I reminded the patient that with Cipro, there is a side effect of tendonitis. Denied gross hematuria. Currently taking Trimethoprim, Tamsulosin and Finasteride. PSA from 2/15/19 was 0.04 ng/mL.\par 4/4/19: The patient returned and reported that he another flare up of prostatitis. Complained of pain when he stands up. His pain is relieved while sitting down. Occasionally has dysuria. Wakes up twice during the night to urinate. Complained of urinary urgency during the night. Takes Tamsulosin BID, Trimethoprim and Finasteride.\par 5/9/19: The patient returned today for an US. Transrectal US of the prostate findings showed a prostate volume of 23.4 cc, a hypoechoic area at the left apex 1.37 x 1.25 cm in transverse image, unremarkable seminal vesicles, and no rectal masses. The hypoechoic are is suspicious for prostate cancer, but the patient has a low PSA. I recommended that the patient undergo a transperineal biopsy for the hyperechoic region, but due to an anal fissure, the patient would like to wait. Notes that he consulted with a colon surgeon about his anal fissure and is currently taking stool softeners. His urination is adequate. Complains of nocturia. Wakes up 5-6 times during the night to urinate. Complains of pain at the tip of his penis. Takes Tamsulosin BID. UA from 4/11/19 was normal. Culture from 4/11/19 showed no growth. Notes that he took Bactrim and ended up getting a rash on his LEs.\par 6/10/19: Patient presents today for a follow up. Today he reports that he is scheduled for an anal fissure operation for 6/21/19. It has been advised that he will need 4 weeks to heal following the operation. Regarding his urination he reports that during the day there are no urinary issues. Nocturia is reported and he will have to wake up 5-6x a night to urinate. Finasteride and Tamsulosin are taken as directed. \par 7/18/19: Patient presents today for a follow up. 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 Patient with hypoechoic lesion of the prostate seen on recent TRUS. PSA 0.690 on 5/2109. Patient on Flomax /Proscar with both irritative and obstructive LUTS. Needs to strain to initiate urination. Nocturia times 5- 6. Denies dysuria or hematuria. Patient states that he feels that he has prostatitis based on familiar symptoms. Additionally he no longer takes Lisinopril but now is on Metoprolol 10 mg. \par 8/7/19: Patient presents today for a follow up. He states that the Bactrim prescribed at his last visit was not providing any relief. He still c/o discomfort when sitting but denies dysuria. He had Ciprofloxacin at home and had taken two tablets over the past two days. He noticed relief and inquired about having the prescription refilled today. \par 10/10/19: Patient presents today for a follow up. On 9/13/19 a needle biopsy of the prostate was completed under general anesthesia. He states that after the biopsy his chronic prostatitis began to cause discomfort. Ciprofloxacin 500 mg was used as directed and the discomfort is no longer present. Additionally it is reported that his sexual function decreased following the procedure but that it is starting to improve again. The results of the pathology report are to be discussed with him today. \par 12/31/2019: Patient presents today for a follow up. Pt reports pain on standing. He has an inflamed prostate. When the bladder is fill he also experiences pain and the pain goes away after he voids. Pt took Cipro to 7 days, finishing last week. While he took it, he felt better. Pt reports allergy to sulfur drugs and develops rash.\par 2/10/20: Patient presents today for a follow up. He had an MRI of the pelvis 1/23/20 which showed no evidence of rectal fistula could be identified. Pt has inflamed prostate\par Pt reports constipation for which he takes stool softner. He admits to dysuria and hematuria with mild pain at the tip of the penis yesterday. Most recent PSA was 0.24, Testosterone is 390.0. \par 02/27/2020: Patient presents today for a follow up. At the conclusion of the last visit, Doxycycline 100 mg was prescribed. The patient states he noticed no improvement. Today he reports having nighttime pain in the rectum which is alleviated after urination. Wakes up 4-5 times during the night to urinate. He has satisfactory day time urination though he reports associated strain. Denies chest pain. He currently takes a stool softener for constipation. The patient also complains of lower back pain for which he will get an injection shortly. A urine culture from 2/10/2020 showed no growth. His most recent creatinine level from 7/11/2019 was 1.07.\par \par 07/28/2020: Patient presents today for a follow up. Reports pain in the rectum and pain with fullness of the bladder. Pt had a US at [place]. Was given suppository which helped. Noxturia x4-5. On Tamsulosin BID. No burning. Frequency at night. Pt must push and staring. He experiences difficulty with his flow and voiding in general. Wife reports fowl smelling urine. \par \par 8/25/2020: Patient presents today for follow up on nocturia. Patient states he has not yet taken Desmopressin as he does not want to take too many medications. Still experiencing pain in rectum and with fullness of bladder. Was given Valium which helped. Last lab work on 7/28/2020 was within normal limits. Osmolality was normal at 294. Patient to start Desmopressin 0.1mg once before bedtime. \par \par 09/15/2020: Patient presents today for follow up. Pt was having pain in rectum and finished 5 days of Cipro which helped resolve pain, but causes constipation. Takes Colace for constipation. Urinalysis on 8/25/20 was normal. Still has pain in rectum at night, and uses Metronidazole gel to relieve it. Wakes up every 2 hours at night to urinate. Has some urgency. \par \par 11/17/2020: The patient presents today for a follow up. He is not taking desmopressin anymore. His last blood work on 11/6/2020 showed his creatinine was good at 1.0. He has no burning. He has some pain in the rectum and when his bladder is full. He is still taking finasteride 5 mg and trimethoprim 100 mg. At night he gets a lot of pain when his bladder is full and has urinary urgency about every 2 hours. After he urinates, the pain goes away. \par \par 03/09/2021: 75 yr  history of BPH, prostatitis , overactive bladder with nocturnal frequency.  Rectal area pains, last visit [facility] November 17, 2020 showing rectal mucosa nodule. To follow GI. \par patient said he is taking gabapentin and tamsulosin and finasteride. Pain is worse when bladder is full and less when he voids. \par Nocturnal frequency Q2 hr\par ED since 2019 \par Patient is seeing  for his rectal pain.  \par \par 05/24/2021: Patient presents today for follow up. Patient went to a rectal surgeon for his rectal pain and had pelvic MRI 4/29/21 which showed no evidence of perianal fistula. States he continues to have pain in rectum after sitting for a long time and then standing. Patient was instructed to take Gabapentin 400mg TID, but skips afternoon dose as it makes him sleepy. Does have constipation. Has not started Tadalafil. No hx of prostate cancer. \par \par 06/15/2021: Patient presents today for a follow up. He reports that last week he had an episode of prostatitis for which he took Cipro for. Currently wakes every 2 hours during the night to urinate. Has tried desipramine and desmopressin in the past which did not improve his symptoms. Daytime urination remains stable. Stream is strong. Pt states he went to the cardiologist today and an EKG was done which was all normal. Currently takes iron supplements. \par \par 09/01/2021: Patient presents today for follow up. Today complains of straining and pushing to urinate. Denies dysuria. Denies infection of penis. Requests renewal of Finasteride and Tamsulosin. Not taking Trospium. Stopped Tadalafil due to side effect. \par \par 09/29/2021: Patient presents today for follow up. 9/1/21 UA showed 12 RBC/HPF and trace blood by dipstick. On repeat 9/9/21, he had only 3 RBC/HPF and small blood by dripstick which normal. Urine cytology 9/1/21 negative for high grade urothelial carcinoma. Provided blood work from 9/17/21 done by PCP which showed UA slightly cloudy, 3-5 RBC/HPF. PSA 0.2. Wakes 4x at night to urinate. States he has rectal pain when bladder is full. \par \par 02/02/2022: Patient presents today for follow up. Reviewed 12/29/21 lab work which showed MCV low at 75.2, WBC 6600, Hb 12.1, platelets [id number], potassium 4.9, creatinine 0.90 upper limits 1.18, BUN high at 25, AST minimally elevated 39 upper limits 34, alk phos normal 60, HbA1c 6.0, UA dipstick positive small blood, moderate leukocyte esterase, few squamous epithelial cells. Has pain when he sits and feels better when standing or laying down. States he must push to urinate. No pain on urination. Nocturia x3-4. Takes tamsulosin 0.4mg BID. Reports his EKG is normal.

## 2022-02-02 NOTE — ASSESSMENT
[FreeTextEntry1] : Mr Carrizales is a 76 year old man presented for follow up of gross hematuria, urinary frequency and urgency. The patient takes tamsulosin 0.4 mg, one half hour after meal twice daily, finasteride 5 mg once daily, and desipramine 10 mg at bedtime. The patient returned to the office 03/20/17 having gross hematuria with blood clot. Dr Holly Bernardo started the patient on Bactrim DS. He eventually went to the ER 03/21/17 where he had a Spann catheter placed. His creatinine in the ER was 1.01. Cystoscopy 03/23/17 found catheter trauma. Prostate protruded modest distance into the bladder. Bladder has a small capacity. Diffuse oozing of blood from bladder neck and trigone. Scraped up mucosa most likely from introduction of telescope. No bladder stones or tumors. Bladder inflammation from catheter. CT urogram was normal and found no etiology of the gross hematuria.\par 3/1/18: The patient returned and reported he has pain in his rectum area and penis. PSA from 1/24/18 was 0.39 ng/mL. 2/15/18 Creatinine was 1.01 mg/dL, hemoglobin was 14.1 g/dL. He received and abdomen ultrasound which shows a fatty liver, gallstones, and obscured pancreas. He denies any gross hematuria. He noted one day ago his GI physician put banding on his hemorrhoids. \par 4/10/18: The patient returned and reported he finished his antibiotics from last visit and noted he had another hemorrhoid and put another band by his GI. He noted pain still persists. UA from 3/1/18 shows 8 WBC/HPF. CBC from 3/1/18 showed slight anemia. \par 5/23/18: The patient returned and reported he has been taking Bactrim for his most recent prostatitis starting one week ago. PSA from 1/22/18 was 0.39 ng/mL. Currently taking Tamsulosin and Finasteride. \par 8/28/18: The patient returned and reported his urination is satisfactory. Currently taking tamsulosin and finasteride. Wakes up 3-4 times during the night to urinate. Noted he had a recent flare up of dysuria 3 days ago and took 2 days of Cipro and symptom have cleared up. \par 3/7/19: The patient returned and reported that he had another flare up of prostatitis and took 2 days of Cipro for his symptoms to clear. Complained of lingering dysuria. I reminded the patient that with Cipro, there is a side effect of tendonitis. Denied gross hematuria. Currently taking Trimethoprim, Tamsulosin and Finasteride. PSA from 2/15/19 was 0.04 ng/mL.\par 4/4/19: The patient returned and reported that he another flare up of prostatitis. Complained of pain when he stands up. His pain is relieved while sitting down. Occasionally has dysuria. Wakes up twice during the night to urinate. Complained of urinary urgency during the night. Takes Tamsulosin BID, Trimethoprim and Finasteride.\par 5/9/19: The patient returned today for an US. Transrectal US of the prostate findings showed a prostate volume of 23.4 cc, a hypoechoic area at the left apex 1.37 x 1.25 cm in transverse image, unremarkable seminal vesicles, and no rectal masses. The hypoechoic are is suspicious for prostate cancer, but the patient has a low PSA. I recommended that the patient undergo a transperineal biopsy for the hyperechoic region, but due to an anal fissure, the patient would like to wait. Notes that he consulted with a colon surgeon about his anal fissure and is currently taking stool softeners. His urination is adequate. Complains of nocturia. Wakes up 5-6 times during the night to urinate. Complains of pain at the tip of his penis. Takes Tamsulosin BID. UA from 4/11/19 was normal. Culture from 4/11/19 showed no growth. Notes that he took Bactrim and ended up getting a rash on his LEs.\par 6/10/19: Patient presents today for a follow up. Today he reports that he is scheduled for an anal fissure operation for 6/21/19. It has been advised that he will need 4 weeks to heal following the operation. Regarding his urination he reports that during the day there are no urinary issues. Nocturia is reported and he will have to wake up 5-6x a night to urinate. Finasteride and Tamsulosin are taken as directed. \par 7/18/19: Patient presents today for a follow up. 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 Patient with hypoechoic lesion of the prostate seen on recent TRUS. PSA 0.690 on 5/2109. Patient on Flomax /Proscar with both irritative and obstructive LUTS. Needs to strain to initiate urination. Nocturia times 5- 6. Denies dysuria or hematuria. Patient states that he feels that he has prostatitis based on familiar symptoms. Additionally he no longer takes Lisinopril but now is on Metoprolol 10 mg. \par 8/7/19: Patient presents today for a follow up. He states that the Bactrim prescribed at his last visit was not providing any relief. He still c/o discomfort when sitting but denies dysuria. He had Ciprofloxacin at home and had taken two tablets over the past two days. He noticed relief and inquired about having the prescription refilled today. \par 10/10/19: Patient presents today for a follow up. On 9/13/19 a needle biopsy of the prostate was completed under general anesthesia. He states that after the biopsy his chronic prostatitis began to cause discomfort. Ciprofloxacin 500 mg was used as directed and the discomfort is no longer present. Additionally it is reported that his sexual function decreased following the procedure but that it is starting to improve again. The results of the pathology report are to be discussed with him today. \par 12/31/2019: Patient presents today for a follow up. Pt reports pain on standing. He has an inflamed prostate. When the bladder is fill he also experiences pain and the pain goes away after he voids. Pt took Cipro to 7 days, finishing last week. While he took it, he felt better. Pt reports allergy to sulfur drugs and develops rash.\par 2/10/20: Patient presents today for a follow up. He had an MRI of the pelvis 1/23/20 which showed no evidence of rectal fistula could be identified. Pt has inflamed prostate\par Pt reports constipation for which he takes stool softner. He admits to dysuria and hematuria with mild pain at the tip of the penis yesterday. Most recent PSA was 0.24, Testosterone is 390.0. \par 02/27/2020: Patient presents today for a follow up. At the conclusion of the last visit, Doxycycline 100 mg was prescribed. The patient states he noticed no improvement. Today he reports having nighttime pain in the rectum which is alleviated after urination. Wakes up 4-5 times during the night to urinate. He has satisfactory day time urination though he reports associated strain. Denies chest pain. He currently takes a stool softener for constipation. The patient also complains of lower back pain for which he will get an injection shortly. A urine culture from 2/10/2020 showed no growth. His most recent creatinine level from 7/11/2019 was 1.07.\par \par 07/28/2020: Patient presents today for a follow up. Reports pain in the rectum and pain with fullness of the bladder. Pt had a US at [place]. Was given suppository which helped. Noxturia x4-5. On Tamsulosin BID. No burning. Frequency at night. Pt must push and staring. He experiences difficulty with his flow and voiding in general. Wife reports fowl smelling urine. \par \par 8/25/2020: Patient presents today for follow up on nocturia. Patient states he has not yet taken Desmopressin as he does not want to take too many medications. Still experiencing pain in rectum and with fullness of bladder. Was given Valium which helped. Last lab work on 7/28/2020 was within normal limits. Osmolality was normal at 294. Patient to start Desmopressin 0.1mg once before bedtime. \par \par 09/15/2020: Patient presents today for follow up. Pt was having pain in rectum and finished 5 days of Cipro which helped resolve pain, but causes constipation. Takes Colace for constipation. Urinalysis on 8/25/20 was normal. Still has pain in rectum at night, and uses Metronidazole gel to relieve it. Wakes up every 2 hours at night to urinate. Has some urgency. \par \par 11/17/2020: The patient presents today for a follow up. He is not taking desmopressin anymore. His last blood work on 11/6/2020 showed his creatinine was good at 1.0. He has no burning. He has some pain in the rectum and when his bladder is full. He is still taking finasteride 5 mg and trimethoprim 100 mg. At night he gets a lot of pain when his bladder is full and has urinary urgency about every 2 hours. After he urinates, the pain goes away. \par \par 03/09/2021: 75 yr male  BPH, nocturia , straining .  Chronic prostatitis, chronic cystitis \par \par 05/24/2021: Patient presents today for follow up. Patient went to a rectal surgeon for his rectal pain and had pelvic MRI 4/29/21 which showed no evidence of perianal fistula. States he continues to have pain in rectum after sitting for a long time and then standing. Patient was instructed to take Gabapentin 400mg TID, but skips afternoon dose as it makes him sleepy. Does have constipation. Has not started Tadalafil. No hx of prostate cancer. \par \par 06/15/2021: Patient presents today for a follow up. He reports that last week he had an episode of prostatitis for which he took Cipro for. Currently wakes every 2 hours during the night to urinate. Has tried desipramine and desmopressin in the past which did not improve his symptoms. Daytime urination remains stable. Stream is strong. Pt states he went to the cardiologist today and an EKG was done which was all normal. Currently takes iron supplements. \par \par 09/01/2021: Patient presents today for follow up. Today complains of straining and pushing to urinate. Denies dysuria. Denies infection of penis. Requests renewal of Finasteride and Tamsulosin. Not taking Trospium. Stopped Tadalafil due to side effect. \par \par 09/29/2021: Patient presents today for follow up. 9/1/21 UA showed 12 RBC/HPF and trace blood by dipstick. On repeat 9/9/21, he had only 3 RBC/HPF and small blood by dripstick which normal. Urine cytology 9/1/21 negative for high grade urothelial carcinoma. Provided blood work from 9/17/21 done by PCP which showed UA slightly cloudy, 3-5 RBC/HPF. PSA 0.2. Wakes 4x at night to urinate. States he has rectal pain when bladder is full. \par \par 02/02/2022: Patient presents today for follow up. Reviewed 12/29/21 lab work which showed MCV low at 75.2, WBC 6600, Hb 12.1, platelets [id number], potassium 4.9, creatinine 0.90 upper limits 1.18, BUN high at 25, AST minimally elevated 39 upper limits 34, alk phos normal 60, HbA1c 6.0, UA dipstick positive small blood, moderate leukocyte esterase, few squamous epithelial cells. No gross hematuria. Has pain when he sits and feels better when standing or laying down. States he must push to urinate. No pain on urination. Nocturia x3-4. Takes tamsulosin 0.4mg BID. Reports his EKG is normal. \par \par Continue tamsulosin 0.4mg BID. \par \par I prescribed the patient Tadalafil 5mg once daily for urinary symptoms. I informed the patient that they may experience tingling of the skin, headache, warm flushed feeling, heartburn, backache, and on rare occasion, visual or hearing disturbances. Informed the patient how to use [facility] to  Tadalafil at a [employer] or [employer] for a discounted price. \par \par I will prescribe the patient Cefadroxil 500mg BID x 10 days for UTI symptoms. \par \par The patient produced a urine sample which will be sent for urinalysis, urine cytology, urine culture, acid fast culture. \par \par Blood work today includes PSA and testosterone. \par \par RTO in 2 weeks for follow up. \par \par Preparation, in-person office visit, and coordination of care took: 40 minutes

## 2022-02-02 NOTE — ADDENDUM
[FreeTextEntry1] : I, Lisa Allenin, acted solely as a scribe for Dr. Collins Duorn on this date 02/02/2022.\par \par All medical record entries made by the Scribe were at my, Dr. Collins Duron, direction and personally dictated by me on 02/02/2022. I have reviewed the chart and agree that the record accurately reflects my personal performance of the history, physical exam, assessment and plan.  I have also personally directed, reviewed and agreed with the chart.

## 2022-02-03 LAB
APPEARANCE: CLEAR
BACTERIA: NEGATIVE
BILIRUBIN URINE: NEGATIVE
BLOOD URINE: NEGATIVE
COLOR: NORMAL
GLUCOSE QUALITATIVE U: NEGATIVE
HYALINE CASTS: 1 /LPF
KETONES URINE: NEGATIVE
LEUKOCYTE ESTERASE URINE: ABNORMAL
MICROSCOPIC-UA: NORMAL
NITRITE URINE: NEGATIVE
PH URINE: 6.5
PROTEIN URINE: NEGATIVE
PSA FREE FLD-MCNC: 26 %
PSA FREE SERPL-MCNC: 0.06 NG/ML
PSA SERPL-MCNC: 0.24 NG/ML
RED BLOOD CELLS URINE: 0 /HPF
SPECIFIC GRAVITY URINE: 1.01
SQUAMOUS EPITHELIAL CELLS: 0 /HPF
UROBILINOGEN URINE: NORMAL
WHITE BLOOD CELLS URINE: 4 /HPF

## 2022-02-06 LAB — URINE CYTOLOGY: NORMAL

## 2022-02-12 LAB
BACTERIA UR CULT: ABNORMAL
TESTOST FREE SERPL-MCNC: 3.9 PG/ML
TESTOST SERPL-MCNC: 247 NG/DL

## 2022-02-14 ENCOUNTER — NON-APPOINTMENT (OUTPATIENT)
Age: 77
End: 2022-02-14

## 2022-02-15 ENCOUNTER — NON-APPOINTMENT (OUTPATIENT)
Age: 77
End: 2022-02-15

## 2022-03-14 ENCOUNTER — APPOINTMENT (OUTPATIENT)
Dept: UROLOGY | Facility: CLINIC | Age: 77
End: 2022-03-14
Payer: MEDICARE

## 2022-03-14 PROCEDURE — 99214 OFFICE O/P EST MOD 30 MIN: CPT

## 2022-03-14 RX ORDER — CIPROFLOXACIN HYDROCHLORIDE 500 MG/1
500 TABLET, FILM COATED ORAL
Qty: 20 | Refills: 0 | Status: COMPLETED | COMMUNITY
Start: 2021-06-15 | End: 2022-03-14

## 2022-03-14 RX ORDER — AMOXICILLIN 500 MG/1
500 TABLET, FILM COATED ORAL
Qty: 30 | Refills: 0 | Status: COMPLETED | COMMUNITY
Start: 2022-02-12 | End: 2022-03-14

## 2022-03-14 RX ORDER — LEVOFLOXACIN 500 MG/1
500 TABLET, FILM COATED ORAL
Qty: 7 | Refills: 0 | Status: COMPLETED | COMMUNITY
Start: 2019-12-31 | End: 2022-03-14

## 2022-03-14 NOTE — HISTORY OF PRESENT ILLNESS
[FreeTextEntry1] : Mr Carrizales is a 76 year old man presented for follow up of gross hematuria, urinary frequency and urgency. The patient takes tamsulosin 0.4 mg, one half hour after meal twice daily, finasteride 5 mg once daily, and desipramine 10 mg at bedtime. The patient returned to the office 03/20/17 having gross hematuria with blood clot. Dr Holly Bernardo started the patient on Bactrim DS. He eventually went to the ER 03/21/17 where he had a Spann catheter placed. His creatinine in the ER was 1.01. Cystoscopy 03/23/17 found catheter trauma. Prostate protruded modest distance into the bladder. Bladder has a small capacity. Diffuse oozing of blood from bladder neck and trigone. Scraped up mucosa most likely from introduction of telescope. No bladder stones or tumors. Bladder inflammation from catheter. CT urogram was normal and found no etiology of the gross hematuria.\par 3/1/18: The patient returned and reported he has pain in his rectum area and penis. PSA from 1/24/18 was 0.39 ng/mL. 2/15/18 Creatinine was 1.01 mg/dL, hemoglobin was 14.1 g/dL. He received and abdomen ultrasound which shows a fatty liver, gallstones, and obscured pancreas. He denies any gross hematuria. He noted one day ago his GI physician put banding on his hemorrhoids. \par 4/10/18: The patient returned and reported he finished his antibiotics from last visit and noted he had another hemorrhoid and put another band by his GI. He noted pain still persists. UA from 3/1/18 shows 8 WBC/HPF. CBC from 3/1/18 showed slight anemia. \par 5/23/18: The patient returned and reported he has been taking Bactrim for his most recent prostatitis starting one week ago. PSA from 1/22/18 was 0.39 ng/mL. Currently taking Tamsulosin and Finasteride. \par 8/28/18: The patient returned and reported his urination is satisfactory. Currently taking tamsulosin and finasteride. Wakes up 3-4 times during the night to urinate. Noted he had a recent flare up of dysuria 3 days ago and took 2 days of Cipro and symptom have cleared up. \par 3/7/19: The patient returned and reported that he had another flare up of prostatitis and took 2 days of Cipro for his symptoms to clear. Complained of lingering dysuria. I reminded the patient that with Cipro, there is a side effect of tendonitis. Denied gross hematuria. Currently taking Trimethoprim, Tamsulosin and Finasteride. PSA from 2/15/19 was 0.04 ng/mL.\par 4/4/19: The patient returned and reported that he another flare up of prostatitis. Complained of pain when he stands up. His pain is relieved while sitting down. Occasionally has dysuria. Wakes up twice during the night to urinate. Complained of urinary urgency during the night. Takes Tamsulosin BID, Trimethoprim and Finasteride.\par 5/9/19: The patient returned today for an US. Transrectal US of the prostate findings showed a prostate volume of 23.4 cc, a hypoechoic area at the left apex 1.37 x 1.25 cm in transverse image, unremarkable seminal vesicles, and no rectal masses. The hypoechoic are is suspicious for prostate cancer, but the patient has a low PSA. I recommended that the patient undergo a transperineal biopsy for the hyperechoic region, but due to an anal fissure, the patient would like to wait. Notes that he consulted with a colon surgeon about his anal fissure and is currently taking stool softeners. His urination is adequate. Complains of nocturia. Wakes up 5-6 times during the night to urinate. Complains of pain at the tip of his penis. Takes Tamsulosin BID. UA from 4/11/19 was normal. Culture from 4/11/19 showed no growth. Notes that he took Bactrim and ended up getting a rash on his LEs.\par 6/10/19: Patient presents today for a follow up. Today he reports that he is scheduled for an anal fissure operation for 6/21/19. It has been advised that he will need 4 weeks to heal following the operation. Regarding his urination he reports that during the day there are no urinary issues. Nocturia is reported and he will have to wake up 5-6x a night to urinate. Finasteride and Tamsulosin are taken as directed. \par 7/18/19: Patient presents today for a follow up. 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 Patient with hypoechoic lesion of the prostate seen on recent TRUS. PSA 0.690 on 5/2109. Patient on Flomax /Proscar with both irritative and obstructive LUTS. Needs to strain to initiate urination. Nocturia times 5- 6. Denies dysuria or hematuria. Patient states that he feels that he has prostatitis based on familiar symptoms. Additionally he no longer takes Lisinopril but now is on Metoprolol 10 mg. \par 8/7/19: Patient presents today for a follow up. He states that the Bactrim prescribed at his last visit was not providing any relief. He still c/o discomfort when sitting but denies dysuria. He had Ciprofloxacin at home and had taken two tablets over the past two days. He noticed relief and inquired about having the prescription refilled today. \par 10/10/19: Patient presents today for a follow up. On 9/13/19 a needle biopsy of the prostate was completed under general anesthesia. He states that after the biopsy his chronic prostatitis began to cause discomfort. Ciprofloxacin 500 mg was used as directed and the discomfort is no longer present. Additionally it is reported that his sexual function decreased following the procedure but that it is starting to improve again. The results of the pathology report are to be discussed with him today. \par 12/31/2019: Patient presents today for a follow up. Pt reports pain on standing. He has an inflamed prostate. When the bladder is fill he also experiences pain and the pain goes away after he voids. Pt took Cipro to 7 days, finishing last week. While he took it, he felt better. Pt reports allergy to sulfur drugs and develops rash.\par 2/10/20: Patient presents today for a follow up. He had an MRI of the pelvis 1/23/20 which showed no evidence of rectal fistula could be identified. Pt has inflamed prostate\par Pt reports constipation for which he takes stool softner. He admits to dysuria and hematuria with mild pain at the tip of the penis yesterday. Most recent PSA was 0.24, Testosterone is 390.0. \par 02/27/2020: Patient presents today for a follow up. At the conclusion of the last visit, Doxycycline 100 mg was prescribed. The patient states he noticed no improvement. Today he reports having nighttime pain in the rectum which is alleviated after urination. Wakes up 4-5 times during the night to urinate. He has satisfactory day time urination though he reports associated strain. Denies chest pain. He currently takes a stool softener for constipation. The patient also complains of lower back pain for which he will get an injection shortly. A urine culture from 2/10/2020 showed no growth. His most recent creatinine level from 7/11/2019 was 1.07.\par \par 07/28/2020: Patient presents today for a follow up. Reports pain in the rectum and pain with fullness of the bladder. Pt had a US at [facility]. Was given suppository which helped. Noxturia x4-5. On Tamsulosin BID. No burning. Frequency at night. Pt must push and staring. He experiences difficulty with his flow and voiding in general. Wife reports fowl smelling urine. \par \par 8/25/2020: Patient presents today for follow up on nocturia. Patient states he has not yet taken Desmopressin as he does not want to take too many medications. Still experiencing pain in rectum and with fullness of bladder. Was given Valium which helped. Last lab work on 7/28/2020 was within normal limits. Osmolality was normal at 294. Patient to start Desmopressin 0.1mg once before bedtime. \par \par 09/15/2020: Patient presents today for follow up. Pt was having pain in rectum and finished 5 days of Cipro which helped resolve pain, but causes constipation. Takes Colace for constipation. Urinalysis on 8/25/20 was normal. Still has pain in rectum at night, and uses Metronidazole gel to relieve it. Wakes up every 2 hours at night to urinate. Has some urgency. \par \par 11/17/2020: The patient presents today for a follow up. He is not taking desmopressin anymore. His last blood work on 11/6/2020 showed his creatinine was good at 1.0. He has no burning. He has some pain in the rectum and when his bladder is full. He is still taking finasteride 5 mg and trimethoprim 100 mg. At night he gets a lot of pain when his bladder is full and has urinary urgency about every 2 hours. After he urinates, the pain goes away. \par \par 03/09/2021: 75 yr  history of BPH, prostatitis , overactive bladder with nocturnal frequency.  Rectal area pains, last visit [facility] November 17, 2020 showing rectal mucosa nodule. To follow GI. \par patient said he is taking gabapentin and tamsulosin and finasteride. Pain is worse when bladder is full and less when he voids. \par Nocturnal frequency Q2 hr\par ED since 2019 \par Patient is seeing  for his rectal pain.  \par \par 05/24/2021: Patient presents today for follow up. Patient went to a rectal surgeon for his rectal pain and had pelvic MRI 4/29/21 which showed no evidence of perianal fistula. States he continues to have pain in rectum after sitting for a long time and then standing. Patient was instructed to take Gabapentin 400mg TID, but skips afternoon dose as it makes him sleepy. Does have constipation. Has not started Tadalafil. No hx of prostate cancer. \par \par 06/15/2021: Patient presents today for a follow up. He reports that last week he had an episode of prostatitis for which he took Cipro for. Currently wakes every 2 hours during the night to urinate. Has tried desipramine and desmopressin in the past which did not improve his symptoms. Daytime urination remains stable. Stream is strong. Pt states he went to the cardiologist today and an EKG was done which was all normal. Currently takes iron supplements. \par \par 09/01/2021: Patient presents today for follow up. Today complains of straining and pushing to urinate. Denies dysuria. Denies infection of penis. Requests renewal of Finasteride and Tamsulosin. Not taking Trospium. Stopped Tadalafil due to side effect. \par \par 09/29/2021: Patient presents today for follow up. 9/1/21 UA showed 12 RBC/HPF and trace blood by dipstick. On repeat 9/9/21, he had only 3 RBC/HPF and small blood by dripstick which normal. Urine cytology 9/1/21 negative for high grade urothelial carcinoma. Provided blood work from 9/17/21 done by PCP which showed UA slightly cloudy, 3-5 RBC/HPF. PSA 0.2. Wakes 4x at night to urinate. States he has rectal pain when bladder is full. \par \par 02/02/2022: Patient presents today for follow up. Reviewed 12/29/21 lab work which showed MCV low at 75.2, WBC 6600, Hb 12.1, platelets [id number], potassium 4.9, creatinine 0.90 upper limits 1.18, BUN high at 25, AST minimally elevated 39 upper limits 34, alk phos normal 60, HbA1c 6.0, UA dipstick positive small blood, moderate leukocyte esterase, few squamous epithelial cells. Has pain when he sits and feels better when standing or laying down. States he must push to urinate. No pain on urination. Nocturia x3-4. Takes tamsulosin 0.4mg BID. Reports his EKG is normal. \par \par 03/14/2022: Patient presents today for follow up. Has been doing well, however does have to push urination. Nocturia x4. Erections are poor but not interested in medications. No dysuria. Stream is slow. Continues finasteride 5mg once daily and tamsulosin 0.4mg BID. Does not take tadalafil every day. Does not have any red itchy skin and has not needed to use clotrimazole-betamethasone cream. Reviewed 2/2/22 lab work. Urine culture grew 10,000-40,000 CFU/mL Enterococcus faecalis and patient was treated with amoxicillin 500mg TID and feels better now. PSA 0.24. Free testosterone low 3.9. Lab work of 2/11/22 showed brain natriuretic peptide normal, procalcitonin undetectable <0.10 ng/mL. Will be having evaluation for anorectal pain, with the proposed procedures being anorectal manometry, electromyography, pudendal nerve terminal motor latency, and endorectal ultrasound. Denies bladder pain. Will be having procedure for varicose veins of RLE.

## 2022-03-14 NOTE — ADDENDUM
[FreeTextEntry1] : I, Lisa Allenin, acted solely as a scribe for Dr. Collins Duron on this date 03/14/2022.\par \par All medical record entries made by the Scribe were at my, Dr. Collins Duron, direction and personally dictated by me on 03/14/2022. I have reviewed the chart and agree that the record accurately reflects my personal performance of the history, physical exam, assessment and plan.  I have also personally directed, reviewed and agreed with the chart.

## 2022-03-14 NOTE — ASSESSMENT
[FreeTextEntry1] : Mr Carrizales is a 76 year old man presented for follow up of gross hematuria, urinary frequency and urgency. The patient takes tamsulosin 0.4 mg, one half hour after meal twice daily, finasteride 5 mg once daily, and desipramine 10 mg at bedtime. The patient returned to the office 03/20/17 having gross hematuria with blood clot. Dr Holly Bernardo started the patient on Bactrim DS. He eventually went to the ER 03/21/17 where he had a Spann catheter placed. His creatinine in the ER was 1.01. Cystoscopy 03/23/17 found catheter trauma. Prostate protruded modest distance into the bladder. Bladder has a small capacity. Diffuse oozing of blood from bladder neck and trigone. Scraped up mucosa most likely from introduction of telescope. No bladder stones or tumors. Bladder inflammation from catheter. CT urogram was normal and found no etiology of the gross hematuria.\par 3/1/18: The patient returned and reported he has pain in his rectum area and penis. PSA from 1/24/18 was 0.39 ng/mL. 2/15/18 Creatinine was 1.01 mg/dL, hemoglobin was 14.1 g/dL. He received and abdomen ultrasound which shows a fatty liver, gallstones, and obscured pancreas. He denies any gross hematuria. He noted one day ago his GI physician put banding on his hemorrhoids. \par 4/10/18: The patient returned and reported he finished his antibiotics from last visit and noted he had another hemorrhoid and put another band by his GI. He noted pain still persists. UA from 3/1/18 shows 8 WBC/HPF. CBC from 3/1/18 showed slight anemia. \par 5/23/18: The patient returned and reported he has been taking Bactrim for his most recent prostatitis starting one week ago. PSA from 1/22/18 was 0.39 ng/mL. Currently taking Tamsulosin and Finasteride. \par 8/28/18: The patient returned and reported his urination is satisfactory. Currently taking tamsulosin and finasteride. Wakes up 3-4 times during the night to urinate. Noted he had a recent flare up of dysuria 3 days ago and took 2 days of Cipro and symptom have cleared up. \par 3/7/19: The patient returned and reported that he had another flare up of prostatitis and took 2 days of Cipro for his symptoms to clear. Complained of lingering dysuria. I reminded the patient that with Cipro, there is a side effect of tendonitis. Denied gross hematuria. Currently taking Trimethoprim, Tamsulosin and Finasteride. PSA from 2/15/19 was 0.04 ng/mL.\par 4/4/19: The patient returned and reported that he another flare up of prostatitis. Complained of pain when he stands up. His pain is relieved while sitting down. Occasionally has dysuria. Wakes up twice during the night to urinate. Complained of urinary urgency during the night. Takes Tamsulosin BID, Trimethoprim and Finasteride.\par 5/9/19: The patient returned today for an US. Transrectal US of the prostate findings showed a prostate volume of 23.4 cc, a hypoechoic area at the left apex 1.37 x 1.25 cm in transverse image, unremarkable seminal vesicles, and no rectal masses. The hypoechoic are is suspicious for prostate cancer, but the patient has a low PSA. I recommended that the patient undergo a transperineal biopsy for the hyperechoic region, but due to an anal fissure, the patient would like to wait. Notes that he consulted with a colon surgeon about his anal fissure and is currently taking stool softeners. His urination is adequate. Complains of nocturia. Wakes up 5-6 times during the night to urinate. Complains of pain at the tip of his penis. Takes Tamsulosin BID. UA from 4/11/19 was normal. Culture from 4/11/19 showed no growth. Notes that he took Bactrim and ended up getting a rash on his LEs.\par 6/10/19: Patient presents today for a follow up. Today he reports that he is scheduled for an anal fissure operation for 6/21/19. It has been advised that he will need 4 weeks to heal following the operation. Regarding his urination he reports that during the day there are no urinary issues. Nocturia is reported and he will have to wake up 5-6x a night to urinate. Finasteride and Tamsulosin are taken as directed. \par 7/18/19: Patient presents today for a follow up. 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 Patient with hypoechoic lesion of the prostate seen on recent TRUS. PSA 0.690 on 5/2109. Patient on Flomax /Proscar with both irritative and obstructive LUTS. Needs to strain to initiate urination. Nocturia times 5- 6. Denies dysuria or hematuria. Patient states that he feels that he has prostatitis based on familiar symptoms. Additionally he no longer takes Lisinopril but now is on Metoprolol 10 mg. \par 8/7/19: Patient presents today for a follow up. He states that the Bactrim prescribed at his last visit was not providing any relief. He still c/o discomfort when sitting but denies dysuria. He had Ciprofloxacin at home and had taken two tablets over the past two days. He noticed relief and inquired about having the prescription refilled today. \par 10/10/19: Patient presents today for a follow up. On 9/13/19 a needle biopsy of the prostate was completed under general anesthesia. He states that after the biopsy his chronic prostatitis began to cause discomfort. Ciprofloxacin 500 mg was used as directed and the discomfort is no longer present. Additionally it is reported that his sexual function decreased following the procedure but that it is starting to improve again. The results of the pathology report are to be discussed with him today. \par 12/31/2019: Patient presents today for a follow up. Pt reports pain on standing. He has an inflamed prostate. When the bladder is fill he also experiences pain and the pain goes away after he voids. Pt took Cipro to 7 days, finishing last week. While he took it, he felt better. Pt reports allergy to sulfur drugs and develops rash.\par 2/10/20: Patient presents today for a follow up. He had an MRI of the pelvis 1/23/20 which showed no evidence of rectal fistula could be identified. Pt has inflamed prostate\par Pt reports constipation for which he takes stool softner. He admits to dysuria and hematuria with mild pain at the tip of the penis yesterday. Most recent PSA was 0.24, Testosterone is 390.0. \par 02/27/2020: Patient presents today for a follow up. At the conclusion of the last visit, Doxycycline 100 mg was prescribed. The patient states he noticed no improvement. Today he reports having nighttime pain in the rectum which is alleviated after urination. Wakes up 4-5 times during the night to urinate. He has satisfactory day time urination though he reports associated strain. Denies chest pain. He currently takes a stool softener for constipation. The patient also complains of lower back pain for which he will get an injection shortly. A urine culture from 2/10/2020 showed no growth. His most recent creatinine level from 7/11/2019 was 1.07.\par \par 07/28/2020: Patient presents today for a follow up. Reports pain in the rectum and pain with fullness of the bladder. Pt had a US at [facility]. Was given suppository which helped. Noxturia x4-5. On Tamsulosin BID. No burning. Frequency at night. Pt must push and staring. He experiences difficulty with his flow and voiding in general. Wife reports fowl smelling urine. \par \par 8/25/2020: Patient presents today for follow up on nocturia. Patient states he has not yet taken Desmopressin as he does not want to take too many medications. Still experiencing pain in rectum and with fullness of bladder. Was given Valium which helped. Last lab work on 7/28/2020 was within normal limits. Osmolality was normal at 294. Patient to start Desmopressin 0.1mg once before bedtime. \par \par 09/15/2020: Patient presents today for follow up. Pt was having pain in rectum and finished 5 days of Cipro which helped resolve pain, but causes constipation. Takes Colace for constipation. Urinalysis on 8/25/20 was normal. Still has pain in rectum at night, and uses Metronidazole gel to relieve it. Wakes up every 2 hours at night to urinate. Has some urgency. \par \par 11/17/2020: The patient presents today for a follow up. He is not taking desmopressin anymore. His last blood work on 11/6/2020 showed his creatinine was good at 1.0. He has no burning. He has some pain in the rectum and when his bladder is full. He is still taking finasteride 5 mg and trimethoprim 100 mg. At night he gets a lot of pain when his bladder is full and has urinary urgency about every 2 hours. After he urinates, the pain goes away. \par \par 03/09/2021: 75 yr male  BPH, nocturia , straining .  Chronic prostatitis, chronic cystitis \par \par 05/24/2021: Patient presents today for follow up. Patient went to a rectal surgeon for his rectal pain and had pelvic MRI 4/29/21 which showed no evidence of perianal fistula. States he continues to have pain in rectum after sitting for a long time and then standing. Patient was instructed to take Gabapentin 400mg TID, but skips afternoon dose as it makes him sleepy. Does have constipation. Has not started Tadalafil. No hx of prostate cancer. \par \par 06/15/2021: Patient presents today for a follow up. He reports that last week he had an episode of prostatitis for which he took Cipro for. Currently wakes every 2 hours during the night to urinate. Has tried desipramine and desmopressin in the past which did not improve his symptoms. Daytime urination remains stable. Stream is strong. Pt states he went to the cardiologist today and an EKG was done which was all normal. Currently takes iron supplements. \par \par 09/01/2021: Patient presents today for follow up. Today complains of straining and pushing to urinate. Denies dysuria. Denies infection of penis. Requests renewal of Finasteride and Tamsulosin. Not taking Trospium. Stopped Tadalafil due to side effect. \par \par 09/29/2021: Patient presents today for follow up. 9/1/21 UA showed 12 RBC/HPF and trace blood by dipstick. On repeat 9/9/21, he had only 3 RBC/HPF and small blood by dripstick which normal. Urine cytology 9/1/21 negative for high grade urothelial carcinoma. Provided blood work from 9/17/21 done by PCP which showed UA slightly cloudy, 3-5 RBC/HPF. PSA 0.2. Wakes 4x at night to urinate. States he has rectal pain when bladder is full. \par \par 02/02/2022: Patient presents today for follow up. Reviewed 12/29/21 lab work which showed MCV low at 75.2, WBC 6600, Hb 12.1, platelets 402953, potassium 4.9, creatinine 0.90 upper limits 1.18, BUN high at 25, AST minimally elevated 39 upper limits 34, alk phos normal 60, HbA1c 6.0, UA dipstick positive small blood, moderate leukocyte esterase, few squamous epithelial cells. No gross hematuria. Has pain when he sits and feels better when standing or laying down. States he must push to urinate. No pain on urination. Nocturia x3-4. Takes tamsulosin 0.4mg BID. Reports his EKG is normal. \par \par 03/14/2022: Patient presents today for follow up. Has been doing well, however does have to push urination. Nocturia x4. Erections are poor but not interested in medications. No dysuria. Stream is slow. Continues finasteride 5mg once daily and tamsulosin 0.4mg BID. Does not take tadalafil every day. Does not have any red itchy skin and has not needed to use clotrimazole-betamethasone cream. Reviewed 2/2/22 lab work. Urine culture grew 10,000-40,000 CFU/mL Enterococcus faecalis and patient was treated with amoxicillin 500mg TID and feels better now. PSA 0.24. Free testosterone low 3.9. Lab work of 2/11/22 showed brain natriuretic peptide normal, procalcitonin undetectable <0.10 ng/mL. Will be having evaluation for anorectal pain, with the proposed procedures being anorectal manometry, electromyography, pudendal nerve terminal motor latency, and endorectal ultrasound. Denies bladder pain. Will be having procedure for varicose veins of RLE. \par \par Patient is having anal pain and will undergo anorectal testing and after that we will do ZENA if tenderness is decreased. \par \par Continue finasteride 5mg once daily and tamsulosin 0.4mg BID. \par \par Instructed patient to take tadalafil 5mg once everday to help his urination. \par \par Blood work today includes dihydrotestosterone, estradiol, estrogen, LH, prolactin, SHBG, testosterone, PSA. \par \par RTO in 2 weeks for cystoscopy after his anorectal testing. \par \par Preparation, in-person office visit, and coordination of care took: 30 minutes

## 2022-03-16 LAB
APPEARANCE: CLEAR
BACTERIA UR CULT: NORMAL
BACTERIA: NEGATIVE
BILIRUBIN URINE: NEGATIVE
BLOOD URINE: NEGATIVE
COLOR: YELLOW
ESTRADIOL SERPL-MCNC: 32 PG/ML
GLUCOSE QUALITATIVE U: NEGATIVE
HYALINE CASTS: 0 /LPF
KETONES URINE: NEGATIVE
LEUKOCYTE ESTERASE URINE: NEGATIVE
LH SERPL-ACNC: 19 IU/L
MICROSCOPIC-UA: NORMAL
NITRITE URINE: NEGATIVE
PH URINE: 7
PROLACTIN SERPL-MCNC: 11.1 NG/ML
PROTEIN URINE: NEGATIVE
PSA FREE FLD-MCNC: 31 %
PSA FREE SERPL-MCNC: 0.06 NG/ML
PSA SERPL-MCNC: 0.2 NG/ML
RED BLOOD CELLS URINE: 1 /HPF
SPECIFIC GRAVITY URINE: 1.01
SQUAMOUS EPITHELIAL CELLS: 0 /HPF
TESTOST FREE SERPL-MCNC: 4 PG/ML
TESTOST SERPL-MCNC: 259 NG/DL
URINE CYTOLOGY: NORMAL
UROBILINOGEN URINE: NORMAL
WHITE BLOOD CELLS URINE: 5 /HPF

## 2022-03-19 LAB
ESTROGEN SERPL-MCNC: 90 PG/ML
SHBG SERPL-SCNC: 26.8 NMOL/L

## 2022-03-30 ENCOUNTER — APPOINTMENT (OUTPATIENT)
Dept: UROLOGY | Facility: CLINIC | Age: 77
End: 2022-03-30
Payer: MEDICARE

## 2022-03-30 PROCEDURE — 99214 OFFICE O/P EST MOD 30 MIN: CPT

## 2022-04-01 NOTE — ASSESSMENT
[FreeTextEntry1] : Mr Carrizales is a 76 year old man presented for follow up of gross hematuria, urinary frequency and urgency. The patient takes tamsulosin 0.4 mg, one half hour after meal twice daily, finasteride 5 mg once daily, and desipramine 10 mg at bedtime. The patient returned to the office 03/20/17 having gross hematuria with blood clot. Dr Holly Bernardo started the patient on Bactrim DS. He eventually went to the ER 03/21/17 where he had a Spann catheter placed. His creatinine in the ER was 1.01. Cystoscopy 03/23/17 found catheter trauma. Prostate protruded modest distance into the bladder. Bladder has a small capacity. Diffuse oozing of blood from bladder neck and trigone. Scraped up mucosa most likely from introduction of telescope. No bladder stones or tumors. Bladder inflammation from catheter. CT urogram was normal and found no etiology of the gross hematuria.\par 3/1/18: The patient returned and reported he has pain in his rectum area and penis. PSA from 1/24/18 was 0.39 ng/mL. 2/15/18 Creatinine was 1.01 mg/dL, hemoglobin was 14.1 g/dL. He received and abdomen ultrasound which shows a fatty liver, gallstones, and obscured pancreas. He denies any gross hematuria. He noted one day ago his GI physician put banding on his hemorrhoids. \par 4/10/18: The patient returned and reported he finished his antibiotics from last visit and noted he had another hemorrhoid and put another band by his GI. He noted pain still persists. UA from 3/1/18 shows 8 WBC/HPF. CBC from 3/1/18 showed slight anemia. \par 5/23/18: The patient returned and reported he has been taking Bactrim for his most recent prostatitis starting one week ago. PSA from 1/22/18 was 0.39 ng/mL. Currently taking Tamsulosin and Finasteride. \par 8/28/18: The patient returned and reported his urination is satisfactory. Currently taking tamsulosin and finasteride. Wakes up 3-4 times during the night to urinate. Noted he had a recent flare up of dysuria 3 days ago and took 2 days of Cipro and symptom have cleared up. \par 3/7/19: The patient returned and reported that he had another flare up of prostatitis and took 2 days of Cipro for his symptoms to clear. Complained of lingering dysuria. I reminded the patient that with Cipro, there is a side effect of tendonitis. Denied gross hematuria. Currently taking Trimethoprim, Tamsulosin and Finasteride. PSA from 2/15/19 was 0.04 ng/mL.\par 4/4/19: The patient returned and reported that he another flare up of prostatitis. Complained of pain when he stands up. His pain is relieved while sitting down. Occasionally has dysuria. Wakes up twice during the night to urinate. Complained of urinary urgency during the night. Takes Tamsulosin BID, Trimethoprim and Finasteride.\par 5/9/19: The patient returned today for an US. Transrectal US of the prostate findings showed a prostate volume of 23.4 cc, a hypoechoic area at the left apex 1.37 x 1.25 cm in transverse image, unremarkable seminal vesicles, and no rectal masses. The hypoechoic are is suspicious for prostate cancer, but the patient has a low PSA. I recommended that the patient undergo a transperineal biopsy for the hyperechoic region, but due to an anal fissure, the patient would like to wait. Notes that he consulted with a colon surgeon about his anal fissure and is currently taking stool softeners. His urination is adequate. Complains of nocturia. Wakes up 5-6 times during the night to urinate. Complains of pain at the tip of his penis. Takes Tamsulosin BID. UA from 4/11/19 was normal. Culture from 4/11/19 showed no growth. Notes that he took Bactrim and ended up getting a rash on his LEs.\par 6/10/19: Patient presents today for a follow up. Today he reports that he is scheduled for an anal fissure operation for 6/21/19. It has been advised that he will need 4 weeks to heal following the operation. Regarding his urination he reports that during the day there are no urinary issues. Nocturia is reported and he will have to wake up 5-6x a night to urinate. Finasteride and Tamsulosin are taken as directed. \par 7/18/19: Patient presents today for a follow up. 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 Patient with hypoechoic lesion of the prostate seen on recent TRUS. PSA 0.690 on 5/2109. Patient on Flomax /Proscar with both irritative and obstructive LUTS. Needs to strain to initiate urination. Nocturia times 5- 6. Denies dysuria or hematuria. Patient states that he feels that he has prostatitis based on familiar symptoms. Additionally he no longer takes Lisinopril but now is on Metoprolol 10 mg. \par 8/7/19: Patient presents today for a follow up. He states that the Bactrim prescribed at his last visit was not providing any relief. He still c/o discomfort when sitting but denies dysuria. He had Ciprofloxacin at home and had taken two tablets over the past two days. He noticed relief and inquired about having the prescription refilled today. \par 10/10/19: Patient presents today for a follow up. On 9/13/19 a needle biopsy of the prostate was completed under general anesthesia. He states that after the biopsy his chronic prostatitis began to cause discomfort. Ciprofloxacin 500 mg was used as directed and the discomfort is no longer present. Additionally it is reported that his sexual function decreased following the procedure but that it is starting to improve again. The results of the pathology report are to be discussed with him today. \par 12/31/2019: Patient presents today for a follow up. Pt reports pain on standing. He has an inflamed prostate. When the bladder is fill he also experiences pain and the pain goes away after he voids. Pt took Cipro to 7 days, finishing last week. While he took it, he felt better. Pt reports allergy to sulfur drugs and develops rash.\par 2/10/20: Patient presents today for a follow up. He had an MRI of the pelvis 1/23/20 which showed no evidence of rectal fistula could be identified. Pt has inflamed prostate\par Pt reports constipation for which he takes stool softner. He admits to dysuria and hematuria with mild pain at the tip of the penis yesterday. Most recent PSA was 0.24, Testosterone is 390.0. \par 02/27/2020: Patient presents today for a follow up. At the conclusion of the last visit, Doxycycline 100 mg was prescribed. The patient states he noticed no improvement. Today he reports having nighttime pain in the rectum which is alleviated after urination. Wakes up 4-5 times during the night to urinate. He has satisfactory day time urination though he reports associated strain. Denies chest pain. He currently takes a stool softener for constipation. The patient also complains of lower back pain for which he will get an injection shortly. A urine culture from 2/10/2020 showed no growth. His most recent creatinine level from 7/11/2019 was 1.07.\par \par 07/28/2020: Patient presents today for a follow up. Reports pain in the rectum and pain with fullness of the bladder. Pt had a US at [place]. Was given suppository which helped. Noxturia x4-5. On Tamsulosin BID. No burning. Frequency at night. Pt must push and staring. He experiences difficulty with his flow and voiding in general. Wife reports fowl smelling urine. \par \par 8/25/2020: Patient presents today for follow up on nocturia. Patient states he has not yet taken Desmopressin as he does not want to take too many medications. Still experiencing pain in rectum and with fullness of bladder. Was given Valium which helped. Last lab work on 7/28/2020 was within normal limits. Osmolality was normal at 294. Patient to start Desmopressin 0.1mg once before bedtime. \par \par 09/15/2020: Patient presents today for follow up. Pt was having pain in rectum and finished 5 days of Cipro which helped resolve pain, but causes constipation. Takes Colace for constipation. Urinalysis on 8/25/20 was normal. Still has pain in rectum at night, and uses Metronidazole gel to relieve it. Wakes up every 2 hours at night to urinate. Has some urgency. \par \par 11/17/2020: The patient presents today for a follow up. He is not taking desmopressin anymore. His last blood work on 11/6/2020 showed his creatinine was good at 1.0. He has no burning. He has some pain in the rectum and when his bladder is full. He is still taking finasteride 5 mg and trimethoprim 100 mg. At night he gets a lot of pain when his bladder is full and has urinary urgency about every 2 hours. After he urinates, the pain goes away. \par \par 03/09/2021: 75 yr male  BPH, nocturia , straining .  Chronic prostatitis, chronic cystitis \par \par 05/24/2021: Patient presents today for follow up. Patient went to a rectal surgeon for his rectal pain and had pelvic MRI 4/29/21 which showed no evidence of perianal fistula. States he continues to have pain in rectum after sitting for a long time and then standing. Patient was instructed to take Gabapentin 400mg TID, but skips afternoon dose as it makes him sleepy. Does have constipation. Has not started Tadalafil. No hx of prostate cancer. \par \par 06/15/2021: Patient presents today for a follow up. He reports that last week he had an episode of prostatitis for which he took Cipro for. Currently wakes every 2 hours during the night to urinate. Has tried desipramine and desmopressin in the past which did not improve his symptoms. Daytime urination remains stable. Stream is strong. Pt states he went to the cardiologist today and an EKG was done which was all normal. Currently takes iron supplements. \par \par 09/01/2021: Patient presents today for follow up. Today complains of straining and pushing to urinate. Denies dysuria. Denies infection of penis. Requests renewal of Finasteride and Tamsulosin. Not taking Trospium. Stopped Tadalafil due to side effect. \par \par 09/29/2021: Patient presents today for follow up. 9/1/21 UA showed 12 RBC/HPF and trace blood by dipstick. On repeat 9/9/21, he had only 3 RBC/HPF and small blood by dripstick which normal. Urine cytology 9/1/21 negative for high grade urothelial carcinoma. Provided blood work from 9/17/21 done by PCP which showed UA slightly cloudy, 3-5 RBC/HPF. PSA 0.2. Wakes 4x at night to urinate. States he has rectal pain when bladder is full. \par \par 02/02/2022: Patient presents today for follow up. Reviewed 12/29/21 lab work which showed MCV low at 75.2, WBC 6600, Hb 12.1, platelets [id number], potassium 4.9, creatinine 0.90 upper limits 1.18, BUN high at 25, AST minimally elevated 39 upper limits 34, alk phos normal 60, HbA1c 6.0, UA dipstick positive small blood, moderate leukocyte esterase, few squamous epithelial cells. No gross hematuria. Has pain when he sits and feels better when standing or laying down. States he must push to urinate. No pain on urination. Nocturia x3-4. Takes tamsulosin 0.4mg BID. Reports his EKG is normal. \par \par 03/14/2022: Patient presents today for follow up. Has been doing well, however does have to push urination. Nocturia x4. Erections are poor but not interested in medications. No dysuria. Stream is slow. Continues finasteride 5mg once daily and tamsulosin 0.4mg BID. Does not take tadalafil every day. Does not have any red itchy skin and has not needed to use clotrimazole-betamethasone cream. Reviewed 2/2/22 lab work. Urine culture grew 10,000-40,000 CFU/mL Enterococcus faecalis and patient was treated with amoxicillin 500mg TID and feels better now. PSA 0.24. Free testosterone low 3.9. Lab work of 2/11/22 showed brain natriuretic peptide normal, procalcitonin undetectable <0.10 ng/mL. Will be having evaluation for anorectal pain, with the proposed procedures being anorectal manometry, electromyography, pudendal nerve terminal motor latency, and endorectal ultrasound. Denies bladder pain. Will be having procedure for varicose veins of RLE. \par \par 03/30/2022: Patient presents today for follow up. Urination is good. Continues finasteride 5mg once daily and tamsulosin 0.4mg BID. Did not help with urination. Denies dysuria. Only has urinary urgency at night. Notes he eats spicy foods. No pain where he sits. Energy levels are good. Has tried Cialis in the past which did not help much. Erections had been poor since the biopsy. Reviewed 3/13/22 lab work which was WNL except free testosterone low 4.0, LH elevated 19, dihydrotestosterone low 4.1. Total testosterone normal 250. PSA was 0.20. UA normal. Urine cytology showed clusters of atypical urothelial cells with high nuclear cytoplasmic ratio, nuclear hyperchromasia and irregular nuclear contours in a background of moderate acute inflammation. States he went to rectal surgeon for anorectal pain, and had US which was reportedly normal and found muscles are weak, and was advised muscle dysfunction physical therapy which he is currently attending. \par \par Reviewed 3/13/22 lab work with patient. Urine cytology showed clusters of atypical urothelial cells with high nuclear cytoplasmic ratio, nuclear hyperchromasia and irregular nuclear contours in a background of moderate  acute inflammation, and which we will continue to watch. If cells persist, we will consider imaging test of kidneys, and cystoscopy. \par \par Recommend avoid spicy foods as it will cause irritation of both bladder and rectum, but is not harmful. \par \par Continue finasteride 5mg once daily and tamsulosin 0.4mg BID.\par \par I prescribed the patient tadalafil 5mg once daily for BPH symptoms and ED. I informed the patient that they may experience tingling of the skin, headache, warm flushed feeling, heartburn, backache, and on rare occasion, visual or hearing disturbances. Informed the patient how to use [employer] to  tadalafil at a [employer] or [employer] for a discounted price. I explained it had been previously used as heart medication and that it is safe to take as long as it is not taken with nitroglycerin which would cause BP to drop too low. \par \par The patient produced a urine sample which will be sent for urinalysis, urine cytology, and urine culture. \par \par RTO in 3 months for follow up or sooner if new urinary symptoms develop. \par \par Preparation, in-person office visit, and coordination of care took: 30 minutes

## 2022-04-01 NOTE — HISTORY OF PRESENT ILLNESS
[FreeTextEntry1] : Mr Carrizales is a 76 year old man presented for follow up of gross hematuria, urinary frequency and urgency. The patient takes tamsulosin 0.4 mg, one half hour after meal twice daily, finasteride 5 mg once daily, and desipramine 10 mg at bedtime. The patient returned to the office 03/20/17 having gross hematuria with blood clot. Dr Holly Bernardo started the patient on Bactrim DS. He eventually went to the ER 03/21/17 where he had a Spann catheter placed. His creatinine in the ER was 1.01. Cystoscopy 03/23/17 found catheter trauma. Prostate protruded modest distance into the bladder. Bladder has a small capacity. Diffuse oozing of blood from bladder neck and trigone. Scraped up mucosa most likely from introduction of telescope. No bladder stones or tumors. Bladder inflammation from catheter. CT urogram was normal and found no etiology of the gross hematuria.\par 3/1/18: The patient returned and reported he has pain in his rectum area and penis. PSA from 1/24/18 was 0.39 ng/mL. 2/15/18 Creatinine was 1.01 mg/dL, hemoglobin was 14.1 g/dL. He received and abdomen ultrasound which shows a fatty liver, gallstones, and obscured pancreas. He denies any gross hematuria. He noted one day ago his GI physician put banding on his hemorrhoids. \par 4/10/18: The patient returned and reported he finished his antibiotics from last visit and noted he had another hemorrhoid and put another band by his GI. He noted pain still persists. UA from 3/1/18 shows 8 WBC/HPF. CBC from 3/1/18 showed slight anemia. \par 5/23/18: The patient returned and reported he has been taking Bactrim for his most recent prostatitis starting one week ago. PSA from 1/22/18 was 0.39 ng/mL. Currently taking Tamsulosin and Finasteride. \par 8/28/18: The patient returned and reported his urination is satisfactory. Currently taking tamsulosin and finasteride. Wakes up 3-4 times during the night to urinate. Noted he had a recent flare up of dysuria 3 days ago and took 2 days of Cipro and symptom have cleared up. \par 3/7/19: The patient returned and reported that he had another flare up of prostatitis and took 2 days of Cipro for his symptoms to clear. Complained of lingering dysuria. I reminded the patient that with Cipro, there is a side effect of tendonitis. Denied gross hematuria. Currently taking Trimethoprim, Tamsulosin and Finasteride. PSA from 2/15/19 was 0.04 ng/mL.\par 4/4/19: The patient returned and reported that he another flare up of prostatitis. Complained of pain when he stands up. His pain is relieved while sitting down. Occasionally has dysuria. Wakes up twice during the night to urinate. Complained of urinary urgency during the night. Takes Tamsulosin BID, Trimethoprim and Finasteride.\par 5/9/19: The patient returned today for an US. Transrectal US of the prostate findings showed a prostate volume of 23.4 cc, a hypoechoic area at the left apex 1.37 x 1.25 cm in transverse image, unremarkable seminal vesicles, and no rectal masses. The hypoechoic are is suspicious for prostate cancer, but the patient has a low PSA. I recommended that the patient undergo a transperineal biopsy for the hyperechoic region, but due to an anal fissure, the patient would like to wait. Notes that he consulted with a colon surgeon about his anal fissure and is currently taking stool softeners. His urination is adequate. Complains of nocturia. Wakes up 5-6 times during the night to urinate. Complains of pain at the tip of his penis. Takes Tamsulosin BID. UA from 4/11/19 was normal. Culture from 4/11/19 showed no growth. Notes that he took Bactrim and ended up getting a rash on his LEs.\par 6/10/19: Patient presents today for a follow up. Today he reports that he is scheduled for an anal fissure operation for 6/21/19. It has been advised that he will need 4 weeks to heal following the operation. Regarding his urination he reports that during the day there are no urinary issues. Nocturia is reported and he will have to wake up 5-6x a night to urinate. Finasteride and Tamsulosin are taken as directed. \par 7/18/19: Patient presents today for a follow up. 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 Patient with hypoechoic lesion of the prostate seen on recent TRUS. PSA 0.690 on 5/2109. Patient on Flomax /Proscar with both irritative and obstructive LUTS. Needs to strain to initiate urination. Nocturia times 5- 6. Denies dysuria or hematuria. Patient states that he feels that he has prostatitis based on familiar symptoms. Additionally he no longer takes Lisinopril but now is on Metoprolol 10 mg. \par 8/7/19: Patient presents today for a follow up. He states that the Bactrim prescribed at his last visit was not providing any relief. He still c/o discomfort when sitting but denies dysuria. He had Ciprofloxacin at home and had taken two tablets over the past two days. He noticed relief and inquired about having the prescription refilled today. \par 10/10/19: Patient presents today for a follow up. On 9/13/19 a needle biopsy of the prostate was completed under general anesthesia. He states that after the biopsy his chronic prostatitis began to cause discomfort. Ciprofloxacin 500 mg was used as directed and the discomfort is no longer present. Additionally it is reported that his sexual function decreased following the procedure but that it is starting to improve again. The results of the pathology report are to be discussed with him today. \par 12/31/2019: Patient presents today for a follow up. Pt reports pain on standing. He has an inflamed prostate. When the bladder is fill he also experiences pain and the pain goes away after he voids. Pt took Cipro to 7 days, finishing last week. While he took it, he felt better. Pt reports allergy to sulfur drugs and develops rash.\par 2/10/20: Patient presents today for a follow up. He had an MRI of the pelvis 1/23/20 which showed no evidence of rectal fistula could be identified. Pt has inflamed prostate\par Pt reports constipation for which he takes stool softner. He admits to dysuria and hematuria with mild pain at the tip of the penis yesterday. Most recent PSA was 0.24, Testosterone is 390.0. \par 02/27/2020: Patient presents today for a follow up. At the conclusion of the last visit, Doxycycline 100 mg was prescribed. The patient states he noticed no improvement. Today he reports having nighttime pain in the rectum which is alleviated after urination. Wakes up 4-5 times during the night to urinate. He has satisfactory day time urination though he reports associated strain. Denies chest pain. He currently takes a stool softener for constipation. The patient also complains of lower back pain for which he will get an injection shortly. A urine culture from 2/10/2020 showed no growth. His most recent creatinine level from 7/11/2019 was 1.07.\par \par 07/28/2020: Patient presents today for a follow up. Reports pain in the rectum and pain with fullness of the bladder. Pt had a US at [facility]. Was given suppository which helped. Noxturia x4-5. On Tamsulosin BID. No burning. Frequency at night. Pt must push and staring. He experiences difficulty with his flow and voiding in general. Wife reports fowl smelling urine. \par \par 8/25/2020: Patient presents today for follow up on nocturia. Patient states he has not yet taken Desmopressin as he does not want to take too many medications. Still experiencing pain in rectum and with fullness of bladder. Was given Valium which helped. Last lab work on 7/28/2020 was within normal limits. Osmolality was normal at 294. Patient to start Desmopressin 0.1mg once before bedtime. \par \par 09/15/2020: Patient presents today for follow up. Pt was having pain in rectum and finished 5 days of Cipro which helped resolve pain, but causes constipation. Takes Colace for constipation. Urinalysis on 8/25/20 was normal. Still has pain in rectum at night, and uses Metronidazole gel to relieve it. Wakes up every 2 hours at night to urinate. Has some urgency. \par \par 11/17/2020: The patient presents today for a follow up. He is not taking desmopressin anymore. His last blood work on 11/6/2020 showed his creatinine was good at 1.0. He has no burning. He has some pain in the rectum and when his bladder is full. He is still taking finasteride 5 mg and trimethoprim 100 mg. At night he gets a lot of pain when his bladder is full and has urinary urgency about every 2 hours. After he urinates, the pain goes away. \par \par 03/09/2021: 75 yr  history of BPH, prostatitis , overactive bladder with nocturnal frequency.  Rectal area pains, last visit [facility] November 17, 2020 showing rectal mucosa nodule. To follow GI. \par patient said he is taking gabapentin and tamsulosin and finasteride. Pain is worse when bladder is full and less when he voids. \par Nocturnal frequency Q2 hr\par ED since 2019 \par Patient is seeing  for his rectal pain.  \par \par 05/24/2021: Patient presents today for follow up. Patient went to a rectal surgeon for his rectal pain and had pelvic MRI 4/29/21 which showed no evidence of perianal fistula. States he continues to have pain in rectum after sitting for a long time and then standing. Patient was instructed to take Gabapentin 400mg TID, but skips afternoon dose as it makes him sleepy. Does have constipation. Has not started Tadalafil. No hx of prostate cancer. \par \par 06/15/2021: Patient presents today for a follow up. He reports that last week he had an episode of prostatitis for which he took Cipro for. Currently wakes every 2 hours during the night to urinate. Has tried desipramine and desmopressin in the past which did not improve his symptoms. Daytime urination remains stable. Stream is strong. Pt states he went to the cardiologist today and an EKG was done which was all normal. Currently takes iron supplements. \par \par 09/01/2021: Patient presents today for follow up. Today complains of straining and pushing to urinate. Denies dysuria. Denies infection of penis. Requests renewal of Finasteride and Tamsulosin. Not taking Trospium. Stopped Tadalafil due to side effect. \par \par 09/29/2021: Patient presents today for follow up. 9/1/21 UA showed 12 RBC/HPF and trace blood by dipstick. On repeat 9/9/21, he had only 3 RBC/HPF and small blood by dripstick which normal. Urine cytology 9/1/21 negative for high grade urothelial carcinoma. Provided blood work from 9/17/21 done by PCP which showed UA slightly cloudy, 3-5 RBC/HPF. PSA 0.2. Wakes 4x at night to urinate. States he has rectal pain when bladder is full. \par \par 02/02/2022: Patient presents today for follow up. Reviewed 12/29/21 lab work which showed MCV low at 75.2, WBC 6600, Hb 12.1, platelets 083200, potassium 4.9, creatinine 0.90 upper limits 1.18, BUN high at 25, AST minimally elevated 39 upper limits 34, alk phos normal 60, HbA1c 6.0, UA dipstick positive small blood, moderate leukocyte esterase, few squamous epithelial cells. Has pain when he sits and feels better when standing or laying down. States he must push to urinate. No pain on urination. Nocturia x3-4. Takes tamsulosin 0.4mg BID. Reports his EKG is normal. \par \par 03/14/2022: Patient presents today for follow up. Has been doing well, however does have to push urination. Nocturia x4. Erections are poor but not interested in medications. No dysuria. Stream is slow. Continues finasteride 5mg once daily and tamsulosin 0.4mg BID. Does not take tadalafil every day. Does not have any red itchy skin and has not needed to use clotrimazole-betamethasone cream. Reviewed 2/2/22 lab work. Urine culture grew 10,000-40,000 CFU/mL Enterococcus faecalis and patient was treated with amoxicillin 500mg TID and feels better now. PSA 0.24. Free testosterone low 3.9. Lab work of 2/11/22 showed brain natriuretic peptide normal, procalcitonin undetectable <0.10 ng/mL. Will be having evaluation for anorectal pain, with the proposed procedures being anorectal manometry, electromyography, pudendal nerve terminal motor latency, and endorectal ultrasound. Denies bladder pain. Will be having procedure for varicose veins of RLE. \par \par 03/30/2022: Patient presents today for follow up. Urination is good. Continues finasteride 5mg once daily and tamsulosin 0.4mg BID. Did not help with urination. Denies dysuria. Only has urinary urgency at night. Notes he eats spicy foods. No pain where he sits. Energy levels are good. Has tried Cialis in the past which did not help much. Erections had been poor since the biopsy. Reviewed 3/13/22 lab work which was WNL except free testosterone low 4.0, LH elevated 19, dihydrotestosterone low 4.1. Total testosterone normal 250. PSA was 0.20. UA normal. Urine cytology showed clusters of atypical urothelial cells with high nuclear cytoplasmic ratio, nuclear hyperchromasia and irregular nuclear contours in a background of moderate acute inflammation. States he went to rectal surgeon for anorectal pain, and had US which was reportedly normal and found muscles are weak, and was advised muscle dysfunction physical therapy which he is currently attending.

## 2022-04-01 NOTE — ADDENDUM
[FreeTextEntry1] : I, Lisa Allenin, acted solely as a scribe for Dr. Collins Duron on this date 03/30/2022.\par \par All medical record entries made by the Scribe were at my, Dr. Collins Duron, direction and personally dictated by me on 03/30/2022. I have reviewed the chart and agree that the record accurately reflects my personal performance of the history, physical exam, assessment and plan.  I have also personally directed, reviewed and agreed with the chart.

## 2022-04-02 LAB
ACID FAST STN UR: NORMAL
ANDROSTANOLONE SERPL-MCNC: 4.1 NG/DL

## 2022-04-08 ENCOUNTER — APPOINTMENT (OUTPATIENT)
Dept: UROLOGY | Facility: CLINIC | Age: 77
End: 2022-04-08
Payer: MEDICARE

## 2022-04-08 PROCEDURE — 99443: CPT

## 2022-04-08 NOTE — ADDENDUM
[FreeTextEntry1] : This note was authored by Guerline Gold working as a scribe for Dr.Gary Duron. I, Dr. Collins Duron have reviewed the content of this note and confirm it is true and accurate. I personally performed the history and physical examination and made all the decisions 04/08/2022.

## 2022-04-08 NOTE — HISTORY OF PRESENT ILLNESS
[FreeTextEntry1] : Mr Carrizales is a 76 year old man presented for follow up of gross hematuria, urinary frequency and urgency. The patient takes tamsulosin 0.4 mg, one half hour after meal twice daily, finasteride 5 mg once daily, and desipramine 10 mg at bedtime. The patient returned to the office 03/20/17 having gross hematuria with blood clot. Dr Holly Bernardo started the patient on Bactrim DS. He eventually went to the ER 03/21/17 where he had a Spann catheter placed. His creatinine in the ER was 1.01. Cystoscopy 03/23/17 found catheter trauma. Prostate protruded modest distance into the bladder. Bladder has a small capacity. Diffuse oozing of blood from bladder neck and trigone. Scraped up mucosa most likely from introduction of telescope. No bladder stones or tumors. Bladder inflammation from catheter. CT urogram was normal and found no etiology of the gross hematuria.\par 3/1/18: The patient returned and reported he has pain in his rectum area and penis. PSA from 1/24/18 was 0.39 ng/mL. 2/15/18 Creatinine was 1.01 mg/dL, hemoglobin was 14.1 g/dL. He received and abdomen ultrasound which shows a fatty liver, gallstones, and obscured pancreas. He denies any gross hematuria. He noted one day ago his GI physician put banding on his hemorrhoids. \par 4/10/18: The patient returned and reported he finished his antibiotics from last visit and noted he had another hemorrhoid and put another band by his GI. He noted pain still persists. UA from 3/1/18 shows 8 WBC/HPF. CBC from 3/1/18 showed slight anemia. \par 5/23/18: The patient returned and reported he has been taking Bactrim for his most recent prostatitis starting one week ago. PSA from 1/22/18 was 0.39 ng/mL. Currently taking Tamsulosin and Finasteride. \par 8/28/18: The patient returned and reported his urination is satisfactory. Currently taking tamsulosin and finasteride. Wakes up 3-4 times during the night to urinate. Noted he had a recent flare up of dysuria 3 days ago and took 2 days of Cipro and symptom have cleared up. \par 3/7/19: The patient returned and reported that he had another flare up of prostatitis and took 2 days of Cipro for his symptoms to clear. Complained of lingering dysuria. I reminded the patient that with Cipro, there is a side effect of tendonitis. Denied gross hematuria. Currently taking Trimethoprim, Tamsulosin and Finasteride. PSA from 2/15/19 was 0.04 ng/mL.\par 4/4/19: The patient returned and reported that he another flare up of prostatitis. Complained of pain when he stands up. His pain is relieved while sitting down. Occasionally has dysuria. Wakes up twice during the night to urinate. Complained of urinary urgency during the night. Takes Tamsulosin BID, Trimethoprim and Finasteride.\par 5/9/19: The patient returned today for an US. Transrectal US of the prostate findings showed a prostate volume of 23.4 cc, a hypoechoic area at the left apex 1.37 x 1.25 cm in transverse image, unremarkable seminal vesicles, and no rectal masses. The hypoechoic are is suspicious for prostate cancer, but the patient has a low PSA. I recommended that the patient undergo a transperineal biopsy for the hyperechoic region, but due to an anal fissure, the patient would like to wait. Notes that he consulted with a colon surgeon about his anal fissure and is currently taking stool softeners. His urination is adequate. Complains of nocturia. Wakes up 5-6 times during the night to urinate. Complains of pain at the tip of his penis. Takes Tamsulosin BID. UA from 4/11/19 was normal. Culture from 4/11/19 showed no growth. Notes that he took Bactrim and ended up getting a rash on his LEs.\par 6/10/19: Patient presents today for a follow up. Today he reports that he is scheduled for an anal fissure operation for 6/21/19. It has been advised that he will need 4 weeks to heal following the operation. Regarding his urination he reports that during the day there are no urinary issues. Nocturia is reported and he will have to wake up 5-6x a night to urinate. Finasteride and Tamsulosin are taken as directed. \par 7/18/19: Patient presents today for a follow up. 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 Patient with hypoechoic lesion of the prostate seen on recent TRUS. PSA 0.690 on 5/2109. Patient on Flomax /Proscar with both irritative and obstructive LUTS. Needs to strain to initiate urination. Nocturia times 5- 6. Denies dysuria or hematuria. Patient states that he feels that he has prostatitis based on familiar symptoms. Additionally he no longer takes Lisinopril but now is on Metoprolol 10 mg. \par 8/7/19: Patient presents today for a follow up. He states that the Bactrim prescribed at his last visit was not providing any relief. He still c/o discomfort when sitting but denies dysuria. He had Ciprofloxacin at home and had taken two tablets over the past two days. He noticed relief and inquired about having the prescription refilled today. \par 10/10/19: Patient presents today for a follow up. On 9/13/19 a needle biopsy of the prostate was completed under general anesthesia. He states that after the biopsy his chronic prostatitis began to cause discomfort. Ciprofloxacin 500 mg was used as directed and the discomfort is no longer present. Additionally it is reported that his sexual function decreased following the procedure but that it is starting to improve again. The results of the pathology report are to be discussed with him today. \par 12/31/2019: Patient presents today for a follow up. Pt reports pain on standing. He has an inflamed prostate. When the bladder is fill he also experiences pain and the pain goes away after he voids. Pt took Cipro to 7 days, finishing last week. While he took it, he felt better. Pt reports allergy to sulfur drugs and develops rash.\par 2/10/20: Patient presents today for a follow up. He had an MRI of the pelvis 1/23/20 which showed no evidence of rectal fistula could be identified. Pt has inflamed prostate\par Pt reports constipation for which he takes stool softner. He admits to dysuria and hematuria with mild pain at the tip of the penis yesterday. Most recent PSA was 0.24, Testosterone is 390.0. \par 02/27/2020: Patient presents today for a follow up. At the conclusion of the last visit, Doxycycline 100 mg was prescribed. The patient states he noticed no improvement. Today he reports having nighttime pain in the rectum which is alleviated after urination. Wakes up 4-5 times during the night to urinate. He has satisfactory day time urination though he reports associated strain. Denies chest pain. He currently takes a stool softener for constipation. The patient also complains of lower back pain for which he will get an injection shortly. A urine culture from 2/10/2020 showed no growth. His most recent creatinine level from 7/11/2019 was 1.07.\par \par 07/28/2020: Patient presents today for a follow up. Reports pain in the rectum and pain with fullness of the bladder. Pt had a US at [facility]. Was given suppository which helped. Noxturia x4-5. On Tamsulosin BID. No burning. Frequency at night. Pt must push and staring. He experiences difficulty with his flow and voiding in general. Wife reports fowl smelling urine. \par \par 8/25/2020: Patient presents today for follow up on nocturia. Patient states he has not yet taken Desmopressin as he does not want to take too many medications. Still experiencing pain in rectum and with fullness of bladder. Was given Valium which helped. Last lab work on 7/28/2020 was within normal limits. Osmolality was normal at 294. Patient to start Desmopressin 0.1mg once before bedtime. \par \par 09/15/2020: Patient presents today for follow up. Pt was having pain in rectum and finished 5 days of Cipro which helped resolve pain, but causes constipation. Takes Colace for constipation. Urinalysis on 8/25/20 was normal. Still has pain in rectum at night, and uses Metronidazole gel to relieve it. Wakes up every 2 hours at night to urinate. Has some urgency. \par \par 11/17/2020: The patient presents today for a follow up. He is not taking desmopressin anymore. His last blood work on 11/6/2020 showed his creatinine was good at 1.0. He has no burning. He has some pain in the rectum and when his bladder is full. He is still taking finasteride 5 mg and trimethoprim 100 mg. At night he gets a lot of pain when his bladder is full and has urinary urgency about every 2 hours. After he urinates, the pain goes away. \par \par 03/09/2021: 75 yr  history of BPH, prostatitis , overactive bladder with nocturnal frequency.  Rectal area pains, last visit [facility] November 17, 2020 showing rectal mucosa nodule. To follow GI. \par patient said he is taking gabapentin and tamsulosin and finasteride. Pain is worse when bladder is full and less when he voids. \par Nocturnal frequency Q2 hr\par ED since 2019 \par Patient is seeing  for his rectal pain.  \par \par 05/24/2021: Patient presents today for follow up. Patient went to a rectal surgeon for his rectal pain and had pelvic MRI 4/29/21 which showed no evidence of perianal fistula. States he continues to have pain in rectum after sitting for a long time and then standing. Patient was instructed to take Gabapentin 400mg TID, but skips afternoon dose as it makes him sleepy. Does have constipation. Has not started Tadalafil. No hx of prostate cancer. \par \par 06/15/2021: Patient presents today for a follow up. He reports that last week he had an episode of prostatitis for which he took Cipro for. Currently wakes every 2 hours during the night to urinate. Has tried desipramine and desmopressin in the past which did not improve his symptoms. Daytime urination remains stable. Stream is strong. Pt states he went to the cardiologist today and an EKG was done which was all normal. Currently takes iron supplements. \par \par 09/01/2021: Patient presents today for follow up. Today complains of straining and pushing to urinate. Denies dysuria. Denies infection of penis. Requests renewal of Finasteride and Tamsulosin. Not taking Trospium. Stopped Tadalafil due to side effect. \par \par 09/29/2021: Patient presents today for follow up. 9/1/21 UA showed 12 RBC/HPF and trace blood by dipstick. On repeat 9/9/21, he had only 3 RBC/HPF and small blood by dripstick which normal. Urine cytology 9/1/21 negative for high grade urothelial carcinoma. Provided blood work from 9/17/21 done by PCP which showed UA slightly cloudy, 3-5 RBC/HPF. PSA 0.2. Wakes 4x at night to urinate. States he has rectal pain when bladder is full. \par \par 02/02/2022: Patient presents today for follow up. Reviewed 12/29/21 lab work which showed MCV low at 75.2, WBC 6600, Hb 12.1, platelets 113070, potassium 4.9, creatinine 0.90 upper limits 1.18, BUN high at 25, AST minimally elevated 39 upper limits 34, alk phos normal 60, HbA1c 6.0, UA dipstick positive small blood, moderate leukocyte esterase, few squamous epithelial cells. Has pain when he sits and feels better when standing or laying down. States he must push to urinate. No pain on urination. Nocturia x3-4. Takes tamsulosin 0.4mg BID. Reports his EKG is normal. \par \par 03/14/2022: Patient presents today for follow up. Has been doing well, however does have to push urination. Nocturia x4. Erections are poor but not interested in medications. No dysuria. Stream is slow. Continues finasteride 5mg once daily and tamsulosin 0.4mg BID. Does not take tadalafil every day. Does not have any red itchy skin and has not needed to use clotrimazole-betamethasone cream. Reviewed 2/2/22 lab work. Urine culture grew 10,000-40,000 CFU/mL Enterococcus faecalis and patient was treated with amoxicillin 500mg TID and feels better now. PSA 0.24. Free testosterone low 3.9. Lab work of 2/11/22 showed brain natriuretic peptide normal, procalcitonin undetectable <0.10 ng/mL. Will be having evaluation for anorectal pain, with the proposed procedures being anorectal manometry, electromyography, pudendal nerve terminal motor latency, and endorectal ultrasound. Denies bladder pain. Will be having procedure for varicose veins of RLE. \par \par 03/30/2022: Patient presents today for follow up. Urination is good. Continues finasteride 5mg once daily and tamsulosin 0.4mg BID. Did not help with urination. Denies dysuria. Only has urinary urgency at night. Notes he eats spicy foods. No pain where he sits. Energy levels are good. Has tried Cialis in the past which did not help much. Erections had been poor since the biopsy. Reviewed 3/13/22 lab work which was WNL except free testosterone low 4.0, LH elevated 19, dihydrotestosterone low 4.1. Total testosterone normal 250. PSA was 0.20. UA normal. Urine cytology showed clusters of atypical urothelial cells with high nuclear cytoplasmic ratio, nuclear hyperchromasia and irregular nuclear contours in a background of moderate acute inflammation. States he went to rectal surgeon for anorectal pain, and had US which was reportedly normal and found muscles are weak, and was advised muscle dysfunction physical therapy which he is currently attending. \par \par 04/08/2022: Patient presents today for a follow up telephone visit for which he gave permission for. The visit was mostly conducted with his wife who was present. The patient has been taking finasteride 5 mg once daily and tamsulosin 0.4 mg BID. I prescribed the patient tadalafil 5 mg once daily at the time of the last visit, however he has not taken it. The pt's wife states today that her  told me during his 3/30/2022 visit he had a problems taking tadalafil which included flatulence and severe muscle aches. Both my personal memory and my note from that day do not recall him ever saying that, however there must have been some sort of miscommunication.

## 2022-04-08 NOTE — ASSESSMENT
[FreeTextEntry1] : Mr Carrizales is a 76 year old man presented for follow up of gross hematuria, urinary frequency and urgency. The patient takes tamsulosin 0.4 mg, one half hour after meal twice daily, finasteride 5 mg once daily, and desipramine 10 mg at bedtime. The patient returned to the office 03/20/17 having gross hematuria with blood clot. Dr Holly Bernardo started the patient on Bactrim DS. He eventually went to the ER 03/21/17 where he had a Spann catheter placed. His creatinine in the ER was 1.01. Cystoscopy 03/23/17 found catheter trauma. Prostate protruded modest distance into the bladder. Bladder has a small capacity. Diffuse oozing of blood from bladder neck and trigone. Scraped up mucosa most likely from introduction of telescope. No bladder stones or tumors. Bladder inflammation from catheter. CT urogram was normal and found no etiology of the gross hematuria.\par 3/1/18: The patient returned and reported he has pain in his rectum area and penis. PSA from 1/24/18 was 0.39 ng/mL. 2/15/18 Creatinine was 1.01 mg/dL, hemoglobin was 14.1 g/dL. He received and abdomen ultrasound which shows a fatty liver, gallstones, and obscured pancreas. He denies any gross hematuria. He noted one day ago his GI physician put banding on his hemorrhoids. \par 4/10/18: The patient returned and reported he finished his antibiotics from last visit and noted he had another hemorrhoid and put another band by his GI. He noted pain still persists. UA from 3/1/18 shows 8 WBC/HPF. CBC from 3/1/18 showed slight anemia. \par 5/23/18: The patient returned and reported he has been taking Bactrim for his most recent prostatitis starting one week ago. PSA from 1/22/18 was 0.39 ng/mL. Currently taking Tamsulosin and Finasteride. \par 8/28/18: The patient returned and reported his urination is satisfactory. Currently taking tamsulosin and finasteride. Wakes up 3-4 times during the night to urinate. Noted he had a recent flare up of dysuria 3 days ago and took 2 days of Cipro and symptom have cleared up. \par 3/7/19: The patient returned and reported that he had another flare up of prostatitis and took 2 days of Cipro for his symptoms to clear. Complained of lingering dysuria. I reminded the patient that with Cipro, there is a side effect of tendonitis. Denied gross hematuria. Currently taking Trimethoprim, Tamsulosin and Finasteride. PSA from 2/15/19 was 0.04 ng/mL.\par 4/4/19: The patient returned and reported that he another flare up of prostatitis. Complained of pain when he stands up. His pain is relieved while sitting down. Occasionally has dysuria. Wakes up twice during the night to urinate. Complained of urinary urgency during the night. Takes Tamsulosin BID, Trimethoprim and Finasteride.\par 5/9/19: The patient returned today for an US. Transrectal US of the prostate findings showed a prostate volume of 23.4 cc, a hypoechoic area at the left apex 1.37 x 1.25 cm in transverse image, unremarkable seminal vesicles, and no rectal masses. The hypoechoic are is suspicious for prostate cancer, but the patient has a low PSA. I recommended that the patient undergo a transperineal biopsy for the hyperechoic region, but due to an anal fissure, the patient would like to wait. Notes that he consulted with a colon surgeon about his anal fissure and is currently taking stool softeners. His urination is adequate. Complains of nocturia. Wakes up 5-6 times during the night to urinate. Complains of pain at the tip of his penis. Takes Tamsulosin BID. UA from 4/11/19 was normal. Culture from 4/11/19 showed no growth. Notes that he took Bactrim and ended up getting a rash on his LEs.\par 6/10/19: Patient presents today for a follow up. Today he reports that he is scheduled for an anal fissure operation for 6/21/19. It has been advised that he will need 4 weeks to heal following the operation. Regarding his urination he reports that during the day there are no urinary issues. Nocturia is reported and he will have to wake up 5-6x a night to urinate. Finasteride and Tamsulosin are taken as directed. \par 7/18/19: Patient presents today for a follow up. 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 Patient with hypoechoic lesion of the prostate seen on recent TRUS. PSA 0.690 on 5/2109. Patient on Flomax /Proscar with both irritative and obstructive LUTS. Needs to strain to initiate urination. Nocturia times 5- 6. Denies dysuria or hematuria. Patient states that he feels that he has prostatitis based on familiar symptoms. Additionally he no longer takes Lisinopril but now is on Metoprolol 10 mg. \par 8/7/19: Patient presents today for a follow up. He states that the Bactrim prescribed at his last visit was not providing any relief. He still c/o discomfort when sitting but denies dysuria. He had Ciprofloxacin at home and had taken two tablets over the past two days. He noticed relief and inquired about having the prescription refilled today. \par 10/10/19: Patient presents today for a follow up. On 9/13/19 a needle biopsy of the prostate was completed under general anesthesia. He states that after the biopsy his chronic prostatitis began to cause discomfort. Ciprofloxacin 500 mg was used as directed and the discomfort is no longer present. Additionally it is reported that his sexual function decreased following the procedure but that it is starting to improve again. The results of the pathology report are to be discussed with him today. \par 12/31/2019: Patient presents today for a follow up. Pt reports pain on standing. He has an inflamed prostate. When the bladder is fill he also experiences pain and the pain goes away after he voids. Pt took Cipro to 7 days, finishing last week. While he took it, he felt better. Pt reports allergy to sulfur drugs and develops rash.\par 2/10/20: Patient presents today for a follow up. He had an MRI of the pelvis 1/23/20 which showed no evidence of rectal fistula could be identified. Pt has inflamed prostate\par Pt reports constipation for which he takes stool softner. He admits to dysuria and hematuria with mild pain at the tip of the penis yesterday. Most recent PSA was 0.24, Testosterone is 390.0. \par 02/27/2020: Patient presents today for a follow up. At the conclusion of the last visit, Doxycycline 100 mg was prescribed. The patient states he noticed no improvement. Today he reports having nighttime pain in the rectum which is alleviated after urination. Wakes up 4-5 times during the night to urinate. He has satisfactory day time urination though he reports associated strain. Denies chest pain. He currently takes a stool softener for constipation. The patient also complains of lower back pain for which he will get an injection shortly. A urine culture from 2/10/2020 showed no growth. His most recent creatinine level from 7/11/2019 was 1.07.\par \par 07/28/2020: Patient presents today for a follow up. Reports pain in the rectum and pain with fullness of the bladder. Pt had a US at [place]. Was given suppository which helped. Noxturia x4-5. On Tamsulosin BID. No burning. Frequency at night. Pt must push and staring. He experiences difficulty with his flow and voiding in general. Wife reports fowl smelling urine. \par \par 8/25/2020: Patient presents today for follow up on nocturia. Patient states he has not yet taken Desmopressin as he does not want to take too many medications. Still experiencing pain in rectum and with fullness of bladder. Was given Valium which helped. Last lab work on 7/28/2020 was within normal limits. Osmolality was normal at 294. Patient to start Desmopressin 0.1mg once before bedtime. \par \par 09/15/2020: Patient presents today for follow up. Pt was having pain in rectum and finished 5 days of Cipro which helped resolve pain, but causes constipation. Takes Colace for constipation. Urinalysis on 8/25/20 was normal. Still has pain in rectum at night, and uses Metronidazole gel to relieve it. Wakes up every 2 hours at night to urinate. Has some urgency. \par \par 11/17/2020: The patient presents today for a follow up. He is not taking desmopressin anymore. His last blood work on 11/6/2020 showed his creatinine was good at 1.0. He has no burning. He has some pain in the rectum and when his bladder is full. He is still taking finasteride 5 mg and trimethoprim 100 mg. At night he gets a lot of pain when his bladder is full and has urinary urgency about every 2 hours. After he urinates, the pain goes away. \par \par 03/09/2021: 75 yr male  BPH, nocturia , straining .  Chronic prostatitis, chronic cystitis \par \par 05/24/2021: Patient presents today for follow up. Patient went to a rectal surgeon for his rectal pain and had pelvic MRI 4/29/21 which showed no evidence of perianal fistula. States he continues to have pain in rectum after sitting for a long time and then standing. Patient was instructed to take Gabapentin 400mg TID, but skips afternoon dose as it makes him sleepy. Does have constipation. Has not started Tadalafil. No hx of prostate cancer. \par \par 06/15/2021: Patient presents today for a follow up. He reports that last week he had an episode of prostatitis for which he took Cipro for. Currently wakes every 2 hours during the night to urinate. Has tried desipramine and desmopressin in the past which did not improve his symptoms. Daytime urination remains stable. Stream is strong. Pt states he went to the cardiologist today and an EKG was done which was all normal. Currently takes iron supplements. \par \par 09/01/2021: Patient presents today for follow up. Today complains of straining and pushing to urinate. Denies dysuria. Denies infection of penis. Requests renewal of Finasteride and Tamsulosin. Not taking Trospium. Stopped Tadalafil due to side effect. \par \par 09/29/2021: Patient presents today for follow up. 9/1/21 UA showed 12 RBC/HPF and trace blood by dipstick. On repeat 9/9/21, he had only 3 RBC/HPF and small blood by dripstick which normal. Urine cytology 9/1/21 negative for high grade urothelial carcinoma. Provided blood work from 9/17/21 done by PCP which showed UA slightly cloudy, 3-5 RBC/HPF. PSA 0.2. Wakes 4x at night to urinate. States he has rectal pain when bladder is full. \par \par 02/02/2022: Patient presents today for follow up. Reviewed 12/29/21 lab work which showed MCV low at 75.2, WBC 6600, Hb 12.1, platelets 867084, potassium 4.9, creatinine 0.90 upper limits 1.18, BUN high at 25, AST minimally elevated 39 upper limits 34, alk phos normal 60, HbA1c 6.0, UA dipstick positive small blood, moderate leukocyte esterase, few squamous epithelial cells. No gross hematuria. Has pain when he sits and feels better when standing or laying down. States he must push to urinate. No pain on urination. Nocturia x3-4. Takes tamsulosin 0.4mg BID. Reports his EKG is normal. \par \par 03/14/2022: Patient presents today for follow up. Has been doing well, however does have to push urination. Nocturia x4. Erections are poor but not interested in medications. No dysuria. Stream is slow. Continues finasteride 5mg once daily and tamsulosin 0.4mg BID. Does not take tadalafil every day. Does not have any red itchy skin and has not needed to use clotrimazole-betamethasone cream. Reviewed 2/2/22 lab work. Urine culture grew 10,000-40,000 CFU/mL Enterococcus faecalis and patient was treated with amoxicillin 500mg TID and feels better now. PSA 0.24. Free testosterone low 3.9. Lab work of 2/11/22 showed brain natriuretic peptide normal, procalcitonin undetectable <0.10 ng/mL. Will be having evaluation for anorectal pain, with the proposed procedures being anorectal manometry, electromyography, pudendal nerve terminal motor latency, and endorectal ultrasound. Denies bladder pain. Will be having procedure for varicose veins of RLE. \par \par 03/30/2022: Patient presents today for follow up. Urination is good. Continues finasteride 5mg once daily and tamsulosin 0.4mg BID. Did not help with urination. Denies dysuria. Only has urinary urgency at night. Notes he eats spicy foods. No pain where he sits. Energy levels are good. Has tried Cialis in the past which did not help much. Erections had been poor since the biopsy. Reviewed 3/13/22 lab work which was WNL except free testosterone low 4.0, LH elevated 19, dihydrotestosterone low 4.1. Total testosterone normal 250. PSA was 0.20. UA normal. Urine cytology showed clusters of atypical urothelial cells with high nuclear cytoplasmic ratio, nuclear hyperchromasia and irregular nuclear contours in a background of moderate acute inflammation. States he went to rectal surgeon for anorectal pain, and had US which was reportedly normal and found muscles are weak, and was advised muscle dysfunction physical therapy which he is currently attending. \par \par Reviewed 3/13/22 lab work with patient. Urine cytology showed clusters of atypical urothelial cells with high nuclear cytoplasmic ratio, nuclear hyperchromasia and irregular nuclear contours in a background of moderate  acute inflammation, and which we will continue to watch. If cells persist, we will consider imaging test of kidneys, and cystoscopy. \par Recommend avoid spicy foods as it will cause irritation of both bladder and rectum, but is not harmful. \par Continue finasteride 5mg once daily and tamsulosin 0.4mg BID.\par I prescribed the patient tadalafil 5mg once daily for BPH symptoms and ED. I informed the patient that they may experience tingling of the skin, headache, warm flushed feeling, heartburn, backache, and on rare occasion, visual or hearing disturbances. Informed the patient how to use ODIN to  tadalafil at a [employer] or [employer] for a discounted price. I explained it had been previously used as heart medication and that it is safe to take as long as it is not taken with nitroglycerin which would cause BP to drop too low. \par The patient produced a urine sample which will be sent for urinalysis, urine cytology, and urine culture. \par RTO in 3 months for follow up or sooner if new urinary symptoms develop. \par \par 04/08/2022: Patient presents today for a follow up telephone visit for which he gave permission for. The visit was mostly conducted with his wife who was present. The patient has been taking finasteride 5 mg once daily and tamsulosin 0.4 mg BID. I prescribed the patient tadalafil 5 mg once daily at the time of the last visit, however he has not taken it. The pt's wife states today that her  told me during his 3/30/2022 visit he had a problems taking tadalafil which included flatulence and severe muscle aches. Both my personal memory and my note from that day do not recall him ever saying that, however there must have been some sort of miscommunication. \par \par I am prescribing the pt Silodosin 8 mg once daily with food. I informed him of the possible side effects of lightheadedness, nasal congestion, and decreased amount of semen when ejaculating. \par  \par Patient will discontinue tadalafil as I did not understand he had a problem with that medication. I apologized for this miscommunication. He will continue taking tamsulosin 0.4 mg ONCE DAILY, decreased from BID, as well as finasteride 5 mg once daily. \par \par Patient will have a telehealth visit in 3 weeks for reassessment on silodosin. \par \par Duration of telephone visit: 12 minutes.

## 2022-04-11 ENCOUNTER — NON-APPOINTMENT (OUTPATIENT)
Age: 77
End: 2022-04-11

## 2022-04-12 ENCOUNTER — NON-APPOINTMENT (OUTPATIENT)
Age: 77
End: 2022-04-12

## 2022-04-17 LAB — BACTERIA UR CULT: NORMAL

## 2022-04-20 LAB
APPEARANCE: CLEAR
BACTERIA: NEGATIVE
BILIRUBIN URINE: NEGATIVE
BLOOD URINE: NEGATIVE
COLOR: NORMAL
GLUCOSE QUALITATIVE U: NEGATIVE
HYALINE CASTS: 1 /LPF
KETONES URINE: NEGATIVE
LEUKOCYTE ESTERASE URINE: ABNORMAL
MICROSCOPIC-UA: NORMAL
NITRITE URINE: NEGATIVE
PH URINE: 6.5
PROTEIN URINE: NORMAL
RED BLOOD CELLS URINE: 6 /HPF
SPECIFIC GRAVITY URINE: 1.01
SQUAMOUS EPITHELIAL CELLS: 1 /HPF
URINE CYTOLOGY: NORMAL
UROBILINOGEN URINE: NORMAL
WHITE BLOOD CELLS URINE: 10 /HPF

## 2022-04-26 ENCOUNTER — NON-APPOINTMENT (OUTPATIENT)
Age: 77
End: 2022-04-26

## 2022-04-28 ENCOUNTER — APPOINTMENT (OUTPATIENT)
Dept: UROLOGY | Facility: CLINIC | Age: 77
End: 2022-04-28
Payer: MEDICARE

## 2022-04-28 LAB
ALP BLD-CCNC: 67 U/L
ANION GAP SERPL CALC-SCNC: 13 MMOL/L
APPEARANCE: CLEAR
BACTERIA: NEGATIVE
BILIRUBIN URINE: NEGATIVE
BLOOD URINE: ABNORMAL
BUN SERPL-MCNC: 22 MG/DL
CALCIUM SERPL-MCNC: 9.7 MG/DL
CHLORIDE SERPL-SCNC: 101 MMOL/L
CO2 SERPL-SCNC: 27 MMOL/L
COLOR: NORMAL
CREAT SERPL-MCNC: 1.04 MG/DL
EGFR: 74 ML/MIN/1.73M2
GLUCOSE QUALITATIVE U: NEGATIVE
GLUCOSE SERPL-MCNC: 114 MG/DL
HYALINE CASTS: 0 /LPF
KETONES URINE: NEGATIVE
LEUKOCYTE ESTERASE URINE: ABNORMAL
MICROSCOPIC-UA: NORMAL
NITRITE URINE: NEGATIVE
PH URINE: 7
POTASSIUM SERPL-SCNC: 4.1 MMOL/L
PROTEIN URINE: NEGATIVE
PSA FREE FLD-MCNC: 27 %
PSA FREE SERPL-MCNC: 0.06 NG/ML
PSA SERPL-MCNC: 0.21 NG/ML
RED BLOOD CELLS URINE: 10 /HPF
SODIUM SERPL-SCNC: 141 MMOL/L
SPECIFIC GRAVITY URINE: 1.01
SQUAMOUS EPITHELIAL CELLS: 0 /HPF
UROBILINOGEN URINE: NORMAL
WHITE BLOOD CELLS URINE: 17 /HPF

## 2022-04-28 PROCEDURE — 99215 OFFICE O/P EST HI 40 MIN: CPT

## 2022-04-28 RX ORDER — TADALAFIL 5 MG/1
5 TABLET ORAL
Qty: 30 | Refills: 11 | Status: COMPLETED | COMMUNITY
Start: 2021-05-24 | End: 2022-04-28

## 2022-04-28 RX ORDER — TADALAFIL 5 MG/1
5 TABLET ORAL
Qty: 30 | Refills: 11 | Status: COMPLETED | COMMUNITY
Start: 2021-03-09 | End: 2022-04-28

## 2022-04-28 RX ORDER — TADALAFIL 5 MG/1
5 TABLET ORAL
Qty: 30 | Refills: 11 | Status: COMPLETED | COMMUNITY
Start: 2022-02-02 | End: 2022-04-28

## 2022-04-29 LAB — BACTERIA UR CULT: NORMAL

## 2022-04-30 LAB
TESTOST FREE SERPL-MCNC: 2.6 PG/ML
TESTOST SERPL-MCNC: 238 NG/DL
URINE CYTOLOGY: NORMAL

## 2022-05-01 NOTE — ADDENDUM
[FreeTextEntry1] : I, Renata Gracia, acted solely as a scribe for Dr. Collins Duron on this date 04/28/2022.\par \par All medical record entries made by the Scribe were at my, Dr. Collins Duron, direction and personally dictated by me on 04/28/2022. I have reviewed the chart and agree that the record accurately reflects my personal performance of the history, physical exam, assessment and plan. I have also personally directed, reviewed and agreed with the chart.

## 2022-05-01 NOTE — HISTORY OF PRESENT ILLNESS
[FreeTextEntry1] : Mr Carrizales is a 76 year old man presented for follow up of gross hematuria, urinary frequency and urgency. The patient takes tamsulosin 0.4 mg, one half hour after meal twice daily, finasteride 5 mg once daily, and desipramine 10 mg at bedtime. The patient returned to the office 03/20/17 having gross hematuria with blood clot. Dr Holly Bernardo started the patient on Bactrim DS. He eventually went to the ER 03/21/17 where he had a Spann catheter placed. His creatinine in the ER was 1.01. Cystoscopy 03/23/17 found catheter trauma. Prostate protruded modest distance into the bladder. Bladder has a small capacity. Diffuse oozing of blood from bladder neck and trigone. Scraped up mucosa most likely from introduction of telescope. No bladder stones or tumors. Bladder inflammation from catheter. CT urogram was normal and found no etiology of the gross hematuria.\par 3/1/18: The patient returned and reported he has pain in his rectum area and penis. PSA from 1/24/18 was 0.39 ng/mL. 2/15/18 Creatinine was 1.01 mg/dL, hemoglobin was 14.1 g/dL. He received and abdomen ultrasound which shows a fatty liver, gallstones, and obscured pancreas. He denies any gross hematuria. He noted one day ago his GI physician put banding on his hemorrhoids. \par 4/10/18: The patient returned and reported he finished his antibiotics from last visit and noted he had another hemorrhoid and put another band by his GI. He noted pain still persists. UA from 3/1/18 shows 8 WBC/HPF. CBC from 3/1/18 showed slight anemia. \par 5/23/18: The patient returned and reported he has been taking Bactrim for his most recent prostatitis starting one week ago. PSA from 1/22/18 was 0.39 ng/mL. Currently taking Tamsulosin and Finasteride. \par 8/28/18: The patient returned and reported his urination is satisfactory. Currently taking tamsulosin and finasteride. Wakes up 3-4 times during the night to urinate. Noted he had a recent flare up of dysuria 3 days ago and took 2 days of Cipro and symptom have cleared up. \par 3/7/19: The patient returned and reported that he had another flare up of prostatitis and took 2 days of Cipro for his symptoms to clear. Complained of lingering dysuria. I reminded the patient that with Cipro, there is a side effect of tendonitis. Denied gross hematuria. Currently taking Trimethoprim, Tamsulosin and Finasteride. PSA from 2/15/19 was 0.04 ng/mL.\par 4/4/19: The patient returned and reported that he another flare up of prostatitis. Complained of pain when he stands up. His pain is relieved while sitting down. Occasionally has dysuria. Wakes up twice during the night to urinate. Complained of urinary urgency during the night. Takes Tamsulosin BID, Trimethoprim and Finasteride.\par 5/9/19: The patient returned today for an US. Transrectal US of the prostate findings showed a prostate volume of 23.4 cc, a hypoechoic area at the left apex 1.37 x 1.25 cm in transverse image, unremarkable seminal vesicles, and no rectal masses. The hypoechoic are is suspicious for prostate cancer, but the patient has a low PSA. I recommended that the patient undergo a transperineal biopsy for the hyperechoic region, but due to an anal fissure, the patient would like to wait. Notes that he consulted with a colon surgeon about his anal fissure and is currently taking stool softeners. His urination is adequate. Complains of nocturia. Wakes up 5-6 times during the night to urinate. Complains of pain at the tip of his penis. Takes Tamsulosin BID. UA from 4/11/19 was normal. Culture from 4/11/19 showed no growth. Notes that he took Bactrim and ended up getting a rash on his LEs.\par 6/10/19: Patient presents today for a follow up. Today he reports that he is scheduled for an anal fissure operation for 6/21/19. It has been advised that he will need 4 weeks to heal following the operation. Regarding his urination he reports that during the day there are no urinary issues. Nocturia is reported and he will have to wake up 5-6x a night to urinate. Finasteride and Tamsulosin are taken as directed. \par 7/18/19: Patient presents today for a follow up. 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 Patient with hypoechoic lesion of the prostate seen on recent TRUS. PSA 0.690 on 5/2109. Patient on Flomax /Proscar with both irritative and obstructive LUTS. Needs to strain to initiate urination. Nocturia times 5- 6. Denies dysuria or hematuria. Patient states that he feels that he has prostatitis based on familiar symptoms. Additionally he no longer takes Lisinopril but now is on Metoprolol 10 mg. \par 8/7/19: Patient presents today for a follow up. He states that the Bactrim prescribed at his last visit was not providing any relief. He still c/o discomfort when sitting but denies dysuria. He had Ciprofloxacin at home and had taken two tablets over the past two days. He noticed relief and inquired about having the prescription refilled today. \par 10/10/19: Patient presents today for a follow up. On 9/13/19 a needle biopsy of the prostate was completed under general anesthesia. He states that after the biopsy his chronic prostatitis began to cause discomfort. Ciprofloxacin 500 mg was used as directed and the discomfort is no longer present. Additionally it is reported that his sexual function decreased following the procedure but that it is starting to improve again. The results of the pathology report are to be discussed with him today. \par 12/31/2019: Patient presents today for a follow up. Pt reports pain on standing. He has an inflamed prostate. When the bladder is fill he also experiences pain and the pain goes away after he voids. Pt took Cipro to 7 days, finishing last week. While he took it, he felt better. Pt reports allergy to sulfur drugs and develops rash.\par 2/10/20: Patient presents today for a follow up. He had an MRI of the pelvis 1/23/20 which showed no evidence of rectal fistula could be identified. Pt has inflamed prostate\par Pt reports constipation for which he takes stool softner. He admits to dysuria and hematuria with mild pain at the tip of the penis yesterday. Most recent PSA was 0.24, Testosterone is 390.0. \par 02/27/2020: Patient presents today for a follow up. At the conclusion of the last visit, Doxycycline 100 mg was prescribed. The patient states he noticed no improvement. Today he reports having nighttime pain in the rectum which is alleviated after urination. Wakes up 4-5 times during the night to urinate. He has satisfactory day time urination though he reports associated strain. Denies chest pain. He currently takes a stool softener for constipation. The patient also complains of lower back pain for which he will get an injection shortly. A urine culture from 2/10/2020 showed no growth. His most recent creatinine level from 7/11/2019 was 1.07.\par \par 07/28/2020: Patient presents today for a follow up. Reports pain in the rectum and pain with fullness of the bladder. Pt had a US at [facility]. Was given suppository which helped. Noxturia x4-5. On Tamsulosin BID. No burning. Frequency at night. Pt must push and staring. He experiences difficulty with his flow and voiding in general. Wife reports fowl smelling urine. \par \par 8/25/2020: Patient presents today for follow up on nocturia. Patient states he has not yet taken Desmopressin as he does not want to take too many medications. Still experiencing pain in rectum and with fullness of bladder. Was given Valium which helped. Last lab work on 7/28/2020 was within normal limits. Osmolality was normal at 294. Patient to start Desmopressin 0.1mg once before bedtime. \par \par 09/15/2020: Patient presents today for follow up. Pt was having pain in rectum and finished 5 days of Cipro which helped resolve pain, but causes constipation. Takes Colace for constipation. Urinalysis on 8/25/20 was normal. Still has pain in rectum at night, and uses Metronidazole gel to relieve it. Wakes up every 2 hours at night to urinate. Has some urgency. \par \par 11/17/2020: The patient presents today for a follow up. He is not taking desmopressin anymore. His last blood work on 11/6/2020 showed his creatinine was good at 1.0. He has no burning. He has some pain in the rectum and when his bladder is full. He is still taking finasteride 5 mg and trimethoprim 100 mg. At night he gets a lot of pain when his bladder is full and has urinary urgency about every 2 hours. After he urinates, the pain goes away. \par \par 03/09/2021: 75 yr  history of BPH, prostatitis , overactive bladder with nocturnal frequency.  Rectal area pains, last visit [facility] November 17, 2020 showing rectal mucosa nodule. To follow GI. \par patient said he is taking gabapentin and tamsulosin and finasteride. Pain is worse when bladder is full and less when he voids. \par Nocturnal frequency Q2 hr\par ED since 2019 \par Patient is seeing  for his rectal pain.  \par \par 05/24/2021: Patient presents today for follow up. Patient went to a rectal surgeon for his rectal pain and had pelvic MRI 4/29/21 which showed no evidence of perianal fistula. States he continues to have pain in rectum after sitting for a long time and then standing. Patient was instructed to take Gabapentin 400mg TID, but skips afternoon dose as it makes him sleepy. Does have constipation. Has not started Tadalafil. No hx of prostate cancer. \par \par 06/15/2021: Patient presents today for a follow up. He reports that last week he had an episode of prostatitis for which he took Cipro for. Currently wakes every 2 hours during the night to urinate. Has tried desipramine and desmopressin in the past which did not improve his symptoms. Daytime urination remains stable. Stream is strong. Pt states he went to the cardiologist today and an EKG was done which was all normal. Currently takes iron supplements. \par \par 09/01/2021: Patient presents today for follow up. Today complains of straining and pushing to urinate. Denies dysuria. Denies infection of penis. Requests renewal of Finasteride and Tamsulosin. Not taking Trospium. Stopped Tadalafil due to side effect. \par \par 09/29/2021: Patient presents today for follow up. 9/1/21 UA showed 12 RBC/HPF and trace blood by dipstick. On repeat 9/9/21, he had only 3 RBC/HPF and small blood by dripstick which normal. Urine cytology 9/1/21 negative for high grade urothelial carcinoma. Provided blood work from 9/17/21 done by PCP which showed UA slightly cloudy, 3-5 RBC/HPF. PSA 0.2. Wakes 4x at night to urinate. States he has rectal pain when bladder is full. \par \par 02/02/2022: Patient presents today for follow up. Reviewed 12/29/21 lab work which showed MCV low at 75.2, WBC 6600, Hb 12.1, platelets 001175, potassium 4.9, creatinine 0.90 upper limits 1.18, BUN high at 25, AST minimally elevated 39 upper limits 34, alk phos normal 60, HbA1c 6.0, UA dipstick positive small blood, moderate leukocyte esterase, few squamous epithelial cells. Has pain when he sits and feels better when standing or laying down. States he must push to urinate. No pain on urination. Nocturia x3-4. Takes tamsulosin 0.4mg BID. Reports his EKG is normal. \par \par 03/14/2022: Patient presents today for follow up. Has been doing well, however does have to push urination. Nocturia x4. Erections are poor but not interested in medications. No dysuria. Stream is slow. Continues finasteride 5mg once daily and tamsulosin 0.4mg BID. Does not take tadalafil every day. Does not have any red itchy skin and has not needed to use clotrimazole-betamethasone cream. Reviewed 2/2/22 lab work. Urine culture grew 10,000-40,000 CFU/mL Enterococcus faecalis and patient was treated with amoxicillin 500mg TID and feels better now. PSA 0.24. Free testosterone low 3.9. Lab work of 2/11/22 showed brain natriuretic peptide normal, procalcitonin undetectable <0.10 ng/mL. Will be having evaluation for anorectal pain, with the proposed procedures being anorectal manometry, electromyography, pudendal nerve terminal motor latency, and endorectal ultrasound. Denies bladder pain. Will be having procedure for varicose veins of RLE. \par \par 03/30/2022: Patient presents today for follow up. Urination is good. Continues finasteride 5mg once daily and tamsulosin 0.4mg BID. Did not help with urination. Denies dysuria. Only has urinary urgency at night. Notes he eats spicy foods. No pain where he sits. Energy levels are good. Has tried Cialis in the past which did not help much. Erections had been poor since the biopsy. Reviewed 3/13/22 lab work which was WNL except free testosterone low 4.0, LH elevated 19, dihydrotestosterone low 4.1. Total testosterone normal 250. PSA was 0.20. UA normal. Urine cytology showed clusters of atypical urothelial cells with high nuclear cytoplasmic ratio, nuclear hyperchromasia and irregular nuclear contours in a background of moderate acute inflammation. States he went to rectal surgeon for anorectal pain, and had US which was reportedly normal and found muscles are weak, and was advised muscle dysfunction physical therapy which he is currently attending. \par \par 04/08/2022: Patient presents today for a follow up telephone visit for which he gave permission for. The visit was mostly conducted with his wife who was present. The patient has been taking finasteride 5 mg once daily and tamsulosin 0.4 mg BID. I prescribed the patient tadalafil 5 mg once daily at the time of the last visit, however he has not taken it. The pt's wife states today that her  told me during his 3/30/2022 visit he had a problems taking tadalafil which included flatulence and severe muscle aches. Both my personal memory and my note from that day do not recall him ever saying that, however there must have been some sort of miscommunication. \par \par 04/28/2022: Patient presents today for a follow up visit. Yesterday, he was having pain, but today he is feeling better. On 4/27/22, his PSA was 0.21 ng/mL, creatinine was 1.04. He also is complaining of rectal pain. He currently is taking Silodosin, one capsule daily, Tamsulosin once daily, Proscar once daily and Cefadroxil. His symptoms have improved with Cefadroxil. Previously he took Cipro, but it did not work satisfactorily. He is not taking Tadalafil. He denies urgency, but is experiencing slight urge incontinence. He reports Gabapentin alleviated his pain.\par

## 2022-05-01 NOTE — ASSESSMENT
[FreeTextEntry1] : Mr Carrizales is a 76 year old man presented for follow up of gross hematuria, urinary frequency and urgency. The patient takes tamsulosin 0.4 mg, one half hour after meal twice daily, finasteride 5 mg once daily, and desipramine 10 mg at bedtime. The patient returned to the office 03/20/17 having gross hematuria with blood clot. Dr Holly Bernardo started the patient on Bactrim DS. He eventually went to the ER 03/21/17 where he had a Spann catheter placed. His creatinine in the ER was 1.01. Cystoscopy 03/23/17 found catheter trauma. Prostate protruded modest distance into the bladder. Bladder has a small capacity. Diffuse oozing of blood from bladder neck and trigone. Scraped up mucosa most likely from introduction of telescope. No bladder stones or tumors. Bladder inflammation from catheter. CT urogram was normal and found no etiology of the gross hematuria.\par 3/1/18: The patient returned and reported he has pain in his rectum area and penis. PSA from 1/24/18 was 0.39 ng/mL. 2/15/18 Creatinine was 1.01 mg/dL, hemoglobin was 14.1 g/dL. He received and abdomen ultrasound which shows a fatty liver, gallstones, and obscured pancreas. He denies any gross hematuria. He noted one day ago his GI physician put banding on his hemorrhoids. \par 4/10/18: The patient returned and reported he finished his antibiotics from last visit and noted he had another hemorrhoid and put another band by his GI. He noted pain still persists. UA from 3/1/18 shows 8 WBC/HPF. CBC from 3/1/18 showed slight anemia. \par 5/23/18: The patient returned and reported he has been taking Bactrim for his most recent prostatitis starting one week ago. PSA from 1/22/18 was 0.39 ng/mL. Currently taking Tamsulosin and Finasteride. \par 8/28/18: The patient returned and reported his urination is satisfactory. Currently taking tamsulosin and finasteride. Wakes up 3-4 times during the night to urinate. Noted he had a recent flare up of dysuria 3 days ago and took 2 days of Cipro and symptom have cleared up. \par 3/7/19: The patient returned and reported that he had another flare up of prostatitis and took 2 days of Cipro for his symptoms to clear. Complained of lingering dysuria. I reminded the patient that with Cipro, there is a side effect of tendonitis. Denied gross hematuria. Currently taking Trimethoprim, Tamsulosin and Finasteride. PSA from 2/15/19 was 0.04 ng/mL.\par 4/4/19: The patient returned and reported that he another flare up of prostatitis. Complained of pain when he stands up. His pain is relieved while sitting down. Occasionally has dysuria. Wakes up twice during the night to urinate. Complained of urinary urgency during the night. Takes Tamsulosin BID, Trimethoprim and Finasteride.\par 5/9/19: The patient returned today for an US. Transrectal US of the prostate findings showed a prostate volume of 23.4 cc, a hypoechoic area at the left apex 1.37 x 1.25 cm in transverse image, unremarkable seminal vesicles, and no rectal masses. The hypoechoic are is suspicious for prostate cancer, but the patient has a low PSA. I recommended that the patient undergo a transperineal biopsy for the hyperechoic region, but due to an anal fissure, the patient would like to wait. Notes that he consulted with a colon surgeon about his anal fissure and is currently taking stool softeners. His urination is adequate. Complains of nocturia. Wakes up 5-6 times during the night to urinate. Complains of pain at the tip of his penis. Takes Tamsulosin BID. UA from 4/11/19 was normal. Culture from 4/11/19 showed no growth. Notes that he took Bactrim and ended up getting a rash on his LEs.\par 6/10/19: Patient presents today for a follow up. Today he reports that he is scheduled for an anal fissure operation for 6/21/19. It has been advised that he will need 4 weeks to heal following the operation. Regarding his urination he reports that during the day there are no urinary issues. Nocturia is reported and he will have to wake up 5-6x a night to urinate. Finasteride and Tamsulosin are taken as directed. \par 7/18/19: Patient presents today for a follow up. 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 Patient with hypoechoic lesion of the prostate seen on recent TRUS. PSA 0.690 on 5/2109. Patient on Flomax /Proscar with both irritative and obstructive LUTS. Needs to strain to initiate urination. Nocturia times 5- 6. Denies dysuria or hematuria. Patient states that he feels that he has prostatitis based on familiar symptoms. Additionally he no longer takes Lisinopril but now is on Metoprolol 10 mg. \par 8/7/19: Patient presents today for a follow up. He states that the Bactrim prescribed at his last visit was not providing any relief. He still c/o discomfort when sitting but denies dysuria. He had Ciprofloxacin at home and had taken two tablets over the past two days. He noticed relief and inquired about having the prescription refilled today. \par 10/10/19: Patient presents today for a follow up. On 9/13/19 a needle biopsy of the prostate was completed under general anesthesia. He states that after the biopsy his chronic prostatitis began to cause discomfort. Ciprofloxacin 500 mg was used as directed and the discomfort is no longer present. Additionally it is reported that his sexual function decreased following the procedure but that it is starting to improve again. The results of the pathology report are to be discussed with him today. \par 12/31/2019: Patient presents today for a follow up. Pt reports pain on standing. He has an inflamed prostate. When the bladder is fill he also experiences pain and the pain goes away after he voids. Pt took Cipro to 7 days, finishing last week. While he took it, he felt better. Pt reports allergy to sulfur drugs and develops rash.\par 2/10/20: Patient presents today for a follow up. He had an MRI of the pelvis 1/23/20 which showed no evidence of rectal fistula could be identified. Pt has inflamed prostate\par Pt reports constipation for which he takes stool softner. He admits to dysuria and hematuria with mild pain at the tip of the penis yesterday. Most recent PSA was 0.24, Testosterone is 390.0. \par 02/27/2020: Patient presents today for a follow up. At the conclusion of the last visit, Doxycycline 100 mg was prescribed. The patient states he noticed no improvement. Today he reports having nighttime pain in the rectum which is alleviated after urination. Wakes up 4-5 times during the night to urinate. He has satisfactory day time urination though he reports associated strain. Denies chest pain. He currently takes a stool softener for constipation. The patient also complains of lower back pain for which he will get an injection shortly. A urine culture from 2/10/2020 showed no growth. His most recent creatinine level from 7/11/2019 was 1.07.\par \par 07/28/2020: Patient presents today for a follow up. Reports pain in the rectum and pain with fullness of the bladder. Pt had a US at [facility]. Was given suppository which helped. Noxturia x4-5. On Tamsulosin BID. No burning. Frequency at night. Pt must push and staring. He experiences difficulty with his flow and voiding in general. Wife reports fowl smelling urine. \par \par 8/25/2020: Patient presents today for follow up on nocturia. Patient states he has not yet taken Desmopressin as he does not want to take too many medications. Still experiencing pain in rectum and with fullness of bladder. Was given Valium which helped. Last lab work on 7/28/2020 was within normal limits. Osmolality was normal at 294. Patient to start Desmopressin 0.1mg once before bedtime. \par \par 09/15/2020: Patient presents today for follow up. Pt was having pain in rectum and finished 5 days of Cipro which helped resolve pain, but causes constipation. Takes Colace for constipation. Urinalysis on 8/25/20 was normal. Still has pain in rectum at night, and uses Metronidazole gel to relieve it. Wakes up every 2 hours at night to urinate. Has some urgency. \par \par 11/17/2020: The patient presents today for a follow up. He is not taking desmopressin anymore. His last blood work on 11/6/2020 showed his creatinine was good at 1.0. He has no burning. He has some pain in the rectum and when his bladder is full. He is still taking finasteride 5 mg and trimethoprim 100 mg. At night he gets a lot of pain when his bladder is full and has urinary urgency about every 2 hours. After he urinates, the pain goes away. \par \par 03/09/2021: 75 yr male  BPH, nocturia , straining .  Chronic prostatitis, chronic cystitis \par \par 05/24/2021: Patient presents today for follow up. Patient went to a rectal surgeon for his rectal pain and had pelvic MRI 4/29/21 which showed no evidence of perianal fistula. States he continues to have pain in rectum after sitting for a long time and then standing. Patient was instructed to take Gabapentin 400mg TID, but skips afternoon dose as it makes him sleepy. Does have constipation. Has not started Tadalafil. No hx of prostate cancer. \par \par 06/15/2021: Patient presents today for a follow up. He reports that last week he had an episode of prostatitis for which he took Cipro for. Currently wakes every 2 hours during the night to urinate. Has tried desipramine and desmopressin in the past which did not improve his symptoms. Daytime urination remains stable. Stream is strong. Pt states he went to the cardiologist today and an EKG was done which was all normal. Currently takes iron supplements. \par \par 09/01/2021: Patient presents today for follow up. Today complains of straining and pushing to urinate. Denies dysuria. Denies infection of penis. Requests renewal of Finasteride and Tamsulosin. Not taking Trospium. Stopped Tadalafil due to side effect. \par \par 09/29/2021: Patient presents today for follow up. 9/1/21 UA showed 12 RBC/HPF and trace blood by dipstick. On repeat 9/9/21, he had only 3 RBC/HPF and small blood by dripstick which normal. Urine cytology 9/1/21 negative for high grade urothelial carcinoma. Provided blood work from 9/17/21 done by PCP which showed UA slightly cloudy, 3-5 RBC/HPF. PSA 0.2. Wakes 4x at night to urinate. States he has rectal pain when bladder is full. \par \par 02/02/2022: Patient presents today for follow up. Reviewed 12/29/21 lab work which showed MCV low at 75.2, WBC 6600, Hb 12.1, platelets [id number], potassium 4.9, creatinine 0.90 upper limits 1.18, BUN high at 25, AST minimally elevated 39 upper limits 34, alk phos normal 60, HbA1c 6.0, UA dipstick positive small blood, moderate leukocyte esterase, few squamous epithelial cells. No gross hematuria. Has pain when he sits and feels better when standing or laying down. States he must push to urinate. No pain on urination. Nocturia x3-4. Takes tamsulosin 0.4mg BID. Reports his EKG is normal. \par \par 03/14/2022: Patient presents today for follow up. Has been doing well, however does have to push urination. Nocturia x4. Erections are poor but not interested in medications. No dysuria. Stream is slow. Continues finasteride 5mg once daily and tamsulosin 0.4mg BID. Does not take tadalafil every day. Does not have any red itchy skin and has not needed to use clotrimazole-betamethasone cream. Reviewed 2/2/22 lab work. Urine culture grew 10,000-40,000 CFU/mL Enterococcus faecalis and patient was treated with amoxicillin 500mg TID and feels better now. PSA 0.24. Free testosterone low 3.9. Lab work of 2/11/22 showed brain natriuretic peptide normal, procalcitonin undetectable <0.10 ng/mL. Will be having evaluation for anorectal pain, with the proposed procedures being anorectal manometry, electromyography, pudendal nerve terminal motor latency, and endorectal ultrasound. Denies bladder pain. Will be having procedure for varicose veins of RLE. \par \par 03/30/2022: Patient presents today for follow up. Urination is good. Continues finasteride 5mg once daily and tamsulosin 0.4mg BID. Did not help with urination. Denies dysuria. Only has urinary urgency at night. Notes he eats spicy foods. No pain where he sits. Energy levels are good. Has tried Cialis in the past which did not help much. Erections had been poor since the biopsy. Reviewed 3/13/22 lab work which was WNL except free testosterone low 4.0, LH elevated 19, dihydrotestosterone low 4.1. Total testosterone normal 250. PSA was 0.20. UA normal. Urine cytology showed clusters of atypical urothelial cells with high nuclear cytoplasmic ratio, nuclear hyperchromasia and irregular nuclear contours in a background of moderate acute inflammation. States he went to rectal surgeon for anorectal pain, and had US which was reportedly normal and found muscles are weak, and was advised muscle dysfunction physical therapy which he is currently attending. \par \par Reviewed 3/13/22 lab work with patient. Urine cytology showed clusters of atypical urothelial cells with high nuclear cytoplasmic ratio, nuclear hyperchromasia and irregular nuclear contours in a background of moderate  acute inflammation, and which we will continue to watch. If cells persist, we will consider imaging test of kidneys, and cystoscopy. \par Recommend avoid spicy foods as it will cause irritation of both bladder and rectum, but is not harmful. \par Continue finasteride 5mg once daily and tamsulosin 0.4mg BID.\par I prescribed the patient tadalafil 5mg once daily for BPH symptoms and ED. I informed the patient that they may experience tingling of the skin, headache, warm flushed feeling, heartburn, backache, and on rare occasion, visual or hearing disturbances. Informed the patient how to use [employer] to  tadalafil at a [employer] or [employer] for a discounted price. I explained it had been previously used as heart medication and that it is safe to take as long as it is not taken with nitroglycerin which would cause BP to drop too low. \par The patient produced a urine sample which will be sent for urinalysis, urine cytology, and urine culture. \par RTO in 3 months for follow up or sooner if new urinary symptoms develop. \par \par 04/08/2022: Patient presents today for a follow up telephone visit for which he gave permission for. The visit was mostly conducted with his wife who was present. The patient has been taking finasteride 5 mg once daily and tamsulosin 0.4 mg BID. I prescribed the patient tadalafil 5 mg once daily at the time of the last visit, however he has not taken it. The pt's wife states today that her  told me during his 3/30/2022 visit he had a problems taking tadalafil which included flatulence and severe muscle aches. Both my personal memory and my note from that day do not recall him ever saying that, however there must have been some sort of miscommunication. \par \par 04/28/2022: Patient presents today for a follow up visit. Yesterday, he was having pain, but today he is feeling better. On 4/27/22, his PSA was 0.21 ng/mL, creatinine was 1.04. He also is complaining of rectal pain. He currently is taking Silodosin, one capsule daily, Tamsulosin once daily, Proscar once daily and Cefadroxil. His symptoms have improved with Cefadroxil. Previously he took Cipro, but it did not work satisfactorily. He is not taking Tadalafil. He denies urgency, but is experiencing slight urge incontinence. He reports Gabapentin alleviated his pain. \par \par The patient produced a urine sample which will be sent for urinalysis, urine cytology, and urine culture. \par \par I am prescribing Gabapentin, 300 mg, two capsules at night, one in the morning for his pain. \par \par RTO in 1 month, sooner if needed.\par \par Preparation, in-person office visit, and coordination of care took: 40 minutes.

## 2022-05-04 NOTE — H&P PST ADULT - TOBACCO USE
Bed in lowest position, wheels locked, appropriate side rails in place/Call bell, personal items and telephone in reach/Instruct patient to call for assistance before getting out of bed or chair/Non-slip footwear when patient is out of bed/[place] to call system/Physically safe environment - no spills, clutter or unnecessary equipment/Purposeful Proactive Rounding/Room/bathroom lighting operational, light cord in reach
Never smoker

## 2022-05-24 LAB
APPEARANCE: CLEAR
BACTERIA UR CULT: NORMAL
BACTERIA: NEGATIVE
BILIRUBIN URINE: NEGATIVE
BLOOD URINE: NEGATIVE
COLOR: NORMAL
GLUCOSE QUALITATIVE U: NEGATIVE
HYALINE CASTS: 0 /LPF
KETONES URINE: NEGATIVE
LEUKOCYTE ESTERASE URINE: NEGATIVE
MICROSCOPIC-UA: NORMAL
NITRITE URINE: NEGATIVE
PH URINE: 7.5
PROTEIN URINE: NEGATIVE
RED BLOOD CELLS URINE: 1 /HPF
SPECIFIC GRAVITY URINE: 1.01
SQUAMOUS EPITHELIAL CELLS: 0 /HPF
UROBILINOGEN URINE: NORMAL
WHITE BLOOD CELLS URINE: 1 /HPF

## 2022-06-07 ENCOUNTER — NON-APPOINTMENT (OUTPATIENT)
Age: 77
End: 2022-06-07

## 2022-06-07 ENCOUNTER — APPOINTMENT (OUTPATIENT)
Dept: UROLOGY | Facility: CLINIC | Age: 77
End: 2022-06-07
Payer: MEDICARE

## 2022-06-07 PROCEDURE — 99442: CPT

## 2022-06-13 NOTE — HISTORY OF PRESENT ILLNESS
[FreeTextEntry1] : Mr Carrizales is a 76 year old man presented for follow up of gross hematuria, urinary frequency and urgency. The patient takes tamsulosin 0.4 mg, one half hour after meal twice daily, finasteride 5 mg once daily, and desipramine 10 mg at bedtime. The patient returned to the office 03/20/17 having gross hematuria with blood clot. Dr Holly Bernardo started the patient on Bactrim DS. He eventually went to the ER 03/21/17 where he had a Spann catheter placed. His creatinine in the ER was 1.01. Cystoscopy 03/23/17 found catheter trauma. Prostate protruded modest distance into the bladder. Bladder has a small capacity. Diffuse oozing of blood from bladder neck and trigone. Scraped up mucosa most likely from introduction of telescope. No bladder stones or tumors. Bladder inflammation from catheter. CT urogram was normal and found no etiology of the gross hematuria.\par 3/1/18: The patient returned and reported he has pain in his rectum area and penis. PSA from 1/24/18 was 0.39 ng/mL. 2/15/18 Creatinine was 1.01 mg/dL, hemoglobin was 14.1 g/dL. He received and abdomen ultrasound which shows a fatty liver, gallstones, and obscured pancreas. He denies any gross hematuria. He noted one day ago his GI physician put banding on his hemorrhoids. \par 4/10/18: The patient returned and reported he finished his antibiotics from last visit and noted he had another hemorrhoid and put another band by his GI. He noted pain still persists. UA from 3/1/18 shows 8 WBC/HPF. CBC from 3/1/18 showed slight anemia. \par 5/23/18: The patient returned and reported he has been taking Bactrim for his most recent prostatitis starting one week ago. PSA from 1/22/18 was 0.39 ng/mL. Currently taking Tamsulosin and Finasteride. \par 8/28/18: The patient returned and reported his urination is satisfactory. Currently taking tamsulosin and finasteride. Wakes up 3-4 times during the night to urinate. Noted he had a recent flare up of dysuria 3 days ago and took 2 days of Cipro and symptom have cleared up. \par 3/7/19: The patient returned and reported that he had another flare up of prostatitis and took 2 days of Cipro for his symptoms to clear. Complained of lingering dysuria. I reminded the patient that with Cipro, there is a side effect of tendonitis. Denied gross hematuria. Currently taking Trimethoprim, Tamsulosin and Finasteride. PSA from 2/15/19 was 0.04 ng/mL.\par 4/4/19: The patient returned and reported that he another flare up of prostatitis. Complained of pain when he stands up. His pain is relieved while sitting down. Occasionally has dysuria. Wakes up twice during the night to urinate. Complained of urinary urgency during the night. Takes Tamsulosin BID, Trimethoprim and Finasteride.\par 5/9/19: The patient returned today for an US. Transrectal US of the prostate findings showed a prostate volume of 23.4 cc, a hypoechoic area at the left apex 1.37 x 1.25 cm in transverse image, unremarkable seminal vesicles, and no rectal masses. The hypoechoic are is suspicious for prostate cancer, but the patient has a low PSA. I recommended that the patient undergo a transperineal biopsy for the hyperechoic region, but due to an anal fissure, the patient would like to wait. Notes that he consulted with a colon surgeon about his anal fissure and is currently taking stool softeners. His urination is adequate. Complains of nocturia. Wakes up 5-6 times during the night to urinate. Complains of pain at the tip of his penis. Takes Tamsulosin BID. UA from 4/11/19 was normal. Culture from 4/11/19 showed no growth. Notes that he took Bactrim and ended up getting a rash on his LEs.\par 6/10/19: Patient presents today for a follow up. Today he reports that he is scheduled for an anal fissure operation for 6/21/19. It has been advised that he will need 4 weeks to heal following the operation. Regarding his urination he reports that during the day there are no urinary issues. Nocturia is reported and he will have to wake up 5-6x a night to urinate. Finasteride and Tamsulosin are taken as directed. \par 7/18/19: Patient presents today for a follow up. 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 Patient with hypoechoic lesion of the prostate seen on recent TRUS. PSA 0.690 on 5/2109. Patient on Flomax /Proscar with both irritative and obstructive LUTS. Needs to strain to initiate urination. Nocturia times 5- 6. Denies dysuria or hematuria. Patient states that he feels that he has prostatitis based on familiar symptoms. Additionally he no longer takes Lisinopril but now is on Metoprolol 10 mg. \par 8/7/19: Patient presents today for a follow up. He states that the Bactrim prescribed at his last visit was not providing any relief. He still c/o discomfort when sitting but denies dysuria. He had Ciprofloxacin at home and had taken two tablets over the past two days. He noticed relief and inquired about having the prescription refilled today. \par 10/10/19: Patient presents today for a follow up. On 9/13/19 a needle biopsy of the prostate was completed under general anesthesia. He states that after the biopsy his chronic prostatitis began to cause discomfort. Ciprofloxacin 500 mg was used as directed and the discomfort is no longer present. Additionally it is reported that his sexual function decreased following the procedure but that it is starting to improve again. The results of the pathology report are to be discussed with him today. \par 12/31/2019: Patient presents today for a follow up. Pt reports pain on standing. He has an inflamed prostate. When the bladder is fill he also experiences pain and the pain goes away after he voids. Pt took Cipro to 7 days, finishing last week. While he took it, he felt better. Pt reports allergy to sulfur drugs and develops rash.\par 2/10/20: Patient presents today for a follow up. He had an MRI of the pelvis 1/23/20 which showed no evidence of rectal fistula could be identified. Pt has inflamed prostate\par Pt reports constipation for which he takes stool softner. He admits to dysuria and hematuria with mild pain at the tip of the penis yesterday. Most recent PSA was 0.24, Testosterone is 390.0. \par 02/27/2020: Patient presents today for a follow up. At the conclusion of the last visit, Doxycycline 100 mg was prescribed. The patient states he noticed no improvement. Today he reports having nighttime pain in the rectum which is alleviated after urination. Wakes up 4-5 times during the night to urinate. He has satisfactory day time urination though he reports associated strain. Denies chest pain. He currently takes a stool softener for constipation. The patient also complains of lower back pain for which he will get an injection shortly. A urine culture from 2/10/2020 showed no growth. His most recent creatinine level from 7/11/2019 was 1.07.\par \par 07/28/2020: Patient presents today for a follow up. Reports pain in the rectum and pain with fullness of the bladder. Pt had a US at [place]. Was given suppository which helped. Noxturia x4-5. On Tamsulosin BID. No burning. Frequency at night. Pt must push and staring. He experiences difficulty with his flow and voiding in general. Wife reports fowl smelling urine. \par \par 8/25/2020: Patient presents today for follow up on nocturia. Patient states he has not yet taken Desmopressin as he does not want to take too many medications. Still experiencing pain in rectum and with fullness of bladder. Was given Valium which helped. Last lab work on 7/28/2020 was within normal limits. Osmolality was normal at 294. Patient to start Desmopressin 0.1mg once before bedtime. \par \par 09/15/2020: Patient presents today for follow up. Pt was having pain in rectum and finished 5 days of Cipro which helped resolve pain, but causes constipation. Takes Colace for constipation. Urinalysis on 8/25/20 was normal. Still has pain in rectum at night, and uses Metronidazole gel to relieve it. Wakes up every 2 hours at night to urinate. Has some urgency. \par \par 11/17/2020: The patient presents today for a follow up. He is not taking desmopressin anymore. His last blood work on 11/6/2020 showed his creatinine was good at 1.0. He has no burning. He has some pain in the rectum and when his bladder is full. He is still taking finasteride 5 mg and trimethoprim 100 mg. At night he gets a lot of pain when his bladder is full and has urinary urgency about every 2 hours. After he urinates, the pain goes away. \par \par 03/09/2021: 75 yr  history of BPH, prostatitis , overactive bladder with nocturnal frequency.  Rectal area pains, last visit [facility] November 17, 2020 showing rectal mucosa nodule. To follow GI. \par patient said he is taking gabapentin and tamsulosin and finasteride. Pain is worse when bladder is full and less when he voids. \par Nocturnal frequency Q2 hr\par ED since 2019 \par Patient is seeing  for his rectal pain.  \par \par 05/24/2021: Patient presents today for follow up. Patient went to a rectal surgeon for his rectal pain and had pelvic MRI 4/29/21 which showed no evidence of perianal fistula. States he continues to have pain in rectum after sitting for a long time and then standing. Patient was instructed to take Gabapentin 400mg TID, but skips afternoon dose as it makes him sleepy. Does have constipation. Has not started Tadalafil. No hx of prostate cancer. \par \par 06/15/2021: Patient presents today for a follow up. He reports that last week he had an episode of prostatitis for which he took Cipro for. Currently wakes every 2 hours during the night to urinate. Has tried desipramine and desmopressin in the past which did not improve his symptoms. Daytime urination remains stable. Stream is strong. Pt states he went to the cardiologist today and an EKG was done which was all normal. Currently takes iron supplements. \par \par 09/01/2021: Patient presents today for follow up. Today complains of straining and pushing to urinate. Denies dysuria. Denies infection of penis. Requests renewal of Finasteride and Tamsulosin. Not taking Trospium. Stopped Tadalafil due to side effect. \par \par 09/29/2021: Patient presents today for follow up. 9/1/21 UA showed 12 RBC/HPF and trace blood by dipstick. On repeat 9/9/21, he had only 3 RBC/HPF and small blood by dripstick which normal. Urine cytology 9/1/21 negative for high grade urothelial carcinoma. Provided blood work from 9/17/21 done by PCP which showed UA slightly cloudy, 3-5 RBC/HPF. PSA 0.2. Wakes 4x at night to urinate. States he has rectal pain when bladder is full. \par \par 02/02/2022: Patient presents today for follow up. Reviewed 12/29/21 lab work which showed MCV low at 75.2, WBC 6600, Hb 12.1, platelets [id number], potassium 4.9, creatinine 0.90 upper limits 1.18, BUN high at 25, AST minimally elevated 39 upper limits 34, alk phos normal 60, HbA1c 6.0, UA dipstick positive small blood, moderate leukocyte esterase, few squamous epithelial cells. Has pain when he sits and feels better when standing or laying down. States he must push to urinate. No pain on urination. Nocturia x3-4. Takes tamsulosin 0.4mg BID. Reports his EKG is normal. \par \par 03/14/2022: Patient presents today for follow up. Has been doing well, however does have to push urination. Nocturia x4. Erections are poor but not interested in medications. No dysuria. Stream is slow. Continues finasteride 5mg once daily and tamsulosin 0.4mg BID. Does not take tadalafil every day. Does not have any red itchy skin and has not needed to use clotrimazole-betamethasone cream. Reviewed 2/2/22 lab work. Urine culture grew 10,000-40,000 CFU/mL Enterococcus faecalis and patient was treated with amoxicillin 500mg TID and feels better now. PSA 0.24. Free testosterone low 3.9. Lab work of 2/11/22 showed brain natriuretic peptide normal, procalcitonin undetectable <0.10 ng/mL. Will be having evaluation for anorectal pain, with the proposed procedures being anorectal manometry, electromyography, pudendal nerve terminal motor latency, and endorectal ultrasound. Denies bladder pain. Will be having procedure for varicose veins of RLE. \par \par 03/30/2022: Patient presents today for follow up. Urination is good. Continues finasteride 5mg once daily and tamsulosin 0.4mg BID. Did not help with urination. Denies dysuria. Only has urinary urgency at night. Notes he eats spicy foods. No pain where he sits. Energy levels are good. Has tried Cialis in the past which did not help much. Erections had been poor since the biopsy. Reviewed 3/13/22 lab work which was WNL except free testosterone low 4.0, LH elevated 19, dihydrotestosterone low 4.1. Total testosterone normal 250. PSA was 0.20. UA normal. Urine cytology showed clusters of atypical urothelial cells with high nuclear cytoplasmic ratio, nuclear hyperchromasia and irregular nuclear contours in a background of moderate acute inflammation. States he went to rectal surgeon for anorectal pain, and had US which was reportedly normal and found muscles are weak, and was advised muscle dysfunction physical therapy which he is currently attending. \par \par 04/08/2022: Patient presents today for a follow up telephone visit for which he gave permission for. The visit was mostly conducted with his wife who was present. The patient has been taking finasteride 5 mg once daily and tamsulosin 0.4 mg BID. I prescribed the patient tadalafil 5 mg once daily at the time of the last visit, however he has not taken it. The pt's wife states today that her  told me during his 3/30/2022 visit he had a problems taking tadalafil which included flatulence and severe muscle aches. Both my personal memory and my note from that day do not recall him ever saying that, however there must have been some sort of miscommunication. \par \par 04/28/2022: Patient presents today for a follow up visit. Yesterday, he was having pain, but today he is feeling better. On 4/27/22, his PSA was 0.21 ng/mL, creatinine was 1.04. He also is complaining of rectal pain. He currently is taking Silodosin, one capsule daily, Tamsulosin once daily, Proscar once daily and Cefadroxil. His symptoms have improved with Cefadroxil. Previously he took Cipro, but it did not work satisfactorily. He is not taking Tadalafil. He denies urgency, but is experiencing slight urge incontinence. He reports Gabapentin alleviated his pain.\par \par 06/07/2022: 's wife called today and spoke to my nurse. She stated that I told her  to take tamsulosin 0.4 mg 2 capsules BID, however there was a misunderstanding and his wife wanted to speak to me. I informed her that it is tamsulosin 0.4 mg one capsule BID and she understood. His wife reports that he has been having difficulties urinating. Has an appt to see me on 6/30/2022.

## 2022-06-13 NOTE — ASSESSMENT

## 2022-06-13 NOTE — ADDENDUM
[FreeTextEntry1] : This note was authored by Guerline Gold working as a scribe for Dr.Gary Duron. I, Dr. Collins Duron have reviewed the content of this note and confirm it is true and accurate. I personally performed the history and physical examination and made all the decisions 06/07/2022.

## 2022-07-14 ENCOUNTER — APPOINTMENT (OUTPATIENT)
Dept: CT IMAGING | Facility: IMAGING CENTER | Age: 77
End: 2022-07-14
Payer: MEDICARE

## 2022-07-14 ENCOUNTER — OUTPATIENT (OUTPATIENT)
Dept: OUTPATIENT SERVICES | Facility: HOSPITAL | Age: 77
LOS: 1 days | End: 2022-07-14
Payer: COMMERCIAL

## 2022-07-14 DIAGNOSIS — Z00.8 ENCOUNTER FOR OTHER GENERAL EXAMINATION: ICD-10-CM

## 2022-07-14 DIAGNOSIS — Z87.19 PERSONAL HISTORY OF OTHER DISEASES OF THE DIGESTIVE SYSTEM: Chronic | ICD-10-CM

## 2022-07-14 DIAGNOSIS — Z90.49 ACQUIRED ABSENCE OF OTHER SPECIFIED PARTS OF DIGESTIVE TRACT: Chronic | ICD-10-CM

## 2022-07-14 PROCEDURE — 74178 CT ABD&PLV WO CNTR FLWD CNTR: CPT | Mod: 26

## 2022-07-14 PROCEDURE — 74178 CT ABD&PLV WO CNTR FLWD CNTR: CPT

## 2022-07-20 ENCOUNTER — APPOINTMENT (OUTPATIENT)
Dept: UROLOGY | Facility: CLINIC | Age: 77
End: 2022-07-20

## 2022-07-20 VITALS
OXYGEN SATURATION: 97 % | SYSTOLIC BLOOD PRESSURE: 145 MMHG | HEIGHT: 64 IN | HEART RATE: 77 BPM | DIASTOLIC BLOOD PRESSURE: 69 MMHG | WEIGHT: 175 LBS | RESPIRATION RATE: 16 BRPM | BODY MASS INDEX: 29.88 KG/M2

## 2022-07-20 PROCEDURE — 99214 OFFICE O/P EST MOD 30 MIN: CPT

## 2022-07-23 LAB
APPEARANCE: CLEAR
BACTERIA UR CULT: NORMAL
BACTERIA: NEGATIVE
BILIRUBIN URINE: NEGATIVE
BLOOD URINE: NEGATIVE
COLOR: NORMAL
GLUCOSE QUALITATIVE U: NEGATIVE
HYALINE CASTS: 0 /LPF
KETONES URINE: NEGATIVE
LEUKOCYTE ESTERASE URINE: NEGATIVE
MICROSCOPIC-UA: NORMAL
NITRITE URINE: NEGATIVE
PH URINE: 6
PROTEIN URINE: NEGATIVE
RED BLOOD CELLS URINE: 1 /HPF
SPECIFIC GRAVITY URINE: 1.01
SQUAMOUS EPITHELIAL CELLS: 1 /HPF
URINE CYTOLOGY: NORMAL
UROBILINOGEN URINE: NORMAL
WHITE BLOOD CELLS URINE: 1 /HPF

## 2022-07-27 ENCOUNTER — APPOINTMENT (OUTPATIENT)
Dept: UROLOGY | Facility: CLINIC | Age: 77
End: 2022-07-27

## 2022-07-27 PROCEDURE — 99441: CPT

## 2022-07-30 NOTE — ASSESSMENT
[FreeTextEntry1] : Mr Carrizales is a 76 year old man presented for follow up of gross hematuria, urinary frequency and urgency. The patient takes tamsulosin 0.4 mg, one half hour after meal twice daily, finasteride 5 mg once daily, and desipramine 10 mg at bedtime. The patient returned to the office 03/20/17 having gross hematuria with blood clot. Dr Holly Bernardo started the patient on Bactrim DS. He eventually went to the ER 03/21/17 where he had a Spann catheter placed. His creatinine in the ER was 1.01. Cystoscopy 03/23/17 found catheter trauma. Prostate protruded modest distance into the bladder. Bladder has a small capacity. Diffuse oozing of blood from bladder neck and trigone. Scraped up mucosa most likely from introduction of telescope. No bladder stones or tumors. Bladder inflammation from catheter. CT urogram was normal and found no etiology of the gross hematuria.\par 3/1/18: The patient returned and reported he has pain in his rectum area and penis. PSA from 1/24/18 was 0.39 ng/mL. 2/15/18 Creatinine was 1.01 mg/dL, hemoglobin was 14.1 g/dL. He received and abdomen ultrasound which shows a fatty liver, gallstones, and obscured pancreas. He denies any gross hematuria. He noted one day ago his GI physician put banding on his hemorrhoids. \par 4/10/18: The patient returned and reported he finished his antibiotics from last visit and noted he had another hemorrhoid and put another band by his GI. He noted pain still persists. UA from 3/1/18 shows 8 WBC/HPF. CBC from 3/1/18 showed slight anemia. \par 5/23/18: The patient returned and reported he has been taking Bactrim for his most recent prostatitis starting one week ago. PSA from 1/22/18 was 0.39 ng/mL. Currently taking Tamsulosin and Finasteride. \par 8/28/18: The patient returned and reported his urination is satisfactory. Currently taking tamsulosin and finasteride. Wakes up 3-4 times during the night to urinate. Noted he had a recent flare up of dysuria 3 days ago and took 2 days of Cipro and symptom have cleared up. \par 3/7/19: The patient returned and reported that he had another flare up of prostatitis and took 2 days of Cipro for his symptoms to clear. Complained of lingering dysuria. I reminded the patient that with Cipro, there is a side effect of tendonitis. Denied gross hematuria. Currently taking Trimethoprim, Tamsulosin and Finasteride. PSA from 2/15/19 was 0.04 ng/mL.\par 4/4/19: The patient returned and reported that he another flare up of prostatitis. Complained of pain when he stands up. His pain is relieved while sitting down. Occasionally has dysuria. Wakes up twice during the night to urinate. Complained of urinary urgency during the night. Takes Tamsulosin BID, Trimethoprim and Finasteride.\par 5/9/19: The patient returned today for an US. Transrectal US of the prostate findings showed a prostate volume of 23.4 cc, a hypoechoic area at the left apex 1.37 x 1.25 cm in transverse image, unremarkable seminal vesicles, and no rectal masses. The hypoechoic are is suspicious for prostate cancer, but the patient has a low PSA. I recommended that the patient undergo a transperineal biopsy for the hyperechoic region, but due to an anal fissure, the patient would like to wait. Notes that he consulted with a colon surgeon about his anal fissure and is currently taking stool softeners. His urination is adequate. Complains of nocturia. Wakes up 5-6 times during the night to urinate. Complains of pain at the tip of his penis. Takes Tamsulosin BID. UA from 4/11/19 was normal. Culture from 4/11/19 showed no growth. Notes that he took Bactrim and ended up getting a rash on his LEs.\par 6/10/19: Patient presents today for a follow up. Today he reports that he is scheduled for an anal fissure operation for 6/21/19. It has been advised that he will need 4 weeks to heal following the operation. Regarding his urination he reports that during the day there are no urinary issues. Nocturia is reported and he will have to wake up 5-6x a night to urinate. Finasteride and Tamsulosin are taken as directed. \par 7/18/19: Patient presents today for a follow up. 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 Patient with hypoechoic lesion of the prostate seen on recent TRUS. PSA 0.690 on 5/2109. Patient on Flomax /Proscar with both irritative and obstructive LUTS. Needs to strain to initiate urination. Nocturia times 5- 6. Denies dysuria or hematuria. Patient states that he feels that he has prostatitis based on familiar symptoms. Additionally he no longer takes Lisinopril but now is on Metoprolol 10 mg. \par 8/7/19: Patient presents today for a follow up. He states that the Bactrim prescribed at his last visit was not providing any relief. He still c/o discomfort when sitting but denies dysuria. He had Ciprofloxacin at home and had taken two tablets over the past two days. He noticed relief and inquired about having the prescription refilled today. \par 10/10/19: Patient presents today for a follow up. On 9/13/19 a needle biopsy of the prostate was completed under general anesthesia. He states that after the biopsy his chronic prostatitis began to cause discomfort. Ciprofloxacin 500 mg was used as directed and the discomfort is no longer present. Additionally it is reported that his sexual function decreased following the procedure but that it is starting to improve again. The results of the pathology report are to be discussed with him today. \par 12/31/2019: Patient presents today for a follow up. Pt reports pain on standing. He has an inflamed prostate. When the bladder is fill he also experiences pain and the pain goes away after he voids. Pt took Cipro to 7 days, finishing last week. While he took it, he felt better. Pt reports allergy to sulfur drugs and develops rash.\par 2/10/20: Patient presents today for a follow up. He had an MRI of the pelvis 1/23/20 which showed no evidence of rectal fistula could be identified. Pt has inflamed prostate\par Pt reports constipation for which he takes stool softner. He admits to dysuria and hematuria with mild pain at the tip of the penis yesterday. Most recent PSA was 0.24, Testosterone is 390.0. \par 02/27/2020: Patient presents today for a follow up. At the conclusion of the last visit, Doxycycline 100 mg was prescribed. The patient states he noticed no improvement. Today he reports having nighttime pain in the rectum which is alleviated after urination. Wakes up 4-5 times during the night to urinate. He has satisfactory day time urination though he reports associated strain. Denies chest pain. He currently takes a stool softener for constipation. The patient also complains of lower back pain for which he will get an injection shortly. A urine culture from 2/10/2020 showed no growth. His most recent creatinine level from 7/11/2019 was 1.07.\par \par 07/28/2020: Patient presents today for a follow up. Reports pain in the rectum and pain with fullness of the bladder. Pt had a US at [facility]. Was given suppository which helped. Noxturia x4-5. On Tamsulosin BID. No burning. Frequency at night. Pt must push and staring. He experiences difficulty with his flow and voiding in general. Wife reports fowl smelling urine. \par \par 8/25/2020: Patient presents today for follow up on nocturia. Patient states he has not yet taken Desmopressin as he does not want to take too many medications. Still experiencing pain in rectum and with fullness of bladder. Was given Valium which helped. Last lab work on 7/28/2020 was within normal limits. Osmolality was normal at 294. Patient to start Desmopressin 0.1mg once before bedtime. \par \par 09/15/2020: Patient presents today for follow up. Pt was having pain in rectum and finished 5 days of Cipro which helped resolve pain, but causes constipation. Takes Colace for constipation. Urinalysis on 8/25/20 was normal. Still has pain in rectum at night, and uses Metronidazole gel to relieve it. Wakes up every 2 hours at night to urinate. Has some urgency. \par \par 11/17/2020: The patient presents today for a follow up. He is not taking desmopressin anymore. His last blood work on 11/6/2020 showed his creatinine was good at 1.0. He has no burning. He has some pain in the rectum and when his bladder is full. He is still taking finasteride 5 mg and trimethoprim 100 mg. At night he gets a lot of pain when his bladder is full and has urinary urgency about every 2 hours. After he urinates, the pain goes away. \par \par 03/09/2021: 75 yr male  BPH, nocturia , straining .  Chronic prostatitis, chronic cystitis \par \par 05/24/2021: Patient presents today for follow up. Patient went to a rectal surgeon for his rectal pain and had pelvic MRI 4/29/21 which showed no evidence of perianal fistula. States he continues to have pain in rectum after sitting for a long time and then standing. Patient was instructed to take Gabapentin 400mg TID, but skips afternoon dose as it makes him sleepy. Does have constipation. Has not started Tadalafil. No hx of prostate cancer. \par \par 06/15/2021: Patient presents today for a follow up. He reports that last week he had an episode of prostatitis for which he took Cipro for. Currently wakes every 2 hours during the night to urinate. Has tried desipramine and desmopressin in the past which did not improve his symptoms. Daytime urination remains stable. Stream is strong. Pt states he went to the cardiologist today and an EKG was done which was all normal. Currently takes iron supplements. \par \par 09/01/2021: Patient presents today for follow up. Today complains of straining and pushing to urinate. Denies dysuria. Denies infection of penis. Requests renewal of Finasteride and Tamsulosin. Not taking Trospium. Stopped Tadalafil due to side effect. \par \par 09/29/2021: Patient presents today for follow up. 9/1/21 UA showed 12 RBC/HPF and trace blood by dipstick. On repeat 9/9/21, he had only 3 RBC/HPF and small blood by dripstick which normal. Urine cytology 9/1/21 negative for high grade urothelial carcinoma. Provided blood work from 9/17/21 done by PCP which showed UA slightly cloudy, 3-5 RBC/HPF. PSA 0.2. Wakes 4x at night to urinate. States he has rectal pain when bladder is full. \par \par 02/02/2022: Patient presents today for follow up. Reviewed 12/29/21 lab work which showed MCV low at 75.2, WBC 6600, Hb 12.1, platelets 176001, potassium 4.9, creatinine 0.90 upper limits 1.18, BUN high at 25, AST minimally elevated 39 upper limits 34, alk phos normal 60, HbA1c 6.0, UA dipstick positive small blood, moderate leukocyte esterase, few squamous epithelial cells. No gross hematuria. Has pain when he sits and feels better when standing or laying down. States he must push to urinate. No pain on urination. Nocturia x3-4. Takes tamsulosin 0.4mg BID. Reports his EKG is normal. \par \par 03/14/2022: Patient presents today for follow up. Has been doing well, however does have to push urination. Nocturia x4. Erections are poor but not interested in medications. No dysuria. Stream is slow. Continues finasteride 5mg once daily and tamsulosin 0.4mg BID. Does not take tadalafil every day. Does not have any red itchy skin and has not needed to use clotrimazole-betamethasone cream. Reviewed 2/2/22 lab work. Urine culture grew 10,000-40,000 CFU/mL Enterococcus faecalis and patient was treated with amoxicillin 500mg TID and feels better now. PSA 0.24. Free testosterone low 3.9. Lab work of 2/11/22 showed brain natriuretic peptide normal, procalcitonin undetectable <0.10 ng/mL. Will be having evaluation for anorectal pain, with the proposed procedures being anorectal manometry, electromyography, pudendal nerve terminal motor latency, and endorectal ultrasound. Denies bladder pain. Will be having procedure for varicose veins of RLE. \par \par 03/30/2022: Patient presents today for follow up. Urination is good. Continues finasteride 5mg once daily and tamsulosin 0.4mg BID. Did not help with urination. Denies dysuria. Only has urinary urgency at night. Notes he eats spicy foods. No pain where he sits. Energy levels are good. Has tried Cialis in the past which did not help much. Erections had been poor since the biopsy. Reviewed 3/13/22 lab work which was WNL except free testosterone low 4.0, LH elevated 19, dihydrotestosterone low 4.1. Total testosterone normal 250. PSA was 0.20. UA normal. Urine cytology showed clusters of atypical urothelial cells with high nuclear cytoplasmic ratio, nuclear hyperchromasia and irregular nuclear contours in a background of moderate acute inflammation. States he went to rectal surgeon for anorectal pain, and had US which was reportedly normal and found muscles are weak, and was advised muscle dysfunction physical therapy which he is currently attending. \par \par Reviewed 3/13/22 lab work with patient. Urine cytology showed clusters of atypical urothelial cells with high nuclear cytoplasmic ratio, nuclear hyperchromasia and irregular nuclear contours in a background of moderate  acute inflammation, and which we will continue to watch. If cells persist, we will consider imaging test of kidneys, and cystoscopy. \par Recommend avoid spicy foods as it will cause irritation of both bladder and rectum, but is not harmful. \par Continue finasteride 5mg once daily and tamsulosin 0.4mg BID.\par I prescribed the patient tadalafil 5mg once daily for BPH symptoms and ED. I informed the patient that they may experience tingling of the skin, headache, warm flushed feeling, heartburn, backache, and on rare occasion, visual or hearing disturbances. Informed the patient how to use [employer] to  tadalafil at a [employer] or [employer] for a discounted price. I explained it had been previously used as heart medication and that it is safe to take as long as it is not taken with nitroglycerin which would cause BP to drop too low. \par The patient produced a urine sample which will be sent for urinalysis, urine cytology, and urine culture. \par RTO in 3 months for follow up or sooner if new urinary symptoms develop. \par \par 04/08/2022: Patient presents today for a follow up telephone visit for which he gave permission for. The visit was mostly conducted with his wife who was present. The patient has been taking finasteride 5 mg once daily and tamsulosin 0.4 mg BID. I prescribed the patient tadalafil 5 mg once daily at the time of the last visit, however he has not taken it. The pt's wife states today that her  told me during his 3/30/2022 visit he had a problems taking tadalafil which included flatulence and severe muscle aches. Both my personal memory and my note from that day do not recall him ever saying that, however there must have been some sort of miscommunication. \par \par 04/28/2022: Patient presents today for a follow up visit. Yesterday, he was having pain, but today he is feeling better. On 4/27/22, his PSA was 0.21 ng/mL, creatinine was 1.04. He also is complaining of rectal pain. He currently is taking Silodosin, one capsule daily, Tamsulosin once daily, Proscar once daily and Cefadroxil. His symptoms have improved with Cefadroxil. Previously he took Cipro, but it did not work satisfactorily. He is not taking Tadalafil. He denies urgency, but is experiencing slight urge incontinence. He reports Gabapentin alleviated his pain. \par \par The patient produced a urine sample which will be sent for urinalysis, urine cytology, and urine culture. \par I am prescribing Gabapentin, 300 mg, two capsules at night, one in the morning for his pain. \par RTO in 1 month, sooner if needed.\par \par 06/07/2022: 's wife called today and spoke to my nurse. She stated that I told her  to take tamsulosin 0.4 mg 2 capsules BID, however there was a misunderstanding and his wife wanted to speak to me. I informed her that it is tamsulosin 0.4 mg one capsule BID and she understood. His wife reports that he has been having difficulties urinating. Has an appt to see me on 6/30/2022. \par \par Urine cytology of 4/28/2022 revealed rare atypical cells with nuclear enlargement, prominent nucleoli, and vacuolated cytoplasm. It was my recommendation after reviewing back in April that he schedule a cystoscopy to evaluate atypical urine cytology, however the pt has not scheduled one yet. Last cystoscopy was in 2017. His wife understood and informed me she would schedule him for a cystoscopy with me within the next two weeks. \par I am sending the patient for a CT Urogram w/wo IV contrast. Creatinine was 1.04 on 4/27/2022. \par Patient will RTO for cystoscopy and to go over the results of his CT. \par \par 07/20/2022:  presents today for a follow up. Patient obtained a CT Urogram w/wo contrast on 7/14/2022 to evaluate microscopic hematuria. CT revealed renal cysts and less than 1 cm low-attenuation lesions which are too small to characterize. No enhancing lesions, or renal stones are identified. On the delayed images, there is poor opacification of the proximal and distal ureter and the right distal ureter. No abnormal soft tissue or gross urothelial lesion is appreciated. Bladder was within normal limits. The anterior aspect of the bladder is not opacified on the delayed imaging. No lymphadenopathy was seen. The patient was pleased with the CT findings. We reviewed his prostate symptoms. Has a long hx of chronic prostatitis that initially responded to cipro but sometimes required bactrim. He currently has no acute problems. Denies pain when sitting for long periods of time. Has some minor urinary urgency and frequency at times but is overall pleased with his urologic state. Continues on finasteride 5 mg once daily, tamsulosin 0.4 mg once daily, tadalafil 5 mg once daily, and silodosin 8 mg once daily. Last PSA 4/27/2022 was 0.21. Creatinine at that time was 1.04. \par \par Clinical findings and CT results were reviewed at length with the patient and his wife. He was pleased with the findings. \par  \par The patient produced a urine sample which will be sent for urinalysis, urine cytology, and urine culture. \par \par Patient will RTO in 6 months or sooner if clinically indicated. \par \par Preparation, in person office visit, and coordination of care: 30 minutes.

## 2022-07-30 NOTE — HISTORY OF PRESENT ILLNESS
[FreeTextEntry1] : Mr Carrizales is a 76 year old man presented for follow up of gross hematuria, urinary frequency and urgency. The patient takes tamsulosin 0.4 mg, one half hour after meal twice daily, finasteride 5 mg once daily, and desipramine 10 mg at bedtime. The patient returned to the office 03/20/17 having gross hematuria with blood clot. Dr Holly Bernardo started the patient on Bactrim DS. He eventually went to the ER 03/21/17 where he had a Spann catheter placed. His creatinine in the ER was 1.01. Cystoscopy 03/23/17 found catheter trauma. Prostate protruded modest distance into the bladder. Bladder has a small capacity. Diffuse oozing of blood from bladder neck and trigone. Scraped up mucosa most likely from introduction of telescope. No bladder stones or tumors. Bladder inflammation from catheter. CT urogram was normal and found no etiology of the gross hematuria.\par 3/1/18: The patient returned and reported he has pain in his rectum area and penis. PSA from 1/24/18 was 0.39 ng/mL. 2/15/18 Creatinine was 1.01 mg/dL, hemoglobin was 14.1 g/dL. He received and abdomen ultrasound which shows a fatty liver, gallstones, and obscured pancreas. He denies any gross hematuria. He noted one day ago his GI physician put banding on his hemorrhoids. \par 4/10/18: The patient returned and reported he finished his antibiotics from last visit and noted he had another hemorrhoid and put another band by his GI. He noted pain still persists. UA from 3/1/18 shows 8 WBC/HPF. CBC from 3/1/18 showed slight anemia. \par 5/23/18: The patient returned and reported he has been taking Bactrim for his most recent prostatitis starting one week ago. PSA from 1/22/18 was 0.39 ng/mL. Currently taking Tamsulosin and Finasteride. \par 8/28/18: The patient returned and reported his urination is satisfactory. Currently taking tamsulosin and finasteride. Wakes up 3-4 times during the night to urinate. Noted he had a recent flare up of dysuria 3 days ago and took 2 days of Cipro and symptom have cleared up. \par 3/7/19: The patient returned and reported that he had another flare up of prostatitis and took 2 days of Cipro for his symptoms to clear. Complained of lingering dysuria. I reminded the patient that with Cipro, there is a side effect of tendonitis. Denied gross hematuria. Currently taking Trimethoprim, Tamsulosin and Finasteride. PSA from 2/15/19 was 0.04 ng/mL.\par 4/4/19: The patient returned and reported that he another flare up of prostatitis. Complained of pain when he stands up. His pain is relieved while sitting down. Occasionally has dysuria. Wakes up twice during the night to urinate. Complained of urinary urgency during the night. Takes Tamsulosin BID, Trimethoprim and Finasteride.\par 5/9/19: The patient returned today for an US. Transrectal US of the prostate findings showed a prostate volume of 23.4 cc, a hypoechoic area at the left apex 1.37 x 1.25 cm in transverse image, unremarkable seminal vesicles, and no rectal masses. The hypoechoic are is suspicious for prostate cancer, but the patient has a low PSA. I recommended that the patient undergo a transperineal biopsy for the hyperechoic region, but due to an anal fissure, the patient would like to wait. Notes that he consulted with a colon surgeon about his anal fissure and is currently taking stool softeners. His urination is adequate. Complains of nocturia. Wakes up 5-6 times during the night to urinate. Complains of pain at the tip of his penis. Takes Tamsulosin BID. UA from 4/11/19 was normal. Culture from 4/11/19 showed no growth. Notes that he took Bactrim and ended up getting a rash on his LEs.\par 6/10/19: Patient presents today for a follow up. Today he reports that he is scheduled for an anal fissure operation for 6/21/19. It has been advised that he will need 4 weeks to heal following the operation. Regarding his urination he reports that during the day there are no urinary issues. Nocturia is reported and he will have to wake up 5-6x a night to urinate. Finasteride and Tamsulosin are taken as directed. \par 7/18/19: Patient presents today for a follow up. 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 Patient with hypoechoic lesion of the prostate seen on recent TRUS. PSA 0.690 on 5/2109. Patient on Flomax /Proscar with both irritative and obstructive LUTS. Needs to strain to initiate urination. Nocturia times 5- 6. Denies dysuria or hematuria. Patient states that he feels that he has prostatitis based on familiar symptoms. Additionally he no longer takes Lisinopril but now is on Metoprolol 10 mg. \par 8/7/19: Patient presents today for a follow up. He states that the Bactrim prescribed at his last visit was not providing any relief. He still c/o discomfort when sitting but denies dysuria. He had Ciprofloxacin at home and had taken two tablets over the past two days. He noticed relief and inquired about having the prescription refilled today. \par 10/10/19: Patient presents today for a follow up. On 9/13/19 a needle biopsy of the prostate was completed under general anesthesia. He states that after the biopsy his chronic prostatitis began to cause discomfort. Ciprofloxacin 500 mg was used as directed and the discomfort is no longer present. Additionally it is reported that his sexual function decreased following the procedure but that it is starting to improve again. The results of the pathology report are to be discussed with him today. \par 12/31/2019: Patient presents today for a follow up. Pt reports pain on standing. He has an inflamed prostate. When the bladder is fill he also experiences pain and the pain goes away after he voids. Pt took Cipro to 7 days, finishing last week. While he took it, he felt better. Pt reports allergy to sulfur drugs and develops rash.\par 2/10/20: Patient presents today for a follow up. He had an MRI of the pelvis 1/23/20 which showed no evidence of rectal fistula could be identified. Pt has inflamed prostate\par Pt reports constipation for which he takes stool softner. He admits to dysuria and hematuria with mild pain at the tip of the penis yesterday. Most recent PSA was 0.24, Testosterone is 390.0. \par 02/27/2020: Patient presents today for a follow up. At the conclusion of the last visit, Doxycycline 100 mg was prescribed. The patient states he noticed no improvement. Today he reports having nighttime pain in the rectum which is alleviated after urination. Wakes up 4-5 times during the night to urinate. He has satisfactory day time urination though he reports associated strain. Denies chest pain. He currently takes a stool softener for constipation. The patient also complains of lower back pain for which he will get an injection shortly. A urine culture from 2/10/2020 showed no growth. His most recent creatinine level from 7/11/2019 was 1.07.\par \par 07/28/2020: Patient presents today for a follow up. Reports pain in the rectum and pain with fullness of the bladder. Pt had a US at [place]. Was given suppository which helped. Noxturia x4-5. On Tamsulosin BID. No burning. Frequency at night. Pt must push and staring. He experiences difficulty with his flow and voiding in general. Wife reports fowl smelling urine. \par \par 8/25/2020: Patient presents today for follow up on nocturia. Patient states he has not yet taken Desmopressin as he does not want to take too many medications. Still experiencing pain in rectum and with fullness of bladder. Was given Valium which helped. Last lab work on 7/28/2020 was within normal limits. Osmolality was normal at 294. Patient to start Desmopressin 0.1mg once before bedtime. \par \par 09/15/2020: Patient presents today for follow up. Pt was having pain in rectum and finished 5 days of Cipro which helped resolve pain, but causes constipation. Takes Colace for constipation. Urinalysis on 8/25/20 was normal. Still has pain in rectum at night, and uses Metronidazole gel to relieve it. Wakes up every 2 hours at night to urinate. Has some urgency. \par \par 11/17/2020: The patient presents today for a follow up. He is not taking desmopressin anymore. His last blood work on 11/6/2020 showed his creatinine was good at 1.0. He has no burning. He has some pain in the rectum and when his bladder is full. He is still taking finasteride 5 mg and trimethoprim 100 mg. At night he gets a lot of pain when his bladder is full and has urinary urgency about every 2 hours. After he urinates, the pain goes away. \par \par 03/09/2021: 75 yr  history of BPH, prostatitis , overactive bladder with nocturnal frequency.  Rectal area pains, last visit [facility] November 17, 2020 showing rectal mucosa nodule. To follow GI. \par patient said he is taking gabapentin and tamsulosin and finasteride. Pain is worse when bladder is full and less when he voids. \par Nocturnal frequency Q2 hr\par ED since 2019 \par Patient is seeing  for his rectal pain.  \par \par 05/24/2021: Patient presents today for follow up. Patient went to a rectal surgeon for his rectal pain and had pelvic MRI 4/29/21 which showed no evidence of perianal fistula. States he continues to have pain in rectum after sitting for a long time and then standing. Patient was instructed to take Gabapentin 400mg TID, but skips afternoon dose as it makes him sleepy. Does have constipation. Has not started Tadalafil. No hx of prostate cancer. \par \par 06/15/2021: Patient presents today for a follow up. He reports that last week he had an episode of prostatitis for which he took Cipro for. Currently wakes every 2 hours during the night to urinate. Has tried desipramine and desmopressin in the past which did not improve his symptoms. Daytime urination remains stable. Stream is strong. Pt states he went to the cardiologist today and an EKG was done which was all normal. Currently takes iron supplements. \par \par 09/01/2021: Patient presents today for follow up. Today complains of straining and pushing to urinate. Denies dysuria. Denies infection of penis. Requests renewal of Finasteride and Tamsulosin. Not taking Trospium. Stopped Tadalafil due to side effect. \par \par 09/29/2021: Patient presents today for follow up. 9/1/21 UA showed 12 RBC/HPF and trace blood by dipstick. On repeat 9/9/21, he had only 3 RBC/HPF and small blood by dripstick which normal. Urine cytology 9/1/21 negative for high grade urothelial carcinoma. Provided blood work from 9/17/21 done by PCP which showed UA slightly cloudy, 3-5 RBC/HPF. PSA 0.2. Wakes 4x at night to urinate. States he has rectal pain when bladder is full. \par \par 02/02/2022: Patient presents today for follow up. Reviewed 12/29/21 lab work which showed MCV low at 75.2, WBC 6600, Hb 12.1, platelets [id number], potassium 4.9, creatinine 0.90 upper limits 1.18, BUN high at 25, AST minimally elevated 39 upper limits 34, alk phos normal 60, HbA1c 6.0, UA dipstick positive small blood, moderate leukocyte esterase, few squamous epithelial cells. Has pain when he sits and feels better when standing or laying down. States he must push to urinate. No pain on urination. Nocturia x3-4. Takes tamsulosin 0.4mg BID. Reports his EKG is normal. \par \par 03/14/2022: Patient presents today for follow up. Has been doing well, however does have to push urination. Nocturia x4. Erections are poor but not interested in medications. No dysuria. Stream is slow. Continues finasteride 5mg once daily and tamsulosin 0.4mg BID. Does not take tadalafil every day. Does not have any red itchy skin and has not needed to use clotrimazole-betamethasone cream. Reviewed 2/2/22 lab work. Urine culture grew 10,000-40,000 CFU/mL Enterococcus faecalis and patient was treated with amoxicillin 500mg TID and feels better now. PSA 0.24. Free testosterone low 3.9. Lab work of 2/11/22 showed brain natriuretic peptide normal, procalcitonin undetectable <0.10 ng/mL. Will be having evaluation for anorectal pain, with the proposed procedures being anorectal manometry, electromyography, pudendal nerve terminal motor latency, and endorectal ultrasound. Denies bladder pain. Will be having procedure for varicose veins of RLE. \par \par 03/30/2022: Patient presents today for follow up. Urination is good. Continues finasteride 5mg once daily and tamsulosin 0.4mg BID. Did not help with urination. Denies dysuria. Only has urinary urgency at night. Notes he eats spicy foods. No pain where he sits. Energy levels are good. Has tried Cialis in the past which did not help much. Erections had been poor since the biopsy. Reviewed 3/13/22 lab work which was WNL except free testosterone low 4.0, LH elevated 19, dihydrotestosterone low 4.1. Total testosterone normal 250. PSA was 0.20. UA normal. Urine cytology showed clusters of atypical urothelial cells with high nuclear cytoplasmic ratio, nuclear hyperchromasia and irregular nuclear contours in a background of moderate acute inflammation. States he went to rectal surgeon for anorectal pain, and had US which was reportedly normal and found muscles are weak, and was advised muscle dysfunction physical therapy which he is currently attending. \par \par 04/08/2022: Patient presents today for a follow up telephone visit for which he gave permission for. The visit was mostly conducted with his wife who was present. The patient has been taking finasteride 5 mg once daily and tamsulosin 0.4 mg BID. I prescribed the patient tadalafil 5 mg once daily at the time of the last visit, however he has not taken it. The pt's wife states today that her  told me during his 3/30/2022 visit he had a problems taking tadalafil which included flatulence and severe muscle aches. Both my personal memory and my note from that day do not recall him ever saying that, however there must have been some sort of miscommunication. \par \par 04/28/2022: Patient presents today for a follow up visit. Yesterday, he was having pain, but today he is feeling better. On 4/27/22, his PSA was 0.21 ng/mL, creatinine was 1.04. He also is complaining of rectal pain. He currently is taking Silodosin, one capsule daily, Tamsulosin once daily, Proscar once daily and Cefadroxil. His symptoms have improved with Cefadroxil. Previously he took Cipro, but it did not work satisfactorily. He is not taking Tadalafil. He denies urgency, but is experiencing slight urge incontinence. He reports Gabapentin alleviated his pain.\par \par 06/07/2022: 's wife called today and spoke to my nurse. She stated that I told her  to take tamsulosin 0.4 mg 2 capsules BID, however there was a misunderstanding and his wife wanted to speak to me. I informed her that it is tamsulosin 0.4 mg one capsule BID and she understood. His wife reports that he has been having difficulties urinating. Has an appt to see me on 6/30/2022. \par \par 07/20/2022:  presents today for a follow up. Patient obtained a CT Urogram w/wo contrast on 7/14/2022 to evaluate microscopic hematuria. CT revealed renal cysts and less than 1 cm low-attenuation lesions which are too small to characterize. No enhancing lesions, or renal stones are identified. On the delayed images, there is poor opacification of the proximal and distal ureter and the right distal ureter. No abnormal soft tissue or gross urothelial lesion is appreciated. Bladder was within normal limits. The anterior aspect of the bladder is not opacified on the delayed imaging. No lymphadenopathy was seen. The patient was pleased with the CT findings. We reviewed his prostate symptoms. Has a long hx of chronic prostatitis that initially responded to cipro but sometimes required bactrim. He currently has no acute problems. Denies pain when sitting for long periods of time. Has some minor urinary urgency and frequency at times but is overall pleased with his urologic state. Continues on finasteride 5 mg once daily, tamsulosin 0.4 mg once daily, tadalafil 5 mg once daily, and silodosin 8 mg once daily. Last PSA 4/27/2022 was 0.21. Creatinine at that time was 1.04.

## 2022-07-30 NOTE — ADDENDUM
[FreeTextEntry1] : This note was authored by Guerline Gold working as a scribe for Dr.Gary Duron. I, Dr. Collins Duron have reviewed the content of this note and confirm it is true and accurate. I personally performed the history and physical examination and made all the decisions 07/27/2022.

## 2022-07-30 NOTE — HISTORY OF PRESENT ILLNESS
[FreeTextEntry1] : Mr Carrizales is a 76 year old man presented for follow up of gross hematuria, urinary frequency and urgency. The patient takes tamsulosin 0.4 mg, one half hour after meal twice daily, finasteride 5 mg once daily, and desipramine 10 mg at bedtime. The patient returned to the office 03/20/17 having gross hematuria with blood clot. Dr Holly Bernardo started the patient on Bactrim DS. He eventually went to the ER 03/21/17 where he had a Spann catheter placed. His creatinine in the ER was 1.01. Cystoscopy 03/23/17 found catheter trauma. Prostate protruded modest distance into the bladder. Bladder has a small capacity. Diffuse oozing of blood from bladder neck and trigone. Scraped up mucosa most likely from introduction of telescope. No bladder stones or tumors. Bladder inflammation from catheter. CT urogram was normal and found no etiology of the gross hematuria.\par 3/1/18: The patient returned and reported he has pain in his rectum area and penis. PSA from 1/24/18 was 0.39 ng/mL. 2/15/18 Creatinine was 1.01 mg/dL, hemoglobin was 14.1 g/dL. He received and abdomen ultrasound which shows a fatty liver, gallstones, and obscured pancreas. He denies any gross hematuria. He noted one day ago his GI physician put banding on his hemorrhoids. \par 4/10/18: The patient returned and reported he finished his antibiotics from last visit and noted he had another hemorrhoid and put another band by his GI. He noted pain still persists. UA from 3/1/18 shows 8 WBC/HPF. CBC from 3/1/18 showed slight anemia. \par 5/23/18: The patient returned and reported he has been taking Bactrim for his most recent prostatitis starting one week ago. PSA from 1/22/18 was 0.39 ng/mL. Currently taking Tamsulosin and Finasteride. \par 8/28/18: The patient returned and reported his urination is satisfactory. Currently taking tamsulosin and finasteride. Wakes up 3-4 times during the night to urinate. Noted he had a recent flare up of dysuria 3 days ago and took 2 days of Cipro and symptom have cleared up. \par 3/7/19: The patient returned and reported that he had another flare up of prostatitis and took 2 days of Cipro for his symptoms to clear. Complained of lingering dysuria. I reminded the patient that with Cipro, there is a side effect of tendonitis. Denied gross hematuria. Currently taking Trimethoprim, Tamsulosin and Finasteride. PSA from 2/15/19 was 0.04 ng/mL.\par 4/4/19: The patient returned and reported that he another flare up of prostatitis. Complained of pain when he stands up. His pain is relieved while sitting down. Occasionally has dysuria. Wakes up twice during the night to urinate. Complained of urinary urgency during the night. Takes Tamsulosin BID, Trimethoprim and Finasteride.\par 5/9/19: The patient returned today for an US. Transrectal US of the prostate findings showed a prostate volume of 23.4 cc, a hypoechoic area at the left apex 1.37 x 1.25 cm in transverse image, unremarkable seminal vesicles, and no rectal masses. The hypoechoic are is suspicious for prostate cancer, but the patient has a low PSA. I recommended that the patient undergo a transperineal biopsy for the hyperechoic region, but due to an anal fissure, the patient would like to wait. Notes that he consulted with a colon surgeon about his anal fissure and is currently taking stool softeners. His urination is adequate. Complains of nocturia. Wakes up 5-6 times during the night to urinate. Complains of pain at the tip of his penis. Takes Tamsulosin BID. UA from 4/11/19 was normal. Culture from 4/11/19 showed no growth. Notes that he took Bactrim and ended up getting a rash on his LEs.\par 6/10/19: Patient presents today for a follow up. Today he reports that he is scheduled for an anal fissure operation for 6/21/19. It has been advised that he will need 4 weeks to heal following the operation. Regarding his urination he reports that during the day there are no urinary issues. Nocturia is reported and he will have to wake up 5-6x a night to urinate. Finasteride and Tamsulosin are taken as directed. \par 7/18/19: Patient presents today for a follow up. 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 Patient with hypoechoic lesion of the prostate seen on recent TRUS. PSA 0.690 on 5/2109. Patient on Flomax /Proscar with both irritative and obstructive LUTS. Needs to strain to initiate urination. Nocturia times 5- 6. Denies dysuria or hematuria. Patient states that he feels that he has prostatitis based on familiar symptoms. Additionally he no longer takes Lisinopril but now is on Metoprolol 10 mg. \par 8/7/19: Patient presents today for a follow up. He states that the Bactrim prescribed at his last visit was not providing any relief. He still c/o discomfort when sitting but denies dysuria. He had Ciprofloxacin at home and had taken two tablets over the past two days. He noticed relief and inquired about having the prescription refilled today. \par 10/10/19: Patient presents today for a follow up. On 9/13/19 a needle biopsy of the prostate was completed under general anesthesia. He states that after the biopsy his chronic prostatitis began to cause discomfort. Ciprofloxacin 500 mg was used as directed and the discomfort is no longer present. Additionally it is reported that his sexual function decreased following the procedure but that it is starting to improve again. The results of the pathology report are to be discussed with him today. \par 12/31/2019: Patient presents today for a follow up. Pt reports pain on standing. He has an inflamed prostate. When the bladder is fill he also experiences pain and the pain goes away after he voids. Pt took Cipro to 7 days, finishing last week. While he took it, he felt better. Pt reports allergy to sulfur drugs and develops rash.\par 2/10/20: Patient presents today for a follow up. He had an MRI of the pelvis 1/23/20 which showed no evidence of rectal fistula could be identified. Pt has inflamed prostate\par Pt reports constipation for which he takes stool softner. He admits to dysuria and hematuria with mild pain at the tip of the penis yesterday. Most recent PSA was 0.24, Testosterone is 390.0. \par 02/27/2020: Patient presents today for a follow up. At the conclusion of the last visit, Doxycycline 100 mg was prescribed. The patient states he noticed no improvement. Today he reports having nighttime pain in the rectum which is alleviated after urination. Wakes up 4-5 times during the night to urinate. He has satisfactory day time urination though he reports associated strain. Denies chest pain. He currently takes a stool softener for constipation. The patient also complains of lower back pain for which he will get an injection shortly. A urine culture from 2/10/2020 showed no growth. His most recent creatinine level from 7/11/2019 was 1.07.\par \par 07/28/2020: Patient presents today for a follow up. Reports pain in the rectum and pain with fullness of the bladder. Pt had a US at [facility]. Was given suppository which helped. Noxturia x4-5. On Tamsulosin BID. No burning. Frequency at night. Pt must push and staring. He experiences difficulty with his flow and voiding in general. Wife reports fowl smelling urine. \par \par 8/25/2020: Patient presents today for follow up on nocturia. Patient states he has not yet taken Desmopressin as he does not want to take too many medications. Still experiencing pain in rectum and with fullness of bladder. Was given Valium which helped. Last lab work on 7/28/2020 was within normal limits. Osmolality was normal at 294. Patient to start Desmopressin 0.1mg once before bedtime. \par \par 09/15/2020: Patient presents today for follow up. Pt was having pain in rectum and finished 5 days of Cipro which helped resolve pain, but causes constipation. Takes Colace for constipation. Urinalysis on 8/25/20 was normal. Still has pain in rectum at night, and uses Metronidazole gel to relieve it. Wakes up every 2 hours at night to urinate. Has some urgency. \par \par 11/17/2020: The patient presents today for a follow up. He is not taking desmopressin anymore. His last blood work on 11/6/2020 showed his creatinine was good at 1.0. He has no burning. He has some pain in the rectum and when his bladder is full. He is still taking finasteride 5 mg and trimethoprim 100 mg. At night he gets a lot of pain when his bladder is full and has urinary urgency about every 2 hours. After he urinates, the pain goes away. \par \par 03/09/2021: 75 yr  history of BPH, prostatitis , overactive bladder with nocturnal frequency.  Rectal area pains, last visit [facility] November 17, 2020 showing rectal mucosa nodule. To follow GI. \par patient said he is taking gabapentin and tamsulosin and finasteride. Pain is worse when bladder is full and less when he voids. \par Nocturnal frequency Q2 hr\par ED since 2019 \par Patient is seeing  for his rectal pain.  \par \par 05/24/2021: Patient presents today for follow up. Patient went to a rectal surgeon for his rectal pain and had pelvic MRI 4/29/21 which showed no evidence of perianal fistula. States he continues to have pain in rectum after sitting for a long time and then standing. Patient was instructed to take Gabapentin 400mg TID, but skips afternoon dose as it makes him sleepy. Does have constipation. Has not started Tadalafil. No hx of prostate cancer. \par \par 06/15/2021: Patient presents today for a follow up. He reports that last week he had an episode of prostatitis for which he took Cipro for. Currently wakes every 2 hours during the night to urinate. Has tried desipramine and desmopressin in the past which did not improve his symptoms. Daytime urination remains stable. Stream is strong. Pt states he went to the cardiologist today and an EKG was done which was all normal. Currently takes iron supplements. \par \par 09/01/2021: Patient presents today for follow up. Today complains of straining and pushing to urinate. Denies dysuria. Denies infection of penis. Requests renewal of Finasteride and Tamsulosin. Not taking Trospium. Stopped Tadalafil due to side effect. \par \par 09/29/2021: Patient presents today for follow up. 9/1/21 UA showed 12 RBC/HPF and trace blood by dipstick. On repeat 9/9/21, he had only 3 RBC/HPF and small blood by dripstick which normal. Urine cytology 9/1/21 negative for high grade urothelial carcinoma. Provided blood work from 9/17/21 done by PCP which showed UA slightly cloudy, 3-5 RBC/HPF. PSA 0.2. Wakes 4x at night to urinate. States he has rectal pain when bladder is full. \par \par 02/02/2022: Patient presents today for follow up. Reviewed 12/29/21 lab work which showed MCV low at 75.2, WBC 6600, Hb 12.1, platelets 063262, potassium 4.9, creatinine 0.90 upper limits 1.18, BUN high at 25, AST minimally elevated 39 upper limits 34, alk phos normal 60, HbA1c 6.0, UA dipstick positive small blood, moderate leukocyte esterase, few squamous epithelial cells. Has pain when he sits and feels better when standing or laying down. States he must push to urinate. No pain on urination. Nocturia x3-4. Takes tamsulosin 0.4mg BID. Reports his EKG is normal. \par \par 03/14/2022: Patient presents today for follow up. Has been doing well, however does have to push urination. Nocturia x4. Erections are poor but not interested in medications. No dysuria. Stream is slow. Continues finasteride 5mg once daily and tamsulosin 0.4mg BID. Does not take tadalafil every day. Does not have any red itchy skin and has not needed to use clotrimazole-betamethasone cream. Reviewed 2/2/22 lab work. Urine culture grew 10,000-40,000 CFU/mL Enterococcus faecalis and patient was treated with amoxicillin 500mg TID and feels better now. PSA 0.24. Free testosterone low 3.9. Lab work of 2/11/22 showed brain natriuretic peptide normal, procalcitonin undetectable <0.10 ng/mL. Will be having evaluation for anorectal pain, with the proposed procedures being anorectal manometry, electromyography, pudendal nerve terminal motor latency, and endorectal ultrasound. Denies bladder pain. Will be having procedure for varicose veins of RLE. \par \par 03/30/2022: Patient presents today for follow up. Urination is good. Continues finasteride 5mg once daily and tamsulosin 0.4mg BID. Did not help with urination. Denies dysuria. Only has urinary urgency at night. Notes he eats spicy foods. No pain where he sits. Energy levels are good. Has tried Cialis in the past which did not help much. Erections had been poor since the biopsy. Reviewed 3/13/22 lab work which was WNL except free testosterone low 4.0, LH elevated 19, dihydrotestosterone low 4.1. Total testosterone normal 250. PSA was 0.20. UA normal. Urine cytology showed clusters of atypical urothelial cells with high nuclear cytoplasmic ratio, nuclear hyperchromasia and irregular nuclear contours in a background of moderate acute inflammation. States he went to rectal surgeon for anorectal pain, and had US which was reportedly normal and found muscles are weak, and was advised muscle dysfunction physical therapy which he is currently attending. \par \par 04/08/2022: Patient presents today for a follow up telephone visit for which he gave permission for. The visit was mostly conducted with his wife who was present. The patient has been taking finasteride 5 mg once daily and tamsulosin 0.4 mg BID. I prescribed the patient tadalafil 5 mg once daily at the time of the last visit, however he has not taken it. The pt's wife states today that her  told me during his 3/30/2022 visit he had a problems taking tadalafil which included flatulence and severe muscle aches. Both my personal memory and my note from that day do not recall him ever saying that, however there must have been some sort of miscommunication. \par \par 04/28/2022: Patient presents today for a follow up visit. Yesterday, he was having pain, but today he is feeling better. On 4/27/22, his PSA was 0.21 ng/mL, creatinine was 1.04. He also is complaining of rectal pain. He currently is taking Silodosin, one capsule daily, Tamsulosin once daily, Proscar once daily and Cefadroxil. His symptoms have improved with Cefadroxil. Previously he took Cipro, but it did not work satisfactorily. He is not taking Tadalafil. He denies urgency, but is experiencing slight urge incontinence. He reports Gabapentin alleviated his pain.\par \par 06/07/2022: 's wife called today and spoke to my nurse. She stated that I told her  to take tamsulosin 0.4 mg 2 capsules BID, however there was a misunderstanding and his wife wanted to speak to me. I informed her that it is tamsulosin 0.4 mg one capsule BID and she understood. His wife reports that he has been having difficulties urinating. Has an appt to see me on 6/30/2022. \par \par 07/20/2022:  presents today for a follow up. Patient obtained a CT Urogram w/wo contrast on 7/14/2022 to evaluate microscopic hematuria. CT revealed renal cysts and less than 1 cm low-attenuation lesions which are too small to characterize. No enhancing lesions, or renal stones are identified. On the delayed images, there is poor opacification of the proximal and distal ureter and the right distal ureter. No abnormal soft tissue or gross urothelial lesion is appreciated. Bladder was within normal limits. The anterior aspect of the bladder is not opacified on the delayed imaging. No lymphadenopathy was seen. The patient was pleased with the CT findings. We reviewed his prostate symptoms. Has a long hx of chronic prostatitis that initially responded to cipro but sometimes required bactrim. He currently has no acute problems. Denies pain when sitting for long periods of time. Has some minor urinary urgency and frequency at times but is overall pleased with his urologic state. Continues on finasteride 5 mg once daily, tamsulosin 0.4 mg once daily, tadalafil 5 mg once daily, and silodosin 8 mg once daily. Last PSA 4/27/2022 was 0.21. Creatinine at that time was 1.04. \par \par 07/27/2022:  presents today for a telephone visit for which he gave permission for. He wanted to review the results of his most recent urine test results. UA was perfect. Culture was no growth. Cytology consisted of mainly mature squamous epithelial cells, acute inflammation, and rare benign urothelial cells. Cytology was negative for high grade urothelial carcinoma. His wife inquired on why he needs to proceed with a cystoscopy.

## 2022-07-30 NOTE — ADDENDUM
[FreeTextEntry1] : This note was authored by Guerline Gold working as a scribe for Dr.Gary Duron. I, Dr. Collins Duron have reviewed the content of this note and confirm it is true and accurate. I personally performed the history and physical examination and made all the decisions 07/21/2022.

## 2022-07-30 NOTE — ASSESSMENT

## 2022-08-07 NOTE — H&P PST ADULT - HEART RATE (BEATS/MIN)
VITAL SIGNS: I have reviewed nursing notes and confirm.  CONSTITUTIONAL: well-appearing, appropriate for age, non-toxic, NAD  SKIN: Warm dry, normal skin turgor  HEAD: NCAT  EYES: PERRLA  ENT: Moist mucous membranes, normal pharynx with + erythema, no exudates.  TM's normal b/l without bulging, no mastoid tenderness  NECK: Supple; non tender. Full ROM. No cervical LAD  CARD: RRR, no murmurs, rubs or gallops  RESP: clear to ausculation b/l.  No rales, rhonchi, or wheezing.  ABD: soft, + BS, non-tender, non-distended, no rebound or guarding. No CVA tenderness  EXT: Full ROM, no bony tenderness, no pedal edema, no calf tenderness  NEURO: normal motor. normal sensory.
79

## 2022-08-30 ENCOUNTER — APPOINTMENT (OUTPATIENT)
Dept: DERMATOLOGY | Facility: CLINIC | Age: 77
End: 2022-08-30

## 2022-08-30 PROCEDURE — 99203 OFFICE O/P NEW LOW 30 MIN: CPT

## 2022-10-01 LAB
APPEARANCE: CLEAR
BACTERIA UR CULT: NORMAL
BACTERIA: NEGATIVE
BILIRUBIN URINE: NEGATIVE
BLOOD URINE: NEGATIVE
COLOR: NORMAL
GLUCOSE QUALITATIVE U: NEGATIVE
HYALINE CASTS: 0 /LPF
KETONES URINE: NEGATIVE
LEUKOCYTE ESTERASE URINE: NEGATIVE
MICROSCOPIC-UA: NORMAL
NITRITE URINE: NEGATIVE
PH URINE: 6
PROTEIN URINE: NEGATIVE
RED BLOOD CELLS URINE: 1 /HPF
SPECIFIC GRAVITY URINE: 1.01
SQUAMOUS EPITHELIAL CELLS: 2 /HPF
URINE CYTOLOGY: NORMAL
UROBILINOGEN URINE: NORMAL
WHITE BLOOD CELLS URINE: 1 /HPF

## 2022-12-06 ENCOUNTER — APPOINTMENT (OUTPATIENT)
Dept: DERMATOLOGY | Facility: CLINIC | Age: 77
End: 2022-12-06

## 2022-12-06 DIAGNOSIS — L30.0 NUMMULAR DERMATITIS: ICD-10-CM

## 2022-12-06 DIAGNOSIS — L81.0 POSTINFLAMMATORY HYPERPIGMENTATION: ICD-10-CM

## 2022-12-06 PROCEDURE — 99214 OFFICE O/P EST MOD 30 MIN: CPT

## 2023-01-25 ENCOUNTER — APPOINTMENT (OUTPATIENT)
Dept: UROLOGY | Facility: CLINIC | Age: 78
End: 2023-01-25

## 2023-03-01 ENCOUNTER — LABORATORY RESULT (OUTPATIENT)
Age: 78
End: 2023-03-01

## 2023-03-01 LAB
APPEARANCE: CLEAR
BACTERIA: NEGATIVE
BILIRUBIN URINE: NEGATIVE
BLOOD URINE: NEGATIVE
COLOR: YELLOW
GLUCOSE QUALITATIVE U: NEGATIVE
HYALINE CASTS: 0 /LPF
KETONES URINE: NEGATIVE
LEUKOCYTE ESTERASE URINE: NEGATIVE
MICROSCOPIC-UA: NORMAL
NITRITE URINE: NEGATIVE
PH URINE: 6.5
PROTEIN URINE: NEGATIVE
RED BLOOD CELLS URINE: 4 /HPF
SPECIFIC GRAVITY URINE: 1.02
SQUAMOUS EPITHELIAL CELLS: 1 /HPF
URINE CYTOLOGY: NORMAL
UROBILINOGEN URINE: NORMAL
WHITE BLOOD CELLS URINE: 3 /HPF

## 2023-03-02 ENCOUNTER — APPOINTMENT (OUTPATIENT)
Dept: UROLOGY | Facility: CLINIC | Age: 78
End: 2023-03-02
Payer: MEDICARE

## 2023-03-02 VITALS
DIASTOLIC BLOOD PRESSURE: 74 MMHG | RESPIRATION RATE: 16 BRPM | SYSTOLIC BLOOD PRESSURE: 133 MMHG | HEART RATE: 75 BPM | TEMPERATURE: 98.2 F | OXYGEN SATURATION: 99 %

## 2023-03-02 DIAGNOSIS — M54.16 RADICULOPATHY, LUMBAR REGION: ICD-10-CM

## 2023-03-02 LAB — BACTERIA UR CULT: NORMAL

## 2023-03-02 PROCEDURE — 99215 OFFICE O/P EST HI 40 MIN: CPT

## 2023-03-05 PROBLEM — M54.16 LUMBAR RADICULOPATHY: Status: ACTIVE | Noted: 2020-02-05

## 2023-03-05 LAB
ALBUMIN SERPL ELPH-MCNC: 4.2 G/DL
ALP BLD-CCNC: 60 U/L
ALT SERPL-CCNC: 24 U/L
ANION GAP SERPL CALC-SCNC: 13 MMOL/L
AST SERPL-CCNC: 26 U/L
BILIRUB SERPL-MCNC: 0.3 MG/DL
BUN SERPL-MCNC: 21 MG/DL
CALCIUM SERPL-MCNC: 10.5 MG/DL
CHLORIDE SERPL-SCNC: 101 MMOL/L
CO2 SERPL-SCNC: 27 MMOL/L
CREAT SERPL-MCNC: 1.08 MG/DL
EGFR: 71 ML/MIN/1.73M2
GLUCOSE SERPL-MCNC: 79 MG/DL
POTASSIUM SERPL-SCNC: 4.7 MMOL/L
PROT SERPL-MCNC: 6.7 G/DL
PSA SERPL-MCNC: 0.18 NG/ML
SODIUM SERPL-SCNC: 141 MMOL/L
TESTOST SERPL-MCNC: 275 NG/DL

## 2023-03-05 NOTE — ADDENDUM
[FreeTextEntry1] : This note was authored by Guerline Gold working as a scribe for Dr.Gary Duron. I, Dr. Collins Duron have reviewed the content of this note and confirm it is true and accurate. I personally performed the history and physical examination and made all the decisions 03/02/2023

## 2023-03-05 NOTE — HISTORY OF PRESENT ILLNESS
[FreeTextEntry1] : Mr Hancock is a 77 year old man presented for follow up of gross hematuria, urinary frequency and urgency. The patient takes tamsulosin 0.4 mg, one half hour after meal twice daily, finasteride 5 mg once daily, and desipramine 10 mg at bedtime. The patient returned to the office 03/20/17 having gross hematuria with blood clot. Dr Holly Bernardo started the patient on Bactrim DS. He eventually went to the ER 03/21/17 where he had a Spann catheter placed. His creatinine in the ER was 1.01. Cystoscopy 03/23/17 found catheter trauma. Prostate protruded modest distance into the bladder. Bladder has a small capacity. Diffuse oozing of blood from bladder neck and trigone. Scraped up mucosa most likely from introduction of telescope. No bladder stones or tumors. Bladder inflammation from catheter. CT urogram was normal and found no etiology of the gross hematuria.\par 3/1/18: The patient returned and reported he has pain in his rectum area and penis. PSA from 1/24/18 was 0.39 ng/mL. 2/15/18 Creatinine was 1.01 mg/dL, hemoglobin was 14.1 g/dL. He received and abdomen ultrasound which shows a fatty liver, gallstones, and obscured pancreas. He denies any gross hematuria. He noted one day ago his GI physician put banding on his hemorrhoids. \par 4/10/18: The patient returned and reported he finished his antibiotics from last visit and noted he had another hemorrhoid and put another band by his GI. He noted pain still persists. UA from 3/1/18 shows 8 WBC/HPF. CBC from 3/1/18 showed slight anemia. \par 5/23/18: The patient returned and reported he has been taking Bactrim for his most recent prostatitis starting one week ago. PSA from 1/22/18 was 0.39 ng/mL. Currently taking Tamsulosin and Finasteride. \par 8/28/18: The patient returned and reported his urination is satisfactory. Currently taking tamsulosin and finasteride. Wakes up 3-4 times during the night to urinate. Noted he had a recent flare up of dysuria 3 days ago and took 2 days of Cipro and symptom have cleared up. \par 3/7/19: The patient returned and reported that he had another flare up of prostatitis and took 2 days of Cipro for his symptoms to clear. Complained of lingering dysuria. I reminded the patient that with Cipro, there is a side effect of tendonitis. Denied gross hematuria. Currently taking Trimethoprim, Tamsulosin and Finasteride. PSA from 2/15/19 was 0.04 ng/mL.\par 4/4/19: The patient returned and reported that he another flare up of prostatitis. Complained of pain when he stands up. His pain is relieved while sitting down. Occasionally has dysuria. Wakes up twice during the night to urinate. Complained of urinary urgency during the night. Takes Tamsulosin BID, Trimethoprim and Finasteride.\par 5/9/19: The patient returned today for an US. Transrectal US of the prostate findings showed a prostate volume of 23.4 cc, a hypoechoic area at the left apex 1.37 x 1.25 cm in transverse image, unremarkable seminal vesicles, and no rectal masses. The hypoechoic are is suspicious for prostate cancer, but the patient has a low PSA. I recommended that the patient undergo a transperineal biopsy for the hyperechoic region, but due to an anal fissure, the patient would like to wait. Notes that he consulted with a colon surgeon about his anal fissure and is currently taking stool softeners. His urination is adequate. Complains of nocturia. Wakes up 5-6 times during the night to urinate. Complains of pain at the tip of his penis. Takes Tamsulosin BID. UA from 4/11/19 was normal. Culture from 4/11/19 showed no growth. Notes that he took Bactrim and ended up getting a rash on his LEs.\par 6/10/19: Patient presents today for a follow up. Today he reports that he is scheduled for an anal fissure operation for 6/21/19. It has been advised that he will need 4 weeks to heal following the operation. Regarding his urination he reports that during the day there are no urinary issues. Nocturia is reported and he will have to wake up 5-6x a night to urinate. Finasteride and Tamsulosin are taken as directed. \par 7/18/19: Patient presents today for a follow up. 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 Patient with hypoechoic lesion of the prostate seen on recent TRUS. PSA 0.690 on 5/2109. Patient on Flomax /Proscar with both irritative and obstructive LUTS. Needs to strain to initiate urination. Nocturia times 5- 6. Denies dysuria or hematuria. Patient states that he feels that he has prostatitis based on familiar symptoms. Additionally he no longer takes Lisinopril but now is on Metoprolol 10 mg. \par 8/7/19: Patient presents today for a follow up. He states that the Bactrim prescribed at his last visit was not providing any relief. He still c/o discomfort when sitting but denies dysuria. He had Ciprofloxacin at home and had taken two tablets over the past two days. He noticed relief and inquired about having the prescription refilled today. \par 10/10/19: Patient presents today for a follow up. On 9/13/19 a needle biopsy of the prostate was completed under general anesthesia. He states that after the biopsy his chronic prostatitis began to cause discomfort. Ciprofloxacin 500 mg was used as directed and the discomfort is no longer present. Additionally it is reported that his sexual function decreased following the procedure but that it is starting to improve again. The results of the pathology report are to be discussed with him today. \par 12/31/2019: Patient presents today for a follow up. Pt reports pain on standing. He has an inflamed prostate. When the bladder is fill he also experiences pain and the pain goes away after he voids. Pt took Cipro to 7 days, finishing last week. While he took it, he felt better. Pt reports allergy to sulfur drugs and develops rash.\par 2/10/20: Patient presents today for a follow up. He had an MRI of the pelvis 1/23/20 which showed no evidence of rectal fistula could be identified. Pt has inflamed prostate\par Pt reports constipation for which he takes stool softner. He admits to dysuria and hematuria with mild pain at the tip of the penis yesterday. Most recent PSA was 0.24, Testosterone is 390.0. \par 02/27/2020: Patient presents today for a follow up. At the conclusion of the last visit, Doxycycline 100 mg was prescribed. The patient states he noticed no improvement. Today he reports having nighttime pain in the rectum which is alleviated after urination. Wakes up 4-5 times during the night to urinate. He has satisfactory day time urination though he reports associated strain. Denies chest pain. He currently takes a stool softener for constipation. The patient also complains of lower back pain for which he will get an injection shortly. A urine culture from 2/10/2020 showed no growth. His most recent creatinine level from 7/11/2019 was 1.07.\par \par 07/28/2020: Patient presents today for a follow up. Reports pain in the rectum and pain with fullness of the bladder. Pt had a US at [facility]. Was given suppository which helped. Noxturia x4-5. On Tamsulosin BID. No burning. Frequency at night. Pt must push and staring. He experiences difficulty with his flow and voiding in general. Wife reports fowl smelling urine. \par \par 8/25/2020: Patient presents today for follow up on nocturia. Patient states he has not yet taken Desmopressin as he does not want to take too many medications. Still experiencing pain in rectum and with fullness of bladder. Was given Valium which helped. Last lab work on 7/28/2020 was within normal limits. Osmolality was normal at 294. Patient to start Desmopressin 0.1mg once before bedtime. \par \par 09/15/2020: Patient presents today for follow up. Pt was having pain in rectum and finished 5 days of Cipro which helped resolve pain, but causes constipation. Takes Colace for constipation. Urinalysis on 8/25/20 was normal. Still has pain in rectum at night, and uses Metronidazole gel to relieve it. Wakes up every 2 hours at night to urinate. Has some urgency. \par \par 11/17/2020: The patient presents today for a follow up. He is not taking desmopressin anymore. His last blood work on 11/6/2020 showed his creatinine was good at 1.0. He has no burning. He has some pain in the rectum and when his bladder is full. He is still taking finasteride 5 mg and trimethoprim 100 mg. At night he gets a lot of pain when his bladder is full and has urinary urgency about every 2 hours. After he urinates, the pain goes away. \par \par 03/09/2021: 75 yr  history of BPH, prostatitis , overactive bladder with nocturnal frequency.  Rectal area pains, last visit [facility] November 17, 2020 showing rectal mucosa nodule. To follow GI. \par patient said he is taking gabapentin and tamsulosin and finasteride. Pain is worse when bladder is full and less when he voids. \par Nocturnal frequency Q2 hr\par ED since 2019 \par Patient is seeing  for his rectal pain.  \par \par 05/24/2021: Patient presents today for follow up. Patient went to a rectal surgeon for his rectal pain and had pelvic MRI 4/29/21 which showed no evidence of perianal fistula. States he continues to have pain in rectum after sitting for a long time and then standing. Patient was instructed to take Gabapentin 400mg TID, but skips afternoon dose as it makes him sleepy. Does have constipation. Has not started Tadalafil. No hx of prostate cancer. \par \par 06/15/2021: Patient presents today for a follow up. He reports that last week he had an episode of prostatitis for which he took Cipro for. Currently wakes every 2 hours during the night to urinate. Has tried desipramine and desmopressin in the past which did not improve his symptoms. Daytime urination remains stable. Stream is strong. Pt states he went to the cardiologist today and an EKG was done which was all normal. Currently takes iron supplements. \par \par 09/01/2021: Patient presents today for follow up. Today complains of straining and pushing to urinate. Denies dysuria. Denies infection of penis. Requests renewal of Finasteride and Tamsulosin. Not taking Trospium. Stopped Tadalafil due to side effect. \par \par 09/29/2021: Patient presents today for follow up. 9/1/21 UA showed 12 RBC/HPF and trace blood by dipstick. On repeat 9/9/21, he had only 3 RBC/HPF and small blood by dripstick which normal. Urine cytology 9/1/21 negative for high grade urothelial carcinoma. Provided blood work from 9/17/21 done by PCP which showed UA slightly cloudy, 3-5 RBC/HPF. PSA 0.2. Wakes 4x at night to urinate. States he has rectal pain when bladder is full. \par \par 02/02/2022: Patient presents today for follow up. Reviewed 12/29/21 lab work which showed MCV low at 75.2, WBC 6600, Hb 12.1, platelets 182727, potassium 4.9, creatinine 0.90 upper limits 1.18, BUN high at 25, AST minimally elevated 39 upper limits 34, alk phos normal 60, HbA1c 6.0, UA dipstick positive small blood, moderate leukocyte esterase, few squamous epithelial cells. Has pain when he sits and feels better when standing or laying down. States he must push to urinate. No pain on urination. Nocturia x3-4. Takes tamsulosin 0.4mg BID. Reports his EKG is normal. \par \par 03/14/2022: Patient presents today for follow up. Has been doing well, however does have to push urination. Nocturia x4. Erections are poor but not interested in medications. No dysuria. Stream is slow. Continues finasteride 5mg once daily and tamsulosin 0.4mg BID. Does not take tadalafil every day. Does not have any red itchy skin and has not needed to use clotrimazole-betamethasone cream. Reviewed 2/2/22 lab work. Urine culture grew 10,000-40,000 CFU/mL Enterococcus faecalis and patient was treated with amoxicillin 500mg TID and feels better now. PSA 0.24. Free testosterone low 3.9. Lab work of 2/11/22 showed brain natriuretic peptide normal, procalcitonin undetectable <0.10 ng/mL. Will be having evaluation for anorectal pain, with the proposed procedures being anorectal manometry, electromyography, pudendal nerve terminal motor latency, and endorectal ultrasound. Denies bladder pain. Will be having procedure for varicose veins of RLE. \par \par 03/30/2022: Patient presents today for follow up. Urination is good. Continues finasteride 5mg once daily and tamsulosin 0.4mg BID. Did not help with urination. Denies dysuria. Only has urinary urgency at night. Notes he eats spicy foods. No pain where he sits. Energy levels are good. Has tried Cialis in the past which did not help much. Erections had been poor since the biopsy. Reviewed 3/13/22 lab work which was WNL except free testosterone low 4.0, LH elevated 19, dihydrotestosterone low 4.1. Total testosterone normal 250. PSA was 0.20. UA normal. Urine cytology showed clusters of atypical urothelial cells with high nuclear cytoplasmic ratio, nuclear hyperchromasia and irregular nuclear contours in a background of moderate acute inflammation. States he went to rectal surgeon for anorectal pain, and had US which was reportedly normal and found muscles are weak, and was advised muscle dysfunction physical therapy which he is currently attending. \par \par 04/08/2022: Patient presents today for a follow up telephone visit for which he gave permission for. The visit was mostly conducted with his wife who was present. The patient has been taking finasteride 5 mg once daily and tamsulosin 0.4 mg BID. I prescribed the patient tadalafil 5 mg once daily at the time of the last visit, however he has not taken it. The pt's wife states today that her  told me during his 3/30/2022 visit he had a problems taking tadalafil which included flatulence and severe muscle aches. Both my personal memory and my note from that day do not recall him ever saying that, however there must have been some sort of miscommunication. \par \par 04/28/2022: Patient presents today for a follow up visit. Yesterday, he was having pain, but today he is feeling better. On 4/27/22, his PSA was 0.21 ng/mL, creatinine was 1.04. He also is complaining of rectal pain. He currently is taking Silodosin, one capsule daily, Tamsulosin once daily, Proscar once daily and Cefadroxil. His symptoms have improved with Cefadroxil. Previously he took Cipro, but it did not work satisfactorily. He is not taking Tadalafil. He denies urgency, but is experiencing slight urge incontinence. He reports Gabapentin alleviated his pain.\par \par 06/07/2022: 's wife called today and spoke to my nurse. She stated that I told her  to take tamsulosin 0.4 mg 2 capsules BID, however there was a misunderstanding and his wife wanted to speak to me. I informed her that it is tamsulosin 0.4 mg one capsule BID and she understood. His wife reports that he has been having difficulties urinating. Has an appt to see me on 6/30/2022. \par \par 07/20/2022:  presents today for a follow up. Patient obtained a CT Urogram w/wo contrast on 7/14/2022 to evaluate microscopic hematuria. CT revealed renal cysts and less than 1 cm low-attenuation lesions which are too small to characterize. No enhancing lesions, or renal stones are identified. On the delayed images, there is poor opacification of the proximal and distal ureter and the right distal ureter. No abnormal soft tissue or gross urothelial lesion is appreciated. Bladder was within normal limits. The anterior aspect of the bladder is not opacified on the delayed imaging. No lymphadenopathy was seen. The patient was pleased with the CT findings. We reviewed his prostate symptoms. Has a long hx of chronic prostatitis that initially responded to cipro but sometimes required bactrim. He currently has no acute problems. Denies pain when sitting for long periods of time. Has some minor urinary urgency and frequency at times but is overall pleased with his urologic state. Continues on finasteride 5 mg once daily, tamsulosin 0.4 mg once daily, tadalafil 5 mg once daily, and silodosin 8 mg once daily. Last PSA 4/27/2022 was 0.21. Creatinine at that time was 1.04. \par \par 07/27/2022:  presents today for a telephone visit for which he gave permission for. He wanted to review the results of his most recent urine test results. UA was perfect. Culture was no growth. Cytology consisted of mainly mature squamous epithelial cells, acute inflammation, and rare benign urothelial cells. Cytology was negative for high grade urothelial carcinoma. His wife inquired on why he needs to proceed with a cystoscopy. \par \par 03/02/2023: Mr. VIVIENNE HANCOCK presents today for a follow up. The pt continues to work as a . Patient provided a urine specimen on 2/28/2023. UA was normal. Culture was no significant growth. Urine cytology was negative for high grade urothelial carcinoma. Pt denies ever smoking or using tobacco products. Pt recently went to gastroenterologist and was told his stomach was distended. He admits to pushing and straining upon urination. His gastroenterologist put in orders for an US which he has scheduled. Patient continues to take finasteride 5 mg once daily and tamsulosin 0.4 mg BID. No longer is taking tadalafil 5 mg once daily.

## 2023-03-05 NOTE — PHYSICAL EXAM
[General Appearance - Well Developed] : well developed [General Appearance - Well Nourished] : well nourished [Normal Appearance] : normal appearance [Well Groomed] : well groomed [General Appearance - In No Acute Distress] : no acute distress [Abdomen Soft] : soft [Abdomen Tenderness] : non-tender [Costovertebral Angle Tenderness] : no ~M costovertebral angle tenderness [Edema] : no peripheral edema [] : no respiratory distress [Respiration, Rhythm And Depth] : normal respiratory rhythm and effort [Exaggerated Use Of Accessory Muscles For Inspiration] : no accessory muscle use [Oriented To Time, Place, And Person] : oriented to person, place, and time [Affect] : the affect was normal [Mood] : the mood was normal [Not Anxious] : not anxious [No Focal Deficits] : no focal deficits [FreeTextEntry1] : Ambulates with a cane.

## 2023-03-05 NOTE — REVIEW OF SYSTEMS
[Constipation] : constipation [Nocturia] : nocturia [Fever] : no fever [Chills] : no chills [Feeling Poorly] : not feeling poorly [Loss Of Hearing] : no hearing loss [Chest Pain] : no chest pain [Shortness Of Breath] : no shortness of breath [Abdominal Pain] : no abdominal pain [Dysuria] : no dysuria [Urinary Stream Is Smaller] : urine stream was not smaller [Arthralgias] : no arthralgias [Initiating Urination Req. Strain] : initiating urination does not require straining [Skin Lesions] : no skin lesions [Difficulty Walking] : no difficulty walking [Suicidal] : not suicidal [Proptosis] : no proptosis [Easy Bleeding] : no tendency for easy bleeding

## 2023-03-05 NOTE — ASSESSMENT
[FreeTextEntry1] : Mr Hancock is a 77 year old man presented for follow up of gross hematuria, urinary frequency and urgency. The patient takes tamsulosin 0.4 mg, one half hour after meal twice daily, finasteride 5 mg once daily, and desipramine 10 mg at bedtime. The patient returned to the office 03/20/17 having gross hematuria with blood clot. Dr Holly Bernardo started the patient on Bactrim DS. He eventually went to the ER 03/21/17 where he had a Spann catheter placed. His creatinine in the ER was 1.01. Cystoscopy 03/23/17 found catheter trauma. Prostate protruded modest distance into the bladder. Bladder has a small capacity. Diffuse oozing of blood from bladder neck and trigone. Scraped up mucosa most likely from introduction of telescope. No bladder stones or tumors. Bladder inflammation from catheter. CT urogram was normal and found no etiology of the gross hematuria.\par 3/1/18: The patient returned and reported he has pain in his rectum area and penis. PSA from 1/24/18 was 0.39 ng/mL. 2/15/18 Creatinine was 1.01 mg/dL, hemoglobin was 14.1 g/dL. He received and abdomen ultrasound which shows a fatty liver, gallstones, and obscured pancreas. He denies any gross hematuria. He noted one day ago his GI physician put banding on his hemorrhoids. \par 4/10/18: The patient returned and reported he finished his antibiotics from last visit and noted he had another hemorrhoid and put another band by his GI. He noted pain still persists. UA from 3/1/18 shows 8 WBC/HPF. CBC from 3/1/18 showed slight anemia. \par 5/23/18: The patient returned and reported he has been taking Bactrim for his most recent prostatitis starting one week ago. PSA from 1/22/18 was 0.39 ng/mL. Currently taking Tamsulosin and Finasteride. \par 8/28/18: The patient returned and reported his urination is satisfactory. Currently taking tamsulosin and finasteride. Wakes up 3-4 times during the night to urinate. Noted he had a recent flare up of dysuria 3 days ago and took 2 days of Cipro and symptom have cleared up. \par 3/7/19: The patient returned and reported that he had another flare up of prostatitis and took 2 days of Cipro for his symptoms to clear. Complained of lingering dysuria. I reminded the patient that with Cipro, there is a side effect of tendonitis. Denied gross hematuria. Currently taking Trimethoprim, Tamsulosin and Finasteride. PSA from 2/15/19 was 0.04 ng/mL.\par 4/4/19: The patient returned and reported that he another flare up of prostatitis. Complained of pain when he stands up. His pain is relieved while sitting down. Occasionally has dysuria. Wakes up twice during the night to urinate. Complained of urinary urgency during the night. Takes Tamsulosin BID, Trimethoprim and Finasteride.\par 5/9/19: The patient returned today for an US. Transrectal US of the prostate findings showed a prostate volume of 23.4 cc, a hypoechoic area at the left apex 1.37 x 1.25 cm in transverse image, unremarkable seminal vesicles, and no rectal masses. The hypoechoic are is suspicious for prostate cancer, but the patient has a low PSA. I recommended that the patient undergo a transperineal biopsy for the hyperechoic region, but due to an anal fissure, the patient would like to wait. Notes that he consulted with a colon surgeon about his anal fissure and is currently taking stool softeners. His urination is adequate. Complains of nocturia. Wakes up 5-6 times during the night to urinate. Complains of pain at the tip of his penis. Takes Tamsulosin BID. UA from 4/11/19 was normal. Culture from 4/11/19 showed no growth. Notes that he took Bactrim and ended up getting a rash on his LEs.\par 6/10/19: Patient presents today for a follow up. Today he reports that he is scheduled for an anal fissure operation for 6/21/19. It has been advised that he will need 4 weeks to heal following the operation. Regarding his urination he reports that during the day there are no urinary issues. Nocturia is reported and he will have to wake up 5-6x a night to urinate. Finasteride and Tamsulosin are taken as directed. \par 7/18/19: Patient presents today for a follow up. 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 Patient with hypoechoic lesion of the prostate seen on recent TRUS. PSA 0.690 on 5/2109. Patient on Flomax /Proscar with both irritative and obstructive LUTS. Needs to strain to initiate urination. Nocturia times 5- 6. Denies dysuria or hematuria. Patient states that he feels that he has prostatitis based on familiar symptoms. Additionally he no longer takes Lisinopril but now is on Metoprolol 10 mg. \par 8/7/19: Patient presents today for a follow up. He states that the Bactrim prescribed at his last visit was not providing any relief. He still c/o discomfort when sitting but denies dysuria. He had Ciprofloxacin at home and had taken two tablets over the past two days. He noticed relief and inquired about having the prescription refilled today. \par 10/10/19: Patient presents today for a follow up. On 9/13/19 a needle biopsy of the prostate was completed under general anesthesia. He states that after the biopsy his chronic prostatitis began to cause discomfort. Ciprofloxacin 500 mg was used as directed and the discomfort is no longer present. Additionally it is reported that his sexual function decreased following the procedure but that it is starting to improve again. The results of the pathology report are to be discussed with him today. \par 12/31/2019: Patient presents today for a follow up. Pt reports pain on standing. He has an inflamed prostate. When the bladder is fill he also experiences pain and the pain goes away after he voids. Pt took Cipro to 7 days, finishing last week. While he took it, he felt better. Pt reports allergy to sulfur drugs and develops rash.\par 2/10/20: Patient presents today for a follow up. He had an MRI of the pelvis 1/23/20 which showed no evidence of rectal fistula could be identified. Pt has inflamed prostate\par Pt reports constipation for which he takes stool softner. He admits to dysuria and hematuria with mild pain at the tip of the penis yesterday. Most recent PSA was 0.24, Testosterone is 390.0. \par 02/27/2020: Patient presents today for a follow up. At the conclusion of the last visit, Doxycycline 100 mg was prescribed. The patient states he noticed no improvement. Today he reports having nighttime pain in the rectum which is alleviated after urination. Wakes up 4-5 times during the night to urinate. He has satisfactory day time urination though he reports associated strain. Denies chest pain. He currently takes a stool softener for constipation. The patient also complains of lower back pain for which he will get an injection shortly. A urine culture from 2/10/2020 showed no growth. His most recent creatinine level from 7/11/2019 was 1.07.\par \par 07/28/2020: Patient presents today for a follow up. Reports pain in the rectum and pain with fullness of the bladder. Pt had a US at [place]. Was given suppository which helped. Noxturia x4-5. On Tamsulosin BID. No burning. Frequency at night. Pt must push and staring. He experiences difficulty with his flow and voiding in general. Wife reports fowl smelling urine. \par \par 8/25/2020: Patient presents today for follow up on nocturia. Patient states he has not yet taken Desmopressin as he does not want to take too many medications. Still experiencing pain in rectum and with fullness of bladder. Was given Valium which helped. Last lab work on 7/28/2020 was within normal limits. Osmolality was normal at 294. Patient to start Desmopressin 0.1mg once before bedtime. \par \par 09/15/2020: Patient presents today for follow up. Pt was having pain in rectum and finished 5 days of Cipro which helped resolve pain, but causes constipation. Takes Colace for constipation. Urinalysis on 8/25/20 was normal. Still has pain in rectum at night, and uses Metronidazole gel to relieve it. Wakes up every 2 hours at night to urinate. Has some urgency. \par \par 11/17/2020: The patient presents today for a follow up. He is not taking desmopressin anymore. His last blood work on 11/6/2020 showed his creatinine was good at 1.0. He has no burning. He has some pain in the rectum and when his bladder is full. He is still taking finasteride 5 mg and trimethoprim 100 mg. At night he gets a lot of pain when his bladder is full and has urinary urgency about every 2 hours. After he urinates, the pain goes away. \par \par 03/09/2021: 75 yr male  BPH, nocturia , straining .  Chronic prostatitis, chronic cystitis \par \par 05/24/2021: Patient presents today for follow up. Patient went to a rectal surgeon for his rectal pain and had pelvic MRI 4/29/21 which showed no evidence of perianal fistula. States he continues to have pain in rectum after sitting for a long time and then standing. Patient was instructed to take Gabapentin 400mg TID, but skips afternoon dose as it makes him sleepy. Does have constipation. Has not started Tadalafil. No hx of prostate cancer. \par \par 06/15/2021: Patient presents today for a follow up. He reports that last week he had an episode of prostatitis for which he took Cipro for. Currently wakes every 2 hours during the night to urinate. Has tried desipramine and desmopressin in the past which did not improve his symptoms. Daytime urination remains stable. Stream is strong. Pt states he went to the cardiologist today and an EKG was done which was all normal. Currently takes iron supplements. \par \par 09/01/2021: Patient presents today for follow up. Today complains of straining and pushing to urinate. Denies dysuria. Denies infection of penis. Requests renewal of Finasteride and Tamsulosin. Not taking Trospium. Stopped Tadalafil due to side effect. \par \par 09/29/2021: Patient presents today for follow up. 9/1/21 UA showed 12 RBC/HPF and trace blood by dipstick. On repeat 9/9/21, he had only 3 RBC/HPF and small blood by dripstick which normal. Urine cytology 9/1/21 negative for high grade urothelial carcinoma. Provided blood work from 9/17/21 done by PCP which showed UA slightly cloudy, 3-5 RBC/HPF. PSA 0.2. Wakes 4x at night to urinate. States he has rectal pain when bladder is full. \par \par 02/02/2022: Patient presents today for follow up. Reviewed 12/29/21 lab work which showed MCV low at 75.2, WBC 6600, Hb 12.1, platelets [id number], potassium 4.9, creatinine 0.90 upper limits 1.18, BUN high at 25, AST minimally elevated 39 upper limits 34, alk phos normal 60, HbA1c 6.0, UA dipstick positive small blood, moderate leukocyte esterase, few squamous epithelial cells. No gross hematuria. Has pain when he sits and feels better when standing or laying down. States he must push to urinate. No pain on urination. Nocturia x3-4. Takes tamsulosin 0.4mg BID. Reports his EKG is normal. \par \par 03/14/2022: Patient presents today for follow up. Has been doing well, however does have to push urination. Nocturia x4. Erections are poor but not interested in medications. No dysuria. Stream is slow. Continues finasteride 5mg once daily and tamsulosin 0.4mg BID. Does not take tadalafil every day. Does not have any red itchy skin and has not needed to use clotrimazole-betamethasone cream. Reviewed 2/2/22 lab work. Urine culture grew 10,000-40,000 CFU/mL Enterococcus faecalis and patient was treated with amoxicillin 500mg TID and feels better now. PSA 0.24. Free testosterone low 3.9. Lab work of 2/11/22 showed brain natriuretic peptide normal, procalcitonin undetectable <0.10 ng/mL. Will be having evaluation for anorectal pain, with the proposed procedures being anorectal manometry, electromyography, pudendal nerve terminal motor latency, and endorectal ultrasound. Denies bladder pain. Will be having procedure for varicose veins of RLE. \par \par 03/30/2022: Patient presents today for follow up. Urination is good. Continues finasteride 5mg once daily and tamsulosin 0.4mg BID. Did not help with urination. Denies dysuria. Only has urinary urgency at night. Notes he eats spicy foods. No pain where he sits. Energy levels are good. Has tried Cialis in the past which did not help much. Erections had been poor since the biopsy. Reviewed 3/13/22 lab work which was WNL except free testosterone low 4.0, LH elevated 19, dihydrotestosterone low 4.1. Total testosterone normal 250. PSA was 0.20. UA normal. Urine cytology showed clusters of atypical urothelial cells with high nuclear cytoplasmic ratio, nuclear hyperchromasia and irregular nuclear contours in a background of moderate acute inflammation. States he went to rectal surgeon for anorectal pain, and had US which was reportedly normal and found muscles are weak, and was advised muscle dysfunction physical therapy which he is currently attending. \par \par Reviewed 3/13/22 lab work with patient. Urine cytology showed clusters of atypical urothelial cells with high nuclear cytoplasmic ratio, nuclear hyperchromasia and irregular nuclear contours in a background of moderate  acute inflammation, and which we will continue to watch. If cells persist, we will consider imaging test of kidneys, and cystoscopy. \par Recommend avoid spicy foods as it will cause irritation of both bladder and rectum, but is not harmful. \par Continue finasteride 5mg once daily and tamsulosin 0.4mg BID.\par I prescribed the patient tadalafil 5mg once daily for BPH symptoms and ED. I informed the patient that they may experience tingling of the skin, headache, warm flushed feeling, heartburn, backache, and on rare occasion, visual or hearing disturbances. Informed the patient how to use [place] to  tadalafil at a [employer] or [employer] for a discounted price. I explained it had been previously used as heart medication and that it is safe to take as long as it is not taken with nitroglycerin which would cause BP to drop too low. \par The patient produced a urine sample which will be sent for urinalysis, urine cytology, and urine culture. \par RTO in 3 months for follow up or sooner if new urinary symptoms develop. \par \par 04/08/2022: Patient presents today for a follow up telephone visit for which he gave permission for. The visit was mostly conducted with his wife who was present. The patient has been taking finasteride 5 mg once daily and tamsulosin 0.4 mg BID. I prescribed the patient tadalafil 5 mg once daily at the time of the last visit, however he has not taken it. The pt's wife states today that her  told me during his 3/30/2022 visit he had a problems taking tadalafil which included flatulence and severe muscle aches. Both my personal memory and my note from that day do not recall him ever saying that, however there must have been some sort of miscommunication. \par \par 04/28/2022: Patient presents today for a follow up visit. Yesterday, he was having pain, but today he is feeling better. On 4/27/22, his PSA was 0.21 ng/mL, creatinine was 1.04. He also is complaining of rectal pain. He currently is taking Silodosin, one capsule daily, Tamsulosin once daily, Proscar once daily and Cefadroxil. His symptoms have improved with Cefadroxil. Previously he took Cipro, but it did not work satisfactorily. He is not taking Tadalafil. He denies urgency, but is experiencing slight urge incontinence. He reports Gabapentin alleviated his pain. \par \par The patient produced a urine sample which will be sent for urinalysis, urine cytology, and urine culture. \par I am prescribing Gabapentin, 300 mg, two capsules at night, one in the morning for his pain. \par RTO in 1 month, sooner if needed.\par \par 06/07/2022: 's wife called today and spoke to my nurse. She stated that I told her  to take tamsulosin 0.4 mg 2 capsules BID, however there was a misunderstanding and his wife wanted to speak to me. I informed her that it is tamsulosin 0.4 mg one capsule BID and she understood. His wife reports that he has been having difficulties urinating. Has an appt to see me on 6/30/2022. \par \par Urine cytology of 4/28/2022 revealed rare atypical cells with nuclear enlargement, prominent nucleoli, and vacuolated cytoplasm. It was my recommendation after reviewing back in April that he schedule a cystoscopy to evaluate atypical urine cytology, however the pt has not scheduled one yet. Last cystoscopy was in 2017. His wife understood and informed me she would schedule him for a cystoscopy with me within the next two weeks. \par I am sending the patient for a CT Urogram w/wo IV contrast. Creatinine was 1.04 on 4/27/2022. \par Patient will RTO for cystoscopy and to go over the results of his CT. \par \par 07/20/2022:  presents today for a follow up. Patient obtained a CT Urogram w/wo contrast on 7/14/2022 to evaluate microscopic hematuria. CT revealed renal cysts and less than 1 cm low-attenuation lesions which are too small to characterize. No enhancing lesions, or renal stones are identified. On the delayed images, there is poor opacification of the proximal and distal ureter and the right distal ureter. No abnormal soft tissue or gross urothelial lesion is appreciated. Bladder was within normal limits. The anterior aspect of the bladder is not opacified on the delayed imaging. No lymphadenopathy was seen. The patient was pleased with the CT findings. We reviewed his prostate symptoms. Has a long hx of chronic prostatitis that initially responded to cipro but sometimes required bactrim. He currently has no acute problems. Denies pain when sitting for long periods of time. Has some minor urinary urgency and frequency at times but is overall pleased with his urologic state. Continues on finasteride 5 mg once daily, tamsulosin 0.4 mg once daily, tadalafil 5 mg once daily, and silodosin 8 mg once daily. Last PSA 4/27/2022 was 0.21. Creatinine at that time was 1.04. \par \[facility] Clinical findings and CT results were reviewed at length with the patient and his wife. He was pleased with the findings. \par The patient produced a urine sample which will be sent for urinalysis, urine cytology, and urine culture. \par Patient will RTO in 6 months or sooner if clinically indicated. \par \par 07/27/2022:  presents today for a telephone visit for which he gave permission for. He wanted to review the results of his most recent urine test results. UA was perfect. Culture was no growth. Cytology consisted of mainly mature squamous epithelial cells, acute inflammation, and rare benign urothelial cells. Cytology was negative for high grade urothelial carcinoma. His wife inquired on why he needs to proceed with a cystoscopy \par \par Reviewed and discussed urine test results of 7/20/2022 in detail with the patient. Given that these tests were so good, he does not need to schedule a cystoscopy at this time. I previously requested a cysto based off of a concerning urine cytology back in April. Requested that the patient provide a urine specimen in 2 weeks and another one 2-4 weeks after that to ensure cytologies remain good. So long as they are favorable, he can proceed without cystoscopy. Both the patient and his wife who is a retired pathologist understood. \par Patient will follow up in January at his scheduled appointment time. \par \par 03/02/2023: Mr. VIVIENNE HANCOCK presents today for a follow up. The pt continues to work as a . Patient provided a urine specimen on 2/28/2023. UA was normal. Culture was no significant growth. Urine cytology was negative for high grade urothelial carcinoma. Pt denies ever smoking or using tobacco products. Pt recently went to gastroenterologist and was told his stomach was distended. He admits to pushing and straining upon urination. His gastroenterologist put in orders for an US which he has scheduled. Patient continues to take finasteride 5 mg once daily and tamsulosin 0.4 mg BID. No longer is taking tadalafil 5 mg once daily. \par \par Reviewed outside lab results of 1/8/2023 that was done at an outside facility. Creatinine was 1.10. A1C was 6.0. Urine was considered abnormal yellow, could be due to vitamin. Specific gravity on the lower side. Patient's PSA back in April 2022 was 0.21. Blood work today included repeat PSA. \par \par Reviewed urine test results of 2/28/2023. \par \par Renewed finasteride 5 mg once daily. I am prescribing the pt Silodosin 8 mg once daily with food to replace one of the tamsulosin. I informed him of the possible side effects of lightheadedness, nasal congestion, and decreased amount of semen when ejaculating. The pt was hesitant to restart tadalafil 5 mg once daily as he feels he is on a lot of medications. I then offered surgical intervention as an option and suggested he talk to one of my colleagues about potential options. He then opted to try tadalafil 5 mg once daily and would consider surgical intervention if it did not help. \par  \par Patient will RTO in 2-3 weeks for a ZENA and to review how he is doing on the changes in his medications. \par \par Preparation, in person office visit, and coordination of care: 40 minutes.

## 2023-03-27 ENCOUNTER — APPOINTMENT (OUTPATIENT)
Dept: ULTRASOUND IMAGING | Facility: IMAGING CENTER | Age: 78
End: 2023-03-27
Payer: MEDICARE

## 2023-03-27 ENCOUNTER — OUTPATIENT (OUTPATIENT)
Dept: OUTPATIENT SERVICES | Facility: HOSPITAL | Age: 78
LOS: 1 days | End: 2023-03-27
Payer: COMMERCIAL

## 2023-03-27 DIAGNOSIS — K76.0 FATTY (CHANGE OF) LIVER, NOT ELSEWHERE CLASSIFIED: ICD-10-CM

## 2023-03-27 DIAGNOSIS — Z90.49 ACQUIRED ABSENCE OF OTHER SPECIFIED PARTS OF DIGESTIVE TRACT: Chronic | ICD-10-CM

## 2023-03-27 DIAGNOSIS — R18.8 OTHER ASCITES: ICD-10-CM

## 2023-03-27 DIAGNOSIS — Z87.19 PERSONAL HISTORY OF OTHER DISEASES OF THE DIGESTIVE SYSTEM: Chronic | ICD-10-CM

## 2023-03-27 DIAGNOSIS — Z00.8 ENCOUNTER FOR OTHER GENERAL EXAMINATION: ICD-10-CM

## 2023-03-27 PROCEDURE — 76700 US EXAM ABDOM COMPLETE: CPT | Mod: 26

## 2023-03-27 PROCEDURE — 76700 US EXAM ABDOM COMPLETE: CPT

## 2023-04-13 ENCOUNTER — APPOINTMENT (OUTPATIENT)
Dept: UROLOGY | Facility: CLINIC | Age: 78
End: 2023-04-13

## 2023-05-18 RX ORDER — GABAPENTIN 300 MG/1
300 CAPSULE ORAL
Qty: 90 | Refills: 11 | Status: ACTIVE | COMMUNITY
Start: 2022-04-28 | End: 1900-01-01

## 2023-05-22 ENCOUNTER — APPOINTMENT (OUTPATIENT)
Dept: UROLOGY | Facility: CLINIC | Age: 78
End: 2023-05-22
Payer: MEDICARE

## 2023-05-22 DIAGNOSIS — G40.209 LOCALIZATION-RELATED (FOCAL) (PARTIAL) SYMPTOMATIC EPILEPSY AND EPILEPTIC SYNDROMES WITH COMPLEX PARTIAL SEIZURES, NOT INTRACTABLE, W/OUT STATUS EPILEPTICUS: ICD-10-CM

## 2023-05-22 DIAGNOSIS — R42 DIZZINESS AND GIDDINESS: ICD-10-CM

## 2023-05-22 PROCEDURE — 99442: CPT

## 2023-05-25 PROBLEM — R42 LIGHTHEADEDNESS: Status: ACTIVE | Noted: 2017-09-05

## 2023-05-25 NOTE — HISTORY OF PRESENT ILLNESS
[FreeTextEntry1] : Mr Hancock is a 77 year old man presented for follow up of gross hematuria, urinary frequency and urgency. The patient takes tamsulosin 0.4 mg, one half hour after meal twice daily, finasteride 5 mg once daily, and desipramine 10 mg at bedtime. The patient returned to the office 03/20/17 having gross hematuria with blood clot. Dr Holly Bernardo started the patient on Bactrim DS. He eventually went to the ER 03/21/17 where he had a Spann catheter placed. His creatinine in the ER was 1.01. Cystoscopy 03/23/17 found catheter trauma. Prostate protruded modest distance into the bladder. Bladder has a small capacity. Diffuse oozing of blood from bladder neck and trigone. Scraped up mucosa most likely from introduction of telescope. No bladder stones or tumors. Bladder inflammation from catheter. CT urogram was normal and found no etiology of the gross hematuria.\par 3/1/18: The patient returned and reported he has pain in his rectum area and penis. PSA from 1/24/18 was 0.39 ng/mL. 2/15/18 Creatinine was 1.01 mg/dL, hemoglobin was 14.1 g/dL. He received and abdomen ultrasound which shows a fatty liver, gallstones, and obscured pancreas. He denies any gross hematuria. He noted one day ago his GI physician put banding on his hemorrhoids. \par 4/10/18: The patient returned and reported he finished his antibiotics from last visit and noted he had another hemorrhoid and put another band by his GI. He noted pain still persists. UA from 3/1/18 shows 8 WBC/HPF. CBC from 3/1/18 showed slight anemia. \par 5/23/18: The patient returned and reported he has been taking Bactrim for his most recent prostatitis starting one week ago. PSA from 1/22/18 was 0.39 ng/mL. Currently taking Tamsulosin and Finasteride. \par 8/28/18: The patient returned and reported his urination is satisfactory. Currently taking tamsulosin and finasteride. Wakes up 3-4 times during the night to urinate. Noted he had a recent flare up of dysuria 3 days ago and took 2 days of Cipro and symptom have cleared up. \par 3/7/19: The patient returned and reported that he had another flare up of prostatitis and took 2 days of Cipro for his symptoms to clear. Complained of lingering dysuria. I reminded the patient that with Cipro, there is a side effect of tendonitis. Denied gross hematuria. Currently taking Trimethoprim, Tamsulosin and Finasteride. PSA from 2/15/19 was 0.04 ng/mL.\par 4/4/19: The patient returned and reported that he another flare up of prostatitis. Complained of pain when he stands up. His pain is relieved while sitting down. Occasionally has dysuria. Wakes up twice during the night to urinate. Complained of urinary urgency during the night. Takes Tamsulosin BID, Trimethoprim and Finasteride.\par 5/9/19: The patient returned today for an US. Transrectal US of the prostate findings showed a prostate volume of 23.4 cc, a hypoechoic area at the left apex 1.37 x 1.25 cm in transverse image, unremarkable seminal vesicles, and no rectal masses. The hypoechoic are is suspicious for prostate cancer, but the patient has a low PSA. I recommended that the patient undergo a transperineal biopsy for the hyperechoic region, but due to an anal fissure, the patient would like to wait. Notes that he consulted with a colon surgeon about his anal fissure and is currently taking stool softeners. His urination is adequate. Complains of nocturia. Wakes up 5-6 times during the night to urinate. Complains of pain at the tip of his penis. Takes Tamsulosin BID. UA from 4/11/19 was normal. Culture from 4/11/19 showed no growth. Notes that he took Bactrim and ended up getting a rash on his LEs.\par 6/10/19: Patient presents today for a follow up. Today he reports that he is scheduled for an anal fissure operation for 6/21/19. It has been advised that he will need 4 weeks to heal following the operation. Regarding his urination he reports that during the day there are no urinary issues. Nocturia is reported and he will have to wake up 5-6x a night to urinate. Finasteride and Tamsulosin are taken as directed. \par 7/18/19: Patient presents today for a follow up. 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 Patient with hypoechoic lesion of the prostate seen on recent TRUS. PSA 0.690 on 5/2109. Patient on Flomax /Proscar with both irritative and obstructive LUTS. Needs to strain to initiate urination. Nocturia times 5- 6. Denies dysuria or hematuria. Patient states that he feels that he has prostatitis based on familiar symptoms. Additionally he no longer takes Lisinopril but now is on Metoprolol 10 mg. \par 8/7/19: Patient presents today for a follow up. He states that the Bactrim prescribed at his last visit was not providing any relief. He still c/o discomfort when sitting but denies dysuria. He had Ciprofloxacin at home and had taken two tablets over the past two days. He noticed relief and inquired about having the prescription refilled today. \par 10/10/19: Patient presents today for a follow up. On 9/13/19 a needle biopsy of the prostate was completed under general anesthesia. He states that after the biopsy his chronic prostatitis began to cause discomfort. Ciprofloxacin 500 mg was used as directed and the discomfort is no longer present. Additionally it is reported that his sexual function decreased following the procedure but that it is starting to improve again. The results of the pathology report are to be discussed with him today. \par 12/31/2019: Patient presents today for a follow up. Pt reports pain on standing. He has an inflamed prostate. When the bladder is fill he also experiences pain and the pain goes away after he voids. Pt took Cipro to 7 days, finishing last week. While he took it, he felt better. Pt reports allergy to sulfur drugs and develops rash.\par 2/10/20: Patient presents today for a follow up. He had an MRI of the pelvis 1/23/20 which showed no evidence of rectal fistula could be identified. Pt has inflamed prostate\par Pt reports constipation for which he takes stool softner. He admits to dysuria and hematuria with mild pain at the tip of the penis yesterday. Most recent PSA was 0.24, Testosterone is 390.0. \par 02/27/2020: Patient presents today for a follow up. At the conclusion of the last visit, Doxycycline 100 mg was prescribed. The patient states he noticed no improvement. Today he reports having nighttime pain in the rectum which is alleviated after urination. Wakes up 4-5 times during the night to urinate. He has satisfactory day time urination though he reports associated strain. Denies chest pain. He currently takes a stool softener for constipation. The patient also complains of lower back pain for which he will get an injection shortly. A urine culture from 2/10/2020 showed no growth. His most recent creatinine level from 7/11/2019 was 1.07.\par \par 07/28/2020: Patient presents today for a follow up. Reports pain in the rectum and pain with fullness of the bladder. Pt had a US at [facility]. Was given suppository which helped. Noxturia x4-5. On Tamsulosin BID. No burning. Frequency at night. Pt must push and staring. He experiences difficulty with his flow and voiding in general. Wife reports fowl smelling urine. \par \par 8/25/2020: Patient presents today for follow up on nocturia. Patient states he has not yet taken Desmopressin as he does not want to take too many medications. Still experiencing pain in rectum and with fullness of bladder. Was given Valium which helped. Last lab work on 7/28/2020 was within normal limits. Osmolality was normal at 294. Patient to start Desmopressin 0.1mg once before bedtime. \par \par 09/15/2020: Patient presents today for follow up. Pt was having pain in rectum and finished 5 days of Cipro which helped resolve pain, but causes constipation. Takes Colace for constipation. Urinalysis on 8/25/20 was normal. Still has pain in rectum at night, and uses Metronidazole gel to relieve it. Wakes up every 2 hours at night to urinate. Has some urgency. \par \par 11/17/2020: The patient presents today for a follow up. He is not taking desmopressin anymore. His last blood work on 11/6/2020 showed his creatinine was good at 1.0. He has no burning. He has some pain in the rectum and when his bladder is full. He is still taking finasteride 5 mg and trimethoprim 100 mg. At night he gets a lot of pain when his bladder is full and has urinary urgency about every 2 hours. After he urinates, the pain goes away. \par \par 03/09/2021: 75 yr  history of BPH, prostatitis , overactive bladder with nocturnal frequency.  Rectal area pains, last visit [facility] November 17, 2020 showing rectal mucosa nodule. To follow GI. \par patient said he is taking gabapentin and tamsulosin and finasteride. Pain is worse when bladder is full and less when he voids. \par Nocturnal frequency Q2 hr\par ED since 2019 \par Patient is seeing  for his rectal pain.  \par \par 05/24/2021: Patient presents today for follow up. Patient went to a rectal surgeon for his rectal pain and had pelvic MRI 4/29/21 which showed no evidence of perianal fistula. States he continues to have pain in rectum after sitting for a long time and then standing. Patient was instructed to take Gabapentin 400mg TID, but skips afternoon dose as it makes him sleepy. Does have constipation. Has not started Tadalafil. No hx of prostate cancer. \par \par 06/15/2021: Patient presents today for a follow up. He reports that last week he had an episode of prostatitis for which he took Cipro for. Currently wakes every 2 hours during the night to urinate. Has tried desipramine and desmopressin in the past which did not improve his symptoms. Daytime urination remains stable. Stream is strong. Pt states he went to the cardiologist today and an EKG was done which was all normal. Currently takes iron supplements. \par \par 09/01/2021: Patient presents today for follow up. Today complains of straining and pushing to urinate. Denies dysuria. Denies infection of penis. Requests renewal of Finasteride and Tamsulosin. Not taking Trospium. Stopped Tadalafil due to side effect. \par \par 09/29/2021: Patient presents today for follow up. 9/1/21 UA showed 12 RBC/HPF and trace blood by dipstick. On repeat 9/9/21, he had only 3 RBC/HPF and small blood by dripstick which normal. Urine cytology 9/1/21 negative for high grade urothelial carcinoma. Provided blood work from 9/17/21 done by PCP which showed UA slightly cloudy, 3-5 RBC/HPF. PSA 0.2. Wakes 4x at night to urinate. States he has rectal pain when bladder is full. \par \par 02/02/2022: Patient presents today for follow up. Reviewed 12/29/21 lab work which showed MCV low at 75.2, WBC 6600, Hb 12.1, platelets [id number], potassium 4.9, creatinine 0.90 upper limits 1.18, BUN high at 25, AST minimally elevated 39 upper limits 34, alk phos normal 60, HbA1c 6.0, UA dipstick positive small blood, moderate leukocyte esterase, few squamous epithelial cells. Has pain when he sits and feels better when standing or laying down. States he must push to urinate. No pain on urination. Nocturia x3-4. Takes tamsulosin 0.4mg BID. Reports his EKG is normal. \par \par 03/14/2022: Patient presents today for follow up. Has been doing well, however does have to push urination. Nocturia x4. Erections are poor but not interested in medications. No dysuria. Stream is slow. Continues finasteride 5mg once daily and tamsulosin 0.4mg BID. Does not take tadalafil every day. Does not have any red itchy skin and has not needed to use clotrimazole-betamethasone cream. Reviewed 2/2/22 lab work. Urine culture grew 10,000-40,000 CFU/mL Enterococcus faecalis and patient was treated with amoxicillin 500mg TID and feels better now. PSA 0.24. Free testosterone low 3.9. Lab work of 2/11/22 showed brain natriuretic peptide normal, procalcitonin undetectable <0.10 ng/mL. Will be having evaluation for anorectal pain, with the proposed procedures being anorectal manometry, electromyography, pudendal nerve terminal motor latency, and endorectal ultrasound. Denies bladder pain. Will be having procedure for varicose veins of RLE. \par \par 03/30/2022: Patient presents today for follow up. Urination is good. Continues finasteride 5mg once daily and tamsulosin 0.4mg BID. Did not help with urination. Denies dysuria. Only has urinary urgency at night. Notes he eats spicy foods. No pain where he sits. Energy levels are good. Has tried Cialis in the past which did not help much. Erections had been poor since the biopsy. Reviewed 3/13/22 lab work which was WNL except free testosterone low 4.0, LH elevated 19, dihydrotestosterone low 4.1. Total testosterone normal 250. PSA was 0.20. UA normal. Urine cytology showed clusters of atypical urothelial cells with high nuclear cytoplasmic ratio, nuclear hyperchromasia and irregular nuclear contours in a background of moderate acute inflammation. States he went to rectal surgeon for anorectal pain, and had US which was reportedly normal and found muscles are weak, and was advised muscle dysfunction physical therapy which he is currently attending. \par \par 04/08/2022: Patient presents today for a follow up telephone visit for which he gave permission for. The visit was mostly conducted with his wife who was present. The patient has been taking finasteride 5 mg once daily and tamsulosin 0.4 mg BID. I prescribed the patient tadalafil 5 mg once daily at the time of the last visit, however he has not taken it. The pt's wife states today that her  told me during his 3/30/2022 visit he had a problems taking tadalafil which included flatulence and severe muscle aches. Both my personal memory and my note from that day do not recall him ever saying that, however there must have been some sort of miscommunication. \par \par 04/28/2022: Patient presents today for a follow up visit. Yesterday, he was having pain, but today he is feeling better. On 4/27/22, his PSA was 0.21 ng/mL, creatinine was 1.04. He also is complaining of rectal pain. He currently is taking Silodosin, one capsule daily, Tamsulosin once daily, Proscar once daily and Cefadroxil. His symptoms have improved with Cefadroxil. Previously he took Cipro, but it did not work satisfactorily. He is not taking Tadalafil. He denies urgency, but is experiencing slight urge incontinence. He reports Gabapentin alleviated his pain.\par \par 06/07/2022: 's wife called today and spoke to my nurse. She stated that I told her  to take tamsulosin 0.4 mg 2 capsules BID, however there was a misunderstanding and his wife wanted to speak to me. I informed her that it is tamsulosin 0.4 mg one capsule BID and she understood. His wife reports that he has been having difficulties urinating. Has an appt to see me on 6/30/2022. \par \par 07/20/2022:  presents today for a follow up. Patient obtained a CT Urogram w/wo contrast on 7/14/2022 to evaluate microscopic hematuria. CT revealed renal cysts and less than 1 cm low-attenuation lesions which are too small to characterize. No enhancing lesions, or renal stones are identified. On the delayed images, there is poor opacification of the proximal and distal ureter and the right distal ureter. No abnormal soft tissue or gross urothelial lesion is appreciated. Bladder was within normal limits. The anterior aspect of the bladder is not opacified on the delayed imaging. No lymphadenopathy was seen. The patient was pleased with the CT findings. We reviewed his prostate symptoms. Has a long hx of chronic prostatitis that initially responded to cipro but sometimes required bactrim. He currently has no acute problems. Denies pain when sitting for long periods of time. Has some minor urinary urgency and frequency at times but is overall pleased with his urologic state. Continues on finasteride 5 mg once daily, tamsulosin 0.4 mg once daily, tadalafil 5 mg once daily, and silodosin 8 mg once daily. Last PSA 4/27/2022 was 0.21. Creatinine at that time was 1.04. \par \par 07/27/2022:  presents today for a telephone visit for which he gave permission for. He wanted to review the results of his most recent urine test results. UA was perfect. Culture was no growth. Cytology consisted of mainly mature squamous epithelial cells, acute inflammation, and rare benign urothelial cells. Cytology was negative for high grade urothelial carcinoma. His wife inquired on why he needs to proceed with a cystoscopy. \par \par 03/02/2023: Mr. VIVIENNE HANCOCK presents today for a follow up. The pt continues to work as a . Patient provided a urine specimen on 2/28/2023. UA was normal. Culture was no significant growth. Urine cytology was negative for high grade urothelial carcinoma. Pt denies ever smoking or using tobacco products. Pt recently went to gastroenterologist and was told his stomach was distended. He admits to pushing and straining upon urination. His gastroenterologist put in orders for an US which he has scheduled. Patient continues to take finasteride 5 mg once daily and tamsulosin 0.4 mg BID. No longer is taking tadalafil 5 mg once daily. \par \par 05/22/2023: Mr. VIVIENNE HANCOCK was scheduled today for a follow up audio only telephone visit for which he gave permission for. I was unable to reach the pt but did reach his wife, Dr.Almas Benjamin who was located at their home, [address] [place] [address], and I was located in my office in [place], [place]. She informs me that the pt has a lot of pain in the rectum. It hurts when he lays down or when standing. The pain is all the time and not just when he moves the bowels. He has a rectal surgeon but he does not think it is a problem with the rectum. Pt has constipation but wife reports he manages it well.

## 2023-05-25 NOTE — ASSESSMENT
[FreeTextEntry1] : Mr Hancock is a 77 year old man presented for follow up of gross hematuria, urinary frequency and urgency. The patient takes tamsulosin 0.4 mg, one half hour after meal twice daily, finasteride 5 mg once daily, and desipramine 10 mg at bedtime. The patient returned to the office 03/20/17 having gross hematuria with blood clot. Dr Holly Bernardo started the patient on Bactrim DS. He eventually went to the ER 03/21/17 where he had a Spann catheter placed. His creatinine in the ER was 1.01. Cystoscopy 03/23/17 found catheter trauma. Prostate protruded modest distance into the bladder. Bladder has a small capacity. Diffuse oozing of blood from bladder neck and trigone. Scraped up mucosa most likely from introduction of telescope. No bladder stones or tumors. Bladder inflammation from catheter. CT urogram was normal and found no etiology of the gross hematuria.\par 3/1/18: The patient returned and reported he has pain in his rectum area and penis. PSA from 1/24/18 was 0.39 ng/mL. 2/15/18 Creatinine was 1.01 mg/dL, hemoglobin was 14.1 g/dL. He received and abdomen ultrasound which shows a fatty liver, gallstones, and obscured pancreas. He denies any gross hematuria. He noted one day ago his GI physician put banding on his hemorrhoids. \par 4/10/18: The patient returned and reported he finished his antibiotics from last visit and noted he had another hemorrhoid and put another band by his GI. He noted pain still persists. UA from 3/1/18 shows 8 WBC/HPF. CBC from 3/1/18 showed slight anemia. \par 5/23/18: The patient returned and reported he has been taking Bactrim for his most recent prostatitis starting one week ago. PSA from 1/22/18 was 0.39 ng/mL. Currently taking Tamsulosin and Finasteride. \par 8/28/18: The patient returned and reported his urination is satisfactory. Currently taking tamsulosin and finasteride. Wakes up 3-4 times during the night to urinate. Noted he had a recent flare up of dysuria 3 days ago and took 2 days of Cipro and symptom have cleared up. \par 3/7/19: The patient returned and reported that he had another flare up of prostatitis and took 2 days of Cipro for his symptoms to clear. Complained of lingering dysuria. I reminded the patient that with Cipro, there is a side effect of tendonitis. Denied gross hematuria. Currently taking Trimethoprim, Tamsulosin and Finasteride. PSA from 2/15/19 was 0.04 ng/mL.\par 4/4/19: The patient returned and reported that he another flare up of prostatitis. Complained of pain when he stands up. His pain is relieved while sitting down. Occasionally has dysuria. Wakes up twice during the night to urinate. Complained of urinary urgency during the night. Takes Tamsulosin BID, Trimethoprim and Finasteride.\par 5/9/19: The patient returned today for an US. Transrectal US of the prostate findings showed a prostate volume of 23.4 cc, a hypoechoic area at the left apex 1.37 x 1.25 cm in transverse image, unremarkable seminal vesicles, and no rectal masses. The hypoechoic are is suspicious for prostate cancer, but the patient has a low PSA. I recommended that the patient undergo a transperineal biopsy for the hyperechoic region, but due to an anal fissure, the patient would like to wait. Notes that he consulted with a colon surgeon about his anal fissure and is currently taking stool softeners. His urination is adequate. Complains of nocturia. Wakes up 5-6 times during the night to urinate. Complains of pain at the tip of his penis. Takes Tamsulosin BID. UA from 4/11/19 was normal. Culture from 4/11/19 showed no growth. Notes that he took Bactrim and ended up getting a rash on his LEs.\par 6/10/19: Patient presents today for a follow up. Today he reports that he is scheduled for an anal fissure operation for 6/21/19. It has been advised that he will need 4 weeks to heal following the operation. Regarding his urination he reports that during the day there are no urinary issues. Nocturia is reported and he will have to wake up 5-6x a night to urinate. Finasteride and Tamsulosin are taken as directed. \par 7/18/19: Patient presents today for a follow up. 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 Patient with hypoechoic lesion of the prostate seen on recent TRUS. PSA 0.690 on 5/2109. Patient on Flomax /Proscar with both irritative and obstructive LUTS. Needs to strain to initiate urination. Nocturia times 5- 6. Denies dysuria or hematuria. Patient states that he feels that he has prostatitis based on familiar symptoms. Additionally he no longer takes Lisinopril but now is on Metoprolol 10 mg. \par 8/7/19: Patient presents today for a follow up. He states that the Bactrim prescribed at his last visit was not providing any relief. He still c/o discomfort when sitting but denies dysuria. He had Ciprofloxacin at home and had taken two tablets over the past two days. He noticed relief and inquired about having the prescription refilled today. \par 10/10/19: Patient presents today for a follow up. On 9/13/19 a needle biopsy of the prostate was completed under general anesthesia. He states that after the biopsy his chronic prostatitis began to cause discomfort. Ciprofloxacin 500 mg was used as directed and the discomfort is no longer present. Additionally it is reported that his sexual function decreased following the procedure but that it is starting to improve again. The results of the pathology report are to be discussed with him today. \par 12/31/2019: Patient presents today for a follow up. Pt reports pain on standing. He has an inflamed prostate. When the bladder is fill he also experiences pain and the pain goes away after he voids. Pt took Cipro to 7 days, finishing last week. While he took it, he felt better. Pt reports allergy to sulfur drugs and develops rash.\par 2/10/20: Patient presents today for a follow up. He had an MRI of the pelvis 1/23/20 which showed no evidence of rectal fistula could be identified. Pt has inflamed prostate\par Pt reports constipation for which he takes stool softner. He admits to dysuria and hematuria with mild pain at the tip of the penis yesterday. Most recent PSA was 0.24, Testosterone is 390.0. \par 02/27/2020: Patient presents today for a follow up. At the conclusion of the last visit, Doxycycline 100 mg was prescribed. The patient states he noticed no improvement. Today he reports having nighttime pain in the rectum which is alleviated after urination. Wakes up 4-5 times during the night to urinate. He has satisfactory day time urination though he reports associated strain. Denies chest pain. He currently takes a stool softener for constipation. The patient also complains of lower back pain for which he will get an injection shortly. A urine culture from 2/10/2020 showed no growth. His most recent creatinine level from 7/11/2019 was 1.07.\par \par 07/28/2020: Patient presents today for a follow up. Reports pain in the rectum and pain with fullness of the bladder. Pt had a US at [place]. Was given suppository which helped. Noxturia x4-5. On Tamsulosin BID. No burning. Frequency at night. Pt must push and staring. He experiences difficulty with his flow and voiding in general. Wife reports fowl smelling urine. \par \par 8/25/2020: Patient presents today for follow up on nocturia. Patient states he has not yet taken Desmopressin as he does not want to take too many medications. Still experiencing pain in rectum and with fullness of bladder. Was given Valium which helped. Last lab work on 7/28/2020 was within normal limits. Osmolality was normal at 294. Patient to start Desmopressin 0.1mg once before bedtime. \par \par 09/15/2020: Patient presents today for follow up. Pt was having pain in rectum and finished 5 days of Cipro which helped resolve pain, but causes constipation. Takes Colace for constipation. Urinalysis on 8/25/20 was normal. Still has pain in rectum at night, and uses Metronidazole gel to relieve it. Wakes up every 2 hours at night to urinate. Has some urgency. \par \par 11/17/2020: The patient presents today for a follow up. He is not taking desmopressin anymore. His last blood work on 11/6/2020 showed his creatinine was good at 1.0. He has no burning. He has some pain in the rectum and when his bladder is full. He is still taking finasteride 5 mg and trimethoprim 100 mg. At night he gets a lot of pain when his bladder is full and has urinary urgency about every 2 hours. After he urinates, the pain goes away. \par \par 03/09/2021: 75 yr male  BPH, nocturia , straining .  Chronic prostatitis, chronic cystitis \par \par 05/24/2021: Patient presents today for follow up. Patient went to a rectal surgeon for his rectal pain and had pelvic MRI 4/29/21 which showed no evidence of perianal fistula. States he continues to have pain in rectum after sitting for a long time and then standing. Patient was instructed to take Gabapentin 400mg TID, but skips afternoon dose as it makes him sleepy. Does have constipation. Has not started Tadalafil. No hx of prostate cancer. \par \par 06/15/2021: Patient presents today for a follow up. He reports that last week he had an episode of prostatitis for which he took Cipro for. Currently wakes every 2 hours during the night to urinate. Has tried desipramine and desmopressin in the past which did not improve his symptoms. Daytime urination remains stable. Stream is strong. Pt states he went to the cardiologist today and an EKG was done which was all normal. Currently takes iron supplements. \par \par 09/01/2021: Patient presents today for follow up. Today complains of straining and pushing to urinate. Denies dysuria. Denies infection of penis. Requests renewal of Finasteride and Tamsulosin. Not taking Trospium. Stopped Tadalafil due to side effect. \par \par 09/29/2021: Patient presents today for follow up. 9/1/21 UA showed 12 RBC/HPF and trace blood by dipstick. On repeat 9/9/21, he had only 3 RBC/HPF and small blood by dripstick which normal. Urine cytology 9/1/21 negative for high grade urothelial carcinoma. Provided blood work from 9/17/21 done by PCP which showed UA slightly cloudy, 3-5 RBC/HPF. PSA 0.2. Wakes 4x at night to urinate. States he has rectal pain when bladder is full. \par \par 02/02/2022: Patient presents today for follow up. Reviewed 12/29/21 lab work which showed MCV low at 75.2, WBC 6600, Hb 12.1, platelets [id number], potassium 4.9, creatinine 0.90 upper limits 1.18, BUN high at 25, AST minimally elevated 39 upper limits 34, alk phos normal 60, HbA1c 6.0, UA dipstick positive small blood, moderate leukocyte esterase, few squamous epithelial cells. No gross hematuria. Has pain when he sits and feels better when standing or laying down. States he must push to urinate. No pain on urination. Nocturia x3-4. Takes tamsulosin 0.4mg BID. Reports his EKG is normal. \par \par 03/14/2022: Patient presents today for follow up. Has been doing well, however does have to push urination. Nocturia x4. Erections are poor but not interested in medications. No dysuria. Stream is slow. Continues finasteride 5mg once daily and tamsulosin 0.4mg BID. Does not take tadalafil every day. Does not have any red itchy skin and has not needed to use clotrimazole-betamethasone cream. Reviewed 2/2/22 lab work. Urine culture grew 10,000-40,000 CFU/mL Enterococcus faecalis and patient was treated with amoxicillin 500mg TID and feels better now. PSA 0.24. Free testosterone low 3.9. Lab work of 2/11/22 showed brain natriuretic peptide normal, procalcitonin undetectable <0.10 ng/mL. Will be having evaluation for anorectal pain, with the proposed procedures being anorectal manometry, electromyography, pudendal nerve terminal motor latency, and endorectal ultrasound. Denies bladder pain. Will be having procedure for varicose veins of RLE. \par \par 03/30/2022: Patient presents today for follow up. Urination is good. Continues finasteride 5mg once daily and tamsulosin 0.4mg BID. Did not help with urination. Denies dysuria. Only has urinary urgency at night. Notes he eats spicy foods. No pain where he sits. Energy levels are good. Has tried Cialis in the past which did not help much. Erections had been poor since the biopsy. Reviewed 3/13/22 lab work which was WNL except free testosterone low 4.0, LH elevated 19, dihydrotestosterone low 4.1. Total testosterone normal 250. PSA was 0.20. UA normal. Urine cytology showed clusters of atypical urothelial cells with high nuclear cytoplasmic ratio, nuclear hyperchromasia and irregular nuclear contours in a background of moderate acute inflammation. States he went to rectal surgeon for anorectal pain, and had US which was reportedly normal and found muscles are weak, and was advised muscle dysfunction physical therapy which he is currently attending. \par \par Reviewed 3/13/22 lab work with patient. Urine cytology showed clusters of atypical urothelial cells with high nuclear cytoplasmic ratio, nuclear hyperchromasia and irregular nuclear contours in a background of moderate  acute inflammation, and which we will continue to watch. If cells persist, we will consider imaging test of kidneys, and cystoscopy. \par Recommend avoid spicy foods as it will cause irritation of both bladder and rectum, but is not harmful. \par Continue finasteride 5mg once daily and tamsulosin 0.4mg BID.\par I prescribed the patient tadalafil 5mg once daily for BPH symptoms and ED. I informed the patient that they may experience tingling of the skin, headache, warm flushed feeling, heartburn, backache, and on rare occasion, visual or hearing disturbances. Informed the patient how to use [employer] to  tadalafil at a [employer] or [employer] for a discounted price. I explained it had been previously used as heart medication and that it is safe to take as long as it is not taken with nitroglycerin which would cause BP to drop too low. \par The patient produced a urine sample which will be sent for urinalysis, urine cytology, and urine culture. \par RTO in 3 months for follow up or sooner if new urinary symptoms develop. \par \par 04/08/2022: Patient presents today for a follow up telephone visit for which he gave permission for. The visit was mostly conducted with his wife who was present. The patient has been taking finasteride 5 mg once daily and tamsulosin 0.4 mg BID. I prescribed the patient tadalafil 5 mg once daily at the time of the last visit, however he has not taken it. The pt's wife states today that her  told me during his 3/30/2022 visit he had a problems taking tadalafil which included flatulence and severe muscle aches. Both my personal memory and my note from that day do not recall him ever saying that, however there must have been some sort of miscommunication. \par \par 04/28/2022: Patient presents today for a follow up visit. Yesterday, he was having pain, but today he is feeling better. On 4/27/22, his PSA was 0.21 ng/mL, creatinine was 1.04. He also is complaining of rectal pain. He currently is taking Silodosin, one capsule daily, Tamsulosin once daily, Proscar once daily and Cefadroxil. His symptoms have improved with Cefadroxil. Previously he took Cipro, but it did not work satisfactorily. He is not taking Tadalafil. He denies urgency, but is experiencing slight urge incontinence. He reports Gabapentin alleviated his pain. \par \par The patient produced a urine sample which will be sent for urinalysis, urine cytology, and urine culture. \par I am prescribing Gabapentin, 300 mg, two capsules at night, one in the morning for his pain. \par RTO in 1 month, sooner if needed.\par \par 06/07/2022: 's wife called today and spoke to my nurse. She stated that I told her  to take tamsulosin 0.4 mg 2 capsules BID, however there was a misunderstanding and his wife wanted to speak to me. I informed her that it is tamsulosin 0.4 mg one capsule BID and she understood. His wife reports that he has been having difficulties urinating. Has an appt to see me on 6/30/2022. \par \par Urine cytology of 4/28/2022 revealed rare atypical cells with nuclear enlargement, prominent nucleoli, and vacuolated cytoplasm. It was my recommendation after reviewing back in April that he schedule a cystoscopy to evaluate atypical urine cytology, however the pt has not scheduled one yet. Last cystoscopy was in 2017. His wife understood and informed me she would schedule him for a cystoscopy with me within the next two weeks. \par I am sending the patient for a CT Urogram w/wo IV contrast. Creatinine was 1.04 on 4/27/2022. \par Patient will RTO for cystoscopy and to go over the results of his CT. \par \par 07/20/2022:  presents today for a follow up. Patient obtained a CT Urogram w/wo contrast on 7/14/2022 to evaluate microscopic hematuria. CT revealed renal cysts and less than 1 cm low-attenuation lesions which are too small to characterize. No enhancing lesions, or renal stones are identified. On the delayed images, there is poor opacification of the proximal and distal ureter and the right distal ureter. No abnormal soft tissue or gross urothelial lesion is appreciated. Bladder was within normal limits. The anterior aspect of the bladder is not opacified on the delayed imaging. No lymphadenopathy was seen. The patient was pleased with the CT findings. We reviewed his prostate symptoms. Has a long hx of chronic prostatitis that initially responded to cipro but sometimes required bactrim. He currently has no acute problems. Denies pain when sitting for long periods of time. Has some minor urinary urgency and frequency at times but is overall pleased with his urologic state. Continues on finasteride 5 mg once daily, tamsulosin 0.4 mg once daily, tadalafil 5 mg once daily, and silodosin 8 mg once daily. Last PSA 4/27/2022 was 0.21. Creatinine at that time was 1.04. \par \[facility] Clinical findings and CT results were reviewed at length with the patient and his wife. He was pleased with the findings. \par The patient produced a urine sample which will be sent for urinalysis, urine cytology, and urine culture. \par Patient will RTO in 6 months or sooner if clinically indicated. \par \par 07/27/2022:  presents today for a telephone visit for which he gave permission for. He wanted to review the results of his most recent urine test results. UA was perfect. Culture was no growth. Cytology consisted of mainly mature squamous epithelial cells, acute inflammation, and rare benign urothelial cells. Cytology was negative for high grade urothelial carcinoma. His wife inquired on why he needs to proceed with a cystoscopy \par \par Reviewed and discussed urine test results of 7/20/2022 in detail with the patient. Given that these tests were so good, he does not need to schedule a cystoscopy at this time. I previously requested a cysto based off of a concerning urine cytology back in April. Requested that the patient provide a urine specimen in 2 weeks and another one 2-4 weeks after that to ensure cytologies remain good. So long as they are favorable, he can proceed without cystoscopy. Both the patient and his wife who is a retired pathologist understood. \par Patient will follow up in January at his scheduled appointment time. \par \par 03/02/2023: Mr. VIVIENNE HANCOCK presents today for a follow up. The pt continues to work as a . Patient provided a urine specimen on 2/28/2023. UA was normal. Culture was no significant growth. Urine cytology was negative for high grade urothelial carcinoma. Pt denies ever smoking or using tobacco products. Pt recently went to gastroenterologist and was told his stomach was distended. He admits to pushing and straining upon urination. His gastroenterologist put in orders for an US which he has scheduled. Patient continues to take finasteride 5 mg once daily and tamsulosin 0.4 mg BID. No longer is taking tadalafil 5 mg once daily. \par \par Reviewed outside lab results of 1/8/2023 that was done at an outside facility. Creatinine was 1.10. A1C was 6.0. Urine was considered abnormal yellow, could be due to vitamin. Specific gravity on the lower side. Patient's PSA back in April 2022 was 0.21. Blood work today included repeat PSA. \par Reviewed urine test results of 2/28/2023. \par Renewed finasteride 5 mg once daily. I am prescribing the pt Silodosin 8 mg once daily with food to replace one of the tamsulosin. I informed him of the possible side effects of lightheadedness, nasal congestion, and decreased amount of semen when ejaculating. The pt was hesitant to restart tadalafil 5 mg once daily as he feels he is on a lot of medications. I then offered surgical intervention as an option and suggested he talk to one of my colleagues about potential options. He then opted to try tadalafil 5 mg once daily and would consider surgical intervention if it did not help. \par Patient will RTO in 2-3 weeks for a ZENA and to review how he is doing on the changes in his medications. \par \par 05/22/2023: Mr. VIVIENNE HANCOCK was scheduled today for a follow up audio only telephone visit for which he gave permission for. I was unable to reach the pt but did reach his wife, Dr.Almas Benjamin who was located at their home, [address] [place] [address], and I was located in my office in [place], [place]. She informs me that the pt has a lot of pain in the rectum. It hurts when he lays down or when standing. The pain is all the time and not just when he moves the bowels. He has a rectal surgeon but he does not think it is a problem with the rectum. Pt has constipation but wife reports he manages it well. \par \par I prescribed the patient Levofloxacin 500 mg, one tablet daily until finished.  I instructed him to avoid strenuous exercise for two weeks as there is a risk of tendonitis. Up to 1-2 months after finishing it, if he experiences some tendonitis he should not work through it and should rest. I also advised him to avoid direct sunlight while taking this medication. Pt was instructed to not take this medication with dairy products. If the pain does not improve within a few days or worsens, they were instructed to call back sooner and we might consider adding a medication for neuropathic pain such as gabapentin. \par \par Patient will have a telehealth visit in 2 weeks for reassessment, sooner if clinically indicated. \par \par Duration of telephone visit: 12 minutes.

## 2023-05-25 NOTE — REVIEW OF SYSTEMS
[Constipation] : constipation [Nocturia] : nocturia [Perineal Pain] : perineal pain [Fever] : no fever [Chills] : no chills [Feeling Poorly] : not feeling poorly [Loss Of Hearing] : no hearing loss [Chest Pain] : no chest pain [Shortness Of Breath] : no shortness of breath [Abdominal Pain] : no abdominal pain [Dysuria] : no dysuria [Urinary Stream Is Smaller] : urine stream was not smaller [Initiating Urination Req. Strain] : initiating urination does not require straining [Arthralgias] : no arthralgias [Skin Lesions] : no skin lesions [Difficulty Walking] : no difficulty walking [Suicidal] : not suicidal [Proptosis] : no proptosis [Easy Bleeding] : no tendency for easy bleeding

## 2023-05-25 NOTE — ADDENDUM
[FreeTextEntry1] : This note was authored by Guerline Gold working as a scribe for Dr.Gary Duron. I, Dr. Collins Duron have reviewed the content of this note and confirm it is true and accurate. I personally performed the history and physical examination and made all the decisions 05/22/2023

## 2023-06-08 NOTE — ASSESSMENT
[FreeTextEntry1] : Mr Carrizales is a 74 year old man presented for follow up of gross hematuria, urinary frequency and urgency. The patient takes tamsulosin 0.4 mg, one half hour after meal twice daily, finasteride 5 mg once daily, and desipramine 10 mg at bedtime. The patient returned to the office 03/20/17 having gross hematuria with blood clot. Dr Holly Bernardo started the patient on Bactrim DS. He eventually went to the ER 03/21/17 where he had a Spann catheter placed. His creatinine in the ER was 1.01. Cystoscopy 03/23/17 found catheter trauma. Prostate protruded modest distance into the bladder. Bladder has a small capacity. Diffuse oozing of blood from bladder neck and trigone. Scraped up mucosa most likely from introduction of telescope. No bladder stones or tumors. Bladder inflammation from catheter. CT urogram was normal and found no etiology of the gross hematuria.\par 3/1/18: The patient returned and reported he has pain in his rectum area and penis. PSA from 1/24/18 was 0.39 ng/mL. 2/15/18 Creatinine was 1.01 mg/dL, hemoglobin was 14.1 g/dL. He received and abdomen ultrasound which shows a fatty liver, gallstones, and obscured pancreas. He denies any gross hematuria. He noted one day ago his GI physician put banding on his hemorrhoids. \par 4/10/18: The patient returned and reported he finished his antibiotics from last visit and noted he had another hemorrhoid and put another band by his GI. He noted pain still persists. UA from 3/1/18 shows 8 WBC/HPF. CBC from 3/1/18 showed slight anemia. \par 5/23/18: The patient returned and reported he has been taking Bactrim for his most recent prostatitis starting one week ago. PSA from  1/22/18 was 0.39 ng/mL. Currently taking Tamsulosin and Finasteride. \par 8/28/18: The patient returned and reported his urination is satisfactory. Currently taking tamsulosin and finasteride. Wakes up 3-4 times during the night to urinate. Noted he had a recent flare up of dysuria 3 days ago and took 2 days of Cipro and symptom have cleared up. \par 3/7/19: The patient returned and reported that he had another flare up of prostatitis and took 2 days of Cipro for his symptoms to clear. Complained of lingering dysuria. I reminded the patient that with Cipro, there is a side effect of tendonitis. Denied gross hematuria. Currently taking Trimethoprim, Tamsulosin and Finasteride. PSA from 2/15/19 was 0.04 ng/mL.\par 4/4/19: The patient returned and reported that he another flare up of prostatitis. Complained of pain when he stands up. His pain is relieved while sitting down. Occasionally has dysuria. Wakes up twice during the night to urinate. Complained of urinary urgency during the night. Takes Tamsulosin BID, Trimethoprim and Finasteride.\par 5/9/19: The patient returned today for an US. Transrectal US of the prostate findings showed a prostate volume of 23.4 cc, a hypoechoic area at the left apex 1.37 x 1.25 cm in transverse image, unremarkable seminal vesicles, and no rectal masses. The hypoechoic are is suspicious for prostate cancer, but the patient has a low PSA. I recommended that the patient undergo a transperineal biopsy for the hyperechoic region, but due to an anal fissure, the patient would like to wait. Notes that he consulted with a colon surgeon about his anal fissure and is currently taking stool softeners. His urination is adequate. Complains of nocturia. Wakes up 5-6 times during the night to urinate. Complains of pain at the tip of his penis. Takes Tamsulosin BID. UA from 4/11/19 was normal. Culture from 4/11/19 showed no growth. Notes that he took Bactrim and ended up getting a rash on his LEs.\par 6/10/19: Patient presents today for a follow up. Today he reports that he is scheduled for an anal fissure operation for 6/21/19. It has been advised that he will need 4 weeks to heal following the operation. Regarding his urination he reports that during the day there are no urinary issues. Nocturia is reported and he will have to wake up 5-6x a night to urinate. Finasteride and Tamsulosin are taken as directed.  \par 7/18/19: Patient presents today for a follow up. 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 Patient with hypoechoic lesion of the prostate seen on recent TRUS. PSA 0.690 on 5/2109. Patient on Flomax /Proscar with both irritative and obstructive LUTS. Needs to strain to initiate urination. Nocturia times 5- 6. Denies dysuria or hematuria. Patient states that he feels that he has prostatitis based on familiar symptoms. Additionally he no longer takes Lisinopril but now is on Metoprolol 10 mg.  \par 8/7/19: Patient presents today for a follow up. He states that the Bactrim prescribed at his last visit was not providing any relief. He still c/o discomfort when sitting but denies dysuria. He had Ciprofloxacin at home and had taken two tablets over the past two days. He noticed relief and inquired about having the prescription refilled today. \par 10/10/19: Patient presents today for a follow up. On 9/13/19 a needle biopsy of the prostate was completed under general anesthesia. He states that after the biopsy his chronic prostatitis began to cause discomfort. Ciprofloxacin 500 mg was used as directed and the discomfort is no longer present. Additionally it is reported that his sexual function decreased following the procedure but that it is starting to improve again. The results of the pathology report are to be discussed with him today. \par 12/31/2019: Patient presents today for a follow up. Pt reports pain on standing. He has an inflamed prostate. When the bladder is fill he also experiences pain and the pain goes away after he voids. Pt took Cipro to 7 days, finishing last week. While he took it, he felt better. Pt reports allergy to sulfur drugs and develops rash.\par 2/10/20: Patient presents today for a follow up. He had an MRI of the pelvis 1/23/20 which showed no evidence of rectal fistula could be identified. Pt has inflamed prostate\par Pt reports constipation for which he takes stool softner. He admits to dysuria and hematuria with mild pain at the tip of the penis yesterday. Most recent PSA was 0.24, Testosterone is 390.0. \par \par 02/27/2020: Patient presents today for a follow up. At the conclusion of the last visit,  Doxycycline 100 mg was prescribed. The patient states he noticed no improvement. Today he reports having nighttime pain in the rectum which is alleviated after urination. Wakes up 4-5 times during the night to urinate. He has satisfactory day time urination though he reports associated strain. Denies chest pain. He currently takes a stool softener for constipation. The patient also complains of lower back pain for which he will get an injection shortly. A urine culture from 2/10/2020 showed no growth. His most recent creatinine level from 7/11/2019 was 1.07. The patient produced a urine sample which will be sent for urinalysis, urine cytology, and urine culture. Ciprofloxacin 500mg was prescribed today to take, 1 tablet every 12hrs daily. Patient should follow up in March or sooner if he experiences urinary difficulties. \par \par 07/28/2020: Patient presents today for a follow up. Reports pain in the rectum and pain with fullness of the bladder. Pt had a US at [facility]. Was given suppository which helped. Nocturia x4-5. On Tamsulosin BID. No burning. Frequency at night. Pt must push and staring. He experiences difficulty with his flow and voiding in general. Wife reports fowl smelling urine. \par \par 8/25/2020: Patient presents today for follow up on nocturia. Patient states he has not yet taken Desmopressin as he does not want to take too many medications. Still experiencing pain in rectum and with fullness of bladder. Was given Valium which helped. Last lab work on 7/28/2020 was within normal limits. Osmolality was normal at 294. Patient to start Desmopressin 0.1mg once before bedtime. \par \par 09/15/2020: Patient presents today for follow up. Pt was having pain in rectum and finished 5 days of Cipro which helped resolve pain, but causes constipation. Takes Colace for constipation. Urinalysis on 8/25/20 was normal. Still has pain in rectum at night, and uses Metronidazole gel to relieve it. Wakes up every 2 hours at night to urinate.  Has some urgency. \par \par The patient produced a urine sample which will be sent for urinalysis, urine cytology, and urine culture. \par \par I recommend the patient take MiraLAX every other day when he is on Cipro.\par \par Patient will RTO in 3 months for follow up.  [place] Counseling: I discussed with the patient the risks of tildrakizumab including but not limited to immunosuppression, malignancy, posterior leukoencephalopathy syndrome, and serious infections.  The patient understands that monitoring is required including a PPD at baseline and must alert us or the primary physician if symptoms of infection or other concerning signs are noted.

## 2023-06-13 ENCOUNTER — LABORATORY RESULT (OUTPATIENT)
Age: 78
End: 2023-06-13

## 2023-06-15 ENCOUNTER — APPOINTMENT (OUTPATIENT)
Dept: UROLOGY | Facility: CLINIC | Age: 78
End: 2023-06-15
Payer: MEDICARE

## 2023-06-15 VITALS
WEIGHT: 175 LBS | BODY MASS INDEX: 29.88 KG/M2 | RESPIRATION RATE: 16 BRPM | DIASTOLIC BLOOD PRESSURE: 66 MMHG | HEART RATE: 82 BPM | HEIGHT: 64 IN | TEMPERATURE: 98 F | OXYGEN SATURATION: 96 % | SYSTOLIC BLOOD PRESSURE: 132 MMHG

## 2023-06-15 LAB — BACTERIA UR CULT: NORMAL

## 2023-06-15 PROCEDURE — 99213 OFFICE O/P EST LOW 20 MIN: CPT

## 2023-06-16 ENCOUNTER — APPOINTMENT (OUTPATIENT)
Dept: UROLOGY | Facility: CLINIC | Age: 78
End: 2023-06-16
Payer: MEDICARE

## 2023-06-16 DIAGNOSIS — D50.9 IRON DEFICIENCY ANEMIA, UNSPECIFIED: ICD-10-CM

## 2023-06-16 PROCEDURE — 99443: CPT

## 2023-06-16 NOTE — ASSESSMENT
[FreeTextEntry1] : Mr Hancock is a 77 year old man presented for follow up of gross hematuria, urinary frequency and urgency. The patient takes tamsulosin 0.4 mg, one half hour after meal twice daily, finasteride 5 mg once daily, and desipramine 10 mg at bedtime. The patient returned to the office 03/20/17 having gross hematuria with blood clot. Dr Holly Bernardo started the patient on Bactrim DS. He eventually went to the ER 03/21/17 where he had a Spann catheter placed. His creatinine in the ER was 1.01. Cystoscopy 03/23/17 found catheter trauma. Prostate protruded modest distance into the bladder. Bladder has a small capacity. Diffuse oozing of blood from bladder neck and trigone. Scraped up mucosa most likely from introduction of telescope. No bladder stones or tumors. Bladder inflammation from catheter. CT urogram was normal and found no etiology of the gross hematuria.\par 3/1/18: The patient returned and reported he has pain in his rectum area and penis. PSA from 1/24/18 was 0.39 ng/mL. 2/15/18 Creatinine was 1.01 mg/dL, hemoglobin was 14.1 g/dL. He received and abdomen ultrasound which shows a fatty liver, gallstones, and obscured pancreas. He denies any gross hematuria. He noted one day ago his GI physician put banding on his hemorrhoids. \par 4/10/18: The patient returned and reported he finished his antibiotics from last visit and noted he had another hemorrhoid and put another band by his GI. He noted pain still persists. UA from 3/1/18 shows 8 WBC/HPF. CBC from 3/1/18 showed slight anemia. \par 5/23/18: The patient returned and reported he has been taking Bactrim for his most recent prostatitis starting one week ago. PSA from 1/22/18 was 0.39 ng/mL. Currently taking Tamsulosin and Finasteride. \par 8/28/18: The patient returned and reported his urination is satisfactory. Currently taking tamsulosin and finasteride. Wakes up 3-4 times during the night to urinate. Noted he had a recent flare up of dysuria 3 days ago and took 2 days of Cipro and symptom have cleared up. \par 3/7/19: The patient returned and reported that he had another flare up of prostatitis and took 2 days of Cipro for his symptoms to clear. Complained of lingering dysuria. I reminded the patient that with Cipro, there is a side effect of tendonitis. Denied gross hematuria. Currently taking Trimethoprim, Tamsulosin and Finasteride. PSA from 2/15/19 was 0.04 ng/mL.\par 4/4/19: The patient returned and reported that he another flare up of prostatitis. Complained of pain when he stands up. His pain is relieved while sitting down. Occasionally has dysuria. Wakes up twice during the night to urinate. Complained of urinary urgency during the night. Takes Tamsulosin BID, Trimethoprim and Finasteride.\par 5/9/19: The patient returned today for an US. Transrectal US of the prostate findings showed a prostate volume of 23.4 cc, a hypoechoic area at the left apex 1.37 x 1.25 cm in transverse image, unremarkable seminal vesicles, and no rectal masses. The hypoechoic are is suspicious for prostate cancer, but the patient has a low PSA. I recommended that the patient undergo a transperineal biopsy for the hyperechoic region, but due to an anal fissure, the patient would like to wait. Notes that he consulted with a colon surgeon about his anal fissure and is currently taking stool softeners. His urination is adequate. Complains of nocturia. Wakes up 5-6 times during the night to urinate. Complains of pain at the tip of his penis. Takes Tamsulosin BID. UA from 4/11/19 was normal. Culture from 4/11/19 showed no growth. Notes that he took Bactrim and ended up getting a rash on his LEs.\par 6/10/19: Patient presents today for a follow up. Today he reports that he is scheduled for an anal fissure operation for 6/21/19. It has been advised that he will need 4 weeks to heal following the operation. Regarding his urination he reports that during the day there are no urinary issues. Nocturia is reported and he will have to wake up 5-6x a night to urinate. Finasteride and Tamsulosin are taken as directed. \par 7/18/19: Patient presents today for a follow up. 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 Patient with hypoechoic lesion of the prostate seen on recent TRUS. PSA 0.690 on 5/2109. Patient on Flomax /Proscar with both irritative and obstructive LUTS. Needs to strain to initiate urination. Nocturia times 5- 6. Denies dysuria or hematuria. Patient states that he feels that he has prostatitis based on familiar symptoms. Additionally he no longer takes Lisinopril but now is on Metoprolol 10 mg. \par 8/7/19: Patient presents today for a follow up. He states that the Bactrim prescribed at his last visit was not providing any relief. He still c/o discomfort when sitting but denies dysuria. He had Ciprofloxacin at home and had taken two tablets over the past two days. He noticed relief and inquired about having the prescription refilled today. \par 10/10/19: Patient presents today for a follow up. On 9/13/19 a needle biopsy of the prostate was completed under general anesthesia. He states that after the biopsy his chronic prostatitis began to cause discomfort. Ciprofloxacin 500 mg was used as directed and the discomfort is no longer present. Additionally it is reported that his sexual function decreased following the procedure but that it is starting to improve again. The results of the pathology report are to be discussed with him today. \par 12/31/2019: Patient presents today for a follow up. Pt reports pain on standing. He has an inflamed prostate. When the bladder is fill he also experiences pain and the pain goes away after he voids. Pt took Cipro to 7 days, finishing last week. While he took it, he felt better. Pt reports allergy to sulfur drugs and develops rash.\par 2/10/20: Patient presents today for a follow up. He had an MRI of the pelvis 1/23/20 which showed no evidence of rectal fistula could be identified. Pt has inflamed prostate\par Pt reports constipation for which he takes stool softner. He admits to dysuria and hematuria with mild pain at the tip of the penis yesterday. Most recent PSA was 0.24, Testosterone is 390.0. \par 02/27/2020: Patient presents today for a follow up. At the conclusion of the last visit, Doxycycline 100 mg was prescribed. The patient states he noticed no improvement. Today he reports having nighttime pain in the rectum which is alleviated after urination. Wakes up 4-5 times during the night to urinate. He has satisfactory day time urination though he reports associated strain. Denies chest pain. He currently takes a stool softener for constipation. The patient also complains of lower back pain for which he will get an injection shortly. A urine culture from 2/10/2020 showed no growth. His most recent creatinine level from 7/11/2019 was 1.07.\par \par 07/28/2020: Patient presents today for a follow up. Reports pain in the rectum and pain with fullness of the bladder. Pt had a US at [facility]. Was given suppository which helped. Noxturia x4-5. On Tamsulosin BID. No burning. Frequency at night. Pt must push and staring. He experiences difficulty with his flow and voiding in general. Wife reports fowl smelling urine. \par \par 8/25/2020: Patient presents today for follow up on nocturia. Patient states he has not yet taken Desmopressin as he does not want to take too many medications. Still experiencing pain in rectum and with fullness of bladder. Was given Valium which helped. Last lab work on 7/28/2020 was within normal limits. Osmolality was normal at 294. Patient to start Desmopressin 0.1mg once before bedtime. \par \par 09/15/2020: Patient presents today for follow up. Pt was having pain in rectum and finished 5 days of Cipro which helped resolve pain, but causes constipation. Takes Colace for constipation. Urinalysis on 8/25/20 was normal. Still has pain in rectum at night, and uses Metronidazole gel to relieve it. Wakes up every 2 hours at night to urinate. Has some urgency. \par \par 11/17/2020: The patient presents today for a follow up. He is not taking desmopressin anymore. His last blood work on 11/6/2020 showed his creatinine was good at 1.0. He has no burning. He has some pain in the rectum and when his bladder is full. He is still taking finasteride 5 mg and trimethoprim 100 mg. At night he gets a lot of pain when his bladder is full and has urinary urgency about every 2 hours. After he urinates, the pain goes away. \par \par 03/09/2021: 75 yr male  BPH, nocturia , straining .  Chronic prostatitis, chronic cystitis \par \par 05/24/2021: Patient presents today for follow up. Patient went to a rectal surgeon for his rectal pain and had pelvic MRI 4/29/21 which showed no evidence of perianal fistula. States he continues to have pain in rectum after sitting for a long time and then standing. Patient was instructed to take Gabapentin 400mg TID, but skips afternoon dose as it makes him sleepy. Does have constipation. Has not started Tadalafil. No hx of prostate cancer. \par \par 06/15/2021: Patient presents today for a follow up. He reports that last week he had an episode of prostatitis for which he took Cipro for. Currently wakes every 2 hours during the night to urinate. Has tried desipramine and desmopressin in the past which did not improve his symptoms. Daytime urination remains stable. Stream is strong. Pt states he went to the cardiologist today and an EKG was done which was all normal. Currently takes iron supplements. \par \par 09/01/2021: Patient presents today for follow up. Today complains of straining and pushing to urinate. Denies dysuria. Denies infection of penis. Requests renewal of Finasteride and Tamsulosin. Not taking Trospium. Stopped Tadalafil due to side effect. \par \par 09/29/2021: Patient presents today for follow up. 9/1/21 UA showed 12 RBC/HPF and trace blood by dipstick. On repeat 9/9/21, he had only 3 RBC/HPF and small blood by dripstick which normal. Urine cytology 9/1/21 negative for high grade urothelial carcinoma. Provided blood work from 9/17/21 done by PCP which showed UA slightly cloudy, 3-5 RBC/HPF. PSA 0.2. Wakes 4x at night to urinate. States he has rectal pain when bladder is full. \par \par 02/02/2022: Patient presents today for follow up. Reviewed 12/29/21 lab work which showed MCV low at 75.2, WBC 6600, Hb 12.1, platelets 136752, potassium 4.9, creatinine 0.90 upper limits 1.18, BUN high at 25, AST minimally elevated 39 upper limits 34, alk phos normal 60, HbA1c 6.0, UA dipstick positive small blood, moderate leukocyte esterase, few squamous epithelial cells. No gross hematuria. Has pain when he sits and feels better when standing or laying down. States he must push to urinate. No pain on urination. Nocturia x3-4. Takes tamsulosin 0.4mg BID. Reports his EKG is normal. \par \par 03/14/2022: Patient presents today for follow up. Has been doing well, however does have to push urination. Nocturia x4. Erections are poor but not interested in medications. No dysuria. Stream is slow. Continues finasteride 5mg once daily and tamsulosin 0.4mg BID. Does not take tadalafil every day. Does not have any red itchy skin and has not needed to use clotrimazole-betamethasone cream. Reviewed 2/2/22 lab work. Urine culture grew 10,000-40,000 CFU/mL Enterococcus faecalis and patient was treated with amoxicillin 500mg TID and feels better now. PSA 0.24. Free testosterone low 3.9. Lab work of 2/11/22 showed brain natriuretic peptide normal, procalcitonin undetectable <0.10 ng/mL. Will be having evaluation for anorectal pain, with the proposed procedures being anorectal manometry, electromyography, pudendal nerve terminal motor latency, and endorectal ultrasound. Denies bladder pain. Will be having procedure for varicose veins of RLE. \par \par 03/30/2022: Patient presents today for follow up. Urination is good. Continues finasteride 5mg once daily and tamsulosin 0.4mg BID. Did not help with urination. Denies dysuria. Only has urinary urgency at night. Notes he eats spicy foods. No pain where he sits. Energy levels are good. Has tried Cialis in the past which did not help much. Erections had been poor since the biopsy. Reviewed 3/13/22 lab work which was WNL except free testosterone low 4.0, LH elevated 19, dihydrotestosterone low 4.1. Total testosterone normal 250. PSA was 0.20. UA normal. Urine cytology showed clusters of atypical urothelial cells with high nuclear cytoplasmic ratio, nuclear hyperchromasia and irregular nuclear contours in a background of moderate acute inflammation. States he went to rectal surgeon for anorectal pain, and had US which was reportedly normal and found muscles are weak, and was advised muscle dysfunction physical therapy which he is currently attending. \par \par Reviewed 3/13/22 lab work with patient. Urine cytology showed clusters of atypical urothelial cells with high nuclear cytoplasmic ratio, nuclear hyperchromasia and irregular nuclear contours in a background of moderate  acute inflammation, and which we will continue to watch. If cells persist, we will consider imaging test of kidneys, and cystoscopy. \par Recommend avoid spicy foods as it will cause irritation of both bladder and rectum, but is not harmful. \par Continue finasteride 5mg once daily and tamsulosin 0.4mg BID.\par I prescribed the patient tadalafil 5mg once daily for BPH symptoms and ED. I informed the patient that they may experience tingling of the skin, headache, warm flushed feeling, heartburn, backache, and on rare occasion, visual or hearing disturbances. Informed the patient how to use [employer] to  tadalafil at a [employer] or [employer] for a discounted price. I explained it had been previously used as heart medication and that it is safe to take as long as it is not taken with nitroglycerin which would cause BP to drop too low. \par The patient produced a urine sample which will be sent for urinalysis, urine cytology, and urine culture. \par RTO in 3 months for follow up or sooner if new urinary symptoms develop. \par \par 04/08/2022: Patient presents today for a follow up telephone visit for which he gave permission for. The visit was mostly conducted with his wife who was present. The patient has been taking finasteride 5 mg once daily and tamsulosin 0.4 mg BID. I prescribed the patient tadalafil 5 mg once daily at the time of the last visit, however he has not taken it. The pt's wife states today that her  told me during his 3/30/2022 visit he had a problems taking tadalafil which included flatulence and severe muscle aches. Both my personal memory and my note from that day do not recall him ever saying that, however there must have been some sort of miscommunication. \par \par 04/28/2022: Patient presents today for a follow up visit. Yesterday, he was having pain, but today he is feeling better. On 4/27/22, his PSA was 0.21 ng/mL, creatinine was 1.04. He also is complaining of rectal pain. He currently is taking Silodosin, one capsule daily, Tamsulosin once daily, Proscar once daily and Cefadroxil. His symptoms have improved with Cefadroxil. Previously he took Cipro, but it did not work satisfactorily. He is not taking Tadalafil. He denies urgency, but is experiencing slight urge incontinence. He reports Gabapentin alleviated his pain. \par \par The patient produced a urine sample which will be sent for urinalysis, urine cytology, and urine culture. \par I am prescribing Gabapentin, 300 mg, two capsules at night, one in the morning for his pain. \par RTO in 1 month, sooner if needed.\par \par 06/07/2022: 's wife called today and spoke to my nurse. She stated that I told her  to take tamsulosin 0.4 mg 2 capsules BID, however there was a misunderstanding and his wife wanted to speak to me. I informed her that it is tamsulosin 0.4 mg one capsule BID and she understood. His wife reports that he has been having difficulties urinating. Has an appt to see me on 6/30/2022. \par \par Urine cytology of 4/28/2022 revealed rare atypical cells with nuclear enlargement, prominent nucleoli, and vacuolated cytoplasm. It was my recommendation after reviewing back in April that he schedule a cystoscopy to evaluate atypical urine cytology, however the pt has not scheduled one yet. Last cystoscopy was in 2017. His wife understood and informed me she would schedule him for a cystoscopy with me within the next two weeks. \par I am sending the patient for a CT Urogram w/wo IV contrast. Creatinine was 1.04 on 4/27/2022. \par Patient will RTO for cystoscopy and to go over the results of his CT. \par \par 07/20/2022:  presents today for a follow up. Patient obtained a CT Urogram w/wo contrast on 7/14/2022 to evaluate microscopic hematuria. CT revealed renal cysts and less than 1 cm low-attenuation lesions which are too small to characterize. No enhancing lesions, or renal stones are identified. On the delayed images, there is poor opacification of the proximal and distal ureter and the right distal ureter. No abnormal soft tissue or gross urothelial lesion is appreciated. Bladder was within normal limits. The anterior aspect of the bladder is not opacified on the delayed imaging. No lymphadenopathy was seen. The patient was pleased with the CT findings. We reviewed his prostate symptoms. Has a long hx of chronic prostatitis that initially responded to cipro but sometimes required bactrim. He currently has no acute problems. Denies pain when sitting for long periods of time. Has some minor urinary urgency and frequency at times but is overall pleased with his urologic state. Continues on finasteride 5 mg once daily, tamsulosin 0.4 mg once daily, tadalafil 5 mg once daily, and silodosin 8 mg once daily. Last PSA 4/27/2022 was 0.21. Creatinine at that time was 1.04. \par \[facility] Clinical findings and CT results were reviewed at length with the patient and his wife. He was pleased with the findings. \par The patient produced a urine sample which will be sent for urinalysis, urine cytology, and urine culture. \par Patient will RTO in 6 months or sooner if clinically indicated. \par \par 07/27/2022:  presents today for a telephone visit for which he gave permission for. He wanted to review the results of his most recent urine test results. UA was perfect. Culture was no growth. Cytology consisted of mainly mature squamous epithelial cells, acute inflammation, and rare benign urothelial cells. Cytology was negative for high grade urothelial carcinoma. His wife inquired on why he needs to proceed with a cystoscopy \par \par Reviewed and discussed urine test results of 7/20/2022 in detail with the patient. Given that these tests were so good, he does not need to schedule a cystoscopy at this time. I previously requested a cysto based off of a concerning urine cytology back in April. Requested that the patient provide a urine specimen in 2 weeks and another one 2-4 weeks after that to ensure cytologies remain good. So long as they are favorable, he can proceed without cystoscopy. Both the patient and his wife who is a retired pathologist understood. \par Patient will follow up in January at his scheduled appointment time. \par \par 03/02/2023: Mr. VIVIENNE HANCOCK presents today for a follow up. The pt continues to work as a . Patient provided a urine specimen on 2/28/2023. UA was normal. Culture was no significant growth. Urine cytology was negative for high grade urothelial carcinoma. Pt denies ever smoking or using tobacco products. Pt recently went to gastroenterologist and was told his stomach was distended. He admits to pushing and straining upon urination. His gastroenterologist put in orders for an US which he has scheduled. Patient continues to take finasteride 5 mg once daily and tamsulosin 0.4 mg BID. No longer is taking tadalafil 5 mg once daily. \par \par Reviewed outside lab results of 1/8/2023 that was done at an outside facility. Creatinine was 1.10. A1C was 6.0. Urine was considered abnormal yellow, could be due to vitamin. Specific gravity on the lower side. Patient's PSA back in April 2022 was 0.21. Blood work today included repeat PSA. \par Reviewed urine test results of 2/28/2023. \par Renewed finasteride 5 mg once daily. I am prescribing the pt Silodosin 8 mg once daily with food to replace one of the tamsulosin. I informed him of the possible side effects of lightheadedness, nasal congestion, and decreased amount of semen when ejaculating. The pt was hesitant to restart tadalafil 5 mg once daily as he feels he is on a lot of medications. I then offered surgical intervention as an option and suggested he talk to one of my colleagues about potential options. He then opted to try tadalafil 5 mg once daily and would consider surgical intervention if it did not help. \par Patient will RTO in 2-3 weeks for a ZENA and to review how he is doing on the changes in his medications. \par \par 05/22/2023: Mr. VIVIENNE HANCOCK was scheduled today for a follow up audio only telephone visit for which he gave permission for. I was unable to reach the pt but did reach his wife, Dr.Almas Benjamin who was located at their home, [address] [place] [address], and I was located in my office in [place], [place]. She informs me that the pt has a lot of pain in the rectum. It hurts when he lays down or when standing. The pain is all the time and not just when he moves the bowels. He has a rectal surgeon but he does not think it is a problem with the rectum. Pt has constipation but wife reports he manages it well. \par \par I prescribed the patient Levofloxacin 500 mg, one tablet daily until finished.  I instructed him to avoid strenuous exercise for two weeks as there is a risk of tendonitis. Up to 1-2 months after finishing it, if he experiences some tendonitis he should not work through it and should rest. I also advised him to avoid direct sunlight while taking this medication. Pt was instructed to not take this medication with dairy products. If the pain does not improve within a few days or worsens, they were instructed to call back sooner and we might consider adding a medication for neuropathic pain such as gabapentin. \par Patient will have a telehealth visit in 2 weeks for reassessment, sooner if clinically indicated. \par \par 06/15/2023: Mr. VIVIENNE HANCOCK presents today for a follow up and ZENA. He reports no significant changes in urination. Does c/o back pain. Had a CT at [facility]. Is planning on getting an epidural injection for pain. Pt reports he had lab work done yesterday, however the results are not yet back. He also reports perineal pain when he sits. His gastroenterologist is prescribing suppositories. \par \par The knee-chest position was utilized for the ZENA. Digital rectal exam found no suspicious rectal masses. Normal seminal vesicles. Anal tone is normal. The prostate is non tender, with normal texture, slightly firm, discrete borders, and no nodules. It is a 20 gram transurethral resection size. No rectal mucosal lesions. No gross blood on the examining finger. \par \par Reviewed imaging results from the spine specialist which he brought with him today. Has spinal canal stenosis in lumbar spine. Also reviewed most recent US of the abdomen done back in March ordered by his gastroenterologist. Does have a fatty liver but no ascites. Both kidneys were normal with no hydro. \par \par Patient will schedule a telehealth visit tomorrow to review results of his lab work. \par \par Preparation, in person office visit, and coordination of care: 22 minutes.

## 2023-06-16 NOTE — REVIEW OF SYSTEMS
16 [Constipation] : constipation [Nocturia] : nocturia [Perineal Pain] : perineal pain [Fever] : no fever [Chills] : no chills [Feeling Poorly] : not feeling poorly [Loss Of Hearing] : no hearing loss [Chest Pain] : no chest pain [Shortness Of Breath] : no shortness of breath [Abdominal Pain] : no abdominal pain [Dysuria] : no dysuria [Urinary Stream Is Smaller] : urine stream was not smaller [Initiating Urination Req. Strain] : initiating urination does not require straining [Arthralgias] : no arthralgias [Skin Lesions] : no skin lesions [Difficulty Walking] : no difficulty walking [Suicidal] : not suicidal [Proptosis] : no proptosis [Easy Bleeding] : no tendency for easy bleeding

## 2023-06-16 NOTE — ADDENDUM
[FreeTextEntry1] : This note was authored by Guerline Gold working as a scribe for Dr.Gary Duron. I, Dr. Collins Duron have reviewed the content of this note and confirm it is true and accurate. I personally performed the history and physical examination and made all the decisions 06/15/2023

## 2023-06-16 NOTE — HISTORY OF PRESENT ILLNESS
[FreeTextEntry1] : Mr Hancock is a 77 year old man presented for follow up of gross hematuria, urinary frequency and urgency. The patient takes tamsulosin 0.4 mg, one half hour after meal twice daily, finasteride 5 mg once daily, and desipramine 10 mg at bedtime. The patient returned to the office 03/20/17 having gross hematuria with blood clot. Dr Holly Bernardo started the patient on Bactrim DS. He eventually went to the ER 03/21/17 where he had a Spann catheter placed. His creatinine in the ER was 1.01. Cystoscopy 03/23/17 found catheter trauma. Prostate protruded modest distance into the bladder. Bladder has a small capacity. Diffuse oozing of blood from bladder neck and trigone. Scraped up mucosa most likely from introduction of telescope. No bladder stones or tumors. Bladder inflammation from catheter. CT urogram was normal and found no etiology of the gross hematuria.\par 3/1/18: The patient returned and reported he has pain in his rectum area and penis. PSA from 1/24/18 was 0.39 ng/mL. 2/15/18 Creatinine was 1.01 mg/dL, hemoglobin was 14.1 g/dL. He received and abdomen ultrasound which shows a fatty liver, gallstones, and obscured pancreas. He denies any gross hematuria. He noted one day ago his GI physician put banding on his hemorrhoids. \par 4/10/18: The patient returned and reported he finished his antibiotics from last visit and noted he had another hemorrhoid and put another band by his GI. He noted pain still persists. UA from 3/1/18 shows 8 WBC/HPF. CBC from 3/1/18 showed slight anemia. \par 5/23/18: The patient returned and reported he has been taking Bactrim for his most recent prostatitis starting one week ago. PSA from 1/22/18 was 0.39 ng/mL. Currently taking Tamsulosin and Finasteride. \par 8/28/18: The patient returned and reported his urination is satisfactory. Currently taking tamsulosin and finasteride. Wakes up 3-4 times during the night to urinate. Noted he had a recent flare up of dysuria 3 days ago and took 2 days of Cipro and symptom have cleared up. \par 3/7/19: The patient returned and reported that he had another flare up of prostatitis and took 2 days of Cipro for his symptoms to clear. Complained of lingering dysuria. I reminded the patient that with Cipro, there is a side effect of tendonitis. Denied gross hematuria. Currently taking Trimethoprim, Tamsulosin and Finasteride. PSA from 2/15/19 was 0.04 ng/mL.\par 4/4/19: The patient returned and reported that he another flare up of prostatitis. Complained of pain when he stands up. His pain is relieved while sitting down. Occasionally has dysuria. Wakes up twice during the night to urinate. Complained of urinary urgency during the night. Takes Tamsulosin BID, Trimethoprim and Finasteride.\par 5/9/19: The patient returned today for an US. Transrectal US of the prostate findings showed a prostate volume of 23.4 cc, a hypoechoic area at the left apex 1.37 x 1.25 cm in transverse image, unremarkable seminal vesicles, and no rectal masses. The hypoechoic are is suspicious for prostate cancer, but the patient has a low PSA. I recommended that the patient undergo a transperineal biopsy for the hyperechoic region, but due to an anal fissure, the patient would like to wait. Notes that he consulted with a colon surgeon about his anal fissure and is currently taking stool softeners. His urination is adequate. Complains of nocturia. Wakes up 5-6 times during the night to urinate. Complains of pain at the tip of his penis. Takes Tamsulosin BID. UA from 4/11/19 was normal. Culture from 4/11/19 showed no growth. Notes that he took Bactrim and ended up getting a rash on his LEs.\par 6/10/19: Patient presents today for a follow up. Today he reports that he is scheduled for an anal fissure operation for 6/21/19. It has been advised that he will need 4 weeks to heal following the operation. Regarding his urination he reports that during the day there are no urinary issues. Nocturia is reported and he will have to wake up 5-6x a night to urinate. Finasteride and Tamsulosin are taken as directed. \par 7/18/19: Patient presents today for a follow up. 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 Patient with hypoechoic lesion of the prostate seen on recent TRUS. PSA 0.690 on 5/2109. Patient on Flomax /Proscar with both irritative and obstructive LUTS. Needs to strain to initiate urination. Nocturia times 5- 6. Denies dysuria or hematuria. Patient states that he feels that he has prostatitis based on familiar symptoms. Additionally he no longer takes Lisinopril but now is on Metoprolol 10 mg. \par 8/7/19: Patient presents today for a follow up. He states that the Bactrim prescribed at his last visit was not providing any relief. He still c/o discomfort when sitting but denies dysuria. He had Ciprofloxacin at home and had taken two tablets over the past two days. He noticed relief and inquired about having the prescription refilled today. \par 10/10/19: Patient presents today for a follow up. On 9/13/19 a needle biopsy of the prostate was completed under general anesthesia. He states that after the biopsy his chronic prostatitis began to cause discomfort. Ciprofloxacin 500 mg was used as directed and the discomfort is no longer present. Additionally it is reported that his sexual function decreased following the procedure but that it is starting to improve again. The results of the pathology report are to be discussed with him today. \par 12/31/2019: Patient presents today for a follow up. Pt reports pain on standing. He has an inflamed prostate. When the bladder is fill he also experiences pain and the pain goes away after he voids. Pt took Cipro to 7 days, finishing last week. While he took it, he felt better. Pt reports allergy to sulfur drugs and develops rash.\par 2/10/20: Patient presents today for a follow up. He had an MRI of the pelvis 1/23/20 which showed no evidence of rectal fistula could be identified. Pt has inflamed prostate\par Pt reports constipation for which he takes stool softner. He admits to dysuria and hematuria with mild pain at the tip of the penis yesterday. Most recent PSA was 0.24, Testosterone is 390.0. \par 02/27/2020: Patient presents today for a follow up. At the conclusion of the last visit, Doxycycline 100 mg was prescribed. The patient states he noticed no improvement. Today he reports having nighttime pain in the rectum which is alleviated after urination. Wakes up 4-5 times during the night to urinate. He has satisfactory day time urination though he reports associated strain. Denies chest pain. He currently takes a stool softener for constipation. The patient also complains of lower back pain for which he will get an injection shortly. A urine culture from 2/10/2020 showed no growth. His most recent creatinine level from 7/11/2019 was 1.07.\par \par 07/28/2020: Patient presents today for a follow up. Reports pain in the rectum and pain with fullness of the bladder. Pt had a US at [facility]. Was given suppository which helped. Noxturia x4-5. On Tamsulosin BID. No burning. Frequency at night. Pt must push and staring. He experiences difficulty with his flow and voiding in general. Wife reports fowl smelling urine. \par \par 8/25/2020: Patient presents today for follow up on nocturia. Patient states he has not yet taken Desmopressin as he does not want to take too many medications. Still experiencing pain in rectum and with fullness of bladder. Was given Valium which helped. Last lab work on 7/28/2020 was within normal limits. Osmolality was normal at 294. Patient to start Desmopressin 0.1mg once before bedtime. \par \par 09/15/2020: Patient presents today for follow up. Pt was having pain in rectum and finished 5 days of Cipro which helped resolve pain, but causes constipation. Takes Colace for constipation. Urinalysis on 8/25/20 was normal. Still has pain in rectum at night, and uses Metronidazole gel to relieve it. Wakes up every 2 hours at night to urinate. Has some urgency. \par \par 11/17/2020: The patient presents today for a follow up. He is not taking desmopressin anymore. His last blood work on 11/6/2020 showed his creatinine was good at 1.0. He has no burning. He has some pain in the rectum and when his bladder is full. He is still taking finasteride 5 mg and trimethoprim 100 mg. At night he gets a lot of pain when his bladder is full and has urinary urgency about every 2 hours. After he urinates, the pain goes away. \par \par 03/09/2021: 75 yr  history of BPH, prostatitis , overactive bladder with nocturnal frequency.  Rectal area pains, last visit [facility] November 17, 2020 showing rectal mucosa nodule. To follow GI. \par patient said he is taking gabapentin and tamsulosin and finasteride. Pain is worse when bladder is full and less when he voids. \par Nocturnal frequency Q2 hr\par ED since 2019 \par Patient is seeing  for his rectal pain.  \par \par 05/24/2021: Patient presents today for follow up. Patient went to a rectal surgeon for his rectal pain and had pelvic MRI 4/29/21 which showed no evidence of perianal fistula. States he continues to have pain in rectum after sitting for a long time and then standing. Patient was instructed to take Gabapentin 400mg TID, but skips afternoon dose as it makes him sleepy. Does have constipation. Has not started Tadalafil. No hx of prostate cancer. \par \par 06/15/2021: Patient presents today for a follow up. He reports that last week he had an episode of prostatitis for which he took Cipro for. Currently wakes every 2 hours during the night to urinate. Has tried desipramine and desmopressin in the past which did not improve his symptoms. Daytime urination remains stable. Stream is strong. Pt states he went to the cardiologist today and an EKG was done which was all normal. Currently takes iron supplements. \par \par 09/01/2021: Patient presents today for follow up. Today complains of straining and pushing to urinate. Denies dysuria. Denies infection of penis. Requests renewal of Finasteride and Tamsulosin. Not taking Trospium. Stopped Tadalafil due to side effect. \par \par 09/29/2021: Patient presents today for follow up. 9/1/21 UA showed 12 RBC/HPF and trace blood by dipstick. On repeat 9/9/21, he had only 3 RBC/HPF and small blood by dripstick which normal. Urine cytology 9/1/21 negative for high grade urothelial carcinoma. Provided blood work from 9/17/21 done by PCP which showed UA slightly cloudy, 3-5 RBC/HPF. PSA 0.2. Wakes 4x at night to urinate. States he has rectal pain when bladder is full. \par \par 02/02/2022: Patient presents today for follow up. Reviewed 12/29/21 lab work which showed MCV low at 75.2, WBC 6600, Hb 12.1, platelets 052304, potassium 4.9, creatinine 0.90 upper limits 1.18, BUN high at 25, AST minimally elevated 39 upper limits 34, alk phos normal 60, HbA1c 6.0, UA dipstick positive small blood, moderate leukocyte esterase, few squamous epithelial cells. Has pain when he sits and feels better when standing or laying down. States he must push to urinate. No pain on urination. Nocturia x3-4. Takes tamsulosin 0.4mg BID. Reports his EKG is normal. \par \par 03/14/2022: Patient presents today for follow up. Has been doing well, however does have to push urination. Nocturia x4. Erections are poor but not interested in medications. No dysuria. Stream is slow. Continues finasteride 5mg once daily and tamsulosin 0.4mg BID. Does not take tadalafil every day. Does not have any red itchy skin and has not needed to use clotrimazole-betamethasone cream. Reviewed 2/2/22 lab work. Urine culture grew 10,000-40,000 CFU/mL Enterococcus faecalis and patient was treated with amoxicillin 500mg TID and feels better now. PSA 0.24. Free testosterone low 3.9. Lab work of 2/11/22 showed brain natriuretic peptide normal, procalcitonin undetectable <0.10 ng/mL. Will be having evaluation for anorectal pain, with the proposed procedures being anorectal manometry, electromyography, pudendal nerve terminal motor latency, and endorectal ultrasound. Denies bladder pain. Will be having procedure for varicose veins of RLE. \par \par 03/30/2022: Patient presents today for follow up. Urination is good. Continues finasteride 5mg once daily and tamsulosin 0.4mg BID. Did not help with urination. Denies dysuria. Only has urinary urgency at night. Notes he eats spicy foods. No pain where he sits. Energy levels are good. Has tried Cialis in the past which did not help much. Erections had been poor since the biopsy. Reviewed 3/13/22 lab work which was WNL except free testosterone low 4.0, LH elevated 19, dihydrotestosterone low 4.1. Total testosterone normal 250. PSA was 0.20. UA normal. Urine cytology showed clusters of atypical urothelial cells with high nuclear cytoplasmic ratio, nuclear hyperchromasia and irregular nuclear contours in a background of moderate acute inflammation. States he went to rectal surgeon for anorectal pain, and had US which was reportedly normal and found muscles are weak, and was advised muscle dysfunction physical therapy which he is currently attending. \par \par 04/08/2022: Patient presents today for a follow up telephone visit for which he gave permission for. The visit was mostly conducted with his wife who was present. The patient has been taking finasteride 5 mg once daily and tamsulosin 0.4 mg BID. I prescribed the patient tadalafil 5 mg once daily at the time of the last visit, however he has not taken it. The pt's wife states today that her  told me during his 3/30/2022 visit he had a problems taking tadalafil which included flatulence and severe muscle aches. Both my personal memory and my note from that day do not recall him ever saying that, however there must have been some sort of miscommunication. \par \par 04/28/2022: Patient presents today for a follow up visit. Yesterday, he was having pain, but today he is feeling better. On 4/27/22, his PSA was 0.21 ng/mL, creatinine was 1.04. He also is complaining of rectal pain. He currently is taking Silodosin, one capsule daily, Tamsulosin once daily, Proscar once daily and Cefadroxil. His symptoms have improved with Cefadroxil. Previously he took Cipro, but it did not work satisfactorily. He is not taking Tadalafil. He denies urgency, but is experiencing slight urge incontinence. He reports Gabapentin alleviated his pain.\par \par 06/07/2022: 's wife called today and spoke to my nurse. She stated that I told her  to take tamsulosin 0.4 mg 2 capsules BID, however there was a misunderstanding and his wife wanted to speak to me. I informed her that it is tamsulosin 0.4 mg one capsule BID and she understood. His wife reports that he has been having difficulties urinating. Has an appt to see me on 6/30/2022. \par \par 07/20/2022:  presents today for a follow up. Patient obtained a CT Urogram w/wo contrast on 7/14/2022 to evaluate microscopic hematuria. CT revealed renal cysts and less than 1 cm low-attenuation lesions which are too small to characterize. No enhancing lesions, or renal stones are identified. On the delayed images, there is poor opacification of the proximal and distal ureter and the right distal ureter. No abnormal soft tissue or gross urothelial lesion is appreciated. Bladder was within normal limits. The anterior aspect of the bladder is not opacified on the delayed imaging. No lymphadenopathy was seen. The patient was pleased with the CT findings. We reviewed his prostate symptoms. Has a long hx of chronic prostatitis that initially responded to cipro but sometimes required bactrim. He currently has no acute problems. Denies pain when sitting for long periods of time. Has some minor urinary urgency and frequency at times but is overall pleased with his urologic state. Continues on finasteride 5 mg once daily, tamsulosin 0.4 mg once daily, tadalafil 5 mg once daily, and silodosin 8 mg once daily. Last PSA 4/27/2022 was 0.21. Creatinine at that time was 1.04. \par \par 07/27/2022:  presents today for a telephone visit for which he gave permission for. He wanted to review the results of his most recent urine test results. UA was perfect. Culture was no growth. Cytology consisted of mainly mature squamous epithelial cells, acute inflammation, and rare benign urothelial cells. Cytology was negative for high grade urothelial carcinoma. His wife inquired on why he needs to proceed with a cystoscopy. \par \par 03/02/2023: Mr. VIVIENNE HANCOCK presents today for a follow up. The pt continues to work as a . Patient provided a urine specimen on 2/28/2023. UA was normal. Culture was no significant growth. Urine cytology was negative for high grade urothelial carcinoma. Pt denies ever smoking or using tobacco products. Pt recently went to gastroenterologist and was told his stomach was distended. He admits to pushing and straining upon urination. His gastroenterologist put in orders for an US which he has scheduled. Patient continues to take finasteride 5 mg once daily and tamsulosin 0.4 mg BID. No longer is taking tadalafil 5 mg once daily. \par \par 05/22/2023: Mr. VIVIENNE HANCOCK was scheduled today for a follow up audio only telephone visit for which he gave permission for. I was unable to reach the pt but did reach his wife, Dr.Almas Benjamin who was located at their home, [address] [place] [address], and I was located in my office in [place], [place]. She informs me that the pt has a lot of pain in the rectum. It hurts when he lays down or when standing. The pain is all the time and not just when he moves the bowels. He has a rectal surgeon but he does not think it is a problem with the rectum. Pt has constipation but wife reports he manages it well. \par \par 06/15/2023: Mr. VIVIENNE HANCOCK presents today for a follow up and [facility]. He reports no significant changes in urination. Does c/o back pain. Had a CT at [facility]. Is planning on getting an epidural injection for pain. Pt reports he had lab work done yesterday, however the results are not yet back. He also reports perineal pain when he sits. His gastroenterologist is prescribing suppositories.

## 2023-06-18 PROBLEM — D50.9 MICROCYTIC ANEMIA: Status: ACTIVE | Noted: 2023-06-18

## 2023-06-18 NOTE — HISTORY OF PRESENT ILLNESS
[FreeTextEntry1] : Mr Hancock is a 77 year old man presented for follow up of gross hematuria, urinary frequency and urgency. The patient takes tamsulosin 0.4 mg, one half hour after meal twice daily, finasteride 5 mg once daily, and desipramine 10 mg at bedtime. The patient returned to the office 03/20/17 having gross hematuria with blood clot. Dr Holly Bernardo started the patient on Bactrim DS. He eventually went to the ER 03/21/17 where he had a Spann catheter placed. His creatinine in the ER was 1.01. Cystoscopy 03/23/17 found catheter trauma. Prostate protruded modest distance into the bladder. Bladder has a small capacity. Diffuse oozing of blood from bladder neck and trigone. Scraped up mucosa most likely from introduction of telescope. No bladder stones or tumors. Bladder inflammation from catheter. CT urogram was normal and found no etiology of the gross hematuria.\par 3/1/18: The patient returned and reported he has pain in his rectum area and penis. PSA from 1/24/18 was 0.39 ng/mL. 2/15/18 Creatinine was 1.01 mg/dL, hemoglobin was 14.1 g/dL. He received and abdomen ultrasound which shows a fatty liver, gallstones, and obscured pancreas. He denies any gross hematuria. He noted one day ago his GI physician put banding on his hemorrhoids. \par 4/10/18: The patient returned and reported he finished his antibiotics from last visit and noted he had another hemorrhoid and put another band by his GI. He noted pain still persists. UA from 3/1/18 shows 8 WBC/HPF. CBC from 3/1/18 showed slight anemia. \par 5/23/18: The patient returned and reported he has been taking Bactrim for his most recent prostatitis starting one week ago. PSA from 1/22/18 was 0.39 ng/mL. Currently taking Tamsulosin and Finasteride. \par 8/28/18: The patient returned and reported his urination is satisfactory. Currently taking tamsulosin and finasteride. Wakes up 3-4 times during the night to urinate. Noted he had a recent flare up of dysuria 3 days ago and took 2 days of Cipro and symptom have cleared up. \par 3/7/19: The patient returned and reported that he had another flare up of prostatitis and took 2 days of Cipro for his symptoms to clear. Complained of lingering dysuria. I reminded the patient that with Cipro, there is a side effect of tendonitis. Denied gross hematuria. Currently taking Trimethoprim, Tamsulosin and Finasteride. PSA from 2/15/19 was 0.04 ng/mL.\par 4/4/19: The patient returned and reported that he another flare up of prostatitis. Complained of pain when he stands up. His pain is relieved while sitting down. Occasionally has dysuria. Wakes up twice during the night to urinate. Complained of urinary urgency during the night. Takes Tamsulosin BID, Trimethoprim and Finasteride.\par 5/9/19: The patient returned today for an US. Transrectal US of the prostate findings showed a prostate volume of 23.4 cc, a hypoechoic area at the left apex 1.37 x 1.25 cm in transverse image, unremarkable seminal vesicles, and no rectal masses. The hypoechoic are is suspicious for prostate cancer, but the patient has a low PSA. I recommended that the patient undergo a transperineal biopsy for the hyperechoic region, but due to an anal fissure, the patient would like to wait. Notes that he consulted with a colon surgeon about his anal fissure and is currently taking stool softeners. His urination is adequate. Complains of nocturia. Wakes up 5-6 times during the night to urinate. Complains of pain at the tip of his penis. Takes Tamsulosin BID. UA from 4/11/19 was normal. Culture from 4/11/19 showed no growth. Notes that he took Bactrim and ended up getting a rash on his LEs.\par 6/10/19: Patient presents today for a follow up. Today he reports that he is scheduled for an anal fissure operation for 6/21/19. It has been advised that he will need 4 weeks to heal following the operation. Regarding his urination he reports that during the day there are no urinary issues. Nocturia is reported and he will have to wake up 5-6x a night to urinate. Finasteride and Tamsulosin are taken as directed. \par 7/18/19: Patient presents today for a follow up. 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 Patient with hypoechoic lesion of the prostate seen on recent TRUS. PSA 0.690 on 5/2109. Patient on Flomax /Proscar with both irritative and obstructive LUTS. Needs to strain to initiate urination. Nocturia times 5- 6. Denies dysuria or hematuria. Patient states that he feels that he has prostatitis based on familiar symptoms. Additionally he no longer takes Lisinopril but now is on Metoprolol 10 mg. \par 8/7/19: Patient presents today for a follow up. He states that the Bactrim prescribed at his last visit was not providing any relief. He still c/o discomfort when sitting but denies dysuria. He had Ciprofloxacin at home and had taken two tablets over the past two days. He noticed relief and inquired about having the prescription refilled today. \par 10/10/19: Patient presents today for a follow up. On 9/13/19 a needle biopsy of the prostate was completed under general anesthesia. He states that after the biopsy his chronic prostatitis began to cause discomfort. Ciprofloxacin 500 mg was used as directed and the discomfort is no longer present. Additionally it is reported that his sexual function decreased following the procedure but that it is starting to improve again. The results of the pathology report are to be discussed with him today. \par 12/31/2019: Patient presents today for a follow up. Pt reports pain on standing. He has an inflamed prostate. When the bladder is fill he also experiences pain and the pain goes away after he voids. Pt took Cipro to 7 days, finishing last week. While he took it, he felt better. Pt reports allergy to sulfur drugs and develops rash.\par 2/10/20: Patient presents today for a follow up. He had an MRI of the pelvis 1/23/20 which showed no evidence of rectal fistula could be identified. Pt has inflamed prostate\par Pt reports constipation for which he takes stool softner. He admits to dysuria and hematuria with mild pain at the tip of the penis yesterday. Most recent PSA was 0.24, Testosterone is 390.0. \par 02/27/2020: Patient presents today for a follow up. At the conclusion of the last visit, Doxycycline 100 mg was prescribed. The patient states he noticed no improvement. Today he reports having nighttime pain in the rectum which is alleviated after urination. Wakes up 4-5 times during the night to urinate. He has satisfactory day time urination though he reports associated strain. Denies chest pain. He currently takes a stool softener for constipation. The patient also complains of lower back pain for which he will get an injection shortly. A urine culture from 2/10/2020 showed no growth. His most recent creatinine level from 7/11/2019 was 1.07.\par \par 07/28/2020: Patient presents today for a follow up. Reports pain in the rectum and pain with fullness of the bladder. Pt had a US at [place]. Was given suppository which helped. Noxturia x4-5. On Tamsulosin BID. No burning. Frequency at night. Pt must push and staring. He experiences difficulty with his flow and voiding in general. Wife reports fowl smelling urine. \par \par 8/25/2020: Patient presents today for follow up on nocturia. Patient states he has not yet taken Desmopressin as he does not want to take too many medications. Still experiencing pain in rectum and with fullness of bladder. Was given Valium which helped. Last lab work on 7/28/2020 was within normal limits. Osmolality was normal at 294. Patient to start Desmopressin 0.1mg once before bedtime. \par \par 09/15/2020: Patient presents today for follow up. Pt was having pain in rectum and finished 5 days of Cipro which helped resolve pain, but causes constipation. Takes Colace for constipation. Urinalysis on 8/25/20 was normal. Still has pain in rectum at night, and uses Metronidazole gel to relieve it. Wakes up every 2 hours at night to urinate. Has some urgency. \par \par 11/17/2020: The patient presents today for a follow up. He is not taking desmopressin anymore. His last blood work on 11/6/2020 showed his creatinine was good at 1.0. He has no burning. He has some pain in the rectum and when his bladder is full. He is still taking finasteride 5 mg and trimethoprim 100 mg. At night he gets a lot of pain when his bladder is full and has urinary urgency about every 2 hours. After he urinates, the pain goes away. \par \par 03/09/2021: 75 yr  history of BPH, prostatitis , overactive bladder with nocturnal frequency.  Rectal area pains, last visit [facility] November 17, 2020 showing rectal mucosa nodule. To follow GI. \par patient said he is taking gabapentin and tamsulosin and finasteride. Pain is worse when bladder is full and less when he voids. \par Nocturnal frequency Q2 hr\par ED since 2019 \par Patient is seeing  for his rectal pain.  \par \par 05/24/2021: Patient presents today for follow up. Patient went to a rectal surgeon for his rectal pain and had pelvic MRI 4/29/21 which showed no evidence of perianal fistula. States he continues to have pain in rectum after sitting for a long time and then standing. Patient was instructed to take Gabapentin 400mg TID, but skips afternoon dose as it makes him sleepy. Does have constipation. Has not started Tadalafil. No hx of prostate cancer. \par \par 06/15/2021: Patient presents today for a follow up. He reports that last week he had an episode of prostatitis for which he took Cipro for. Currently wakes every 2 hours during the night to urinate. Has tried desipramine and desmopressin in the past which did not improve his symptoms. Daytime urination remains stable. Stream is strong. Pt states he went to the cardiologist today and an EKG was done which was all normal. Currently takes iron supplements. \par \par 09/01/2021: Patient presents today for follow up. Today complains of straining and pushing to urinate. Denies dysuria. Denies infection of penis. Requests renewal of Finasteride and Tamsulosin. Not taking Trospium. Stopped Tadalafil due to side effect. \par \par 09/29/2021: Patient presents today for follow up. 9/1/21 UA showed 12 RBC/HPF and trace blood by dipstick. On repeat 9/9/21, he had only 3 RBC/HPF and small blood by dripstick which normal. Urine cytology 9/1/21 negative for high grade urothelial carcinoma. Provided blood work from 9/17/21 done by PCP which showed UA slightly cloudy, 3-5 RBC/HPF. PSA 0.2. Wakes 4x at night to urinate. States he has rectal pain when bladder is full. \par \par 02/02/2022: Patient presents today for follow up. Reviewed 12/29/21 lab work which showed MCV low at 75.2, WBC 6600, Hb 12.1, platelets 821020, potassium 4.9, creatinine 0.90 upper limits 1.18, BUN high at 25, AST minimally elevated 39 upper limits 34, alk phos normal 60, HbA1c 6.0, UA dipstick positive small blood, moderate leukocyte esterase, few squamous epithelial cells. Has pain when he sits and feels better when standing or laying down. States he must push to urinate. No pain on urination. Nocturia x3-4. Takes tamsulosin 0.4mg BID. Reports his EKG is normal. \par \par 03/14/2022: Patient presents today for follow up. Has been doing well, however does have to push urination. Nocturia x4. Erections are poor but not interested in medications. No dysuria. Stream is slow. Continues finasteride 5mg once daily and tamsulosin 0.4mg BID. Does not take tadalafil every day. Does not have any red itchy skin and has not needed to use clotrimazole-betamethasone cream. Reviewed 2/2/22 lab work. Urine culture grew 10,000-40,000 CFU/mL Enterococcus faecalis and patient was treated with amoxicillin 500mg TID and feels better now. PSA 0.24. Free testosterone low 3.9. Lab work of 2/11/22 showed brain natriuretic peptide normal, procalcitonin undetectable <0.10 ng/mL. Will be having evaluation for anorectal pain, with the proposed procedures being anorectal manometry, electromyography, pudendal nerve terminal motor latency, and endorectal ultrasound. Denies bladder pain. Will be having procedure for varicose veins of RLE. \par \par 03/30/2022: Patient presents today for follow up. Urination is good. Continues finasteride 5mg once daily and tamsulosin 0.4mg BID. Did not help with urination. Denies dysuria. Only has urinary urgency at night. Notes he eats spicy foods. No pain where he sits. Energy levels are good. Has tried Cialis in the past which did not help much. Erections had been poor since the biopsy. Reviewed 3/13/22 lab work which was WNL except free testosterone low 4.0, LH elevated 19, dihydrotestosterone low 4.1. Total testosterone normal 250. PSA was 0.20. UA normal. Urine cytology showed clusters of atypical urothelial cells with high nuclear cytoplasmic ratio, nuclear hyperchromasia and irregular nuclear contours in a background of moderate acute inflammation. States he went to rectal surgeon for anorectal pain, and had US which was reportedly normal and found muscles are weak, and was advised muscle dysfunction physical therapy which he is currently attending. \par \par 04/08/2022: Patient presents today for a follow up telephone visit for which he gave permission for. The visit was mostly conducted with his wife who was present. The patient has been taking finasteride 5 mg once daily and tamsulosin 0.4 mg BID. I prescribed the patient tadalafil 5 mg once daily at the time of the last visit, however he has not taken it. The pt's wife states today that her  told me during his 3/30/2022 visit he had a problems taking tadalafil which included flatulence and severe muscle aches. Both my personal memory and my note from that day do not recall him ever saying that, however there must have been some sort of miscommunication. \par \par 04/28/2022: Patient presents today for a follow up visit. Yesterday, he was having pain, but today he is feeling better. On 4/27/22, his PSA was 0.21 ng/mL, creatinine was 1.04. He also is complaining of rectal pain. He currently is taking Silodosin, one capsule daily, Tamsulosin once daily, Proscar once daily and Cefadroxil. His symptoms have improved with Cefadroxil. Previously he took Cipro, but it did not work satisfactorily. He is not taking Tadalafil. He denies urgency, but is experiencing slight urge incontinence. He reports Gabapentin alleviated his pain.\par \par 06/07/2022: 's wife called today and spoke to my nurse. She stated that I told her  to take tamsulosin 0.4 mg 2 capsules BID, however there was a misunderstanding and his wife wanted to speak to me. I informed her that it is tamsulosin 0.4 mg one capsule BID and she understood. His wife reports that he has been having difficulties urinating. Has an appt to see me on 6/30/2022. \par \par 07/20/2022:  presents today for a follow up. Patient obtained a CT Urogram w/wo contrast on 7/14/2022 to evaluate microscopic hematuria. CT revealed renal cysts and less than 1 cm low-attenuation lesions which are too small to characterize. No enhancing lesions, or renal stones are identified. On the delayed images, there is poor opacification of the proximal and distal ureter and the right distal ureter. No abnormal soft tissue or gross urothelial lesion is appreciated. Bladder was within normal limits. The anterior aspect of the bladder is not opacified on the delayed imaging. No lymphadenopathy was seen. The patient was pleased with the CT findings. We reviewed his prostate symptoms. Has a long hx of chronic prostatitis that initially responded to cipro but sometimes required bactrim. He currently has no acute problems. Denies pain when sitting for long periods of time. Has some minor urinary urgency and frequency at times but is overall pleased with his urologic state. Continues on finasteride 5 mg once daily, tamsulosin 0.4 mg once daily, tadalafil 5 mg once daily, and silodosin 8 mg once daily. Last PSA 4/27/2022 was 0.21. Creatinine at that time was 1.04. \par \par 07/27/2022:  presents today for a telephone visit for which he gave permission for. He wanted to review the results of his most recent urine test results. UA was perfect. Culture was no growth. Cytology consisted of mainly mature squamous epithelial cells, acute inflammation, and rare benign urothelial cells. Cytology was negative for high grade urothelial carcinoma. His wife inquired on why he needs to proceed with a cystoscopy. \par \par 03/02/2023: Mr. VIVIENNE HANCOCK presents today for a follow up. The pt continues to work as a . Patient provided a urine specimen on 2/28/2023. UA was normal. Culture was no significant growth. Urine cytology was negative for high grade urothelial carcinoma. Pt denies ever smoking or using tobacco products. Pt recently went to gastroenterologist and was told his stomach was distended. He admits to pushing and straining upon urination. His gastroenterologist put in orders for an US which he has scheduled. Patient continues to take finasteride 5 mg once daily and tamsulosin 0.4 mg BID. No longer is taking tadalafil 5 mg once daily. \par \par 05/22/2023: Mr. VIVIENNE HANCOCK was scheduled today for a follow up audio only telephone visit for which he gave permission for. I was unable to reach the pt but did reach his wife, Dr.Almas Benjamin who was located at their home, [address] [place] [address], and I was located in my office in [place], [place]. She informs me that the pt has a lot of pain in the rectum. It hurts when he lays down or when standing. The pain is all the time and not just when he moves the bowels. He has a rectal surgeon but he does not think it is a problem with the rectum. Pt has constipation but wife reports he manages it well. \par \par 06/15/2023: Mr. VIVIENNE HANCOCK presents today for a follow up and [facility]. He reports no significant changes in urination. Does c/o back pain. Had a CT at [place] [facility] and interventional pain management. Is planning on getting an epidural injection for pain. Pt reports he had lab work done yesterday, however the results are not yet back. He also reports perineal pain when he sits. His gastroenterologist is prescribing suppositories. \par \par 06/16/2023: Mr. HANCOCK is a 77 year old male presenting today via telehealth using real time 2 way audio only technology.  The patient,  Mr. HANCOCK, was located in his home at [address] [place] [address] [address] [place] [address] at the time of the visit. The provider, FREDERIC KIM, was located in his office on [place], [place]. Verbal Consent was given by the patient on 06/16/2023 and my scribe served as a witness to the verbal consent. The patient provided blood sample on June 13 at [facility] but they seemed not available. I called to inquire and it was determined that an entry mistake occurred where the lab results was misdated and it was reported as being drawn as March 1st. The  was agreeable to correct the mistake. I called the patient again and explained the mishap to the patient. I explain that his CBC on 06/13/2023 shows that his blood count is low. HGB  is low at 9.8. HCT  low at 34.9. Mean Cell Volume  also low at 69.7. He reports that his diet has not changed recently. He says his last colonoscopy was several years ago.

## 2023-06-18 NOTE — ASSESSMENT
[FreeTextEntry1] : Mr Hancock is a 77 year old man presented for follow up of gross hematuria, urinary frequency and urgency. The patient takes tamsulosin 0.4 mg, one half hour after meal twice daily, finasteride 5 mg once daily, and desipramine 10 mg at bedtime. The patient returned to the office 03/20/17 having gross hematuria with blood clot. Dr Holly Bernardo started the patient on Bactrim DS. He eventually went to the ER 03/21/17 where he had a Spann catheter placed. His creatinine in the ER was 1.01. Cystoscopy 03/23/17 found catheter trauma. Prostate protruded modest distance into the bladder. Bladder has a small capacity. Diffuse oozing of blood from bladder neck and trigone. Scraped up mucosa most likely from introduction of telescope. No bladder stones or tumors. Bladder inflammation from catheter. CT urogram was normal and found no etiology of the gross hematuria.\par 3/1/18: The patient returned and reported he has pain in his rectum area and penis. PSA from 1/24/18 was 0.39 ng/mL. 2/15/18 Creatinine was 1.01 mg/dL, hemoglobin was 14.1 g/dL. He received and abdomen ultrasound which shows a fatty liver, gallstones, and obscured pancreas. He denies any gross hematuria. He noted one day ago his GI physician put banding on his hemorrhoids. \par 4/10/18: The patient returned and reported he finished his antibiotics from last visit and noted he had another hemorrhoid and put another band by his GI. He noted pain still persists. UA from 3/1/18 shows 8 WBC/HPF. CBC from 3/1/18 showed slight anemia. \par 5/23/18: The patient returned and reported he has been taking Bactrim for his most recent prostatitis starting one week ago. PSA from 1/22/18 was 0.39 ng/mL. Currently taking Tamsulosin and Finasteride. \par 8/28/18: The patient returned and reported his urination is satisfactory. Currently taking tamsulosin and finasteride. Wakes up 3-4 times during the night to urinate. Noted he had a recent flare up of dysuria 3 days ago and took 2 days of Cipro and symptom have cleared up. \par 3/7/19: The patient returned and reported that he had another flare up of prostatitis and took 2 days of Cipro for his symptoms to clear. Complained of lingering dysuria. I reminded the patient that with Cipro, there is a side effect of tendonitis. Denied gross hematuria. Currently taking Trimethoprim, Tamsulosin and Finasteride. PSA from 2/15/19 was 0.04 ng/mL.\par 4/4/19: The patient returned and reported that he another flare up of prostatitis. Complained of pain when he stands up. His pain is relieved while sitting down. Occasionally has dysuria. Wakes up twice during the night to urinate. Complained of urinary urgency during the night. Takes Tamsulosin BID, Trimethoprim and Finasteride.\par 5/9/19: The patient returned today for an US. Transrectal US of the prostate findings showed a prostate volume of 23.4 cc, a hypoechoic area at the left apex 1.37 x 1.25 cm in transverse image, unremarkable seminal vesicles, and no rectal masses. The hypoechoic are is suspicious for prostate cancer, but the patient has a low PSA. I recommended that the patient undergo a transperineal biopsy for the hyperechoic region, but due to an anal fissure, the patient would like to wait. Notes that he consulted with a colon surgeon about his anal fissure and is currently taking stool softeners. His urination is adequate. Complains of nocturia. Wakes up 5-6 times during the night to urinate. Complains of pain at the tip of his penis. Takes Tamsulosin BID. UA from 4/11/19 was normal. Culture from 4/11/19 showed no growth. Notes that he took Bactrim and ended up getting a rash on his LEs.\par 6/10/19: Patient presents today for a follow up. Today he reports that he is scheduled for an anal fissure operation for 6/21/19. It has been advised that he will need 4 weeks to heal following the operation. Regarding his urination he reports that during the day there are no urinary issues. Nocturia is reported and he will have to wake up 5-6x a night to urinate. Finasteride and Tamsulosin are taken as directed. \par 7/18/19: Patient presents today for a follow up. 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 Patient with hypoechoic lesion of the prostate seen on recent TRUS. PSA 0.690 on 5/2109. Patient on Flomax /Proscar with both irritative and obstructive LUTS. Needs to strain to initiate urination. Nocturia times 5- 6. Denies dysuria or hematuria. Patient states that he feels that he has prostatitis based on familiar symptoms. Additionally he no longer takes Lisinopril but now is on Metoprolol 10 mg. \par 8/7/19: Patient presents today for a follow up. He states that the Bactrim prescribed at his last visit was not providing any relief. He still c/o discomfort when sitting but denies dysuria. He had Ciprofloxacin at home and had taken two tablets over the past two days. He noticed relief and inquired about having the prescription refilled today. \par 10/10/19: Patient presents today for a follow up. On 9/13/19 a needle biopsy of the prostate was completed under general anesthesia. He states that after the biopsy his chronic prostatitis began to cause discomfort. Ciprofloxacin 500 mg was used as directed and the discomfort is no longer present. Additionally it is reported that his sexual function decreased following the procedure but that it is starting to improve again. The results of the pathology report are to be discussed with him today. \par 12/31/2019: Patient presents today for a follow up. Pt reports pain on standing. He has an inflamed prostate. When the bladder is fill he also experiences pain and the pain goes away after he voids. Pt took Cipro to 7 days, finishing last week. While he took it, he felt better. Pt reports allergy to sulfur drugs and develops rash.\par 2/10/20: Patient presents today for a follow up. He had an MRI of the pelvis 1/23/20 which showed no evidence of rectal fistula could be identified. Pt has inflamed prostate\par Pt reports constipation for which he takes stool softner. He admits to dysuria and hematuria with mild pain at the tip of the penis yesterday. Most recent PSA was 0.24, Testosterone is 390.0. \par 02/27/2020: Patient presents today for a follow up. At the conclusion of the last visit, Doxycycline 100 mg was prescribed. The patient states he noticed no improvement. Today he reports having nighttime pain in the rectum which is alleviated after urination. Wakes up 4-5 times during the night to urinate. He has satisfactory day time urination though he reports associated strain. Denies chest pain. He currently takes a stool softener for constipation. The patient also complains of lower back pain for which he will get an injection shortly. A urine culture from 2/10/2020 showed no growth. His most recent creatinine level from 7/11/2019 was 1.07.\par \par 07/28/2020: Patient presents today for a follow up. Reports pain in the rectum and pain with fullness of the bladder. Pt had a US at [place]. Was given suppository which helped. Noxturia x4-5. On Tamsulosin BID. No burning. Frequency at night. Pt must push and staring. He experiences difficulty with his flow and voiding in general. Wife reports fowl smelling urine. \par \par 8/25/2020: Patient presents today for follow up on nocturia. Patient states he has not yet taken Desmopressin as he does not want to take too many medications. Still experiencing pain in rectum and with fullness of bladder. Was given Valium which helped. Last lab work on 7/28/2020 was within normal limits. Osmolality was normal at 294. Patient to start Desmopressin 0.1mg once before bedtime. \par \par 09/15/2020: Patient presents today for follow up. Pt was having pain in rectum and finished 5 days of Cipro which helped resolve pain, but causes constipation. Takes Colace for constipation. Urinalysis on 8/25/20 was normal. Still has pain in rectum at night, and uses Metronidazole gel to relieve it. Wakes up every 2 hours at night to urinate. Has some urgency. \par \par 11/17/2020: The patient presents today for a follow up. He is not taking desmopressin anymore. His last blood work on 11/6/2020 showed his creatinine was good at 1.0. He has no burning. He has some pain in the rectum and when his bladder is full. He is still taking finasteride 5 mg and trimethoprim 100 mg. At night he gets a lot of pain when his bladder is full and has urinary urgency about every 2 hours. After he urinates, the pain goes away. \par \par 03/09/2021: 75 yr male  BPH, nocturia , straining .  Chronic prostatitis, chronic cystitis \par \par 05/24/2021: Patient presents today for follow up. Patient went to a rectal surgeon for his rectal pain and had pelvic MRI 4/29/21 which showed no evidence of perianal fistula. States he continues to have pain in rectum after sitting for a long time and then standing. Patient was instructed to take Gabapentin 400mg TID, but skips afternoon dose as it makes him sleepy. Does have constipation. Has not started Tadalafil. No hx of prostate cancer. \par \par 06/15/2021: Patient presents today for a follow up. He reports that last week he had an episode of prostatitis for which he took Cipro for. Currently wakes every 2 hours during the night to urinate. Has tried desipramine and desmopressin in the past which did not improve his symptoms. Daytime urination remains stable. Stream is strong. Pt states he went to the cardiologist today and an EKG was done which was all normal. Currently takes iron supplements. \par \par 09/01/2021: Patient presents today for follow up. Today complains of straining and pushing to urinate. Denies dysuria. Denies infection of penis. Requests renewal of Finasteride and Tamsulosin. Not taking Trospium. Stopped Tadalafil due to side effect. \par \par 09/29/2021: Patient presents today for follow up. 9/1/21 UA showed 12 RBC/HPF and trace blood by dipstick. On repeat 9/9/21, he had only 3 RBC/HPF and small blood by dripstick which normal. Urine cytology 9/1/21 negative for high grade urothelial carcinoma. Provided blood work from 9/17/21 done by PCP which showed UA slightly cloudy, 3-5 RBC/HPF. PSA 0.2. Wakes 4x at night to urinate. States he has rectal pain when bladder is full. \par \par 02/02/2022: Patient presents today for follow up. Reviewed 12/29/21 lab work which showed MCV low at 75.2, WBC 6600, Hb 12.1, platelets 011747, potassium 4.9, creatinine 0.90 upper limits 1.18, BUN high at 25, AST minimally elevated 39 upper limits 34, alk phos normal 60, HbA1c 6.0, UA dipstick positive small blood, moderate leukocyte esterase, few squamous epithelial cells. No gross hematuria. Has pain when he sits and feels better when standing or laying down. States he must push to urinate. No pain on urination. Nocturia x3-4. Takes tamsulosin 0.4mg BID. Reports his EKG is normal. \par \par 03/14/2022: Patient presents today for follow up. Has been doing well, however does have to push urination. Nocturia x4. Erections are poor but not interested in medications. No dysuria. Stream is slow. Continues finasteride 5mg once daily and tamsulosin 0.4mg BID. Does not take tadalafil every day. Does not have any red itchy skin and has not needed to use clotrimazole-betamethasone cream. Reviewed 2/2/22 lab work. Urine culture grew 10,000-40,000 CFU/mL Enterococcus faecalis and patient was treated with amoxicillin 500mg TID and feels better now. PSA 0.24. Free testosterone low 3.9. Lab work of 2/11/22 showed brain natriuretic peptide normal, procalcitonin undetectable <0.10 ng/mL. Will be having evaluation for anorectal pain, with the proposed procedures being anorectal manometry, electromyography, pudendal nerve terminal motor latency, and endorectal ultrasound. Denies bladder pain. Will be having procedure for varicose veins of RLE. \par \par 03/30/2022: Patient presents today for follow up. Urination is good. Continues finasteride 5mg once daily and tamsulosin 0.4mg BID. Did not help with urination. Denies dysuria. Only has urinary urgency at night. Notes he eats spicy foods. No pain where he sits. Energy levels are good. Has tried Cialis in the past which did not help much. Erections had been poor since the biopsy. Reviewed 3/13/22 lab work which was WNL except free testosterone low 4.0, LH elevated 19, dihydrotestosterone low 4.1. Total testosterone normal 250. PSA was 0.20. UA normal. Urine cytology showed clusters of atypical urothelial cells with high nuclear cytoplasmic ratio, nuclear hyperchromasia and irregular nuclear contours in a background of moderate acute inflammation. States he went to rectal surgeon for anorectal pain, and had US which was reportedly normal and found muscles are weak, and was advised muscle dysfunction physical therapy which he is currently attending. \par \par Reviewed 3/13/22 lab work with patient. Urine cytology showed clusters of atypical urothelial cells with high nuclear cytoplasmic ratio, nuclear hyperchromasia and irregular nuclear contours in a background of moderate  acute inflammation, and which we will continue to watch. If cells persist, we will consider imaging test of kidneys, and cystoscopy. \par Recommend avoid spicy foods as it will cause irritation of both bladder and rectum, but is not harmful. \par Continue finasteride 5mg once daily and tamsulosin 0.4mg BID.\par I prescribed the patient tadalafil 5mg once daily for BPH symptoms and ED. I informed the patient that they may experience tingling of the skin, headache, warm flushed feeling, heartburn, backache, and on rare occasion, visual or hearing disturbances. Informed the patient how to use [employer] to  tadalafil at a [employer] or [employer] for a discounted price. I explained it had been previously used as heart medication and that it is safe to take as long as it is not taken with nitroglycerin which would cause BP to drop too low. \par The patient produced a urine sample which will be sent for urinalysis, urine cytology, and urine culture. \par RTO in 3 months for follow up or sooner if new urinary symptoms develop. \par \par 04/08/2022: Patient presents today for a follow up telephone visit for which he gave permission for. The visit was mostly conducted with his wife who was present. The patient has been taking finasteride 5 mg once daily and tamsulosin 0.4 mg BID. I prescribed the patient tadalafil 5 mg once daily at the time of the last visit, however he has not taken it. The pt's wife states today that her  told me during his 3/30/2022 visit he had a problems taking tadalafil which included flatulence and severe muscle aches. Both my personal memory and my note from that day do not recall him ever saying that, however there must have been some sort of miscommunication. \par \par 04/28/2022: Patient presents today for a follow up visit. Yesterday, he was having pain, but today he is feeling better. On 4/27/22, his PSA was 0.21 ng/mL, creatinine was 1.04. He also is complaining of rectal pain. He currently is taking Silodosin, one capsule daily, Tamsulosin once daily, Proscar once daily and Cefadroxil. His symptoms have improved with Cefadroxil. Previously he took Cipro, but it did not work satisfactorily. He is not taking Tadalafil. He denies urgency, but is experiencing slight urge incontinence. He reports Gabapentin alleviated his pain. \par \par The patient produced a urine sample which will be sent for urinalysis, urine cytology, and urine culture. \par I am prescribing Gabapentin, 300 mg, two capsules at night, one in the morning for his pain. \par RTO in 1 month, sooner if needed.\par \par 06/07/2022: 's wife called today and spoke to my nurse. She stated that I told her  to take tamsulosin 0.4 mg 2 capsules BID, however there was a misunderstanding and his wife wanted to speak to me. I informed her that it is tamsulosin 0.4 mg one capsule BID and she understood. His wife reports that he has been having difficulties urinating. Has an appt to see me on 6/30/2022. \par \par Urine cytology of 4/28/2022 revealed rare atypical cells with nuclear enlargement, prominent nucleoli, and vacuolated cytoplasm. It was my recommendation after reviewing back in April that he schedule a cystoscopy to evaluate atypical urine cytology, however the pt has not scheduled one yet. Last cystoscopy was in 2017. His wife understood and informed me she would schedule him for a cystoscopy with me within the next two weeks. \par I am sending the patient for a CT Urogram w/wo IV contrast. Creatinine was 1.04 on 4/27/2022. \par Patient will RTO for cystoscopy and to go over the results of his CT. \par \par 07/20/2022:  presents today for a follow up. Patient obtained a CT Urogram w/wo contrast on 7/14/2022 to evaluate microscopic hematuria. CT revealed renal cysts and less than 1 cm low-attenuation lesions which are too small to characterize. No enhancing lesions, or renal stones are identified. On the delayed images, there is poor opacification of the proximal and distal ureter and the right distal ureter. No abnormal soft tissue or gross urothelial lesion is appreciated. Bladder was within normal limits. The anterior aspect of the bladder is not opacified on the delayed imaging. No lymphadenopathy was seen. The patient was pleased with the CT findings. We reviewed his prostate symptoms. Has a long hx of chronic prostatitis that initially responded to cipro but sometimes required bactrim. He currently has no acute problems. Denies pain when sitting for long periods of time. Has some minor urinary urgency and frequency at times but is overall pleased with his urologic state. Continues on finasteride 5 mg once daily, tamsulosin 0.4 mg once daily, tadalafil 5 mg once daily, and silodosin 8 mg once daily. Last PSA 4/27/2022 was 0.21. Creatinine at that time was 1.04. \par \[facility] Clinical findings and CT results were reviewed at length with the patient and his wife. He was pleased with the findings. \par The patient produced a urine sample which will be sent for urinalysis, urine cytology, and urine culture. \par Patient will RTO in 6 months or sooner if clinically indicated. \par \par 07/27/2022:  presents today for a telephone visit for which he gave permission for. He wanted to review the results of his most recent urine test results. UA was perfect. Culture was no growth. Cytology consisted of mainly mature squamous epithelial cells, acute inflammation, and rare benign urothelial cells. Cytology was negative for high grade urothelial carcinoma. His wife inquired on why he needs to proceed with a cystoscopy \par \par Reviewed and discussed urine test results of 7/20/2022 in detail with the patient. Given that these tests were so good, he does not need to schedule a cystoscopy at this time. I previously requested a cysto based off of a concerning urine cytology back in April. Requested that the patient provide a urine specimen in 2 weeks and another one 2-4 weeks after that to ensure cytologies remain good. So long as they are favorable, he can proceed without cystoscopy. Both the patient and his wife who is a retired pathologist understood. \par Patient will follow up in January at his scheduled appointment time. \par \par 03/02/2023: Mr. VIVIENNE HANCOCK presents today for a follow up. The pt continues to work as a . Patient provided a urine specimen on 2/28/2023. UA was normal. Culture was no significant growth. Urine cytology was negative for high grade urothelial carcinoma. Pt denies ever smoking or using tobacco products. Pt recently went to gastroenterologist and was told his stomach was distended. He admits to pushing and straining upon urination. His gastroenterologist put in orders for an US which he has scheduled. Patient continues to take finasteride 5 mg once daily and tamsulosin 0.4 mg BID. No longer is taking tadalafil 5 mg once daily. \par \par Reviewed outside lab results of 1/8/2023 that was done at an outside facility. Creatinine was 1.10. A1C was 6.0. Urine was considered abnormal yellow, could be due to vitamin. Specific gravity on the lower side. Patient's PSA back in April 2022 was 0.21. Blood work today included repeat PSA. \par Reviewed urine test results of 2/28/2023. \par Renewed finasteride 5 mg once daily. I am prescribing the pt Silodosin 8 mg once daily with food to replace one of the tamsulosin. I informed him of the possible side effects of lightheadedness, nasal congestion, and decreased amount of semen when ejaculating. The pt was hesitant to restart tadalafil 5 mg once daily as he feels he is on a lot of medications. I then offered surgical intervention as an option and suggested he talk to one of my colleagues about potential options. He then opted to try tadalafil 5 mg once daily and would consider surgical intervention if it did not help. \par Patient will RTO in 2-3 weeks for a ZENA and to review how he is doing on the changes in his medications. \par \par 05/22/2023: Mr. VIVIENNE HANCOCK was scheduled today for a follow up audio only telephone visit for which he gave permission for. I was unable to reach the pt but did reach his wife, Dr.Almas Benjamin who was located at their home, [address] [place] [address], and I was located in my office in [place], [place]. She informs me that the pt has a lot of pain in the rectum. It hurts when he lays down or when standing. The pain is all the time and not just when he moves the bowels. He has a rectal surgeon but he does not think it is a problem with the rectum. Pt has constipation but wife reports he manages it well. \par \par I prescribed the patient Levofloxacin 500 mg, one tablet daily until finished.  I instructed him to avoid strenuous exercise for two weeks as there is a risk of tendonitis. Up to 1-2 months after finishing it, if he experiences some tendonitis he should not work through it and should rest. I also advised him to avoid direct sunlight while taking this medication. Pt was instructed to not take this medication with dairy products. If the pain does not improve within a few days or worsens, they were instructed to call back sooner and we might consider adding a medication for neuropathic pain such as gabapentin. \par Patient will have a telehealth visit in 2 weeks for reassessment, sooner if clinically indicated. \par \par 06/15/2023: Mr. VIVIENNE HANCOCK presents today for a follow up and ZENA. He reports no significant changes in urination. Does c/o back pain. Had a CT at [facility]. Is planning on getting an epidural injection for pain. Pt reports he had lab work done yesterday, however the results are not yet back. He also reports perineal pain when he sits. His gastroenterologist is prescribing suppositories. \par \par The knee-chest position was utilized for the ZENA. Digital rectal exam found no suspicious rectal masses. Normal seminal vesicles. Anal tone is normal. The prostate is non tender, with normal texture, slightly firm, discrete borders, and no nodules. It is a 20 gram transurethral resection size. No rectal mucosal lesions. No gross blood on the examining finger. \par \par Reviewed imaging results from the spine specialist which he brought with him today. Has spinal canal stenosis in lumbar spine. Also reviewed most recent US of the abdomen done back in March ordered by his gastroenterologist. Does have a fatty liver but no ascites. Both kidneys were normal with no hydro. \par Patient will schedule a telehealth visit tomorrow to review results of his lab work. \par \par 06/16/2023: Mr. HANCOCK is a 77 year old male presenting today via telehealth using real time 2 way audio only technology.  The patient,  Mr. HANCOCK, was located in his home at [address] [place] [address] at the time of the visit. The provider, FREDERIC KIM, was located in his office on [place], [place]. Verbal Consent was given by the patient on 06/16/2023 and my scribe served as a witness to the verbal consent. The patient provided blood sample on June 13 at [facility] but they seemed not available. I called to inquire and it was determined that an entry mistake occurred where the lab results was misdated and it was reported as being drawn as March 1st. The  was agreeable to correct the mistake. I called the patient again and explained the mishap to the patient. I explain that his CBC on 06/13/2023 shows that his blood count is low. HGB  is low at 9.8. HCT  low at 34.9. Mean Cell Volume  also low at 69.7. He reports that his diet has not changed recently. He says his last colonoscopy was several years ago. \par \par Plan: Pt will inform his PCP and Gastroenterologist about his low blood count.  His last colonoscopy was about 5 years ago.  In view of the progressive anemia and the small red blood cells the gastroenterologist might be interested in performing upper GI endoscopy as well as a repeat colonoscopy.  If gastroenterology evaluation does not disclose any cause for the anemia I will ask that the primary care physician evaluate the anemia further.  If primary care physician would prefer I will refer the patient to a hematologist.  I have asked the patient to provide blood specimen for CBC with Iron level and urine sample for Urinalysis, Culture and cytology. \par \par Duration of Call: 30 mins

## 2023-06-18 NOTE — ADDENDUM
[FreeTextEntry1] : This note was authored by Michael Jaramillo working as a scribe for Dr. Collins Duron. I, Dr. Collins Duron have reviewed the content of this note and confirm it is true and accurate. I personally performed the history and physical examination and made all the decisions. 06/16/2023

## 2023-06-18 NOTE — REVIEW OF SYSTEMS
[Constipation] : constipation [Nocturia] : nocturia [Perineal Pain] : perineal pain [Fever] : no fever [Chills] : no chills [Loss Of Hearing] : no hearing loss [Feeling Poorly] : not feeling poorly [Chest Pain] : no chest pain [Shortness Of Breath] : no shortness of breath [Abdominal Pain] : no abdominal pain [Urinary Stream Is Smaller] : urine stream was not smaller [Dysuria] : no dysuria [Initiating Urination Req. Strain] : initiating urination does not require straining [Arthralgias] : no arthralgias [Skin Lesions] : no skin lesions [Suicidal] : not suicidal [Difficulty Walking] : no difficulty walking [Proptosis] : no proptosis [Easy Bleeding] : no tendency for easy bleeding

## 2023-07-17 ENCOUNTER — APPOINTMENT (OUTPATIENT)
Dept: UROLOGY | Facility: CLINIC | Age: 78
End: 2023-07-17
Payer: MEDICARE

## 2023-07-17 PROCEDURE — 99443: CPT

## 2023-07-21 NOTE — ADDENDUM
[FreeTextEntry1] : This note was authored by Guerline Gold working as a scribe for Dr.Gary Duron. I, Dr. Collins Duron have reviewed the content of this note and confirm it is true and accurate. I personally performed the history and physical examination and made all the decisions 07/17/2023

## 2023-07-21 NOTE — HISTORY OF PRESENT ILLNESS
[FreeTextEntry1] : Mr Hancock is a 77 year old man presented for follow up of gross hematuria, urinary frequency and urgency. The patient takes tamsulosin 0.4 mg, one half hour after meal twice daily, finasteride 5 mg once daily, and desipramine 10 mg at bedtime. The patient returned to the office 03/20/17 having gross hematuria with blood clot. Dr Holly Bernardo started the patient on Bactrim DS. He eventually went to the ER 03/21/17 where he had a Spann catheter placed. His creatinine in the ER was 1.01. Cystoscopy 03/23/17 found catheter trauma. Prostate protruded modest distance into the bladder. Bladder has a small capacity. Diffuse oozing of blood from bladder neck and trigone. Scraped up mucosa most likely from introduction of telescope. No bladder stones or tumors. Bladder inflammation from catheter. CT urogram was normal and found no etiology of the gross hematuria.\par 3/1/18: The patient returned and reported he has pain in his rectum area and penis. PSA from 1/24/18 was 0.39 ng/mL. 2/15/18 Creatinine was 1.01 mg/dL, hemoglobin was 14.1 g/dL. He received and abdomen ultrasound which shows a fatty liver, gallstones, and obscured pancreas. He denies any gross hematuria. He noted one day ago his GI physician put banding on his hemorrhoids. \par 4/10/18: The patient returned and reported he finished his antibiotics from last visit and noted he had another hemorrhoid and put another band by his GI. He noted pain still persists. UA from 3/1/18 shows 8 WBC/HPF. CBC from 3/1/18 showed slight anemia. \par 5/23/18: The patient returned and reported he has been taking Bactrim for his most recent prostatitis starting one week ago. PSA from 1/22/18 was 0.39 ng/mL. Currently taking Tamsulosin and Finasteride. \par 8/28/18: The patient returned and reported his urination is satisfactory. Currently taking tamsulosin and finasteride. Wakes up 3-4 times during the night to urinate. Noted he had a recent flare up of dysuria 3 days ago and took 2 days of Cipro and symptom have cleared up. \par 3/7/19: The patient returned and reported that he had another flare up of prostatitis and took 2 days of Cipro for his symptoms to clear. Complained of lingering dysuria. I reminded the patient that with Cipro, there is a side effect of tendonitis. Denied gross hematuria. Currently taking Trimethoprim, Tamsulosin and Finasteride. PSA from 2/15/19 was 0.04 ng/mL.\par 4/4/19: The patient returned and reported that he another flare up of prostatitis. Complained of pain when he stands up. His pain is relieved while sitting down. Occasionally has dysuria. Wakes up twice during the night to urinate. Complained of urinary urgency during the night. Takes Tamsulosin BID, Trimethoprim and Finasteride.\par 5/9/19: The patient returned today for an US. Transrectal US of the prostate findings showed a prostate volume of 23.4 cc, a hypoechoic area at the left apex 1.37 x 1.25 cm in transverse image, unremarkable seminal vesicles, and no rectal masses. The hypoechoic are is suspicious for prostate cancer, but the patient has a low PSA. I recommended that the patient undergo a transperineal biopsy for the hyperechoic region, but due to an anal fissure, the patient would like to wait. Notes that he consulted with a colon surgeon about his anal fissure and is currently taking stool softeners. His urination is adequate. Complains of nocturia. Wakes up 5-6 times during the night to urinate. Complains of pain at the tip of his penis. Takes Tamsulosin BID. UA from 4/11/19 was normal. Culture from 4/11/19 showed no growth. Notes that he took Bactrim and ended up getting a rash on his LEs.\par 6/10/19: Patient presents today for a follow up. Today he reports that he is scheduled for an anal fissure operation for 6/21/19. It has been advised that he will need 4 weeks to heal following the operation. Regarding his urination he reports that during the day there are no urinary issues. Nocturia is reported and he will have to wake up 5-6x a night to urinate. Finasteride and Tamsulosin are taken as directed. \par 7/18/19: Patient presents today for a follow up. 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 Patient with hypoechoic lesion of the prostate seen on recent TRUS. PSA 0.690 on 5/2109. Patient on Flomax /Proscar with both irritative and obstructive LUTS. Needs to strain to initiate urination. Nocturia times 5- 6. Denies dysuria or hematuria. Patient states that he feels that he has prostatitis based on familiar symptoms. Additionally he no longer takes Lisinopril but now is on Metoprolol 10 mg. \par 8/7/19: Patient presents today for a follow up. He states that the Bactrim prescribed at his last visit was not providing any relief. He still c/o discomfort when sitting but denies dysuria. He had Ciprofloxacin at home and had taken two tablets over the past two days. He noticed relief and inquired about having the prescription refilled today. \par 10/10/19: Patient presents today for a follow up. On 9/13/19 a needle biopsy of the prostate was completed under general anesthesia. He states that after the biopsy his chronic prostatitis began to cause discomfort. Ciprofloxacin 500 mg was used as directed and the discomfort is no longer present. Additionally it is reported that his sexual function decreased following the procedure but that it is starting to improve again. The results of the pathology report are to be discussed with him today. \par 12/31/2019: Patient presents today for a follow up. Pt reports pain on standing. He has an inflamed prostate. When the bladder is fill he also experiences pain and the pain goes away after he voids. Pt took Cipro to 7 days, finishing last week. While he took it, he felt better. Pt reports allergy to sulfur drugs and develops rash.\par 2/10/20: Patient presents today for a follow up. He had an MRI of the pelvis 1/23/20 which showed no evidence of rectal fistula could be identified. Pt has inflamed prostate\par Pt reports constipation for which he takes stool softner. He admits to dysuria and hematuria with mild pain at the tip of the penis yesterday. Most recent PSA was 0.24, Testosterone is 390.0. \par 02/27/2020: Patient presents today for a follow up. At the conclusion of the last visit, Doxycycline 100 mg was prescribed. The patient states he noticed no improvement. Today he reports having nighttime pain in the rectum which is alleviated after urination. Wakes up 4-5 times during the night to urinate. He has satisfactory day time urination though he reports associated strain. Denies chest pain. He currently takes a stool softener for constipation. The patient also complains of lower back pain for which he will get an injection shortly. A urine culture from 2/10/2020 showed no growth. His most recent creatinine level from 7/11/2019 was 1.07.\par \par 07/28/2020: Patient presents today for a follow up. Reports pain in the rectum and pain with fullness of the bladder. Pt had a US at [facility]. Was given suppository which helped. Noxturia x4-5. On Tamsulosin BID. No burning. Frequency at night. Pt must push and staring. He experiences difficulty with his flow and voiding in general. Wife reports fowl smelling urine. \par \par 8/25/2020: Patient presents today for follow up on nocturia. Patient states he has not yet taken Desmopressin as he does not want to take too many medications. Still experiencing pain in rectum and with fullness of bladder. Was given Valium which helped. Last lab work on 7/28/2020 was within normal limits. Osmolality was normal at 294. Patient to start Desmopressin 0.1mg once before bedtime. \par \par 09/15/2020: Patient presents today for follow up. Pt was having pain in rectum and finished 5 days of Cipro which helped resolve pain, but causes constipation. Takes Colace for constipation. Urinalysis on 8/25/20 was normal. Still has pain in rectum at night, and uses Metronidazole gel to relieve it. Wakes up every 2 hours at night to urinate. Has some urgency. \par \par 11/17/2020: The patient presents today for a follow up. He is not taking desmopressin anymore. His last blood work on 11/6/2020 showed his creatinine was good at 1.0. He has no burning. He has some pain in the rectum and when his bladder is full. He is still taking finasteride 5 mg and trimethoprim 100 mg. At night he gets a lot of pain when his bladder is full and has urinary urgency about every 2 hours. After he urinates, the pain goes away. \par \par 03/09/2021: 75 yr  history of BPH, prostatitis , overactive bladder with nocturnal frequency.  Rectal area pains, last visit [facility] November 17, 2020 showing rectal mucosa nodule. To follow GI. \par patient said he is taking gabapentin and tamsulosin and finasteride. Pain is worse when bladder is full and less when he voids. \par Nocturnal frequency Q2 hr\par ED since 2019 \par Patient is seeing  for his rectal pain.  \par \par 05/24/2021: Patient presents today for follow up. Patient went to a rectal surgeon for his rectal pain and had pelvic MRI 4/29/21 which showed no evidence of perianal fistula. States he continues to have pain in rectum after sitting for a long time and then standing. Patient was instructed to take Gabapentin 400mg TID, but skips afternoon dose as it makes him sleepy. Does have constipation. Has not started Tadalafil. No hx of prostate cancer. \par \par 06/15/2021: Patient presents today for a follow up. He reports that last week he had an episode of prostatitis for which he took Cipro for. Currently wakes every 2 hours during the night to urinate. Has tried desipramine and desmopressin in the past which did not improve his symptoms. Daytime urination remains stable. Stream is strong. Pt states he went to the cardiologist today and an EKG was done which was all normal. Currently takes iron supplements. \par \par 09/01/2021: Patient presents today for follow up. Today complains of straining and pushing to urinate. Denies dysuria. Denies infection of penis. Requests renewal of Finasteride and Tamsulosin. Not taking Trospium. Stopped Tadalafil due to side effect. \par \par 09/29/2021: Patient presents today for follow up. 9/1/21 UA showed 12 RBC/HPF and trace blood by dipstick. On repeat 9/9/21, he had only 3 RBC/HPF and small blood by dripstick which normal. Urine cytology 9/1/21 negative for high grade urothelial carcinoma. Provided blood work from 9/17/21 done by PCP which showed UA slightly cloudy, 3-5 RBC/HPF. PSA 0.2. Wakes 4x at night to urinate. States he has rectal pain when bladder is full. \par \par 02/02/2022: Patient presents today for follow up. Reviewed 12/29/21 lab work which showed MCV low at 75.2, WBC 6600, Hb 12.1, platelets 380728, potassium 4.9, creatinine 0.90 upper limits 1.18, BUN high at 25, AST minimally elevated 39 upper limits 34, alk phos normal 60, HbA1c 6.0, UA dipstick positive small blood, moderate leukocyte esterase, few squamous epithelial cells. Has pain when he sits and feels better when standing or laying down. States he must push to urinate. No pain on urination. Nocturia x3-4. Takes tamsulosin 0.4mg BID. Reports his EKG is normal. \par \par 03/14/2022: Patient presents today for follow up. Has been doing well, however does have to push urination. Nocturia x4. Erections are poor but not interested in medications. No dysuria. Stream is slow. Continues finasteride 5mg once daily and tamsulosin 0.4mg BID. Does not take tadalafil every day. Does not have any red itchy skin and has not needed to use clotrimazole-betamethasone cream. Reviewed 2/2/22 lab work. Urine culture grew 10,000-40,000 CFU/mL Enterococcus faecalis and patient was treated with amoxicillin 500mg TID and feels better now. PSA 0.24. Free testosterone low 3.9. Lab work of 2/11/22 showed brain natriuretic peptide normal, procalcitonin undetectable <0.10 ng/mL. Will be having evaluation for anorectal pain, with the proposed procedures being anorectal manometry, electromyography, pudendal nerve terminal motor latency, and endorectal ultrasound. Denies bladder pain. Will be having procedure for varicose veins of RLE. \par \par 03/30/2022: Patient presents today for follow up. Urination is good. Continues finasteride 5mg once daily and tamsulosin 0.4mg BID. Did not help with urination. Denies dysuria. Only has urinary urgency at night. Notes he eats spicy foods. No pain where he sits. Energy levels are good. Has tried Cialis in the past which did not help much. Erections had been poor since the biopsy. Reviewed 3/13/22 lab work which was WNL except free testosterone low 4.0, LH elevated 19, dihydrotestosterone low 4.1. Total testosterone normal 250. PSA was 0.20. UA normal. Urine cytology showed clusters of atypical urothelial cells with high nuclear cytoplasmic ratio, nuclear hyperchromasia and irregular nuclear contours in a background of moderate acute inflammation. States he went to rectal surgeon for anorectal pain, and had US which was reportedly normal and found muscles are weak, and was advised muscle dysfunction physical therapy which he is currently attending. \par \par 04/08/2022: Patient presents today for a follow up telephone visit for which he gave permission for. The visit was mostly conducted with his wife who was present. The patient has been taking finasteride 5 mg once daily and tamsulosin 0.4 mg BID. I prescribed the patient tadalafil 5 mg once daily at the time of the last visit, however he has not taken it. The pt's wife states today that her  told me during his 3/30/2022 visit he had a problems taking tadalafil which included flatulence and severe muscle aches. Both my personal memory and my note from that day do not recall him ever saying that, however there must have been some sort of miscommunication. \par \par 04/28/2022: Patient presents today for a follow up visit. Yesterday, he was having pain, but today he is feeling better. On 4/27/22, his PSA was 0.21 ng/mL, creatinine was 1.04. He also is complaining of rectal pain. He currently is taking Silodosin, one capsule daily, Tamsulosin once daily, Proscar once daily and Cefadroxil. His symptoms have improved with Cefadroxil. Previously he took Cipro, but it did not work satisfactorily. He is not taking Tadalafil. He denies urgency, but is experiencing slight urge incontinence. He reports Gabapentin alleviated his pain.\par \par 06/07/2022: 's wife called today and spoke to my nurse. She stated that I told her  to take tamsulosin 0.4 mg 2 capsules BID, however there was a misunderstanding and his wife wanted to speak to me. I informed her that it is tamsulosin 0.4 mg one capsule BID and she understood. His wife reports that he has been having difficulties urinating. Has an appt to see me on 6/30/2022. \par \par 07/20/2022:  presents today for a follow up. Patient obtained a CT Urogram w/wo contrast on 7/14/2022 to evaluate microscopic hematuria. CT revealed renal cysts and less than 1 cm low-attenuation lesions which are too small to characterize. No enhancing lesions, or renal stones are identified. On the delayed images, there is poor opacification of the proximal and distal ureter and the right distal ureter. No abnormal soft tissue or gross urothelial lesion is appreciated. Bladder was within normal limits. The anterior aspect of the bladder is not opacified on the delayed imaging. No lymphadenopathy was seen. The patient was pleased with the CT findings. We reviewed his prostate symptoms. Has a long hx of chronic prostatitis that initially responded to cipro but sometimes required bactrim. He currently has no acute problems. Denies pain when sitting for long periods of time. Has some minor urinary urgency and frequency at times but is overall pleased with his urologic state. Continues on finasteride 5 mg once daily, tamsulosin 0.4 mg once daily, tadalafil 5 mg once daily, and silodosin 8 mg once daily. Last PSA 4/27/2022 was 0.21. Creatinine at that time was 1.04. \par \par 07/27/2022:  presents today for a telephone visit for which he gave permission for. He wanted to review the results of his most recent urine test results. UA was perfect. Culture was no growth. Cytology consisted of mainly mature squamous epithelial cells, acute inflammation, and rare benign urothelial cells. Cytology was negative for high grade urothelial carcinoma. His wife inquired on why he needs to proceed with a cystoscopy. \par \par 03/02/2023: Mr. VIVIENNE HANCOCK presents today for a follow up. The pt continues to work as a . Patient provided a urine specimen on 2/28/2023. UA was normal. Culture was no significant growth. Urine cytology was negative for high grade urothelial carcinoma. Pt denies ever smoking or using tobacco products. Pt recently went to gastroenterologist and was told his stomach was distended. He admits to pushing and straining upon urination. His gastroenterologist put in orders for an US which he has scheduled. Patient continues to take finasteride 5 mg once daily and tamsulosin 0.4 mg BID. No longer is taking tadalafil 5 mg once daily. \par \par 05/22/2023: Mr. VIVIENNE HANCOCK was scheduled today for a follow up audio only telephone visit for which he gave permission for. I was unable to reach the pt but did reach his wife, Dr.Almas Benjamin who was located at their home, [address] [place] [address], and I was located in my office in [place], [place]. She informs me that the pt has a lot of pain in the rectum. It hurts when he lays down or when standing. The pain is all the time and not just when he moves the bowels. He has a rectal surgeon but he does not think it is a problem with the rectum. Pt has constipation but wife reports he manages it well. \par \par 06/15/2023: Mr. VIVIENNE HANCOCK presents today for a follow up and [facility]. He reports no significant changes in urination. Does c/o back pain. Had a CT at [place] [facility] and interventional pain management. Is planning on getting an epidural injection for pain. Pt reports he had lab work done yesterday, however the results are not yet back. He also reports perineal pain when he sits. His gastroenterologist is prescribing suppositories. \par \par 06/16/2023: Mr. HANCOCK is a 77 year old male presenting today via telehealth using real time 2 way audio only technology.  The patient,  Mr. HANCOCK, was located in his home at [address] [place] [address] [address] [address] at the time of the visit. The provider, FREDERIC KIM, was located in his office on [place], [place]. Verbal Consent was given by the patient on 06/16/2023 and my scribe served as a witness to the verbal consent. The patient provided blood sample on June 13 at [facility] but they seemed not available. I called to inquire and it was determined that an entry mistake occurred where the lab results was misdated and it was reported as being drawn as March 1st. The  was agreeable to correct the mistake. I called the patient again and explained the mishap to the patient. I explain that his CBC on 06/13/2023 shows that his blood count is low. HGB  is low at 9.8. HCT  low at 34.9. Mean Cell Volume  also low at 69.7. He reports that his diet has not changed recently. He says his last colonoscopy was several years ago. \par \par 07/17/2023: Mr. VIVIENNE HANCOCK presents today for a follow up audio only telephone visit for which he gave permission for. The pt was located at home, [address] [place] [address], and I was located in my office in [place], [place]. The patient reports that he had some rectal and perineal pain last night. He admits a lot of constipation and hard stool. Pt has not yet spoken to his PCP or gastroenterologist yet about his recent lab work.

## 2023-07-21 NOTE — ASSESSMENT
[FreeTextEntry1] : Mr Hancock is a 77 year old man presented for follow up of gross hematuria, urinary frequency and urgency. The patient takes tamsulosin 0.4 mg, one half hour after meal twice daily, finasteride 5 mg once daily, and desipramine 10 mg at bedtime. The patient returned to the office 03/20/17 having gross hematuria with blood clot. Dr Holly Bernardo started the patient on Bactrim DS. He eventually went to the ER 03/21/17 where he had a Spann catheter placed. His creatinine in the ER was 1.01. Cystoscopy 03/23/17 found catheter trauma. Prostate protruded modest distance into the bladder. Bladder has a small capacity. Diffuse oozing of blood from bladder neck and trigone. Scraped up mucosa most likely from introduction of telescope. No bladder stones or tumors. Bladder inflammation from catheter. CT urogram was normal and found no etiology of the gross hematuria.\par 3/1/18: The patient returned and reported he has pain in his rectum area and penis. PSA from 1/24/18 was 0.39 ng/mL. 2/15/18 Creatinine was 1.01 mg/dL, hemoglobin was 14.1 g/dL. He received and abdomen ultrasound which shows a fatty liver, gallstones, and obscured pancreas. He denies any gross hematuria. He noted one day ago his GI physician put banding on his hemorrhoids. \par 4/10/18: The patient returned and reported he finished his antibiotics from last visit and noted he had another hemorrhoid and put another band by his GI. He noted pain still persists. UA from 3/1/18 shows 8 WBC/HPF. CBC from 3/1/18 showed slight anemia. \par 5/23/18: The patient returned and reported he has been taking Bactrim for his most recent prostatitis starting one week ago. PSA from 1/22/18 was 0.39 ng/mL. Currently taking Tamsulosin and Finasteride. \par 8/28/18: The patient returned and reported his urination is satisfactory. Currently taking tamsulosin and finasteride. Wakes up 3-4 times during the night to urinate. Noted he had a recent flare up of dysuria 3 days ago and took 2 days of Cipro and symptom have cleared up. \par 3/7/19: The patient returned and reported that he had another flare up of prostatitis and took 2 days of Cipro for his symptoms to clear. Complained of lingering dysuria. I reminded the patient that with Cipro, there is a side effect of tendonitis. Denied gross hematuria. Currently taking Trimethoprim, Tamsulosin and Finasteride. PSA from 2/15/19 was 0.04 ng/mL.\par 4/4/19: The patient returned and reported that he another flare up of prostatitis. Complained of pain when he stands up. His pain is relieved while sitting down. Occasionally has dysuria. Wakes up twice during the night to urinate. Complained of urinary urgency during the night. Takes Tamsulosin BID, Trimethoprim and Finasteride.\par 5/9/19: The patient returned today for an US. Transrectal US of the prostate findings showed a prostate volume of 23.4 cc, a hypoechoic area at the left apex 1.37 x 1.25 cm in transverse image, unremarkable seminal vesicles, and no rectal masses. The hypoechoic are is suspicious for prostate cancer, but the patient has a low PSA. I recommended that the patient undergo a transperineal biopsy for the hyperechoic region, but due to an anal fissure, the patient would like to wait. Notes that he consulted with a colon surgeon about his anal fissure and is currently taking stool softeners. His urination is adequate. Complains of nocturia. Wakes up 5-6 times during the night to urinate. Complains of pain at the tip of his penis. Takes Tamsulosin BID. UA from 4/11/19 was normal. Culture from 4/11/19 showed no growth. Notes that he took Bactrim and ended up getting a rash on his LEs.\par 6/10/19: Patient presents today for a follow up. Today he reports that he is scheduled for an anal fissure operation for 6/21/19. It has been advised that he will need 4 weeks to heal following the operation. Regarding his urination he reports that during the day there are no urinary issues. Nocturia is reported and he will have to wake up 5-6x a night to urinate. Finasteride and Tamsulosin are taken as directed. \par 7/18/19: Patient presents today for a follow up. 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 Patient with hypoechoic lesion of the prostate seen on recent TRUS. PSA 0.690 on 5/2109. Patient on Flomax /Proscar with both irritative and obstructive LUTS. Needs to strain to initiate urination. Nocturia times 5- 6. Denies dysuria or hematuria. Patient states that he feels that he has prostatitis based on familiar symptoms. Additionally he no longer takes Lisinopril but now is on Metoprolol 10 mg. \par 8/7/19: Patient presents today for a follow up. He states that the Bactrim prescribed at his last visit was not providing any relief. He still c/o discomfort when sitting but denies dysuria. He had Ciprofloxacin at home and had taken two tablets over the past two days. He noticed relief and inquired about having the prescription refilled today. \par 10/10/19: Patient presents today for a follow up. On 9/13/19 a needle biopsy of the prostate was completed under general anesthesia. He states that after the biopsy his chronic prostatitis began to cause discomfort. Ciprofloxacin 500 mg was used as directed and the discomfort is no longer present. Additionally it is reported that his sexual function decreased following the procedure but that it is starting to improve again. The results of the pathology report are to be discussed with him today. \par 12/31/2019: Patient presents today for a follow up. Pt reports pain on standing. He has an inflamed prostate. When the bladder is fill he also experiences pain and the pain goes away after he voids. Pt took Cipro to 7 days, finishing last week. While he took it, he felt better. Pt reports allergy to sulfur drugs and develops rash.\par 2/10/20: Patient presents today for a follow up. He had an MRI of the pelvis 1/23/20 which showed no evidence of rectal fistula could be identified. Pt has inflamed prostate\par Pt reports constipation for which he takes stool softner. He admits to dysuria and hematuria with mild pain at the tip of the penis yesterday. Most recent PSA was 0.24, Testosterone is 390.0. \par 02/27/2020: Patient presents today for a follow up. At the conclusion of the last visit, Doxycycline 100 mg was prescribed. The patient states he noticed no improvement. Today he reports having nighttime pain in the rectum which is alleviated after urination. Wakes up 4-5 times during the night to urinate. He has satisfactory day time urination though he reports associated strain. Denies chest pain. He currently takes a stool softener for constipation. The patient also complains of lower back pain for which he will get an injection shortly. A urine culture from 2/10/2020 showed no growth. His most recent creatinine level from 7/11/2019 was 1.07.\par \par 07/28/2020: Patient presents today for a follow up. Reports pain in the rectum and pain with fullness of the bladder. Pt had a US at [facility]. Was given suppository which helped. Noxturia x4-5. On Tamsulosin BID. No burning. Frequency at night. Pt must push and staring. He experiences difficulty with his flow and voiding in general. Wife reports fowl smelling urine. \par \par 8/25/2020: Patient presents today for follow up on nocturia. Patient states he has not yet taken Desmopressin as he does not want to take too many medications. Still experiencing pain in rectum and with fullness of bladder. Was given Valium which helped. Last lab work on 7/28/2020 was within normal limits. Osmolality was normal at 294. Patient to start Desmopressin 0.1mg once before bedtime. \par \par 09/15/2020: Patient presents today for follow up. Pt was having pain in rectum and finished 5 days of Cipro which helped resolve pain, but causes constipation. Takes Colace for constipation. Urinalysis on 8/25/20 was normal. Still has pain in rectum at night, and uses Metronidazole gel to relieve it. Wakes up every 2 hours at night to urinate. Has some urgency. \par \par 11/17/2020: The patient presents today for a follow up. He is not taking desmopressin anymore. His last blood work on 11/6/2020 showed his creatinine was good at 1.0. He has no burning. He has some pain in the rectum and when his bladder is full. He is still taking finasteride 5 mg and trimethoprim 100 mg. At night he gets a lot of pain when his bladder is full and has urinary urgency about every 2 hours. After he urinates, the pain goes away. \par \par 03/09/2021: 75 yr male  BPH, nocturia , straining .  Chronic prostatitis, chronic cystitis \par \par 05/24/2021: Patient presents today for follow up. Patient went to a rectal surgeon for his rectal pain and had pelvic MRI 4/29/21 which showed no evidence of perianal fistula. States he continues to have pain in rectum after sitting for a long time and then standing. Patient was instructed to take Gabapentin 400mg TID, but skips afternoon dose as it makes him sleepy. Does have constipation. Has not started Tadalafil. No hx of prostate cancer. \par \par 06/15/2021: Patient presents today for a follow up. He reports that last week he had an episode of prostatitis for which he took Cipro for. Currently wakes every 2 hours during the night to urinate. Has tried desipramine and desmopressin in the past which did not improve his symptoms. Daytime urination remains stable. Stream is strong. Pt states he went to the cardiologist today and an EKG was done which was all normal. Currently takes iron supplements. \par \par 09/01/2021: Patient presents today for follow up. Today complains of straining and pushing to urinate. Denies dysuria. Denies infection of penis. Requests renewal of Finasteride and Tamsulosin. Not taking Trospium. Stopped Tadalafil due to side effect. \par \par 09/29/2021: Patient presents today for follow up. 9/1/21 UA showed 12 RBC/HPF and trace blood by dipstick. On repeat 9/9/21, he had only 3 RBC/HPF and small blood by dripstick which normal. Urine cytology 9/1/21 negative for high grade urothelial carcinoma. Provided blood work from 9/17/21 done by PCP which showed UA slightly cloudy, 3-5 RBC/HPF. PSA 0.2. Wakes 4x at night to urinate. States he has rectal pain when bladder is full. \par \par 02/02/2022: Patient presents today for follow up. Reviewed 12/29/21 lab work which showed MCV low at 75.2, WBC 6600, Hb 12.1, platelets 544153, potassium 4.9, creatinine 0.90 upper limits 1.18, BUN high at 25, AST minimally elevated 39 upper limits 34, alk phos normal 60, HbA1c 6.0, UA dipstick positive small blood, moderate leukocyte esterase, few squamous epithelial cells. No gross hematuria. Has pain when he sits and feels better when standing or laying down. States he must push to urinate. No pain on urination. Nocturia x3-4. Takes tamsulosin 0.4mg BID. Reports his EKG is normal. \par \par 03/14/2022: Patient presents today for follow up. Has been doing well, however does have to push urination. Nocturia x4. Erections are poor but not interested in medications. No dysuria. Stream is slow. Continues finasteride 5mg once daily and tamsulosin 0.4mg BID. Does not take tadalafil every day. Does not have any red itchy skin and has not needed to use clotrimazole-betamethasone cream. Reviewed 2/2/22 lab work. Urine culture grew 10,000-40,000 CFU/mL Enterococcus faecalis and patient was treated with amoxicillin 500mg TID and feels better now. PSA 0.24. Free testosterone low 3.9. Lab work of 2/11/22 showed brain natriuretic peptide normal, procalcitonin undetectable <0.10 ng/mL. Will be having evaluation for anorectal pain, with the proposed procedures being anorectal manometry, electromyography, pudendal nerve terminal motor latency, and endorectal ultrasound. Denies bladder pain. Will be having procedure for varicose veins of RLE. \par \par 03/30/2022: Patient presents today for follow up. Urination is good. Continues finasteride 5mg once daily and tamsulosin 0.4mg BID. Did not help with urination. Denies dysuria. Only has urinary urgency at night. Notes he eats spicy foods. No pain where he sits. Energy levels are good. Has tried Cialis in the past which did not help much. Erections had been poor since the biopsy. Reviewed 3/13/22 lab work which was WNL except free testosterone low 4.0, LH elevated 19, dihydrotestosterone low 4.1. Total testosterone normal 250. PSA was 0.20. UA normal. Urine cytology showed clusters of atypical urothelial cells with high nuclear cytoplasmic ratio, nuclear hyperchromasia and irregular nuclear contours in a background of moderate acute inflammation. States he went to rectal surgeon for anorectal pain, and had US which was reportedly normal and found muscles are weak, and was advised muscle dysfunction physical therapy which he is currently attending. \par \par Reviewed 3/13/22 lab work with patient. Urine cytology showed clusters of atypical urothelial cells with high nuclear cytoplasmic ratio, nuclear hyperchromasia and irregular nuclear contours in a background of moderate  acute inflammation, and which we will continue to watch. If cells persist, we will consider imaging test of kidneys, and cystoscopy. \par Recommend avoid spicy foods as it will cause irritation of both bladder and rectum, but is not harmful. \par Continue finasteride 5mg once daily and tamsulosin 0.4mg BID.\par I prescribed the patient tadalafil 5mg once daily for BPH symptoms and ED. I informed the patient that they may experience tingling of the skin, headache, warm flushed feeling, heartburn, backache, and on rare occasion, visual or hearing disturbances. Informed the patient how to use [employer] to  tadalafil at a [employer] or [employer] for a discounted price. I explained it had been previously used as heart medication and that it is safe to take as long as it is not taken with nitroglycerin which would cause BP to drop too low. \par The patient produced a urine sample which will be sent for urinalysis, urine cytology, and urine culture. \par RTO in 3 months for follow up or sooner if new urinary symptoms develop. \par \par 04/08/2022: Patient presents today for a follow up telephone visit for which he gave permission for. The visit was mostly conducted with his wife who was present. The patient has been taking finasteride 5 mg once daily and tamsulosin 0.4 mg BID. I prescribed the patient tadalafil 5 mg once daily at the time of the last visit, however he has not taken it. The pt's wife states today that her  told me during his 3/30/2022 visit he had a problems taking tadalafil which included flatulence and severe muscle aches. Both my personal memory and my note from that day do not recall him ever saying that, however there must have been some sort of miscommunication. \par \par 04/28/2022: Patient presents today for a follow up visit. Yesterday, he was having pain, but today he is feeling better. On 4/27/22, his PSA was 0.21 ng/mL, creatinine was 1.04. He also is complaining of rectal pain. He currently is taking Silodosin, one capsule daily, Tamsulosin once daily, Proscar once daily and Cefadroxil. His symptoms have improved with Cefadroxil. Previously he took Cipro, but it did not work satisfactorily. He is not taking Tadalafil. He denies urgency, but is experiencing slight urge incontinence. He reports Gabapentin alleviated his pain. \par \par The patient produced a urine sample which will be sent for urinalysis, urine cytology, and urine culture. \par I am prescribing Gabapentin, 300 mg, two capsules at night, one in the morning for his pain. \par RTO in 1 month, sooner if needed.\par \par 06/07/2022: 's wife called today and spoke to my nurse. She stated that I told her  to take tamsulosin 0.4 mg 2 capsules BID, however there was a misunderstanding and his wife wanted to speak to me. I informed her that it is tamsulosin 0.4 mg one capsule BID and she understood. His wife reports that he has been having difficulties urinating. Has an appt to see me on 6/30/2022. \par \par Urine cytology of 4/28/2022 revealed rare atypical cells with nuclear enlargement, prominent nucleoli, and vacuolated cytoplasm. It was my recommendation after reviewing back in April that he schedule a cystoscopy to evaluate atypical urine cytology, however the pt has not scheduled one yet. Last cystoscopy was in 2017. His wife understood and informed me she would schedule him for a cystoscopy with me within the next two weeks. \par I am sending the patient for a CT Urogram w/wo IV contrast. Creatinine was 1.04 on 4/27/2022. \par Patient will RTO for cystoscopy and to go over the results of his CT. \par \par 07/20/2022:  presents today for a follow up. Patient obtained a CT Urogram w/wo contrast on 7/14/2022 to evaluate microscopic hematuria. CT revealed renal cysts and less than 1 cm low-attenuation lesions which are too small to characterize. No enhancing lesions, or renal stones are identified. On the delayed images, there is poor opacification of the proximal and distal ureter and the right distal ureter. No abnormal soft tissue or gross urothelial lesion is appreciated. Bladder was within normal limits. The anterior aspect of the bladder is not opacified on the delayed imaging. No lymphadenopathy was seen. The patient was pleased with the CT findings. We reviewed his prostate symptoms. Has a long hx of chronic prostatitis that initially responded to cipro but sometimes required bactrim. He currently has no acute problems. Denies pain when sitting for long periods of time. Has some minor urinary urgency and frequency at times but is overall pleased with his urologic state. Continues on finasteride 5 mg once daily, tamsulosin 0.4 mg once daily, tadalafil 5 mg once daily, and silodosin 8 mg once daily. Last PSA 4/27/2022 was 0.21. Creatinine at that time was 1.04. \par \[facility] Clinical findings and CT results were reviewed at length with the patient and his wife. He was pleased with the findings. \par The patient produced a urine sample which will be sent for urinalysis, urine cytology, and urine culture. \par Patient will RTO in 6 months or sooner if clinically indicated. \par \par 07/27/2022:  presents today for a telephone visit for which he gave permission for. He wanted to review the results of his most recent urine test results. UA was perfect. Culture was no growth. Cytology consisted of mainly mature squamous epithelial cells, acute inflammation, and rare benign urothelial cells. Cytology was negative for high grade urothelial carcinoma. His wife inquired on why he needs to proceed with a cystoscopy \par \par Reviewed and discussed urine test results of 7/20/2022 in detail with the patient. Given that these tests were so good, he does not need to schedule a cystoscopy at this time. I previously requested a cysto based off of a concerning urine cytology back in April. Requested that the patient provide a urine specimen in 2 weeks and another one 2-4 weeks after that to ensure cytologies remain good. So long as they are favorable, he can proceed without cystoscopy. Both the patient and his wife who is a retired pathologist understood. \par Patient will follow up in January at his scheduled appointment time. \par \par 03/02/2023: Mr. VIVIENNE HANCOCK presents today for a follow up. The pt continues to work as a . Patient provided a urine specimen on 2/28/2023. UA was normal. Culture was no significant growth. Urine cytology was negative for high grade urothelial carcinoma. Pt denies ever smoking or using tobacco products. Pt recently went to gastroenterologist and was told his stomach was distended. He admits to pushing and straining upon urination. His gastroenterologist put in orders for an US which he has scheduled. Patient continues to take finasteride 5 mg once daily and tamsulosin 0.4 mg BID. No longer is taking tadalafil 5 mg once daily. \par \par Reviewed outside lab results of 1/8/2023 that was done at an outside facility. Creatinine was 1.10. A1C was 6.0. Urine was considered abnormal yellow, could be due to vitamin. Specific gravity on the lower side. Patient's PSA back in April 2022 was 0.21. Blood work today included repeat PSA. \par Reviewed urine test results of 2/28/2023. \par Renewed finasteride 5 mg once daily. I am prescribing the pt Silodosin 8 mg once daily with food to replace one of the tamsulosin. I informed him of the possible side effects of lightheadedness, nasal congestion, and decreased amount of semen when ejaculating. The pt was hesitant to restart tadalafil 5 mg once daily as he feels he is on a lot of medications. I then offered surgical intervention as an option and suggested he talk to one of my colleagues about potential options. He then opted to try tadalafil 5 mg once daily and would consider surgical intervention if it did not help. \par Patient will RTO in 2-3 weeks for a ZENA and to review how he is doing on the changes in his medications. \par \par 05/22/2023: Mr. VIVIENNE HANCOCK was scheduled today for a follow up audio only telephone visit for which he gave permission for. I was unable to reach the pt but did reach his wife, Dr.Almas Benjamin who was located at their home, [address] [place] [address], and I was located in my office in [place], [place]. She informs me that the pt has a lot of pain in the rectum. It hurts when he lays down or when standing. The pain is all the time and not just when he moves the bowels. He has a rectal surgeon but he does not think it is a problem with the rectum. Pt has constipation but wife reports he manages it well. \par \par I prescribed the patient Levofloxacin 500 mg, one tablet daily until finished.  I instructed him to avoid strenuous exercise for two weeks as there is a risk of tendonitis. Up to 1-2 months after finishing it, if he experiences some tendonitis he should not work through it and should rest. I also advised him to avoid direct sunlight while taking this medication. Pt was instructed to not take this medication with dairy products. If the pain does not improve within a few days or worsens, they were instructed to call back sooner and we might consider adding a medication for neuropathic pain such as gabapentin. \par Patient will have a telehealth visit in 2 weeks for reassessment, sooner if clinically indicated. \par \par 06/15/2023: Mr. VIVIENNE HANCOCK presents today for a follow up and ZENA. He reports no significant changes in urination. Does c/o back pain. Had a CT at [facility]. Is planning on getting an epidural injection for pain. Pt reports he had lab work done yesterday, however the results are not yet back. He also reports perineal pain when he sits. His gastroenterologist is prescribing suppositories. \par \par The knee-chest position was utilized for the ZENA. Digital rectal exam found no suspicious rectal masses. Normal seminal vesicles. Anal tone is normal. The prostate is non tender, with normal texture, slightly firm, discrete borders, and no nodules. It is a 20 gram transurethral resection size. No rectal mucosal lesions. No gross blood on the examining finger. \par \par Reviewed imaging results from the spine specialist which he brought with him today. Has spinal canal stenosis in lumbar spine. Also reviewed most recent US of the abdomen done back in March ordered by his gastroenterologist. Does have a fatty liver but no ascites. Both kidneys were normal with no hydro. \par Patient will schedule a telehealth visit tomorrow to review results of his lab work. \par \par 06/16/2023: Mr. HANCOCK is a 77 year old male presenting today via telehealth using real time 2 way audio only technology.  The patient,  Mr. HANCOCK, was located in his home at [address] [place] [address] at the time of the visit. The provider, FREDERIC KIM, was located in his office on [place], [place]. Verbal Consent was given by the patient on 06/16/2023 and my scribe served as a witness to the verbal consent. The patient provided blood sample on June 13 at [facility] but they seemed not available. I called to inquire and it was determined that an entry mistake occurred where the lab results was misdated and it was reported as being drawn as March 1st. The  was agreeable to correct the mistake. I called the patient again and explained the mishap to the patient. I explain that his CBC on 06/13/2023 shows that his blood count is low. HGB  is low at 9.8. HCT  low at 34.9. Mean Cell Volume  also low at 69.7. He reports that his diet has not changed recently. He says his last colonoscopy was several years ago. \par \par Plan: Pt will inform his PCP and Gastroenterologist about his low blood count.  His last colonoscopy was about 5 years ago.  In view of the progressive anemia and the small red blood cells the gastroenterologist might be interested in performing upper GI endoscopy as well as a repeat colonoscopy.  If gastroenterology evaluation does not disclose any cause for the anemia I will ask that the primary care physician evaluate the anemia further.  If primary care physician would prefer I will refer the patient to a hematologist.  I have asked the patient to provide blood specimen for CBC with Iron level and urine sample for Urinalysis, Culture and cytology. \par \par 07/17/2023: Mr. VIVIENNE HANCOCK presents today for a follow up audio only telephone visit for which he gave permission for. The pt was located at home, [address] [place] [address], and I was located in my office in [place], [place]. The patient reports that he had some rectal and perineal pain last night. He admits a lot of constipation and hard stool. Pt has not yet spoken to his PCP or gastroenterologist yet about his recent lab work. \par \par Patient counseled on constipation management. Pt advised to take 2 docusate or colace and miralax if needed. He was advised to increase his water intake. I believe his perineal pain is from the constipation and not from prostatitis, however if relieving his constipation does not help, we will reassess. \par \par Patient will repeat lab work this week at [place]. Orders were put in today. \par \par Patient will have a telehealth visit next week for reassessment, sooner if clinically indicated. \par \par Duration of telephone visit:22 minutes.

## 2023-09-08 DIAGNOSIS — Z00.00 ENCOUNTER FOR GENERAL ADULT MEDICAL EXAMINATION W/OUT ABNORMAL FINDINGS: ICD-10-CM

## 2023-09-11 ENCOUNTER — APPOINTMENT (OUTPATIENT)
Dept: UROLOGY | Facility: CLINIC | Age: 78
End: 2023-09-11
Payer: MEDICARE

## 2023-09-11 VITALS
SYSTOLIC BLOOD PRESSURE: 148 MMHG | WEIGHT: 175 LBS | HEART RATE: 60 BPM | BODY MASS INDEX: 29.88 KG/M2 | OXYGEN SATURATION: 97 % | TEMPERATURE: 98 F | RESPIRATION RATE: 16 BRPM | DIASTOLIC BLOOD PRESSURE: 74 MMHG | HEIGHT: 64 IN

## 2023-09-11 PROCEDURE — 99214 OFFICE O/P EST MOD 30 MIN: CPT

## 2023-09-13 LAB
BACTERIA UR CULT: NORMAL
URINE CYTOLOGY: NORMAL

## 2023-09-14 LAB
ALP BLD-CCNC: 57 U/L
ANION GAP SERPL CALC-SCNC: 9 MMOL/L
APPEARANCE: CLEAR
BACTERIA: NEGATIVE /HPF
BILIRUBIN URINE: NEGATIVE
BLOOD URINE: ABNORMAL
BUN SERPL-MCNC: 26 MG/DL
CALCIUM SERPL-MCNC: 10.1 MG/DL
CAST: 0 /LPF
CHLORIDE SERPL-SCNC: 103 MMOL/L
CO2 SERPL-SCNC: 28 MMOL/L
COLOR: YELLOW
CREAT SERPL-MCNC: 1.14 MG/DL
EGFR: 66 ML/MIN/1.73M2
EPITHELIAL CELLS: 0 /HPF
GLUCOSE QUALITATIVE U: NEGATIVE MG/DL
GLUCOSE SERPL-MCNC: 102 MG/DL
HCT VFR BLD CALC: 39.9 %
HGB BLD-MCNC: 11.4 G/DL
IRON SATN MFR SERPL: 11 %
IRON SERPL-MCNC: 42 UG/DL
KETONES URINE: NEGATIVE MG/DL
LEUKOCYTE ESTERASE URINE: NEGATIVE
MCHC RBC-ENTMCNC: 20.8 PG
MCHC RBC-ENTMCNC: 28.6 GM/DL
MCV RBC AUTO: 72.9 FL
MICROSCOPIC-UA: NORMAL
NITRITE URINE: NEGATIVE
PH URINE: 6
PLATELET # BLD AUTO: 186 K/UL
POTASSIUM SERPL-SCNC: 4.6 MMOL/L
PROTEIN URINE: NEGATIVE MG/DL
PSA SERPL-MCNC: 0.23 NG/ML
RBC # BLD: 5.47 M/UL
RBC # FLD: 25.2 %
RED BLOOD CELLS URINE: 6 /HPF
SODIUM SERPL-SCNC: 140 MMOL/L
SPECIFIC GRAVITY URINE: 1.01
TESTOST SERPL-MCNC: 302 NG/DL
TIBC SERPL-MCNC: 373 UG/DL
UIBC SERPL-MCNC: 331 UG/DL
UROBILINOGEN URINE: 0.2 MG/DL
WBC # FLD AUTO: 7.22 K/UL
WHITE BLOOD CELLS URINE: 2 /HPF

## 2023-10-17 NOTE — PACU DISCHARGE NOTE - PAIN:
Addended by: VARSHA LEONE on: 10/17/2023 06:25 PM     Modules accepted: Orders    
Controlled with current regime

## 2023-10-20 NOTE — PHYSICAL EXAM
[General Appearance - Well Nourished] : well nourished [General Appearance - Well Developed] : well developed [Normal Appearance] : normal appearance [General Appearance - In No Acute Distress] : no acute distress [Abdomen Soft] : soft [Abdomen Tenderness] : non-tender [Abdomen Mass (___ Cm)] : no abdominal mass palpated [Skin Color & Pigmentation] : normal skin color and pigmentation [Heart Rate And Rhythm] : Heart rate and rhythm were normal [Exaggerated Use Of Accessory Muscles For Inspiration] : no accessory muscle use [] : no respiratory distress [Respiration, Rhythm And Depth] : normal respiratory rhythm and effort [Oriented To Time, Place, And Person] : oriented to person, place, and time [Affect] : the affect was normal [Mood] : the mood was normal [Normal Station and Gait] : the gait and station were normal for the patient's age [No Focal Deficits] : no focal deficits [No Palpable Adenopathy] : no palpable adenopathy [Cervical Lymph Nodes Enlarged Posterior Bilaterally] : posterior cervical [Cervical Lymph Nodes Enlarged Anterior Bilaterally] : anterior cervical [Supraclavicular Lymph Nodes Enlarged Bilaterally] : supraclavicular [FreeTextEntry1] : No submandibular gland tenderness.\par  There are no Wet Read(s) to document.

## 2023-11-16 ENCOUNTER — NON-APPOINTMENT (OUTPATIENT)
Age: 78
End: 2023-11-16

## 2023-11-16 RX ORDER — LEVOFLOXACIN 500 MG/1
500 TABLET, FILM COATED ORAL DAILY
Qty: 14 | Refills: 0 | Status: COMPLETED | COMMUNITY
Start: 2023-05-22 | End: 2023-11-16

## 2023-11-16 RX ORDER — CEFADROXIL 500 MG/1
500 CAPSULE ORAL TWICE DAILY
Qty: 20 | Refills: 0 | Status: COMPLETED | COMMUNITY
Start: 2022-02-02 | End: 2023-11-16

## 2023-12-04 ENCOUNTER — APPOINTMENT (OUTPATIENT)
Dept: UROLOGY | Facility: CLINIC | Age: 78
End: 2023-12-04
Payer: MEDICARE

## 2023-12-04 VITALS
DIASTOLIC BLOOD PRESSURE: 77 MMHG | OXYGEN SATURATION: 98 % | TEMPERATURE: 98.3 F | SYSTOLIC BLOOD PRESSURE: 145 MMHG | RESPIRATION RATE: 16 BRPM | HEART RATE: 73 BPM

## 2023-12-04 PROCEDURE — 99214 OFFICE O/P EST MOD 30 MIN: CPT

## 2023-12-06 LAB
APPEARANCE: CLEAR
BACTERIA UR CULT: NORMAL
BACTERIA: NEGATIVE /HPF
BILIRUBIN URINE: NEGATIVE
BLOOD URINE: ABNORMAL
CAST: NORMAL /LPF
COLOR: NORMAL
EPITHELIAL CELLS: 1 /HPF
GLUCOSE QUALITATIVE U: NEGATIVE MG/DL
KETONES URINE: ABNORMAL MG/DL
LEUKOCYTE ESTERASE URINE: NEGATIVE
MICROSCOPIC-UA: NORMAL
MUCUS: PRESENT
NITRITE URINE: NEGATIVE
PH URINE: 6
PROTEIN URINE: 30 MG/DL
RED BLOOD CELLS URINE: 64 /HPF
REVIEW: NORMAL
SPECIFIC GRAVITY URINE: 1.02
UROBILINOGEN URINE: 0.2 MG/DL
WHITE BLOOD CELLS URINE: 19 /HPF

## 2023-12-12 ENCOUNTER — APPOINTMENT (OUTPATIENT)
Dept: UROLOGY | Facility: CLINIC | Age: 78
End: 2023-12-12
Payer: MEDICARE

## 2023-12-12 DIAGNOSIS — D64.9 ANEMIA, UNSPECIFIED: ICD-10-CM

## 2023-12-12 PROCEDURE — 99442: CPT

## 2023-12-12 RX ORDER — DOXYCYCLINE 100 MG/1
100 CAPSULE ORAL TWICE DAILY
Qty: 28 | Refills: 0 | Status: ACTIVE | COMMUNITY
Start: 2023-12-12 | End: 1900-01-01

## 2023-12-13 ENCOUNTER — APPOINTMENT (OUTPATIENT)
Dept: UROLOGY | Facility: CLINIC | Age: 78
End: 2023-12-13

## 2023-12-18 ENCOUNTER — APPOINTMENT (OUTPATIENT)
Dept: COLORECTAL SURGERY | Facility: CLINIC | Age: 78
End: 2023-12-18
Payer: MEDICARE

## 2023-12-18 VITALS — WEIGHT: 174 LBS | BODY MASS INDEX: 29.71 KG/M2 | HEIGHT: 64 IN

## 2023-12-18 PROCEDURE — 46600 DIAGNOSTIC ANOSCOPY SPX: CPT

## 2023-12-18 RX ORDER — CLOTRIMAZOLE AND BETAMETHASONE DIPROPIONATE 10; .5 MG/G; MG/G
1-0.05 CREAM TOPICAL TWICE DAILY
Qty: 1 | Refills: 0 | Status: DISCONTINUED | COMMUNITY
Start: 2019-10-02 | End: 2023-12-18

## 2023-12-18 RX ORDER — BETAMETHASONE DIPROPIONATE 0.5 MG/G
0.05 OINTMENT TOPICAL
Qty: 1 | Refills: 3 | Status: DISCONTINUED | COMMUNITY
Start: 2022-08-30 | End: 2023-12-18

## 2023-12-18 RX ORDER — HYDROCORTISONE ACETATE 25 MG/1
25 SUPPOSITORY RECTAL
Qty: 12 | Refills: 0 | Status: DISCONTINUED | COMMUNITY
Start: 2019-10-04 | End: 2023-12-18

## 2023-12-18 RX ORDER — CIPROFLOXACIN HYDROCHLORIDE 500 MG/1
500 TABLET, FILM COATED ORAL
Qty: 20 | Refills: 0 | Status: DISCONTINUED | COMMUNITY
Start: 2023-11-16 | End: 2023-12-18

## 2023-12-18 RX ORDER — TAMSULOSIN HYDROCHLORIDE 0.4 MG/1
0.4 CAPSULE ORAL
Qty: 30 | Refills: 11 | Status: DISCONTINUED | COMMUNITY
Start: 2020-04-03 | End: 2023-12-18

## 2023-12-18 RX ORDER — FLUTICASONE PROPION/SALMETEROL 500-50 MCG
BLISTER, WITH INHALATION DEVICE INHALATION
Refills: 0 | Status: DISCONTINUED | COMMUNITY
End: 2023-12-18

## 2023-12-18 RX ORDER — NYSTATIN 100000 1/G
100000 POWDER TOPICAL TWICE DAILY
Qty: 1 | Refills: 1 | Status: DISCONTINUED | COMMUNITY
Start: 2019-10-02 | End: 2023-12-18

## 2023-12-18 RX ORDER — SILODOSIN 8 MG/1
8 CAPSULE ORAL DAILY
Qty: 30 | Refills: 11 | Status: DISCONTINUED | COMMUNITY
Start: 2022-04-08 | End: 2023-12-18

## 2023-12-18 RX ORDER — TADALAFIL 5 MG/1
5 TABLET ORAL
Qty: 30 | Refills: 0 | Status: DISCONTINUED | COMMUNITY
Start: 2021-03-09 | End: 2023-12-18

## 2023-12-18 RX ORDER — AMOXICILLIN AND CLAVULANATE POTASSIUM 875; 125 MG/1; MG/1
875-125 TABLET, COATED ORAL TWICE DAILY
Qty: 20 | Refills: 0 | Status: DISCONTINUED | COMMUNITY
Start: 2022-09-27 | End: 2023-12-18

## 2023-12-18 NOTE — HISTORY OF PRESENT ILLNESS
[FreeTextEntry1] : 78 year old male presents with rectal pain. He has a history of anal fissure which required surgery back in 2019. He also has a history of prostatitis which he is being treated with oral abx. He states that he is experiencing a lot of pain when moving into a standing position. He denies any rectal bleeding.   HAS SEEN DR. ELAINE AND HAS BEEN ON DIAZAPAM SUPP AND MESLAMINE SUPP.  Last colonoscopy was in 2016, reportedly normal. Patient's brother had colon cancer.

## 2023-12-18 NOTE — REVIEW OF SYSTEMS
[As Noted in HPI] : as noted in HPI [Negative] : Heme/Lymph [FreeTextEntry5] : pacemaker, HTN [FreeTextEntry6] : asthma - seasonal [FreeTextEntry8] : prostatitis

## 2023-12-18 NOTE — PHYSICAL EXAM
[Normal Breath Sounds] : Normal breath sounds [Normal Heart Sounds] : normal heart sounds [Normal Rate and Rhythm] : normal rate and rhythm [No Rash or Lesion] : No rash or lesion [Alert] : alert [Oriented to Person] : oriented to person [Oriented to Place] : oriented to place [Oriented to Time] : oriented to time [Calm] : calm [Normal rectal exam] : exam was normal [None] : no anal fissures seen [Excoriation] : no perianal excoriation [Multiple Sinus Tracts] : no perianal sinus tracts [Fistula] : no fistulas [Wart] : no warts [Ulcer ___ cm] : no ulcers [Pilonidal Cyst] : no pilonidal cysts [Pilonidal Sinus] : no pilonidal sinus [Pilonidal Sinus Draining] : no pilonidal sinus drainage [Tender, Swollen] : nontender, non-swollen [Normal] : was normal [Purpura] : no purpura  [Petechiae] : no petechiae [Skin Ulcer] : no ulcer [Skin Induration] : no induration [de-identified] : round, soft, NT/ND, +BS [de-identified] : Normal anorectal exam  [de-identified] : elderly male [de-identified] : NC/AT [de-identified] : YANELI/+ROM [de-identified] : intact

## 2023-12-18 NOTE — PROCEDURE
[FreeTextEntry1] : Anoscopy performed with a well lubricated adult anoscope.  The internal hemorrhoids, and low rectal mucosa were examined thoroughly.

## 2023-12-29 LAB
APPEARANCE: CLEAR
BACTERIA UR CULT: NORMAL
BACTERIA: NEGATIVE /HPF
BILIRUBIN URINE: NEGATIVE
BLOOD URINE: NEGATIVE
CAST: 0 /LPF
COLOR: YELLOW
EPITHELIAL CELLS: 1 /HPF
GLUCOSE QUALITATIVE U: NEGATIVE MG/DL
KETONES URINE: NEGATIVE MG/DL
LEUKOCYTE ESTERASE URINE: ABNORMAL
MICROSCOPIC-UA: NORMAL
NITRITE URINE: NEGATIVE
PH URINE: 6
PROTEIN URINE: NEGATIVE MG/DL
RED BLOOD CELLS URINE: 1 /HPF
SPECIFIC GRAVITY URINE: 1.01
UROBILINOGEN URINE: 0.2 MG/DL
WHITE BLOOD CELLS URINE: 6 /HPF

## 2023-12-30 LAB — URINE CYTOLOGY: NORMAL

## 2024-01-17 ENCOUNTER — NON-APPOINTMENT (OUTPATIENT)
Age: 79
End: 2024-01-17

## 2024-01-17 ENCOUNTER — APPOINTMENT (OUTPATIENT)
Dept: UROLOGY | Facility: CLINIC | Age: 79
End: 2024-01-17
Payer: MEDICARE

## 2024-01-17 DIAGNOSIS — M51.26 OTHER INTERVERTEBRAL DISC DISPLACEMENT, LUMBAR REGION: ICD-10-CM

## 2024-01-17 DIAGNOSIS — R31.0 GROSS HEMATURIA: ICD-10-CM

## 2024-01-17 DIAGNOSIS — M48.07 SPINAL STENOSIS, LUMBOSACRAL REGION: ICD-10-CM

## 2024-01-17 DIAGNOSIS — M54.50 LOW BACK PAIN, UNSPECIFIED: ICD-10-CM

## 2024-01-17 PROCEDURE — 99442: CPT | Mod: 93

## 2024-01-22 ENCOUNTER — APPOINTMENT (OUTPATIENT)
Dept: UROLOGY | Facility: CLINIC | Age: 79
End: 2024-01-22
Payer: MEDICARE

## 2024-01-22 ENCOUNTER — APPOINTMENT (OUTPATIENT)
Dept: UROLOGY | Facility: CLINIC | Age: 79
End: 2024-01-22

## 2024-01-22 PROCEDURE — 99442: CPT

## 2024-01-22 NOTE — ADDENDUM
[FreeTextEntry1] : This note was authored by Guerline Gold working as a scribe for Dr.Gary Duron. I, Dr. Collins Duron have reviewed the content of this note and confirm it is true and accurate. I personally performed the history and physical examination and made all the decisions 01/22/2024

## 2024-01-22 NOTE — REVIEW OF SYSTEMS
[Constipation] : constipation [Nocturia] : nocturia [Perineal Pain] : perineal pain [Fever] : no fever [Chills] : no chills [Feeling Poorly] : not feeling poorly [Loss Of Hearing] : no hearing loss [Chest Pain] : no chest pain [Shortness Of Breath] : no shortness of breath [Abdominal Pain] : no abdominal pain [Dysuria] : no dysuria [Scrotal Pain] : no scrotal pain [Urinary Stream Is Smaller] : urine stream was not smaller [Initiating Urination Req. Strain] : initiating urination does not require straining [Arthralgias] : no arthralgias [Difficulty Walking] : no difficulty walking [Skin Lesions] : no skin lesions [Suicidal] : not suicidal [Proptosis] : no proptosis [Easy Bleeding] : no tendency for easy bleeding

## 2024-01-22 NOTE — ASSESSMENT
[FreeTextEntry1] : Mr Hancock is a 78 year old man presented for follow up of gross hematuria, urinary frequency and urgency. The patient takes tamsulosin 0.4 mg, one half hour after meal twice daily, finasteride 5 mg once daily, and desipramine 10 mg at bedtime. The patient returned to the office 03/20/17 having gross hematuria with blood clot. Dr Holly Bernardo started the patient on Bactrim DS. He eventually went to the ER 03/21/17 where he had a Spann catheter placed. His creatinine in the ER was 1.01. Cystoscopy 03/23/17 found catheter trauma. Prostate protruded modest distance into the bladder. Bladder has a small capacity. Diffuse oozing of blood from bladder neck and trigone. Scraped up mucosa most likely from introduction of telescope. No bladder stones or tumors. Bladder inflammation from catheter. CT urogram was normal and found no etiology of the gross hematuria. 3/1/18: The patient returned and reported he has pain in his rectum area and penis. PSA from 1/24/18 was 0.39 ng/mL. 2/15/18 Creatinine was 1.01 mg/dL, hemoglobin was 14.1 g/dL. He received and abdomen ultrasound which shows a fatty liver, gallstones, and obscured pancreas. He denies any gross hematuria. He noted one day ago his GI physician put banding on his hemorrhoids. 4/10/18: The patient returned and reported he finished his antibiotics from last visit and noted he had another hemorrhoid and put another band by his GI. He noted pain still persists. UA from 3/1/18 shows 8 WBC/HPF. CBC from 3/1/18 showed slight anemia. 5/23/18: The patient returned and reported he has been taking Bactrim for his most recent prostatitis starting one week ago. PSA from 1/22/18 was 0.39 ng/mL. Currently taking Tamsulosin and Finasteride. 8/28/18: The patient returned and reported his urination is satisfactory. Currently taking tamsulosin and finasteride. Wakes up 3-4 times during the night to urinate. Noted he had a recent flare up of dysuria 3 days ago and took 2 days of Cipro and symptom have cleared up. 3/7/19: The patient returned and reported that he had another flare up of prostatitis and took 2 days of Cipro for his symptoms to clear. Complained of lingering dysuria. I reminded the patient that with Cipro, there is a side effect of tendonitis. Denied gross hematuria. Currently taking Trimethoprim, Tamsulosin and Finasteride. PSA from 2/15/19 was 0.04 ng/mL. 4/4/19: The patient returned and reported that he another flare up of prostatitis. Complained of pain when he stands up. His pain is relieved while sitting down. Occasionally has dysuria. Wakes up twice during the night to urinate. Complained of urinary urgency during the night. Takes Tamsulosin BID, Trimethoprim and Finasteride. 5/9/19: The patient returned today for an US. Transrectal US of the prostate findings showed a prostate volume of 23.4 cc, a hypoechoic area at the left apex 1.37 x 1.25 cm in transverse image, unremarkable seminal vesicles, and no rectal masses. The hypoechoic are is suspicious for prostate cancer, but the patient has a low PSA. I recommended that the patient undergo a transperineal biopsy for the hyperechoic region, but due to an anal fissure, the patient would like to wait. Notes that he consulted with a colon surgeon about his anal fissure and is currently taking stool softeners. His urination is adequate. Complains of nocturia. Wakes up 5-6 times during the night to urinate. Complains of pain at the tip of his penis. Takes Tamsulosin BID. UA from 4/11/19 was normal. Culture from 4/11/19 showed no growth. Notes that he took Bactrim and ended up getting a rash on his LEs. 6/10/19: Patient presents today for a follow up. Today he reports that he is scheduled for an anal fissure operation for 6/21/19. It has been advised that he will need 4 weeks to heal following the operation. Regarding his urination he reports that during the day there are no urinary issues. Nocturia is reported and he will have to wake up 5-6x a night to urinate. Finasteride and Tamsulosin are taken as directed. 7/18/19: Patient presents today for a follow up. 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 Patient with hypoechoic lesion of the prostate seen on recent TRUS. PSA 0.690 on 5/2109. Patient on Flomax /Proscar with both irritative and obstructive LUTS. Needs to strain to initiate urination. Nocturia times 5- 6. Denies dysuria or hematuria. Patient states that he feels that he has prostatitis based on familiar symptoms. Additionally he no longer takes Lisinopril but now is on Metoprolol 10 mg. 8/7/19: Patient presents today for a follow up. He states that the Bactrim prescribed at his last visit was not providing any relief. He still c/o discomfort when sitting but denies dysuria. He had Ciprofloxacin at home and had taken two tablets over the past two days. He noticed relief and inquired about having the prescription refilled today. 10/10/19: Patient presents today for a follow up. On 9/13/19 a needle biopsy of the prostate was completed under general anesthesia. He states that after the biopsy his chronic prostatitis began to cause discomfort. Ciprofloxacin 500 mg was used as directed and the discomfort is no longer present. Additionally it is reported that his sexual function decreased following the procedure but that it is starting to improve again. The results of the pathology report are to be discussed with him today. 12/31/2019: Patient presents today for a follow up. Pt reports pain on standing. He has an inflamed prostate. When the bladder is fill he also experiences pain and the pain goes away after he voids. Pt took Cipro to 7 days, finishing last week. While he took it, he felt better. Pt reports allergy to sulfur drugs and develops rash. 2/10/20: Patient presents today for a follow up. He had an MRI of the pelvis 1/23/20 which showed no evidence of rectal fistula could be identified. Pt has inflamed prostate Pt reports constipation for which he takes stool softner. He admits to dysuria and hematuria with mild pain at the tip of the penis yesterday. Most recent PSA was 0.24, Testosterone is 390.0. 02/27/2020: Patient presents today for a follow up. At the conclusion of the last visit, Doxycycline 100 mg was prescribed. The patient states he noticed no improvement. Today he reports having nighttime pain in the rectum which is alleviated after urination. Wakes up 4-5 times during the night to urinate. He has satisfactory day time urination though he reports associated strain. Denies chest pain. He currently takes a stool softener for constipation. The patient also complains of lower back pain for which he will get an injection shortly. A urine culture from 2/10/2020 showed no growth. His most recent creatinine level from 7/11/2019 was 1.07.  07/28/2020: Patient presents today for a follow up. Reports pain in the rectum and pain with fullness of the bladder. Pt had a US at [place]. Was given suppository which helped. Noxturia x4-5. On Tamsulosin BID. No burning. Frequency at night. Pt must push and staring. He experiences difficulty with his flow and voiding in general. Wife reports fowl smelling urine.  8/25/2020: Patient presents today for follow up on nocturia. Patient states he has not yet taken Desmopressin as he does not want to take too many medications. Still experiencing pain in rectum and with fullness of bladder. Was given Valium which helped. Last lab work on 7/28/2020 was within normal limits. Osmolality was normal at 294. Patient to start Desmopressin 0.1mg once before bedtime.  09/15/2020: Patient presents today for follow up. Pt was having pain in rectum and finished 5 days of Cipro which helped resolve pain, but causes constipation. Takes Colace for constipation. Urinalysis on 8/25/20 was normal. Still has pain in rectum at night, and uses Metronidazole gel to relieve it. Wakes up every 2 hours at night to urinate. Has some urgency.  11/17/2020: The patient presents today for a follow up. He is not taking desmopressin anymore. His last blood work on 11/6/2020 showed his creatinine was good at 1.0. He has no burning. He has some pain in the rectum and when his bladder is full. He is still taking finasteride 5 mg and trimethoprim 100 mg. At night he gets a lot of pain when his bladder is full and has urinary urgency about every 2 hours. After he urinates, the pain goes away.  03/09/2021: 75 yr male BPH, nocturia , straining. Chronic prostatitis, chronic cystitis  05/24/2021: Patient presents today for follow up. Patient went to a rectal surgeon for his rectal pain and had pelvic MRI 4/29/21 which showed no evidence of perianal fistula. States he continues to have pain in rectum after sitting for a long time and then standing. Patient was instructed to take Gabapentin 400mg TID, but skips afternoon dose as it makes him sleepy. Does have constipation. Has not started Tadalafil. No hx of prostate cancer.  06/15/2021: Patient presents today for a follow up. He reports that last week he had an episode of prostatitis for which he took Cipro for. Currently wakes every 2 hours during the night to urinate. Has tried desipramine and desmopressin in the past which did not improve his symptoms. Daytime urination remains stable. Stream is strong. Pt states he went to the cardiologist today and an EKG was done which was all normal. Currently takes iron supplements.  09/01/2021: Patient presents today for follow up. Today complains of straining and pushing to urinate. Denies dysuria. Denies infection of penis. Requests renewal of Finasteride and Tamsulosin. Not taking Trospium. Stopped Tadalafil due to side effect.  09/29/2021: Patient presents today for follow up. 9/1/21 UA showed 12 RBC/HPF and trace blood by dipstick. On repeat 9/9/21, he had only 3 RBC/HPF and small blood by dripstick which normal. Urine cytology 9/1/21 negative for high grade urothelial carcinoma. Provided blood work from 9/17/21 done by PCP which showed UA slightly cloudy, 3-5 RBC/HPF. PSA 0.2. Wakes 4x at night to urinate. States he has rectal pain when bladder is full.  02/02/2022: Patient presents today for follow up. Reviewed 12/29/21 lab work which showed MCV low at 75.2, WBC 6600, Hb 12.1, platelets 320472, potassium 4.9, creatinine 0.90 upper limits 1.18, BUN high at 25, AST minimally elevated 39 upper limits 34, alk phos normal 60, HbA1c 6.0, UA dipstick positive small blood, moderate leukocyte esterase, few squamous epithelial cells. No gross hematuria. Has pain when he sits and feels better when standing or laying down. States he must push to urinate. No pain on urination. Nocturia x3-4. Takes tamsulosin 0.4mg BID. Reports his EKG is normal.  03/14/2022: Patient presents today for follow up. Has been doing well, however does have to push urination. Nocturia x4. Erections are poor but not interested in medications. No dysuria. Stream is slow. Continues finasteride 5mg once daily and tamsulosin 0.4mg BID. Does not take tadalafil every day. Does not have any red itchy skin and has not needed to use clotrimazole-betamethasone cream. Reviewed 2/2/22 lab work. Urine culture grew 10,000-40,000 CFU/mL Enterococcus faecalis and patient was treated with amoxicillin 500mg TID and feels better now. PSA 0.24. Free testosterone low 3.9. Lab work of 2/11/22 showed brain natriuretic peptide normal, procalcitonin undetectable <0.10 ng/mL. Will be having evaluation for anorectal pain, with the proposed procedures being anorectal manometry, electromyography, pudendal nerve terminal motor latency, and endorectal ultrasound. Denies bladder pain. Will be having procedure for varicose veins of RLE.  03/30/2022: Patient presents today for follow up. Urination is good. Continues finasteride 5mg once daily and tamsulosin 0.4mg BID. Did not help with urination. Denies dysuria. Only has urinary urgency at night. Notes he eats spicy foods. No pain where he sits. Energy levels are good. Has tried Cialis in the past which did not help much. Erections had been poor since the biopsy. Reviewed 3/13/22 lab work which was WNL except free testosterone low 4.0, LH elevated 19, dihydrotestosterone low 4.1. Total testosterone normal 250. PSA was 0.20. UA normal. Urine cytology showed clusters of atypical urothelial cells with high nuclear cytoplasmic ratio, nuclear hyperchromasia and irregular nuclear contours in a background of moderate acute inflammation. States he went to rectal surgeon for anorectal pain, and had US which was reportedly normal and found muscles are weak, and was advised muscle dysfunction physical therapy which he is currently attending.  Reviewed 3/13/22 lab work with patient. Urine cytology showed clusters of atypical urothelial cells with high nuclear cytoplasmic ratio, nuclear hyperchromasia and irregular nuclear contours in a background of moderate acute inflammation, and which we will continue to watch. If cells persist, we will consider imaging test of kidneys, and cystoscopy. Recommend avoid spicy foods as it will cause irritation of both bladder and rectum, but is not harmful. Continue finasteride 5mg once daily and tamsulosin 0.4mg BID. I prescribed the patient tadalafil 5mg once daily for BPH symptoms and ED. I informed the patient that they may experience tingling of the skin, headache, warm flushed feeling, heartburn, backache, and on rare occasion, visual or hearing disturbances. Informed the patient how to use [employer] to  tadalafil at a [employer] or [employer] for a discounted price. I explained it had been previously used as heart medication and that it is safe to take as long as it is not taken with nitroglycerin which would cause BP to drop too low. The patient produced a urine sample which will be sent for urinalysis, urine cytology, and urine culture. RTO in 3 months for follow up or sooner if new urinary symptoms develop.  04/08/2022: Patient presents today for a follow up telephone visit for which he gave permission for. The visit was mostly conducted with his wife who was present. The patient has been taking finasteride 5 mg once daily and tamsulosin 0.4 mg BID. I prescribed the patient tadalafil 5 mg once daily at the time of the last visit, however he has not taken it. The pt's wife states today that her  told me during his 3/30/2022 visit he had a problems taking tadalafil which included flatulence and severe muscle aches. Both my personal memory and my note from that day do not recall him ever saying that, however there must have been some sort of miscommunication.  04/28/2022: Patient presents today for a follow up visit. Yesterday, he was having pain, but today he is feeling better. On 4/27/22, his PSA was 0.21 ng/mL, creatinine was 1.04. He also is complaining of rectal pain. He currently is taking Silodosin, one capsule daily, Tamsulosin once daily, Proscar once daily and Cefadroxil. His symptoms have improved with Cefadroxil. Previously he took Cipro, but it did not work satisfactorily. He is not taking Tadalafil. He denies urgency, but is experiencing slight urge incontinence. He reports Gabapentin alleviated his pain.  The patient produced a urine sample which will be sent for urinalysis, urine cytology, and urine culture. I am prescribing Gabapentin, 300 mg, two capsules at night, one in the morning for his pain. RTO in 1 month, sooner if needed.  06/07/2022: 's wife called today and spoke to my nurse. She stated that I told her  to take tamsulosin 0.4 mg 2 capsules BID, however there was a misunderstanding and his wife wanted to speak to me. I informed her that it is tamsulosin 0.4 mg one capsule BID and she understood. His wife reports that he has been having difficulties urinating. Has an appt to see me on 6/30/2022.  Urine cytology of 4/28/2022 revealed rare atypical cells with nuclear enlargement, prominent nucleoli, and vacuolated cytoplasm. It was my recommendation after reviewing back in April that he schedule a cystoscopy to evaluate atypical urine cytology, however the pt has not scheduled one yet. Last cystoscopy was in 2017. His wife understood and informed me she would schedule him for a cystoscopy with me within the next two weeks. I am sending the patient for a CT Urogram w/wo IV contrast. Creatinine was 1.04 on 4/27/2022. Patient will RTO for cystoscopy and to go over the results of his CT.  07/20/2022:  presents today for a follow up. Patient obtained a CT Urogram w/wo contrast on 7/14/2022 to evaluate microscopic hematuria. CT revealed renal cysts and less than 1 cm low-attenuation lesions which are too small to characterize. No enhancing lesions, or renal stones are identified. On the delayed images, there is poor opacification of the proximal and distal ureter and the right distal ureter. No abnormal soft tissue or gross urothelial lesion is appreciated. Bladder was within normal limits. The anterior aspect of the bladder is not opacified on the delayed imaging. No lymphadenopathy was seen. The patient was pleased with the CT findings. We reviewed his prostate symptoms. Has a long hx of chronic prostatitis that initially responded to cipro but sometimes required bactrim. He currently has no acute problems. Denies pain when sitting for long periods of time. Has some minor urinary urgency and frequency at times but is overall pleased with his urologic state. Continues on finasteride 5 mg once daily, tamsulosin 0.4 mg once daily, tadalafil 5 mg once daily, and silodosin 8 mg once daily. Last PSA 4/27/2022 was 0.21. Creatinine at that time was 1.04.  Clinical findings and CT results were reviewed at length with the patient and his wife. He was pleased with the findings. The patient produced a urine sample which will be sent for urinalysis, urine cytology, and urine culture. Patient will RTO in 6 months or sooner if clinically indicated.  07/27/2022:  presents today for a telephone visit for which he gave permission for. He wanted to review the results of his most recent urine test results. UA was perfect. Culture was no growth. Cytology consisted of mainly mature squamous epithelial cells, acute inflammation, and rare benign urothelial cells. Cytology was negative for high grade urothelial carcinoma. His wife inquired on why he needs to proceed with a cystoscopy  Reviewed and discussed urine test results of 7/20/2022 in detail with the patient. Given that these tests were so good, he does not need to schedule a cystoscopy at this time. I previously requested a cysto based off of a concerning urine cytology back in April. Requested that the patient provide a urine specimen in 2 weeks and another one 2-4 weeks after that to ensure cytologies remain good. So long as they are favorable, he can proceed without cystoscopy. Both the patient and his wife who is a retired pathologist understood. Patient will follow up in January at his scheduled appointment time.  03/02/2023: Mr. VIVIENNE HANCOCK presents today for a follow up. The pt continues to work as a . Patient provided a urine specimen on 2/28/2023. UA was normal. Culture was no significant growth. Urine cytology was negative for high grade urothelial carcinoma. Pt denies ever smoking or using tobacco products. Pt recently went to gastroenterologist and was told his stomach was distended. He admits to pushing and straining upon urination. His gastroenterologist put in orders for an US which he has scheduled. Patient continues to take finasteride 5 mg once daily and tamsulosin 0.4 mg BID. No longer is taking tadalafil 5 mg once daily.  Reviewed outside lab results of 1/8/2023 that was done at an outside facility. Creatinine was 1.10. A1C was 6.0. Urine was considered abnormal yellow, could be due to vitamin. Specific gravity on the lower side. Patient's PSA back in April 2022 was 0.21. Blood work today included repeat PSA. Reviewed urine test results of 2/28/2023. Renewed finasteride 5 mg once daily. I am prescribing the pt Silodosin 8 mg once daily with food to replace one of the tamsulosin. I informed him of the possible side effects of lightheadedness, nasal congestion, and decreased amount of semen when ejaculating. The pt was hesitant to restart tadalafil 5 mg once daily as he feels he is on a lot of medications. I then offered surgical intervention as an option and suggested he talk to one of my colleagues about potential options. He then opted to try tadalafil 5 mg once daily and would consider surgical intervention if it did not help. Patient will RTO in 2-3 weeks for a ZENA and to review how he is doing on the changes in his medications.  05/22/2023: Mr. VIVIENNE HANCOCK was scheduled today for a follow up audio only telephone visit for which he gave permission for. I was unable to reach the pt but did reach his wife, Dr.Almas Benjamin who was located at their home, [address] [place] [address], and I was located in my office in [place], [place]. She informs me that the pt has a lot of pain in the rectum. It hurts when he lays down or when standing. The pain is all the time and not just when he moves the bowels. He has a rectal surgeon but he does not think it is a problem with the rectum. Pt has constipation but wife reports he manages it well.  I prescribed the patient Levofloxacin 500 mg, one tablet daily until finished. I instructed him to avoid strenuous exercise for two weeks as there is a risk of tendonitis. Up to 1-2 months after finishing it, if he experiences some tendonitis he should not work through it and should rest. I also advised him to avoid direct sunlight while taking this medication. Pt was instructed to not take this medication with dairy products. If the pain does not improve within a few days or worsens, they were instructed to call back sooner and we might consider adding a medication for neuropathic pain such as gabapentin. Patient will have a telehealth visit in 2 weeks for reassessment, sooner if clinically indicated.  06/15/2023: Mr. VIVIENNE HANCOCK presents today for a follow up and ZENA. He reports no significant changes in urination. Does c/o back pain. Had a CT at [facility]. Is planning on getting an epidural injection for pain. Pt reports he had lab work done yesterday, however the results are not yet back. He also reports perineal pain when he sits. His gastroenterologist is prescribing suppositories.  The knee-chest position was utilized for the ZENA. Digital rectal exam found no suspicious rectal masses. Normal seminal vesicles. Anal tone is normal. The prostate is non tender, with normal texture, slightly firm, discrete borders, and no nodules. It is a 20 gram transurethral resection size. No rectal mucosal lesions. No gross blood on the examining finger.  Reviewed imaging results from the spine specialist which he brought with him today. Has spinal canal stenosis in lumbar spine. Also reviewed most recent US of the abdomen done back in March ordered by his gastroenterologist. Does have a fatty liver but no ascites. Both kidneys were normal with no hydro. Patient will schedule a telehealth visit tomorrow to review results of his lab work.  06/16/2023: Mr. HANCOCK is a 77 year old male presenting today via telehealth using real time 2 way audio only technology. The patient, Mr. HANCOCK, was located in his home at [address] [place] [address] at the time of the visit. The provider, FREDERIC BETH, was located in his office on [place], [place]. Verbal Consent was given by the patient on 06/16/2023 and my scribe served as a witness to the verbal consent. The patient provided blood sample on June 13 at [facility] but they seemed not available. I called to inquire and it was determined that an entry mistake occurred where the lab results was misdated and it was reported as being drawn as March 1st. The  was agreeable to correct the mistake. I called the patient again and explained the mishap to the patient. I explain that his CBC on 06/13/2023 shows that his blood count is low. HGB is low at 9.8. HCT low at 34.9. Mean Cell Volume also low at 69.7. He reports that his diet has not changed recently. He says his last colonoscopy was several years ago.  Plan: Pt will inform his PCP and Gastroenterologist about his low blood count. His last colonoscopy was about 5 years ago. In view of the progressive anemia and the small red blood cells the gastroenterologist might be interested in performing upper GI endoscopy as well as a repeat colonoscopy. If gastroenterology evaluation does not disclose any cause for the anemia I will ask that the primary care physician evaluate the anemia further. If primary care physician would prefer I will refer the patient to a hematologist. I have asked the patient to provide blood specimen for CBC with Iron level and urine sample for Urinalysis, Culture and cytology.  07/17/2023: Mr. VIVIENNE HANCOCK presents today for a follow up audio only telephone visit for which he gave permission for. The pt was located at home, [address] [place] [address], and I was located in my office in [place], [place]. The patient reports that he had some rectal and perineal pain last night. He admits a lot of constipation and hard stool. Pt has not yet spoken to his PCP or gastroenterologist yet about his recent lab work.  Patient counseled on constipation management. Pt advised to take 2 docusate or colace and miralax if needed. He was advised to increase his water intake. I believe his perineal pain is from the constipation and not from prostatitis, however if relieving his constipation does not help, we will reassess. Patient will repeat lab work this week at his nearest  lab. Orders were put in today. Patient will have a telehealth visit next week for reassessment, sooner if clinically indicated.  09/11/2023: Mr. VIVIENNE HANCOCK presents today for a follow up. He reports ongoing problems with anal/ rectal pain. Denies any testicular pain. Has spoken to his colorectal surgeon and he feels he was not helped enough. Gabapentin 300 mg helps his pain overall. He has not tried taking it in the morning because it makes him a little tired and he is fearful of driving. Pt had lab work done at his PCP on 7/21/2023. Creatinine was normal. Urine tests were normal. PSA was not done. Pt is on an iron supplement which causes some constipation. Eats a diet rich in vegetables. Pt has a pacemaker. Admits pushing and straining on urination.  I increased the dosage of Tamsulosin 0.4 mg from once daily to twice daily.  My recommendation is try taking gabapentin two at night and one in the morning for at least one month and be cautious with driving. If this does not work for him, we can look into a different medication for neuropathic pain.  Reviewed and discussed lab work of 7/21/2023 in detail with the pt.  Pt strongly advised to increase his water consumption to reduce constipation. I was considering ordering an MRI of the prostate given his perineal pain, however given the patient's pacemaker and that his pain has been found to be mostly neuropathic in origin, he will RTO for an US of the prostate to check for any abscesses.  12/4/23: patient here for urgent visit for rectal pain  same as described to dr beth in Sept 2023 constipation better seeing colorectal surgeon- to see in Jan 2024- normal eval by report last week urine done wiht PCP : ( nov 25) : nomal - took 3d of levaquin psa 0.17 creat 1.0 normal white count 8.0 no luts- good flow, no strain , feels empty after void  here for eval: 1- check urine 2- exam signif for right sided pelvic pain - 3- to f/u with dr beth-consider PT of pelvic floor  12/12/2023: Mr. HANCOCK presents today for a follow up audio only telehealth visit for which they gave permission for. The patient was located at home [address] [place] [address] and I was located in my office in [place], [place]. Patient's 12/4/23 UA showed Proteinuria 30 mg/dL, trace Ketonuria, moderate blood, 19/HPF of WBC, 64/HPF of RBC and mucus present.  Wife reports the patient is experiencing terrible pain. The patient did take took levofloxacin but the abx resulted in no relief. They desired a stronger antibiotic, but I explained to them, if the microscopic hematuria does not clear I believed it to be suppurative to obtain a cystoscopy. Also explained this may be symptoms of bladder cancer, so it is within their best interest to obtain a Cystoscopy.   Patient is not agreeable to obtaining a cystoscopy as he believes it was a waste of time. I assured patient obtaining one is strongly suggested as his symptoms are ones of bladder cancer. His last Cystoscopy was back in March 23 2017. As further intervention, I prescribed the patient doxycycline 100 mg, one capsule every 12 hours. I informed the patient to avoid excessive amounts of direct sunlight while on this medication. If Cystoscopy is negative and Doxycycline does not relieve pain, I will recommend Pelvic floor therapy.  Pt will schedule a telehealth visit in 2 weeks.   01/17/2024: Mr. VIVIENNE HANCOCK presents today for an audio visual telehealth visit for which he gave permission for. The patient was located at home at [address] [place] [address] and I was located at my office in [place], [place]. When I called, the patient was in physical therapy and gave permission for me to discuss with his wife by telephone call. She informed me that he is having a lot of pain in his prostate. At the time of his last suspected episode of prostatitis, I prescribed him doxycycline, however she informs me that it did not help him at all. The patient's last MRI of the pelvis was in April 2021. The patient now has a pacemaker. He is currently taking gabapentin.   Reviewed and discussed lab work of 12/28/2023 in detail with the pt's wife.   Patient will provide a new urine specimen at his nearest  lab. While he is there he will also have blood drawn for orders I have put in today. If he has pyuria, will consider abx. Advised him to take two gabapentin at night for pain. He was advised to not drive if he is drowsy from the gabapentin.   I am sending the patient for a CT abdomen and pelvis to evaluate prostate pain.    Patient will have a telehealth visit after obtaining the CT to review and discuss results. He will also have a telehealth visit this Friday to discuss urine test results.    01/22/2024: Mr. VIVIENNE HANCOCK presents today for a follow up audio only telephone visit for which he gave permission for. The pt was located at home, [address] [place] [address], and I was located in my office in [place], [place]. The patient has not yet obtained the CT I ordered, nor has he supplied a urine specimen to a [facility] lab. He reports that he is feeling okay this week.    Patient will obtain the CT abdomen and pelvis w/wo contrast.    Patient will have a telehealth visit after obtaining the CT to review and discuss results.    Duration of telephone visit was 12 minutes.

## 2024-01-29 ENCOUNTER — RESULT REVIEW (OUTPATIENT)
Age: 79
End: 2024-01-29

## 2024-01-30 LAB
ALBUMIN SERPL ELPH-MCNC: 4 G/DL
ALP BLD-CCNC: 65 U/L
ALT SERPL-CCNC: 30 U/L
ANION GAP SERPL CALC-SCNC: 11 MMOL/L
AST SERPL-CCNC: 29 U/L
BACTERIA UR CULT: NORMAL
BILIRUB SERPL-MCNC: 0.4 MG/DL
BUN SERPL-MCNC: 27 MG/DL
CALCIUM SERPL-MCNC: 9.6 MG/DL
CHLORIDE SERPL-SCNC: 102 MMOL/L
CO2 SERPL-SCNC: 25 MMOL/L
CREAT SERPL-MCNC: 0.99 MG/DL
EGFR: 78 ML/MIN/1.73M2
GLUCOSE SERPL-MCNC: 91 MG/DL
POTASSIUM SERPL-SCNC: 4.7 MMOL/L
PROT SERPL-MCNC: 6.8 G/DL
PSA FREE FLD-MCNC: 33 %
PSA FREE SERPL-MCNC: 0.06 NG/ML
PSA SERPL-MCNC: 0.19 NG/ML
SODIUM SERPL-SCNC: 138 MMOL/L
URINE CYTOLOGY: NORMAL

## 2024-01-31 ENCOUNTER — APPOINTMENT (OUTPATIENT)
Dept: UROLOGY | Facility: CLINIC | Age: 79
End: 2024-01-31
Payer: MEDICARE

## 2024-01-31 DIAGNOSIS — N34.2 OTHER URETHRITIS: ICD-10-CM

## 2024-01-31 DIAGNOSIS — R39.9 UNSPECIFIED SYMPTOMS AND SIGNS INVOLVING THE GENITOURINARY SYSTEM: ICD-10-CM

## 2024-01-31 LAB
BASOPHILS # BLD AUTO: 0.07 K/UL
BASOPHILS NFR BLD AUTO: 0.9 %
EOSINOPHIL # BLD AUTO: 0.14 K/UL
EOSINOPHIL NFR BLD AUTO: 1.8 %
HCT VFR BLD CALC: 43 %
HGB BLD-MCNC: 13 G/DL
IMM GRANULOCYTES NFR BLD AUTO: 0.3 %
LYMPHOCYTES # BLD AUTO: 2.16 K/UL
LYMPHOCYTES NFR BLD AUTO: 28.2 %
MAN DIFF?: NORMAL
MCHC RBC-ENTMCNC: 25.5 PG
MCHC RBC-ENTMCNC: 30.2 GM/DL
MCV RBC AUTO: 84.3 FL
MONOCYTES # BLD AUTO: 0.76 K/UL
MONOCYTES NFR BLD AUTO: 9.9 %
NEUTROPHILS # BLD AUTO: 4.52 K/UL
NEUTROPHILS NFR BLD AUTO: 58.9 %
PLATELET # BLD AUTO: 190 K/UL
RBC # BLD: 5.1 M/UL
RBC # FLD: 17.8 %
WBC # FLD AUTO: 7.67 K/UL

## 2024-01-31 PROCEDURE — 99442: CPT

## 2024-01-31 RX ORDER — CIPROFLOXACIN HYDROCHLORIDE 500 MG/1
500 TABLET, FILM COATED ORAL
Qty: 28 | Refills: 0 | Status: ACTIVE | COMMUNITY
Start: 2024-01-31 | End: 1900-01-01

## 2024-01-31 NOTE — ASSESSMENT
[FreeTextEntry1] : Mr Hancock is a 78 year old man presented for follow up of gross hematuria, urinary frequency and urgency. The patient takes tamsulosin 0.4 mg, one half hour after meal twice daily, finasteride 5 mg once daily, and desipramine 10 mg at bedtime. The patient returned to the office 03/20/17 having gross hematuria with blood clot. Dr Holly Bernardo started the patient on Bactrim DS. He eventually went to the ER 03/21/17 where he had a Spann catheter placed. His creatinine in the ER was 1.01. Cystoscopy 03/23/17 found catheter trauma. Prostate protruded modest distance into the bladder. Bladder has a small capacity. Diffuse oozing of blood from bladder neck and trigone. Scraped up mucosa most likely from introduction of telescope. No bladder stones or tumors. Bladder inflammation from catheter. CT urogram was normal and found no etiology of the gross hematuria. 3/1/18: The patient returned and reported he has pain in his rectum area and penis. PSA from 1/24/18 was 0.39 ng/mL. 2/15/18 Creatinine was 1.01 mg/dL, hemoglobin was 14.1 g/dL. He received and abdomen ultrasound which shows a fatty liver, gallstones, and obscured pancreas. He denies any gross hematuria. He noted one day ago his GI physician put banding on his hemorrhoids. 4/10/18: The patient returned and reported he finished his antibiotics from last visit and noted he had another hemorrhoid and put another band by his GI. He noted pain still persists. UA from 3/1/18 shows 8 WBC/HPF. CBC from 3/1/18 showed slight anemia. 5/23/18: The patient returned and reported he has been taking Bactrim for his most recent prostatitis starting one week ago. PSA from 1/22/18 was 0.39 ng/mL. Currently taking Tamsulosin and Finasteride. 8/28/18: The patient returned and reported his urination is satisfactory. Currently taking tamsulosin and finasteride. Wakes up 3-4 times during the night to urinate. Noted he had a recent flare up of dysuria 3 days ago and took 2 days of Cipro and symptom have cleared up. 3/7/19: The patient returned and reported that he had another flare up of prostatitis and took 2 days of Cipro for his symptoms to clear. Complained of lingering dysuria. I reminded the patient that with Cipro, there is a side effect of tendonitis. Denied gross hematuria. Currently taking Trimethoprim, Tamsulosin and Finasteride. PSA from 2/15/19 was 0.04 ng/mL. 4/4/19: The patient returned and reported that he another flare up of prostatitis. Complained of pain when he stands up. His pain is relieved while sitting down. Occasionally has dysuria. Wakes up twice during the night to urinate. Complained of urinary urgency during the night. Takes Tamsulosin BID, Trimethoprim and Finasteride. 5/9/19: The patient returned today for an US. Transrectal US of the prostate findings showed a prostate volume of 23.4 cc, a hypoechoic area at the left apex 1.37 x 1.25 cm in transverse image, unremarkable seminal vesicles, and no rectal masses. The hypoechoic are is suspicious for prostate cancer, but the patient has a low PSA. I recommended that the patient undergo a transperineal biopsy for the hyperechoic region, but due to an anal fissure, the patient would like to wait. Notes that he consulted with a colon surgeon about his anal fissure and is currently taking stool softeners. His urination is adequate. Complains of nocturia. Wakes up 5-6 times during the night to urinate. Complains of pain at the tip of his penis. Takes Tamsulosin BID. UA from 4/11/19 was normal. Culture from 4/11/19 showed no growth. Notes that he took Bactrim and ended up getting a rash on his LEs. 6/10/19: Patient presents today for a follow up. Today he reports that he is scheduled for an anal fissure operation for 6/21/19. It has been advised that he will need 4 weeks to heal following the operation. Regarding his urination he reports that during the day there are no urinary issues. Nocturia is reported and he will have to wake up 5-6x a night to urinate. Finasteride and Tamsulosin are taken as directed. 7/18/19: Patient presents today for a follow up. 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 Patient with hypoechoic lesion of the prostate seen on recent TRUS. PSA 0.690 on 5/2109. Patient on Flomax /Proscar with both irritative and obstructive LUTS. Needs to strain to initiate urination. Nocturia times 5- 6. Denies dysuria or hematuria. Patient states that he feels that he has prostatitis based on familiar symptoms. Additionally he no longer takes Lisinopril but now is on Metoprolol 10 mg. 8/7/19: Patient presents today for a follow up. He states that the Bactrim prescribed at his last visit was not providing any relief. He still c/o discomfort when sitting but denies dysuria. He had Ciprofloxacin at home and had taken two tablets over the past two days. He noticed relief and inquired about having the prescription refilled today. 10/10/19: Patient presents today for a follow up. On 9/13/19 a needle biopsy of the prostate was completed under general anesthesia. He states that after the biopsy his chronic prostatitis began to cause discomfort. Ciprofloxacin 500 mg was used as directed and the discomfort is no longer present. Additionally it is reported that his sexual function decreased following the procedure but that it is starting to improve again. The results of the pathology report are to be discussed with him today. 12/31/2019: Patient presents today for a follow up. Pt reports pain on standing. He has an inflamed prostate. When the bladder is fill he also experiences pain and the pain goes away after he voids. Pt took Cipro to 7 days, finishing last week. While he took it, he felt better. Pt reports allergy to sulfur drugs and develops rash. 2/10/20: Patient presents today for a follow up. He had an MRI of the pelvis 1/23/20 which showed no evidence of rectal fistula could be identified. Pt has inflamed prostate Pt reports constipation for which he takes stool softner. He admits to dysuria and hematuria with mild pain at the tip of the penis yesterday. Most recent PSA was 0.24, Testosterone is 390.0. 02/27/2020: Patient presents today for a follow up. At the conclusion of the last visit, Doxycycline 100 mg was prescribed. The patient states he noticed no improvement. Today he reports having nighttime pain in the rectum which is alleviated after urination. Wakes up 4-5 times during the night to urinate. He has satisfactory day time urination though he reports associated strain. Denies chest pain. He currently takes a stool softener for constipation. The patient also complains of lower back pain for which he will get an injection shortly. A urine culture from 2/10/2020 showed no growth. His most recent creatinine level from 7/11/2019 was 1.07.  07/28/2020: Patient presents today for a follow up. Reports pain in the rectum and pain with fullness of the bladder. Pt had a US at [facility]. Was given suppository which helped. Noxturia x4-5. On Tamsulosin BID. No burning. Frequency at night. Pt must push and staring. He experiences difficulty with his flow and voiding in general. Wife reports fowl smelling urine.  8/25/2020: Patient presents today for follow up on nocturia. Patient states he has not yet taken Desmopressin as he does not want to take too many medications. Still experiencing pain in rectum and with fullness of bladder. Was given Valium which helped. Last lab work on 7/28/2020 was within normal limits. Osmolality was normal at 294. Patient to start Desmopressin 0.1mg once before bedtime.  09/15/2020: Patient presents today for follow up. Pt was having pain in rectum and finished 5 days of Cipro which helped resolve pain, but causes constipation. Takes Colace for constipation. Urinalysis on 8/25/20 was normal. Still has pain in rectum at night, and uses Metronidazole gel to relieve it. Wakes up every 2 hours at night to urinate. Has some urgency.  11/17/2020: The patient presents today for a follow up. He is not taking desmopressin anymore. His last blood work on 11/6/2020 showed his creatinine was good at 1.0. He has no burning. He has some pain in the rectum and when his bladder is full. He is still taking finasteride 5 mg and trimethoprim 100 mg. At night he gets a lot of pain when his bladder is full and has urinary urgency about every 2 hours. After he urinates, the pain goes away.  03/09/2021: 75 yr male BPH, nocturia , straining. Chronic prostatitis, chronic cystitis  05/24/2021: Patient presents today for follow up. Patient went to a rectal surgeon for his rectal pain and had pelvic MRI 4/29/21 which showed no evidence of perianal fistula. States he continues to have pain in rectum after sitting for a long time and then standing. Patient was instructed to take Gabapentin 400mg TID, but skips afternoon dose as it makes him sleepy. Does have constipation. Has not started Tadalafil. No hx of prostate cancer.  06/15/2021: Patient presents today for a follow up. He reports that last week he had an episode of prostatitis for which he took Cipro for. Currently wakes every 2 hours during the night to urinate. Has tried desipramine and desmopressin in the past which did not improve his symptoms. Daytime urination remains stable. Stream is strong. Pt states he went to the cardiologist today and an EKG was done which was all normal. Currently takes iron supplements.  09/01/2021: Patient presents today for follow up. Today complains of straining and pushing to urinate. Denies dysuria. Denies infection of penis. Requests renewal of Finasteride and Tamsulosin. Not taking Trospium. Stopped Tadalafil due to side effect.  09/29/2021: Patient presents today for follow up. 9/1/21 UA showed 12 RBC/HPF and trace blood by dipstick. On repeat 9/9/21, he had only 3 RBC/HPF and small blood by dripstick which normal. Urine cytology 9/1/21 negative for high grade urothelial carcinoma. Provided blood work from 9/17/21 done by PCP which showed UA slightly cloudy, 3-5 RBC/HPF. PSA 0.2. Wakes 4x at night to urinate. States he has rectal pain when bladder is full.  02/02/2022: Patient presents today for follow up. Reviewed 12/29/21 lab work which showed MCV low at 75.2, WBC 6600, Hb 12.1, platelets 438984, potassium 4.9, creatinine 0.90 upper limits 1.18, BUN high at 25, AST minimally elevated 39 upper limits 34, alk phos normal 60, HbA1c 6.0, UA dipstick positive small blood, moderate leukocyte esterase, few squamous epithelial cells. No gross hematuria. Has pain when he sits and feels better when standing or laying down. States he must push to urinate. No pain on urination. Nocturia x3-4. Takes tamsulosin 0.4mg BID. Reports his EKG is normal.  03/14/2022: Patient presents today for follow up. Has been doing well, however does have to push urination. Nocturia x4. Erections are poor but not interested in medications. No dysuria. Stream is slow. Continues finasteride 5mg once daily and tamsulosin 0.4mg BID. Does not take tadalafil every day. Does not have any red itchy skin and has not needed to use clotrimazole-betamethasone cream. Reviewed 2/2/22 lab work. Urine culture grew 10,000-40,000 CFU/mL Enterococcus faecalis and patient was treated with amoxicillin 500mg TID and feels better now. PSA 0.24. Free testosterone low 3.9. Lab work of 2/11/22 showed brain natriuretic peptide normal, procalcitonin undetectable <0.10 ng/mL. Will be having evaluation for anorectal pain, with the proposed procedures being anorectal manometry, electromyography, pudendal nerve terminal motor latency, and endorectal ultrasound. Denies bladder pain. Will be having procedure for varicose veins of RLE.  03/30/2022: Patient presents today for follow up. Urination is good. Continues finasteride 5mg once daily and tamsulosin 0.4mg BID. Did not help with urination. Denies dysuria. Only has urinary urgency at night. Notes he eats spicy foods. No pain where he sits. Energy levels are good. Has tried Cialis in the past which did not help much. Erections had been poor since the biopsy. Reviewed 3/13/22 lab work which was WNL except free testosterone low 4.0, LH elevated 19, dihydrotestosterone low 4.1. Total testosterone normal 250. PSA was 0.20. UA normal. Urine cytology showed clusters of atypical urothelial cells with high nuclear cytoplasmic ratio, nuclear hyperchromasia and irregular nuclear contours in a background of moderate acute inflammation. States he went to rectal surgeon for anorectal pain, and had US which was reportedly normal and found muscles are weak, and was advised muscle dysfunction physical therapy which he is currently attending.  Reviewed 3/13/22 lab work with patient. Urine cytology showed clusters of atypical urothelial cells with high nuclear cytoplasmic ratio, nuclear hyperchromasia and irregular nuclear contours in a background of moderate acute inflammation, and which we will continue to watch. If cells persist, we will consider imaging test of kidneys, and cystoscopy. Recommend avoid spicy foods as it will cause irritation of both bladder and rectum, but is not harmful. Continue finasteride 5mg once daily and tamsulosin 0.4mg BID. I prescribed the patient tadalafil 5mg once daily for BPH symptoms and ED. I informed the patient that they may experience tingling of the skin, headache, warm flushed feeling, heartburn, backache, and on rare occasion, visual or hearing disturbances. Informed the patient how to use [employer] to  tadalafil at a [employer] or [employer] for a discounted price. I explained it had been previously used as heart medication and that it is safe to take as long as it is not taken with nitroglycerin which would cause BP to drop too low. The patient produced a urine sample which will be sent for urinalysis, urine cytology, and urine culture. RTO in 3 months for follow up or sooner if new urinary symptoms develop.  04/08/2022: Patient presents today for a follow up telephone visit for which he gave permission for. The visit was mostly conducted with his wife who was present. The patient has been taking finasteride 5 mg once daily and tamsulosin 0.4 mg BID. I prescribed the patient tadalafil 5 mg once daily at the time of the last visit, however he has not taken it. The pt's wife states today that her  told me during his 3/30/2022 visit he had a problems taking tadalafil which included flatulence and severe muscle aches. Both my personal memory and my note from that day do not recall him ever saying that, however there must have been some sort of miscommunication.  04/28/2022: Patient presents today for a follow up visit. Yesterday, he was having pain, but today he is feeling better. On 4/27/22, his PSA was 0.21 ng/mL, creatinine was 1.04. He also is complaining of rectal pain. He currently is taking Silodosin, one capsule daily, Tamsulosin once daily, Proscar once daily and Cefadroxil. His symptoms have improved with Cefadroxil. Previously he took Cipro, but it did not work satisfactorily. He is not taking Tadalafil. He denies urgency, but is experiencing slight urge incontinence. He reports Gabapentin alleviated his pain.  The patient produced a urine sample which will be sent for urinalysis, urine cytology, and urine culture. I am prescribing Gabapentin, 300 mg, two capsules at night, one in the morning for his pain. RTO in 1 month, sooner if needed.  06/07/2022: 's wife called today and spoke to my nurse. She stated that I told her  to take tamsulosin 0.4 mg 2 capsules BID, however there was a misunderstanding and his wife wanted to speak to me. I informed her that it is tamsulosin 0.4 mg one capsule BID and she understood. His wife reports that he has been having difficulties urinating. Has an appt to see me on 6/30/2022.  Urine cytology of 4/28/2022 revealed rare atypical cells with nuclear enlargement, prominent nucleoli, and vacuolated cytoplasm. It was my recommendation after reviewing back in April that he schedule a cystoscopy to evaluate atypical urine cytology, however the pt has not scheduled one yet. Last cystoscopy was in 2017. His wife understood and informed me she would schedule him for a cystoscopy with me within the next two weeks. I am sending the patient for a CT Urogram w/wo IV contrast. Creatinine was 1.04 on 4/27/2022. Patient will RTO for cystoscopy and to go over the results of his CT.  07/20/2022:  presents today for a follow up. Patient obtained a CT Urogram w/wo contrast on 7/14/2022 to evaluate microscopic hematuria. CT revealed renal cysts and less than 1 cm low-attenuation lesions which are too small to characterize. No enhancing lesions, or renal stones are identified. On the delayed images, there is poor opacification of the proximal and distal ureter and the right distal ureter. No abnormal soft tissue or gross urothelial lesion is appreciated. Bladder was within normal limits. The anterior aspect of the bladder is not opacified on the delayed imaging. No lymphadenopathy was seen. The patient was pleased with the CT findings. We reviewed his prostate symptoms. Has a long hx of chronic prostatitis that initially responded to cipro but sometimes required bactrim. He currently has no acute problems. Denies pain when sitting for long periods of time. Has some minor urinary urgency and frequency at times but is overall pleased with his urologic state. Continues on finasteride 5 mg once daily, tamsulosin 0.4 mg once daily, tadalafil 5 mg once daily, and silodosin 8 mg once daily. Last PSA 4/27/2022 was 0.21. Creatinine at that time was 1.04.  Clinical findings and CT results were reviewed at length with the patient and his wife. He was pleased with the findings. The patient produced a urine sample which will be sent for urinalysis, urine cytology, and urine culture. Patient will RTO in 6 months or sooner if clinically indicated.  07/27/2022:  presents today for a telephone visit for which he gave permission for. He wanted to review the results of his most recent urine test results. UA was perfect. Culture was no growth. Cytology consisted of mainly mature squamous epithelial cells, acute inflammation, and rare benign urothelial cells. Cytology was negative for high grade urothelial carcinoma. His wife inquired on why he needs to proceed with a cystoscopy  Reviewed and discussed urine test results of 7/20/2022 in detail with the patient. Given that these tests were so good, he does not need to schedule a cystoscopy at this time. I previously requested a cysto based off of a concerning urine cytology back in April. Requested that the patient provide a urine specimen in 2 weeks and another one 2-4 weeks after that to ensure cytologies remain good. So long as they are favorable, he can proceed without cystoscopy. Both the patient and his wife who is a retired pathologist understood. Patient will follow up in January at his scheduled appointment time.  03/02/2023: Mr. VIVIENNE HANCOCK presents today for a follow up. The pt continues to work as a . Patient provided a urine specimen on 2/28/2023. UA was normal. Culture was no significant growth. Urine cytology was negative for high grade urothelial carcinoma. Pt denies ever smoking or using tobacco products. Pt recently went to gastroenterologist and was told his stomach was distended. He admits to pushing and straining upon urination. His gastroenterologist put in orders for an US which he has scheduled. Patient continues to take finasteride 5 mg once daily and tamsulosin 0.4 mg BID. No longer is taking tadalafil 5 mg once daily.  Reviewed outside lab results of 1/8/2023 that was done at an outside facility. Creatinine was 1.10. A1C was 6.0. Urine was considered abnormal yellow, could be due to vitamin. Specific gravity on the lower side. Patient's PSA back in April 2022 was 0.21. Blood work today included repeat PSA. Reviewed urine test results of 2/28/2023. Renewed finasteride 5 mg once daily. I am prescribing the pt Silodosin 8 mg once daily with food to replace one of the tamsulosin. I informed him of the possible side effects of lightheadedness, nasal congestion, and decreased amount of semen when ejaculating. The pt was hesitant to restart tadalafil 5 mg once daily as he feels he is on a lot of medications. I then offered surgical intervention as an option and suggested he talk to one of my colleagues about potential options. He then opted to try tadalafil 5 mg once daily and would consider surgical intervention if it did not help. Patient will RTO in 2-3 weeks for a ZENA and to review how he is doing on the changes in his medications.  05/22/2023: Mr. VIVIENNE HANCOCK was scheduled today for a follow up audio only telephone visit for which he gave permission for. I was unable to reach the pt but did reach his wife, Dr.Almas Benjamin who was located at their home, [address] [place] [address], and I was located in my office in [place], [place]. She informs me that the pt has a lot of pain in the rectum. It hurts when he lays down or when standing. The pain is all the time and not just when he moves the bowels. He has a rectal surgeon but he does not think it is a problem with the rectum. Pt has constipation but wife reports he manages it well.  I prescribed the patient Levofloxacin 500 mg, one tablet daily until finished. I instructed him to avoid strenuous exercise for two weeks as there is a risk of tendonitis. Up to 1-2 months after finishing it, if he experiences some tendonitis he should not work through it and should rest. I also advised him to avoid direct sunlight while taking this medication. Pt was instructed to not take this medication with dairy products. If the pain does not improve within a few days or worsens, they were instructed to call back sooner and we might consider adding a medication for neuropathic pain such as gabapentin. Patient will have a telehealth visit in 2 weeks for reassessment, sooner if clinically indicated.  06/15/2023: Mr. VIVIENNE HANCOCK presents today for a follow up and ZENA. He reports no significant changes in urination. Does c/o back pain. Had a CT at [facility]. Is planning on getting an epidural injection for pain. Pt reports he had lab work done yesterday, however the results are not yet back. He also reports perineal pain when he sits. His gastroenterologist is prescribing suppositories.  The knee-chest position was utilized for the ZENA. Digital rectal exam found no suspicious rectal masses. Normal seminal vesicles. Anal tone is normal. The prostate is non tender, with normal texture, slightly firm, discrete borders, and no nodules. It is a 20 gram transurethral resection size. No rectal mucosal lesions. No gross blood on the examining finger.  Reviewed imaging results from the spine specialist which he brought with him today. Has spinal canal stenosis in lumbar spine. Also reviewed most recent US of the abdomen done back in March ordered by his gastroenterologist. Does have a fatty liver but no ascites. Both kidneys were normal with no hydro. Patient will schedule a telehealth visit tomorrow to review results of his lab work.  06/16/2023: Mr. HANCOCK is a 77 year old male presenting today via telehealth using real time 2 way audio only technology. The patient, Mr. HANCOCK, was located in his home at [address] [place] [address] at the time of the visit. The provider, FREDERIC BETH, was located in his office on [place], [place]. Verbal Consent was given by the patient on 06/16/2023 and my scribe served as a witness to the verbal consent. The patient provided blood sample on June 13 at [facility] but they seemed not available. I called to inquire and it was determined that an entry mistake occurred where the lab results was misdated and it was reported as being drawn as March 1st. The  was agreeable to correct the mistake. I called the patient again and explained the mishap to the patient. I explain that his CBC on 06/13/2023 shows that his blood count is low. HGB is low at 9.8. HCT low at 34.9. Mean Cell Volume also low at 69.7. He reports that his diet has not changed recently. He says his last colonoscopy was several years ago.  Plan: Pt will inform his PCP and Gastroenterologist about his low blood count. His last colonoscopy was about 5 years ago. In view of the progressive anemia and the small red blood cells the gastroenterologist might be interested in performing upper GI endoscopy as well as a repeat colonoscopy. If gastroenterology evaluation does not disclose any cause for the anemia I will ask that the primary care physician evaluate the anemia further. If primary care physician would prefer I will refer the patient to a hematologist. I have asked the patient to provide blood specimen for CBC with Iron level and urine sample for Urinalysis, Culture and cytology.  07/17/2023: Mr. VIVIENNE HANCOCK presents today for a follow up audio only telephone visit for which he gave permission for. The pt was located at home, [address] [place] [address], and I was located in my office in [place], [place]. The patient reports that he had some rectal and perineal pain last night. He admits a lot of constipation and hard stool. Pt has not yet spoken to his PCP or gastroenterologist yet about his recent lab work.  Patient counseled on constipation management. Pt advised to take 2 docusate or colace and miralax if needed. He was advised to increase his water intake. I believe his perineal pain is from the constipation and not from prostatitis, however if relieving his constipation does not help, we will reassess. Patient will repeat lab work this week at his nearest  lab. Orders were put in today. Patient will have a telehealth visit next week for reassessment, sooner if clinically indicated.  09/11/2023: Mr. VIVIENNE HANCOCK presents today for a follow up. He reports ongoing problems with anal/ rectal pain. Denies any testicular pain. Has spoken to his colorectal surgeon and he feels he was not helped enough. Gabapentin 300 mg helps his pain overall. He has not tried taking it in the morning because it makes him a little tired and he is fearful of driving. Pt had lab work done at his PCP on 7/21/2023. Creatinine was normal. Urine tests were normal. PSA was not done. Pt is on an iron supplement which causes some constipation. Eats a diet rich in vegetables. Pt has a pacemaker. Admits pushing and straining on urination.  I increased the dosage of Tamsulosin 0.4 mg from once daily to twice daily.  My recommendation is try taking gabapentin two at night and one in the morning for at least one month and be cautious with driving. If this does not work for him, we can look into a different medication for neuropathic pain.  Reviewed and discussed lab work of 7/21/2023 in detail with the pt.  Pt strongly advised to increase his water consumption to reduce constipation. I was considering ordering an MRI of the prostate given his perineal pain, however given the patient's pacemaker and that his pain has been found to be mostly neuropathic in origin, he will RTO for an US of the prostate to check for any abscesses.  12/4/23: patient here for urgent visit for rectal pain  same as described to dr beth in Sept 2023 constipation better seeing colorectal surgeon- to see in Jan 2024- normal eval by report last week urine done wiht PCP : ( nov 25) : nomal - took 3d of levaquin psa 0.17 creat 1.0 normal white count 8.0 no luts- good flow, no strain , feels empty after void  here for eval: 1- check urine 2- exam signif for right sided pelvic pain - 3- to f/u with dr beth-consider PT of pelvic floor  12/12/2023: Mr. HANCOCK presents today for a follow up audio only telehealth visit for which they gave permission for. The patient was located at home [address] [place] [address] and I was located in my office in [place], [place]. Patient's 12/4/23 UA showed Proteinuria 30 mg/dL, trace Ketonuria, moderate blood, 19/HPF of WBC, 64/HPF of RBC and mucus present.  Wife reports the patient is experiencing terrible pain. The patient did take took levofloxacin but the abx resulted in no relief. They desired a stronger antibiotic, but I explained to them, if the microscopic hematuria does not clear I believed it to be suppurative to obtain a cystoscopy. Also explained this may be symptoms of bladder cancer, so it is within their best interest to obtain a Cystoscopy.   Patient is not agreeable to obtaining a cystoscopy as he believes it was a waste of time. I assured patient obtaining one is strongly suggested as his symptoms are ones of bladder cancer. His last Cystoscopy was back in March 23 2017. As further intervention, I prescribed the patient doxycycline 100 mg, one capsule every 12 hours. I informed the patient to avoid excessive amounts of direct sunlight while on this medication. If Cystoscopy is negative and Doxycycline does not relieve pain, I will recommend Pelvic floor therapy.  Pt will schedule a telehealth visit in 2 weeks.   01/17/2024: Mr. VIVIENNE HANCOCK presents today for an audio visual telehealth visit for which he gave permission for. The patient was located at home at [address] [place] [address] and I was located at my office in [place], [place]. When I called, the patient was in physical therapy and gave permission for me to discuss with his wife by telephone call. She informed me that he is having a lot of pain in his prostate. At the time of his last suspected episode of prostatitis, I prescribed him doxycycline, however she informs me that it did not help him at all. The patient's last MRI of the pelvis was in April 2021. The patient now has a pacemaker. He is currently taking gabapentin.   Reviewed and discussed lab work of 12/28/2023 in detail with the pt's wife.   Patient will provide a new urine specimen at his nearest  lab. While he is there he will also have blood drawn for orders I have put in today. If he has pyuria, will consider abx. Advised him to take two gabapentin at night for pain. He was advised to not drive if he is drowsy from the gabapentin.   I am sending the patient for a CT abdomen and pelvis to evaluate prostate pain.    Patient will have a telehealth visit after obtaining the CT to review and discuss results. He will also have a telehealth visit this Friday to discuss urine test results.    01/22/2024: Mr. VIVIENNE HANCOCK presents today for a follow up audio only telephone visit for which he gave permission for. The pt was located at home, [address] [place] [address], and I was located in my office in [place], [place]. The patient has not yet obtained the CT I ordered, nor has he supplied a urine specimen to a [facility] lab. He reports that he is feeling okay this week.    Patient will obtain the CT abdomen and pelvis w/wo contrast.  Patient will have a telehealth visit after obtaining the CT to review and discuss results.    01/31/2024: Mr. VIVIENNE HANCOCK presents today for a follow up audio only telephone visit for which he gave permission for. The pt was located at home, [address] [place] [address], and I was located in my office in [place], [place]. Visit was conducted with patient's wife. Patient had lab work done on 1/29/2024. PSA was very good at 0.19. Creatinine was 0.99. UA was compatible with current infection. The urine appeared cloudy, with 30 mg/dl of protein, large blood by dipstick, moderate LEC, 198 WBC/HPF, 132 RBC/HPF and only 1 epithelial cell/HPF indicating it was a clean catch. However, urine culture was no significant growth. Urine cytology was negative for high grade urothelial carcinoma. Rare benign urothelial cells and squamous cells were seen in a background of severe acute inflammation and RBCs. Patient had PT yesterday which included ZENA and massage. She states that they felt something was wrong with his prostate. I assume they meant it was boggy.    Reviewed and discussed lab work of 1/29/2024 in detail with the pt.    I prescribed the patient Ciprofloxacin 500 mg, one tablet twice a day. I informed the patient that Ciprofloxacin may cause sensitivity to direct sunlight.   Will provide a urine specimen after completion of abx.   Will have a telehealth visit after providing a urine specimen to review and discuss results as well as how he is feeling.    Duration of telephone visit was 12 minutes.

## 2024-01-31 NOTE — HISTORY OF PRESENT ILLNESS
[FreeTextEntry1] : Mr Hancock is a 78 year old man presented for follow up of gross hematuria, urinary frequency and urgency. The patient takes tamsulosin 0.4 mg, one half hour after meal twice daily, finasteride 5 mg once daily, and desipramine 10 mg at bedtime. The patient returned to the office 03/20/17 having gross hematuria with blood clot. Dr Holly Bernardo started the patient on Bactrim DS. He eventually went to the ER 03/21/17 where he had a Spann catheter placed. His creatinine in the ER was 1.01. Cystoscopy 03/23/17 found catheter trauma. Prostate protruded modest distance into the bladder. Bladder has a small capacity. Diffuse oozing of blood from bladder neck and trigone. Scraped up mucosa most likely from introduction of telescope. No bladder stones or tumors. Bladder inflammation from catheter. CT urogram was normal and found no etiology of the gross hematuria. 3/1/18: The patient returned and reported he has pain in his rectum area and penis. PSA from 1/24/18 was 0.39 ng/mL. 2/15/18 Creatinine was 1.01 mg/dL, hemoglobin was 14.1 g/dL. He received and abdomen ultrasound which shows a fatty liver, gallstones, and obscured pancreas. He denies any gross hematuria. He noted one day ago his GI physician put banding on his hemorrhoids.  4/10/18: The patient returned and reported he finished his antibiotics from last visit and noted he had another hemorrhoid and put another band by his GI. He noted pain still persists. UA from 3/1/18 shows 8 WBC/HPF. CBC from 3/1/18 showed slight anemia.  5/23/18: The patient returned and reported he has been taking Bactrim for his most recent prostatitis starting one week ago. PSA from 1/22/18 was 0.39 ng/mL. Currently taking Tamsulosin and Finasteride.  8/28/18: The patient returned and reported his urination is satisfactory. Currently taking tamsulosin and finasteride. Wakes up 3-4 times during the night to urinate. Noted he had a recent flare up of dysuria 3 days ago and took 2 days of Cipro and symptom have cleared up.  3/7/19: The patient returned and reported that he had another flare up of prostatitis and took 2 days of Cipro for his symptoms to clear. Complained of lingering dysuria. I reminded the patient that with Cipro, there is a side effect of tendonitis. Denied gross hematuria. Currently taking Trimethoprim, Tamsulosin and Finasteride. PSA from 2/15/19 was 0.04 ng/mL. 4/4/19: The patient returned and reported that he another flare up of prostatitis. Complained of pain when he stands up. His pain is relieved while sitting down. Occasionally has dysuria. Wakes up twice during the night to urinate. Complained of urinary urgency during the night. Takes Tamsulosin BID, Trimethoprim and Finasteride. 5/9/19: The patient returned today for an US. Transrectal US of the prostate findings showed a prostate volume of 23.4 cc, a hypoechoic area at the left apex 1.37 x 1.25 cm in transverse image, unremarkable seminal vesicles, and no rectal masses. The hypoechoic are is suspicious for prostate cancer, but the patient has a low PSA. I recommended that the patient undergo a transperineal biopsy for the hyperechoic region, but due to an anal fissure, the patient would like to wait. Notes that he consulted with a colon surgeon about his anal fissure and is currently taking stool softeners. His urination is adequate. Complains of nocturia. Wakes up 5-6 times during the night to urinate. Complains of pain at the tip of his penis. Takes Tamsulosin BID. UA from 4/11/19 was normal. Culture from 4/11/19 showed no growth. Notes that he took Bactrim and ended up getting a rash on his LEs. 6/10/19: Patient presents today for a follow up. Today he reports that he is scheduled for an anal fissure operation for 6/21/19. It has been advised that he will need 4 weeks to heal following the operation. Regarding his urination he reports that during the day there are no urinary issues. Nocturia is reported and he will have to wake up 5-6x a night to urinate. Finasteride and Tamsulosin are taken as directed.  7/18/19: Patient presents today for a follow up. 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 Patient with hypoechoic lesion of the prostate seen on recent TRUS. PSA 0.690 on 5/2109. Patient on Flomax /Proscar with both irritative and obstructive LUTS. Needs to strain to initiate urination. Nocturia times 5- 6. Denies dysuria or hematuria. Patient states that he feels that he has prostatitis based on familiar symptoms. Additionally he no longer takes Lisinopril but now is on Metoprolol 10 mg.  8/7/19: Patient presents today for a follow up. He states that the Bactrim prescribed at his last visit was not providing any relief. He still c/o discomfort when sitting but denies dysuria. He had Ciprofloxacin at home and had taken two tablets over the past two days. He noticed relief and inquired about having the prescription refilled today.  10/10/19: Patient presents today for a follow up. On 9/13/19 a needle biopsy of the prostate was completed under general anesthesia. He states that after the biopsy his chronic prostatitis began to cause discomfort. Ciprofloxacin 500 mg was used as directed and the discomfort is no longer present. Additionally it is reported that his sexual function decreased following the procedure but that it is starting to improve again. The results of the pathology report are to be discussed with him today.  12/31/2019: Patient presents today for a follow up. Pt reports pain on standing. He has an inflamed prostate. When the bladder is fill he also experiences pain and the pain goes away after he voids. Pt took Cipro to 7 days, finishing last week. While he took it, he felt better. Pt reports allergy to sulfur drugs and develops rash. 2/10/20: Patient presents today for a follow up. He had an MRI of the pelvis 1/23/20 which showed no evidence of rectal fistula could be identified. Pt has inflamed prostate Pt reports constipation for which he takes stool softner. He admits to dysuria and hematuria with mild pain at the tip of the penis yesterday. Most recent PSA was 0.24, Testosterone is 390.0.  02/27/2020: Patient presents today for a follow up. At the conclusion of the last visit, Doxycycline 100 mg was prescribed. The patient states he noticed no improvement. Today he reports having nighttime pain in the rectum which is alleviated after urination. Wakes up 4-5 times during the night to urinate. He has satisfactory day time urination though he reports associated strain. Denies chest pain. He currently takes a stool softener for constipation. The patient also complains of lower back pain for which he will get an injection shortly. A urine culture from 2/10/2020 showed no growth. His most recent creatinine level from 7/11/2019 was 1.07.  07/28/2020: Patient presents today for a follow up. Reports pain in the rectum and pain with fullness of the bladder. Pt had a US at [facility]. Was given suppository which helped. Noxturia x4-5. On Tamsulosin BID. No burning. Frequency at night. Pt must push and staring. He experiences difficulty with his flow and voiding in general. Wife reports fowl smelling urine.   8/25/2020: Patient presents today for follow up on nocturia. Patient states he has not yet taken Desmopressin as he does not want to take too many medications. Still experiencing pain in rectum and with fullness of bladder. Was given Valium which helped. Last lab work on 7/28/2020 was within normal limits. Osmolality was normal at 294. Patient to start Desmopressin 0.1mg once before bedtime.   09/15/2020: Patient presents today for follow up. Pt was having pain in rectum and finished 5 days of Cipro which helped resolve pain, but causes constipation. Takes Colace for constipation. Urinalysis on 8/25/20 was normal. Still has pain in rectum at night, and uses Metronidazole gel to relieve it. Wakes up every 2 hours at night to urinate. Has some urgency.   11/17/2020: The patient presents today for a follow up. He is not taking desmopressin anymore. His last blood work on 11/6/2020 showed his creatinine was good at 1.0. He has no burning. He has some pain in the rectum and when his bladder is full. He is still taking finasteride 5 mg and trimethoprim 100 mg. At night he gets a lot of pain when his bladder is full and has urinary urgency about every 2 hours. After he urinates, the pain goes away.   03/09/2021: 75 yr  history of BPH, prostatitis , overactive bladder with nocturnal frequency.  Rectal area pains, last visit [facility] November 17, 2020 showing rectal mucosa nodule. To follow GI.  patient said he is taking gabapentin and tamsulosin and finasteride. Pain is worse when bladder is full and less when he voids.  Nocturnal frequency Q2 hr ED since 2019  Patient is seeing  for his rectal pain.    05/24/2021: Patient presents today for follow up. Patient went to a rectal surgeon for his rectal pain and had pelvic MRI 4/29/21 which showed no evidence of perianal fistula. States he continues to have pain in rectum after sitting for a long time and then standing. Patient was instructed to take Gabapentin 400mg TID, but skips afternoon dose as it makes him sleepy. Does have constipation. Has not started Tadalafil. No hx of prostate cancer.   06/15/2021: Patient presents today for a follow up. He reports that last week he had an episode of prostatitis for which he took Cipro for. Currently wakes every 2 hours during the night to urinate. Has tried desipramine and desmopressin in the past which did not improve his symptoms. Daytime urination remains stable. Stream is strong. Pt states he went to the cardiologist today and an EKG was done which was all normal. Currently takes iron supplements.   09/01/2021: Patient presents today for follow up. Today complains of straining and pushing to urinate. Denies dysuria. Denies infection of penis. Requests renewal of Finasteride and Tamsulosin. Not taking Trospium. Stopped Tadalafil due to side effect.   09/29/2021: Patient presents today for follow up. 9/1/21 UA showed 12 RBC/HPF and trace blood by dipstick. On repeat 9/9/21, he had only 3 RBC/HPF and small blood by dripstick which normal. Urine cytology 9/1/21 negative for high grade urothelial carcinoma. Provided blood work from 9/17/21 done by PCP which showed UA slightly cloudy, 3-5 RBC/HPF. PSA 0.2. Wakes 4x at night to urinate. States he has rectal pain when bladder is full.   02/02/2022: Patient presents today for follow up. Reviewed 12/29/21 lab work which showed MCV low at 75.2, WBC 6600, Hb 12.1, platelets 478312, potassium 4.9, creatinine 0.90 upper limits 1.18, BUN high at 25, AST minimally elevated 39 upper limits 34, alk phos normal 60, HbA1c 6.0, UA dipstick positive small blood, moderate leukocyte esterase, few squamous epithelial cells. Has pain when he sits and feels better when standing or laying down. States he must push to urinate. No pain on urination. Nocturia x3-4. Takes tamsulosin 0.4mg BID. Reports his EKG is normal.   03/14/2022: Patient presents today for follow up. Has been doing well, however does have to push urination. Nocturia x4. Erections are poor but not interested in medications. No dysuria. Stream is slow. Continues finasteride 5mg once daily and tamsulosin 0.4mg BID. Does not take tadalafil every day. Does not have any red itchy skin and has not needed to use clotrimazole-betamethasone cream. Reviewed 2/2/22 lab work. Urine culture grew 10,000-40,000 CFU/mL Enterococcus faecalis and patient was treated with amoxicillin 500mg TID and feels better now. PSA 0.24. Free testosterone low 3.9. Lab work of 2/11/22 showed brain natriuretic peptide normal, procalcitonin undetectable <0.10 ng/mL. Will be having evaluation for anorectal pain, with the proposed procedures being anorectal manometry, electromyography, pudendal nerve terminal motor latency, and endorectal ultrasound. Denies bladder pain. Will be having procedure for varicose veins of RLE.   03/30/2022: Patient presents today for follow up. Urination is good. Continues finasteride 5mg once daily and tamsulosin 0.4mg BID. Did not help with urination. Denies dysuria. Only has urinary urgency at night. Notes he eats spicy foods. No pain where he sits. Energy levels are good. Has tried Cialis in the past which did not help much. Erections had been poor since the biopsy. Reviewed 3/13/22 lab work which was WNL except free testosterone low 4.0, LH elevated 19, dihydrotestosterone low 4.1. Total testosterone normal 250. PSA was 0.20. UA normal. Urine cytology showed clusters of atypical urothelial cells with high nuclear cytoplasmic ratio, nuclear hyperchromasia and irregular nuclear contours in a background of moderate acute inflammation. States he went to rectal surgeon for anorectal pain, and had US which was reportedly normal and found muscles are weak, and was advised muscle dysfunction physical therapy which he is currently attending.   04/08/2022: Patient presents today for a follow up telephone visit for which he gave permission for. The visit was mostly conducted with his wife who was present. The patient has been taking finasteride 5 mg once daily and tamsulosin 0.4 mg BID. I prescribed the patient tadalafil 5 mg once daily at the time of the last visit, however he has not taken it. The pt's wife states today that her  told me during his 3/30/2022 visit he had a problems taking tadalafil which included flatulence and severe muscle aches. Both my personal memory and my note from that day do not recall him ever saying that, however there must have been some sort of miscommunication.   04/28/2022: Patient presents today for a follow up visit. Yesterday, he was having pain, but today he is feeling better. On 4/27/22, his PSA was 0.21 ng/mL, creatinine was 1.04. He also is complaining of rectal pain. He currently is taking Silodosin, one capsule daily, Tamsulosin once daily, Proscar once daily and Cefadroxil. His symptoms have improved with Cefadroxil. Previously he took Cipro, but it did not work satisfactorily. He is not taking Tadalafil. He denies urgency, but is experiencing slight urge incontinence. He reports Gabapentin alleviated his pain.  06/07/2022: 's wife called today and spoke to my nurse. She stated that I told her  to take tamsulosin 0.4 mg 2 capsules BID, however there was a misunderstanding and his wife wanted to speak to me. I informed her that it is tamsulosin 0.4 mg one capsule BID and she understood. His wife reports that he has been having difficulties urinating. Has an appt to see me on 6/30/2022.   07/20/2022:  presents today for a follow up. Patient obtained a CT Urogram w/wo contrast on 7/14/2022 to evaluate microscopic hematuria. CT revealed renal cysts and less than 1 cm low-attenuation lesions which are too small to characterize. No enhancing lesions, or renal stones are identified. On the delayed images, there is poor opacification of the proximal and distal ureter and the right distal ureter. No abnormal soft tissue or gross urothelial lesion is appreciated. Bladder was within normal limits. The anterior aspect of the bladder is not opacified on the delayed imaging. No lymphadenopathy was seen. The patient was pleased with the CT findings. We reviewed his prostate symptoms. Has a long hx of chronic prostatitis that initially responded to cipro but sometimes required bactrim. He currently has no acute problems. Denies pain when sitting for long periods of time. Has some minor urinary urgency and frequency at times but is overall pleased with his urologic state. Continues on finasteride 5 mg once daily, tamsulosin 0.4 mg once daily, tadalafil 5 mg once daily, and silodosin 8 mg once daily. Last PSA 4/27/2022 was 0.21. Creatinine at that time was 1.04.   07/27/2022:  presents today for a telephone visit for which he gave permission for. He wanted to review the results of his most recent urine test results. UA was perfect. Culture was no growth. Cytology consisted of mainly mature squamous epithelial cells, acute inflammation, and rare benign urothelial cells. Cytology was negative for high grade urothelial carcinoma. His wife inquired on why he needs to proceed with a cystoscopy.   03/02/2023: Mr. VIVIENNE HANCOCK presents today for a follow up. The pt continues to work as a . Patient provided a urine specimen on 2/28/2023. UA was normal. Culture was no significant growth. Urine cytology was negative for high grade urothelial carcinoma. Pt denies ever smoking or using tobacco products. Pt recently went to gastroenterologist and was told his stomach was distended. He admits to pushing and straining upon urination. His gastroenterologist put in orders for an US which he has scheduled. Patient continues to take finasteride 5 mg once daily and tamsulosin 0.4 mg BID. No longer is taking tadalafil 5 mg once daily.   05/22/2023: Mr. VIVIENNE HANCOCK was scheduled today for a follow up audio only telephone visit for which he gave permission for. I was unable to reach the pt but did reach his wife, Dr.Almas Benjamin who was located at their home, [address] [place] [address], and I was located in my office in [place], [place]. She informs me that the pt has a lot of pain in the rectum. It hurts when he lays down or when standing. The pain is all the time and not just when he moves the bowels. He has a rectal surgeon but he does not think it is a problem with the rectum. Pt has constipation but wife reports he manages it well.   06/15/2023: Mr. VIVIENNE HANCOCK presents today for a follow up and [facility]. He reports no significant changes in urination. Does c/o back pain. Had a CT at [facility]. Is planning on getting an epidural injection for pain. Pt reports he had lab work done yesterday, however the results are not yet back. He also reports perineal pain when he sits. His gastroenterologist is prescribing suppositories.   06/16/2023: Mr. HANCOCK is a 77 year old male presenting today via telehealth using real time 2 way audio only technology.  The patient,  Mr. HANCOCK, was located in his home at [address] [place] [address] at the time of the visit. The provider, FREDERIC BETH, was located in his office on [place], [place]. Verbal Consent was given by the patient on 06/16/2023 and my scribe served as a witness to the verbal consent. The patient provided blood sample on June 13 at [facility] but they seemed not available. I called to inquire and it was determined that an entry mistake occurred where the lab results was misdated and it was reported as being drawn as March 1st. The  was agreeable to correct the mistake. I called the patient again and explained the mishap to the patient. I explain that his CBC on 06/13/2023 shows that his blood count is low. HGB  is low at 9.8. HCT  low at 34.9. Mean Cell Volume  also low at 69.7. He reports that his diet has not changed recently. He says his last colonoscopy was several years ago.   07/17/2023: Mr. VIVIENNE HANCOCK presents today for a follow up audio only telephone visit for which he gave permission for. The pt was located at home, [address] [place] [address], and I was located in my office in [place], [place]. The patient reports that he had some rectal and perineal pain last night. He admits a lot of constipation and hard stool. Pt has not yet spoken to his PCP or gastroenterologist yet about his recent lab work.   09/11/2023: Mr. VIVIENNE HANCOCK presents today for a follow up. He reports ongoing problems with anal/ rectal pain. Denies any testicular pain. Has spoken to his colorectal surgeon and he feels he was not helped enough. Gabapentin 300 mg helps his pain overall. He has not tried taking it in the morning because it makes him a little tired and he is fearful of driving. Pt had lab work done at his PCP on 7/21/2023. Creatinine was normal. Urine tests were normal. PSA was not done. Pt is on an iron supplement which causes some constipation. Eats a diet rich in vegetables. Patient has a pacemaker. Admits pushing and straining on urination.   Dr. Gonzalez- 12/4/23: patient here for urgent visit  for rectal pain  same as described to dr beth in Sept 2023 constipation better seeing colorectal surgeon- to see in Jan 2024- normal eval by report last week urine done [facility] PCP : ( nov 25) : nomal - took 3d of levaquin psa 0.17 creat 1.0 normal white count 8.0 no luts- good flow, no strain , feels empty after void  here for eval: 1- check urine 2- exam signif for right sided pelvic pain - 3- to f/u with dr beth-consider PT of pelvic floor  12/12/2023: Mr. HANCOCK presents today for a follow up audio only telehealth visit for which they gave permission for. The patient was located at home [address] [place] [address] and I was located in my office in [place], [place]. Patient's 12/4/23 UA showed Proteinuria 30 mg/dL, trace Ketonuria, moderate blood, 19/HPF of WBC, 64/HPF of RBC and mucus present.  Wife reports the patient is experiencing terrible pain. The patient did take took levofloxacin but the abx resulted in no relief. They desired a stronger antibiotic, but I explained to them, if the microscopic hematuria does not clear I believed it to be suppurative to obtain a cystoscopy. Also explained this may be symptoms of bladder cancer, so it is within their best interest to obtain a Cystoscopy.   01/17/2024: Mr. VIVIENNE HANCOCK presents today for an audio visual telehealth visit for which he gave permission for. The patient was located at home at [address] [place] [address] and I was located at my office in [place], [place]. When I called, the patient was in physical therapy and gave permission for me to discuss with his wife by telephone call. She informed me that he is having a lot of pain in his prostate. At the time of his last suspected episode of prostatitis, I prescribed him doxycycline, however she informs me that it did not help him at all. The patient's last MRI of the pelvis was in April 2021. The patient now has a pacemaker. He is currently taking gabapentin.   01/22/2024: Mr. VIVIENNE HANCOCK presents today for a follow up audio only telephone visit for which he gave permission for. The pt was located at home, [address] [place] [address], and I was located in my office in [place], [place]. The patient has not yet obtained the CT I ordered, nor has he supplied a urine specimen to a [facility] lab. He reports that he is feeling okay this week.   01/31/2024: Mr. VIVIENNE HANCOCK presents today for a follow up audio only telephone visit for which he gave permission for. The pt was located at home, [address] [place] [address], and I was located in my office in [place], [place]. Visit was conducted with patient's wife. Patient had lab work done on 1/29/2024. PSA was very good at 0.19. Creatinine was 0.99. UA was compatible with current infection. The urine appeared cloudy, with 30 mg/dl of protein, large blood by dipstick, moderate LEC, 198 WBC/HPF, 132 RBC/HPF and only 1 epithelial cell/HPF indicating it was a clean catch. However, urine culture was no significant growth. Urine cytology was negative for high grade urothelial carcinoma. Rare benign urothelial cells and squamous cells were seen in a background of severe acute inflammation and RBCs. Patient had PT yesterday which included ZENA and massage. She states that they felt something was wrong with his prostate. I assume they meant it was boggy.

## 2024-01-31 NOTE — REVIEW OF SYSTEMS
[Constipation] : constipation [Nocturia] : nocturia [Perineal Pain] : perineal pain [Fever] : no fever [Chills] : no chills [Feeling Poorly] : not feeling poorly [Loss Of Hearing] : no hearing loss [Chest Pain] : no chest pain [Shortness Of Breath] : no shortness of breath [Abdominal Pain] : no abdominal pain [Dysuria] : no dysuria [Scrotal Pain] : no scrotal pain [Urinary Stream Is Smaller] : urine stream was not smaller [Initiating Urination Req. Strain] : initiating urination does not require straining [Arthralgias] : no arthralgias [Skin Lesions] : no skin lesions [Difficulty Walking] : no difficulty walking [Suicidal] : not suicidal [Proptosis] : no proptosis [Easy Bleeding] : no tendency for easy bleeding

## 2024-01-31 NOTE — ADDENDUM
[FreeTextEntry1] : This note was authored by Guerline Gold working as a scribe for Dr.Gary Duron. I, Dr. Collins Duron have reviewed the content of this note and confirm it is true and accurate. I personally performed the history and physical examination and made all the decisions 01/31/2024

## 2024-02-02 LAB
TESTOST FREE SERPL-MCNC: 2.7 PG/ML
TESTOST SERPL-MCNC: 201 NG/DL

## 2024-02-16 ENCOUNTER — OUTPATIENT (OUTPATIENT)
Dept: OUTPATIENT SERVICES | Facility: HOSPITAL | Age: 79
LOS: 1 days | End: 2024-02-16
Payer: COMMERCIAL

## 2024-02-16 ENCOUNTER — APPOINTMENT (OUTPATIENT)
Dept: CT IMAGING | Facility: IMAGING CENTER | Age: 79
End: 2024-02-16
Payer: MEDICARE

## 2024-02-16 DIAGNOSIS — N41.1 CHRONIC PROSTATITIS: ICD-10-CM

## 2024-02-16 DIAGNOSIS — R30.0 DYSURIA: ICD-10-CM

## 2024-02-16 DIAGNOSIS — R31.0 GROSS HEMATURIA: ICD-10-CM

## 2024-02-16 DIAGNOSIS — Z87.19 PERSONAL HISTORY OF OTHER DISEASES OF THE DIGESTIVE SYSTEM: Chronic | ICD-10-CM

## 2024-02-16 DIAGNOSIS — R39.89 OTHER SYMPTOMS AND SIGNS INVOLVING THE GENITOURINARY SYSTEM: ICD-10-CM

## 2024-02-16 DIAGNOSIS — Z90.49 ACQUIRED ABSENCE OF OTHER SPECIFIED PARTS OF DIGESTIVE TRACT: Chronic | ICD-10-CM

## 2024-02-16 DIAGNOSIS — N30.20 OTHER CHRONIC CYSTITIS WITHOUT HEMATURIA: ICD-10-CM

## 2024-02-16 PROCEDURE — 74178 CT ABD&PLV WO CNTR FLWD CNTR: CPT | Mod: 26

## 2024-02-16 PROCEDURE — 74178 CT ABD&PLV WO CNTR FLWD CNTR: CPT

## 2024-02-29 ENCOUNTER — APPOINTMENT (OUTPATIENT)
Dept: UROLOGY | Facility: CLINIC | Age: 79
End: 2024-02-29
Payer: MEDICARE

## 2024-02-29 DIAGNOSIS — J45.909 UNSPECIFIED ASTHMA, UNCOMPLICATED: ICD-10-CM

## 2024-02-29 DIAGNOSIS — R35.0 FREQUENCY OF MICTURITION: ICD-10-CM

## 2024-02-29 PROCEDURE — 99442: CPT

## 2024-02-29 NOTE — ADDENDUM
[FreeTextEntry1] : This note was authored by Rin Elias working as a scribe for Dr. Collins Duron. I, Dr. Collins Duron have reviewed the content of this note and confirm it is true and accurate. I personally performed the history and physical examination and made all the decisions 02/29/2024.

## 2024-02-29 NOTE — ASSESSMENT
[FreeTextEntry1] : Mr Hancock is a 78 year old man presented for follow up of gross hematuria, urinary frequency and urgency. The patient takes tamsulosin 0.4 mg, one half hour after meal twice daily, finasteride 5 mg once daily, and desipramine 10 mg at bedtime. The patient returned to the office 03/20/17 having gross hematuria with blood clot. Dr Holly Bernardo started the patient on Bactrim DS. He eventually went to the ER 03/21/17 where he had a Spann catheter placed. His creatinine in the ER was 1.01. Cystoscopy 03/23/17 found catheter trauma. Prostate protruded modest distance into the bladder. Bladder has a small capacity. Diffuse oozing of blood from bladder neck and trigone. Scraped up mucosa most likely from introduction of telescope. No bladder stones or tumors. Bladder inflammation from catheter. CT urogram was normal and found no etiology of the gross hematuria. 3/1/18: The patient returned and reported he has pain in his rectum area and penis. PSA from 1/24/18 was 0.39 ng/mL. 2/15/18 Creatinine was 1.01 mg/dL, hemoglobin was 14.1 g/dL. He received and abdomen ultrasound which shows a fatty liver, gallstones, and obscured pancreas. He denies any gross hematuria. He noted one day ago his GI physician put banding on his hemorrhoids. From March 1, 2018 through April 4, 2019 patient had significant complaints of perirectal and rectal pain.  He did have issues with hemorrhoids and an anal fissure.  Some of the symptoms did seem referable to recurrent bouts of prostatitis exacerbation.  He was treated intermittently generally with Bactrim or Cipro and usually with some improvement in symptoms.  He had a May 9, 2019 transrectal ultrasound of the prostate which showed a prostate volume was 23.4 cm and a hypoechoic area at the left apex which was about 1.4 cm in transverse image.  The seminal vesicles were unremarkable and no rectal masses were seen.  The hypoechoic area was compatible with an increased risk of prostate cancer but the patient has a very low PSA being under 0.4 on recurrent readings and by palpation had not felt any suspicious findings in the past.  I asked for a transperineal needle biopsy of the prostate of the hypoechoic region but the patient declined because of his anal pain and the known anal fissure.  The patient underwent a anal fistula repair.  It helped his symptoms but symptoms persist.  And occasionally got worse is colorectal surgeon said that at the current time he had no fistulas no hemorrhoids and as far as the surgery could so there was no actual anal rectal pathology despite the perirectal pain and occasional rectal pain.  The patient I thought possibly that it was related to his residual chronic prostatitis.  On August 25, 2020 the patient reported increasing his nocturia.  He at that time was using metronidazole rectal gel for rectal pains which seem to help.  The November 17, 2020 digital rectal exam showed no a rectal mucosal nodule.  He was asked to follow-up with his gastrointestinal surgeon (colorectal surgeon) and her gastroenterologist.  The patient's colorectal surgeon is Dr. Mustapha cardoso.  A MRI of April 29, 2021 showed no evidence of a perianal fistula despite the patient's complaint of continued rectal pain.  In the past the patient be given desmopressin for his nocturia which did not seem to help and was discontinued.  He he saw his cardiologist  in the late spring 2021 who found no EKG problems.  By September 2021 he was complaining of strain to urinate and having to push at this point he wanted to resume finasteride and tamsulosin which he had stopped previously.  The patient was not on trospium at the time he had stopped tadalafil due to unpleasant feeling.   09/29/2021: Patient presents today for follow up. 9/1/21 UA showed 12 RBC/HPF and trace blood by dipstick. On repeat 9/9/21, he had only 3 RBC/HPF and small blood by dripstick which normal. Urine cytology 9/1/21 negative for high grade urothelial carcinoma. Provided blood work from 9/17/21 done by PCP which showed UA slightly cloudy, 3-5 RBC/HPF. PSA 0.2. Wakes 4x at night to urinate. States he has rectal pain when bladder is full. 02/02/2022: Patient presents today for follow up. Reviewed 12/29/21 lab work which showed MCV low at 75.2, WBC 6600, Hb 12.1, platelets 019185, potassium 4.9, creatinine 0.90 upper limits 1.18, BUN high at 25, AST minimally elevated 39 upper limits 34, alk phos normal 60, HbA1c 6.0, UA dipstick positive small blood, moderate leukocyte esterase, few squamous epithelial cells. No gross hematuria. Has pain when he sits and feels better when standing or laying down. States he must push to urinate. No pain on urination. Nocturia x3-4. Takes tamsulosin 0.4mg BID. Reports his EKG is normal. 03/14/2022: Patient presents today for follow up. Has been doing well, however does have to push urination. Nocturia x4. Erections are poor but not interested in medications. No dysuria. Stream is slow. Continues finasteride 5mg once daily and tamsulosin 0.4mg BID. Does not take tadalafil every day. Does not have any red itchy skin and has not needed to use clotrimazole-betamethasone cream. Reviewed 2/2/22 lab work. Urine culture grew 10,000-40,000 CFU/mL Enterococcus faecalis and patient was treated with amoxicillin 500mg TID and feels better now. PSA 0.24. Free testosterone low 3.9. Lab work of 2/11/22 showed brain natriuretic peptide normal, procalcitonin undetectable <0.10 ng/mL. Will be having evaluation for anorectal pain, with the proposed procedures being anorectal manometry, electromyography, pudendal nerve terminal motor latency, and endorectal ultrasound. Denies bladder pain. Will be having procedure for varicose veins of RLE. 03/30/2022: Patient presents today for follow up. Urination is good. Continues finasteride 5mg once daily and tamsulosin 0.4mg BID. Did not help with urination. Denies dysuria. Only has urinary urgency at night. Notes he eats spicy foods. No pain where he sits. Energy levels are good. Has tried Cialis in the past which did not help much. Erections had been poor since the biopsy. Reviewed 3/13/22 lab work which was WNL except free testosterone low 4.0, LH elevated 19, dihydrotestosterone low 4.1. Total testosterone normal 250. PSA was 0.20. UA normal. Urine cytology showed clusters of atypical urothelial cells with high nuclear cytoplasmic ratio, nuclear hyperchromasia and irregular nuclear contours in a background of moderate acute inflammation. States he went to rectal surgeon for anorectal pain, and had US which was reportedly normal and found muscles are weak, and was advised muscle dysfunction physical therapy which he is currently attending. Reviewed 3/13/22 lab work with patient. Urine cytology showed clusters of atypical urothelial cells with high nuclear cytoplasmic ratio, nuclear hyperchromasia and irregular nuclear contours in a background of moderate acute inflammation, and which we will continue to watch. If cells persist, we will consider imaging test of kidneys, and cystoscopy. Recommend avoid spicy foods as it will cause irritation of both bladder and rectum, but is not harmful. Continue finasteride 5mg once daily and tamsulosin 0.4mg BID. I prescribed the patient tadalafil 5mg once daily for BPH symptoms and ED. I informed the patient that they may experience tingling of the skin, headache, warm flushed feeling, heartburn, backache, and on rare occasion, visual or hearing disturbances. Informed the patient how to use [employer] to  tadalafil at a [employer] or [employer] for a discounted price. I explained it had been previously used as heart medication and that it is safe to take as long as it is not taken with nitroglycerin which would cause BP to drop too low. The patient produced a urine sample which will be sent for urinalysis, urine cytology, and urine culture. RTO in 3 months for follow up or sooner if new urinary symptoms develop.  04/08/2022: Patient presents today for a follow up telephone visit for which he gave permission for. The visit was mostly conducted with his wife who was present. The patient has been taking finasteride 5 mg once daily and tamsulosin 0.4 mg BID. I prescribed the patient tadalafil 5 mg once daily at the time of the last visit, however he has not taken it. The pt's wife states today that her  told me during his 3/30/2022 visit he had a problems taking tadalafil which included flatulence and severe muscle aches. Both my personal memory and my note from that day do not recall him ever saying that, however there must have been some sort of miscommunication.  04/28/2022: Patient presents today for a follow up visit. Yesterday, he was having pain, but today he is feeling better. On 4/27/22, his PSA was 0.21 ng/mL, creatinine was 1.04. He also is complaining of rectal pain. He currently is taking Silodosin, one capsule daily, Tamsulosin once daily, Proscar once daily and Cefadroxil. His symptoms have improved with Cefadroxil. Previously he took Cipro, but it did not work satisfactorily. He is not taking Tadalafil. He denies urgency, but is experiencing slight urge incontinence. He reports Gabapentin alleviated his pain.  The patient produced a urine sample which will be sent for urinalysis, urine cytology, and urine culture. I am prescribing Gabapentin, 300 mg, two capsules at night, one in the morning for his pain. RTO in 1 month, sooner if needed.  06/07/2022: 's wife called today and spoke to my nurse. She stated that I told her  to take tamsulosin 0.4 mg 2 capsules BID, however there was a misunderstanding and his wife wanted to speak to me. I informed her that it is tamsulosin 0.4 mg one capsule BID and she understood. His wife reports that he has been having difficulties urinating. Has an appt to see me on 6/30/2022.  Urine cytology of 4/28/2022 revealed rare atypical cells with nuclear enlargement, prominent nucleoli, and vacuolated cytoplasm. It was my recommendation after reviewing back in April that he schedule a cystoscopy to evaluate atypical urine cytology, however the pt has not scheduled one yet. Last cystoscopy was in 2017. His wife understood and informed me she would schedule him for a cystoscopy with me within the next two weeks. I am sending the patient for a CT Urogram w/wo IV contrast. Creatinine was 1.04 on 4/27/2022. Patient will RTO for cystoscopy and to go over the results of his CT.  07/20/2022:  presents today for a follow up. Patient obtained a CT Urogram w/wo contrast on 7/14/2022 to evaluate microscopic hematuria. CT revealed renal cysts and less than 1 cm low-attenuation lesions which are too small to characterize. No enhancing lesions, or renal stones are identified. On the delayed images, there is poor opacification of the proximal and distal ureter and the right distal ureter. No abnormal soft tissue or gross urothelial lesion is appreciated. Bladder was within normal limits. The anterior aspect of the bladder is not opacified on the delayed imaging. No lymphadenopathy was seen. The patient was pleased with the CT findings. We reviewed his prostate symptoms. Has a long hx of chronic prostatitis that initially responded to cipro but sometimes required bactrim. He currently has no acute problems. Denies pain when sitting for long periods of time. Has some minor urinary urgency and frequency at times but is overall pleased with his urologic state. Continues on finasteride 5 mg once daily, tamsulosin 0.4 mg once daily, tadalafil 5 mg once daily, and silodosin 8 mg once daily. Last PSA 4/27/2022 was 0.21. Creatinine at that time was 1.04.  Clinical findings and CT results were reviewed at length with the patient and his wife. He was pleased with the findings. The patient produced a urine sample which will be sent for urinalysis, urine cytology, and urine culture. Patient will RTO in 6 months or sooner if clinically indicated.  07/27/2022:  presents today for a telephone visit for which he gave permission for. He wanted to review the results of his most recent urine test results. UA was perfect. Culture was no growth. Cytology consisted of mainly mature squamous epithelial cells, acute inflammation, and rare benign urothelial cells. Cytology was negative for high grade urothelial carcinoma. His wife inquired on why he needs to proceed with a cystoscopy  Reviewed and discussed urine test results of 7/20/2022 in detail with the patient. Given that these tests were so good, he does not need to schedule a cystoscopy at this time. I previously requested a cysto based off of a concerning urine cytology back in April. Requested that the patient provide a urine specimen in 2 weeks and another one 2-4 weeks after that to ensure cytologies remain good. So long as they are favorable, he can proceed without cystoscopy. Both the patient and his wife who is a retired pathologist understood. Patient will follow up in January at his scheduled appointment time.  03/02/2023: Mr. VIVIENNE HANCOCK presents today for a follow up. The pt continues to work as a . Patient provided a urine specimen on 2/28/2023. UA was normal. Culture was no significant growth. Urine cytology was negative for high grade urothelial carcinoma. Pt denies ever smoking or using tobacco products. Pt recently went to gastroenterologist and was told his stomach was distended. He admits to pushing and straining upon urination. His gastroenterologist put in orders for an US which he has scheduled. Patient continues to take finasteride 5 mg once daily and tamsulosin 0.4 mg BID. No longer is taking tadalafil 5 mg once daily.  Reviewed outside lab results of 1/8/2023 that was done at an outside facility. Creatinine was 1.10. A1C was 6.0. Urine was considered abnormal yellow, could be due to vitamin. Specific gravity on the lower side. Patient's PSA back in April 2022 was 0.21. Blood work today included repeat PSA. Reviewed urine test results of 2/28/2023. Renewed finasteride 5 mg once daily. I am prescribing the pt Silodosin 8 mg once daily with food to replace one of the tamsulosin. I informed him of the possible side effects of lightheadedness, nasal congestion, and decreased amount of semen when ejaculating. The pt was hesitant to restart tadalafil 5 mg once daily as he feels he is on a lot of medications. I then offered surgical intervention as an option and suggested he talk to one of my colleagues about potential options. He then opted to try tadalafil 5 mg once daily and would consider surgical intervention if it did not help. Patient will RTO in 2-3 weeks for a ZENA and to review how he is doing on the changes in his medications.  05/22/2023: Mr. VIVIENNE HANCOCK was scheduled today for a follow up audio only telephone visit for which he gave permission for. I was unable to reach the pt but did reach his wife, Dr.Almas Benjamin who was located at their home, [address] [place] [address], and I was located in my office in [place], [place]. She informs me that the pt has a lot of pain in the rectum. It hurts when he lays down or when standing. The pain is all the time and not just when he moves the bowels. He has a rectal surgeon but he does not think it is a problem with the rectum. Pt has constipation but wife reports he manages it well.  I prescribed the patient Levofloxacin 500 mg, one tablet daily until finished. I instructed him to avoid strenuous exercise for two weeks as there is a risk of tendonitis. Up to 1-2 months after finishing it, if he experiences some tendonitis he should not work through it and should rest. I also advised him to avoid direct sunlight while taking this medication. Pt was instructed to not take this medication with dairy products. If the pain does not improve within a few days or worsens, they were instructed to call back sooner and we might consider adding a medication for neuropathic pain such as gabapentin. Patient will have a telehealth visit in 2 weeks for reassessment, sooner if clinically indicated.  06/15/2023: Mr. VIVIENNE HANCOCK presents today for a follow up and ZENA. He reports no significant changes in urination. Does c/o back pain. Had a CT at [facility]. Is planning on getting an epidural injection for pain. Pt reports he had lab work done yesterday, however the results are not yet back. He also reports perineal pain when he sits. His gastroenterologist is prescribing suppositories.  The knee-chest position was utilized for the ZENA. Digital rectal exam found no suspicious rectal masses. Normal seminal vesicles. Anal tone is normal. The prostate is non tender, with normal texture, slightly firm, discrete borders, and no nodules. It is a 20 gram transurethral resection size. No rectal mucosal lesions. No gross blood on the examining finger. Reviewed imaging results from the spine specialist which he brought with him today. Has spinal canal stenosis in lumbar spine. Also reviewed most recent US of the abdomen done back in March ordered by his gastroenterologist. Does have a fatty liver but no ascites. Both kidneys were normal with no hydro. Patient will schedule a telehealth visit tomorrow to review results of his lab work.  06/16/2023: Mr. HANCOCK is a 77 year old male presenting today via telehealth using real time 2 way audio only technology. The patient, Mr. HANCOCK, was located in his home at [address] [place] [address] at the time of the visit. The provider, FREDERIC BETH, was located in his office on [place], [place]. Verbal Consent was given by the patient on 06/16/2023 and my scribe served as a witness to the verbal consent. The patient provided blood sample on June 13 at [facility] but they seemed not available. I called to inquire and it was determined that an entry mistake occurred where the lab results was misdated and it was reported as being drawn as March 1st. The  was agreeable to correct the mistake. I called the patient again and explained the mishap to the patient. I explain that his CBC on 06/13/2023 shows that his blood count is low. HGB is low at 9.8. HCT low at 34.9. Mean Cell Volume also low at 69.7. He reports that his diet has not changed recently. He says his last colonoscopy was several years ago.  Plan: Pt will inform his PCP and Gastroenterologist about his low blood count. His last colonoscopy was about 5 years ago. In view of the progressive anemia and the small red blood cells the gastroenterologist might be interested in performing upper GI endoscopy as well as a repeat colonoscopy. If gastroenterology evaluation does not disclose any cause for the anemia I will ask that the primary care physician evaluate the anemia further. If primary care physician would prefer I will refer the patient to a hematologist. I have asked the patient to provide blood specimen for CBC with Iron level and urine sample for Urinalysis, Culture and cytology.  07/17/2023: Mr. VIVIENNE HANCOCK presents today for a follow up audio only telephone visit for which he gave permission for. The pt was located at home, [address] [place] [address], and I was located in my office in [place], [place]. The patient reports that he had some rectal and perineal pain last night. He admits a lot of constipation and hard stool. Pt has not yet spoken to his PCP or gastroenterologist yet about his recent lab work. Patient counseled on constipation management. Pt advised to take 2 docusate or colace and miralax if needed. He was advised to increase his water intake. I believe his perineal pain is from the constipation and not from prostatitis, however if relieving his constipation does not help, we will reassess. Patient will repeat lab work this week at his nearest  lab. Orders were put in today. Patient will have a telehealth visit next week for reassessment, sooner if clinically indicated.  09/11/2023: Mr. VIVIENNE HANCOCK presents today for a follow up. He reports ongoing problems with anal/ rectal pain. Denies any testicular pain. Has spoken to his colorectal surgeon and he feels he was not helped enough. Gabapentin 300 mg helps his pain overall. He has not tried taking it in the morning because it makes him a little tired and he is fearful of driving. Pt had lab work done at his PCP on 7/21/2023. Creatinine was normal. Urine tests were normal. PSA was not done. Pt is on an iron supplement which causes some constipation. Eats a diet rich in vegetables. Pt has a pacemaker. Admits pushing and straining on urination.  I increased the dosage of Tamsulosin 0.4 mg from once daily to twice daily. My recommendation is try taking gabapentin two at night and one in the morning for at least one month and be cautious with driving. If this does not work for him, we can look into a different medication for neuropathic pain. Reviewed and discussed lab work of 7/21/2023 in detail with the pt. Pt strongly advised to increase his water consumption to reduce constipation. I was considering ordering an MRI of the prostate given his perineal pain, however given the patient's pacemaker and that his pain has been found to be mostly neuropathic in origin, he will RTO for an US of the prostate to check for any abscesses.  12/4/23: patient here for urgent visit for rectal pain same as described to dr beth in Sept 2023 constipation betterseeing colorectal surgeon- to see in Jan 2024- normal eval by reportlast week urine done wiht PCP : ( nov 25) : nomal - took 3d of levaquinpsa 0.17creat 1.0normal white count 8.0no luts- good flow, no strain feels empty after void here for eval:1- check urine2- exam signif for right sided pelvic pain -3- to f/u with dr beth-consider PT of pelvic floor  12/12/2023: Mr. HANCOCK presents today for a follow up audio only telehealth visit for which they gave permission for. The patient was located at home [address] [address] and I was located in my office in [place], [place]. Patient's 12/4/23 UA showed Proteinuria 30 mg/dL, trace Ketonuria, moderate blood, 19/HPF of WBC, 64/HPF of RBC and mucus present.  Wife reports the patient is experiencing terrible pain. The patient did take took levofloxacin but the abx resulted in no relief. They desired a stronger antibiotic, but I explained to them, if the microscopic hematuria does not clear I believed it to be suppurative to obtain a cystoscopy. Also explained this may be symptoms of bladder cancer, so it is within their best interest to obtain a Cystoscopy.   Patient is not agreeable to obtaining a cystoscopy as he believes it was a waste of time. I assured patient obtaining one is strongly suggested as his symptoms are ones of bladder cancer. His last Cystoscopy was back in March 23 2017. As further intervention, I prescribed the patient doxycycline 100 mg, one capsule every 12 hours. I informed the patient to avoid excessive amounts of direct sunlight while on this medication. If Cystoscopy is negative and Doxycycline does not relieve pain, I will recommend Pelvic floor therapy.  Pt will schedule a telehealth visit in 2 weeks.   01/17/2024: Mr. VIVIENNE HANCOCK presents today for an audio visual telehealth visit for which he gave permission for. The patient was located at home at [address] [place] [address] and I was located at my office in [place], [place]. When I called, the patient was in physical therapy and gave permission for me to discuss with his wife by telephone call. She informed me that he is having a lot of pain in his prostate. At the time of his last suspected episode of prostatitis, I prescribed him doxycycline, however she informs me that it did not help him at all. The patient's last MRI of the pelvis was in April 2021. The patient now has a pacemaker. He is currently taking gabapentin.   Reviewed and discussed lab work of 12/28/2023 in detail with the pt's wife.  Patient will provide a new urine specimen at his nearest  lab. While he is there he will also have blood drawn for orders I have put in today. If he has pyuria, will consider abx. Advised him to take two gabapentin at night for pain. He was advised to not drive if he is drowsy from the gabapentin.  I am sending the patient for a CT abdomen and pelvis to evaluate prostate pain.  Patient will have a telehealth visit after obtaining the CT to review and discuss results. He will also have a telehealth visit this Friday to discuss urine test results.    01/22/2024: Mr. VIVIENNE HANCOCK presents today for a follow up audio only telephone visit for which he gave permission for. The pt was located at home, [address] [place] [address], and I was located in my office in [place], [place]. The patient has not yet obtained the CT I ordered, nor has he supplied a urine specimen to a [facility] lab. He reports that he is feeling okay this week.    Patient will obtain the CT abdomen and pelvis w/wo contrast.  Patient will have a telehealth visit after obtaining the CT to review and discuss results.    01/31/2024: Mr. VIVIENNE HANCOCK presents today for a follow up audio only telephone visit for which he gave permission for. The pt was located at home, [address] [place] [address], and I was located in my office in [place], [place]. Visit was conducted with patient's wife. Patient had lab work done on 1/29/2024. PSA was very good at 0.19. Creatinine was 0.99. UA was compatible with current infection. The urine appeared cloudy, with 30 mg/dl of protein, large blood by dipstick, moderate LEC, 198 WBC/HPF, 132 RBC/HPF and only 1 epithelial cell/HPF indicating it was a clean catch. However, urine culture was no significant growth. Urine cytology was negative for high grade urothelial carcinoma. Rare benign urothelial cells and squamous cells were seen in a background of severe acute inflammation and RBCs. Patient had PT yesterday which included ZENA and massage. She states that they felt something was wrong with his prostate. I assume they meant it was boggy.    Reviewed and discussed lab work of 1/29/2024 in detail with the pt.  I prescribed the patient Ciprofloxacin 500 mg, one tablet twice a day. I informed the patient that Ciprofloxacin may cause sensitivity to direct sunlight.  Will provide a urine specimen after completion of abx.  Will have a telehealth visit after providing a urine specimen to review and discuss results as well as how he is feeling.    02/29/2024: Mr. HANCOCK presents today for a follow up audio-only telehealth visit for which they gave permission for. The patient was located at home [address] [place] [address] and I was located in my office in [place], [place]. CT Abdomen and Pelvis 2/16/24 that revealed No renal calculus or hydronephrosis. Renal cysts, largest measuring 2.3 cm in the right upper pole. Subcentimeter lesions too small to characterize, likely additional cysts. Nonspecific wall thickening of the bladder with perivesicular fat stranding, which may be seen in the setting of cystitis. Post abx, patient endorses improvement. Denies taking any abx prior to urine testing. After 7 days he discontinued the Cipro due to diarrhea and loose stool. We will consider this for the future.  Clinical findings and CT Abdomen and Pelvis results were reviewed at length with the patient. I personally reviewed the imaging myself. Patient has evidence of bladder inflammation on CT scan but otherwise findings are quite good. Advised patient to increase fluid intake.   Pt will go to his nearest [facility] lab to provide a urine sample which will be sent for urinalysis, urine culture, and urine cytology.  The patient will return to office in 3-4 months.  Duration of call 13 Minutes.

## 2024-02-29 NOTE — REVIEW OF SYSTEMS
[Constipation] : constipation [Nocturia] : nocturia [Perineal Pain] : perineal pain [Chills] : no chills [Fever] : no fever [Feeling Poorly] : not feeling poorly [Chest Pain] : no chest pain [Loss Of Hearing] : no hearing loss [Dysuria] : no dysuria [Shortness Of Breath] : no shortness of breath [Abdominal Pain] : no abdominal pain [Scrotal Pain] : no scrotal pain [Urinary Stream Is Smaller] : urine stream was not smaller [Arthralgias] : no arthralgias [Initiating Urination Req. Strain] : initiating urination does not require straining [Skin Lesions] : no skin lesions [Difficulty Walking] : no difficulty walking [Suicidal] : not suicidal [Easy Bleeding] : no tendency for easy bleeding [Proptosis] : no proptosis

## 2024-03-01 ENCOUNTER — APPOINTMENT (OUTPATIENT)
Dept: UROLOGY | Facility: CLINIC | Age: 79
End: 2024-03-01

## 2024-04-23 LAB
APPEARANCE: ABNORMAL
APPEARANCE: CLEAR
BACTERIA: NEGATIVE /HPF
BACTERIA: NEGATIVE /HPF
BILIRUBIN URINE: NEGATIVE
BILIRUBIN URINE: NEGATIVE
BLOOD URINE: ABNORMAL
BLOOD URINE: ABNORMAL
CAST: 0 /LPF
CAST: 1 /LPF
COLOR: NORMAL
COLOR: YELLOW
EPITHELIAL CELLS: 1 /HPF
EPITHELIAL CELLS: 1 /HPF
GLUCOSE QUALITATIVE U: NEGATIVE MG/DL
GLUCOSE QUALITATIVE U: NEGATIVE MG/DL
KETONES URINE: NEGATIVE MG/DL
KETONES URINE: NEGATIVE MG/DL
LEUKOCYTE ESTERASE URINE: ABNORMAL
LEUKOCYTE ESTERASE URINE: ABNORMAL
MICROSCOPIC-UA: NORMAL
MICROSCOPIC-UA: NORMAL
NITRITE URINE: NEGATIVE
NITRITE URINE: NEGATIVE
PH URINE: 5.5
PH URINE: 5.5
PROTEIN URINE: 30 MG/DL
PROTEIN URINE: NORMAL MG/DL
RED BLOOD CELLS URINE: 132 /HPF
RED BLOOD CELLS URINE: 187 /HPF
SPECIFIC GRAVITY URINE: 1.02
SPECIFIC GRAVITY URINE: 1.02
UROBILINOGEN URINE: 0.2 MG/DL
UROBILINOGEN URINE: 0.2 MG/DL
WHITE BLOOD CELLS URINE: 198 /HPF
WHITE BLOOD CELLS URINE: 97 /HPF

## 2024-04-24 LAB — BACTERIA UR CULT: NORMAL

## 2024-04-25 NOTE — REVIEW OF SYSTEMS
[Fever] : no fever [Chills] : no chills [Feeling Poorly] : not feeling poorly [Loss Of Hearing] : no hearing loss [Chest Pain] : no chest pain [Shortness Of Breath] : no shortness of breath [Abdominal Pain] : no abdominal pain [Constipation] : constipation [Dysuria] : no dysuria [Nocturia] : nocturia [Scrotal Pain] : no scrotal pain [Perineal Pain] : perineal pain [Urinary Stream Is Smaller] : urine stream was not smaller [Initiating Urination Req. Strain] : initiating urination does not require straining [Arthralgias] : no arthralgias [Skin Lesions] : no skin lesions [Difficulty Walking] : no difficulty walking [Suicidal] : not suicidal [Proptosis] : no proptosis [Easy Bleeding] : no tendency for easy bleeding

## 2024-04-25 NOTE — HISTORY OF PRESENT ILLNESS
[FreeTextEntry1] : Mr Hancock is a 78 year old man presented for follow up of gross hematuria, urinary frequency and urgency. The patient takes tamsulosin 0.4 mg, one half hour after meal twice daily, finasteride 5 mg once daily, and desipramine 10 mg at bedtime. The patient returned to the office 03/20/17 having gross hematuria with blood clot. Dr Holly Bernardo started the patient on Bactrim DS. He eventually went to the ER 03/21/17 where he had a Spann catheter placed. His creatinine in the ER was 1.01. Cystoscopy 03/23/17 found catheter trauma. Prostate protruded modest distance into the bladder. Bladder has a small capacity. Diffuse oozing of blood from bladder neck and trigone. Scraped up mucosa most likely from introduction of telescope. No bladder stones or tumors. Bladder inflammation from catheter. CT urogram was normal and found no etiology of the gross hematuria.   05/24/2021: Patient presents today for follow up. Patient went to a rectal surgeon for his rectal pain and had pelvic MRI 4/29/21 which showed no evidence of perianal fistula. States he continues to have pain in rectum after sitting for a long time and then standing. Patient was instructed to take Gabapentin 400mg TID, but skips afternoon dose as it makes him sleepy. Does have constipation. Has not started Tadalafil. No hx of prostate cancer.   From March 1, 2018 through April 4, 2019 patient had significant complaints of perirectal and rectal pain.  He did have issues with hemorrhoids and an anal fissure.  Some of the symptoms did seem referable to recurrent bouts of prostatitis exacerbation.  He was treated intermittently generally with Bactrim or Cipro and usually with some improvement in symptoms.  He had a May 9, 2019 transrectal ultrasound of the prostate which showed a prostate volume was 23.4 cm and a hypoechoic area at the left apex which was about 1.4 cm in transverse image.  The seminal vesicles were unremarkable and no rectal masses were seen.  The hypoechoic area was compatible with an increased risk of prostate cancer but the patient has a very low PSA being under 0.4 on recurrent readings and by palpation had not felt any suspicious findings in the past.  I asked for a transperineal needle biopsy of the prostate of the hypoechoic region but the patient declined because of his anal pain and the known anal fissure.  The patient underwent a anal fistula repair.  It helped his symptoms but symptoms persist.  And occasionally got worse is colorectal surgeon said that at the current time he had no fistulas no hemorrhoids and as far as the surgery could so there was no actual anal rectal pathology despite the perirectal pain and occasional rectal pain.  The patient I thought possibly that it was related to his residual chronic prostatitis.  On August 25, 2020 the patient reported increasing his nocturia.  He at that time was using metronidazole rectal gel for rectal pains which seem to help.  The November 17, 2020 digital rectal exam showed no a rectal mucosal nodule.  He was asked to follow-up with his gastrointestinal surgeon (colorectal surgeon) and her gastroenterologist.  The patient's colorectal surgeon is Dr. Mustapha cardoso.  A MRI of April 29, 2021 showed no evidence of a perianal fistula despite the patient's complaint of continued rectal pain.  In the past the patient be given desmopressin for his nocturia which did not seem to help and was discontinued.  He cardiologistin the late spring 2021 who found no EKG problems.  By September 2021 he was complaining of strain to urinate and having to push at this point he wanted to resume finasteride and tamsulosin which he had stopped previously.  The patient was not on trospium at the time he had stopped tadalafil due to unpleasant feeling.    09/29/2021: Patient presents today for follow up. 9/1/21 UA showed 12 RBC/HPF and trace blood by dipstick. On repeat 9/9/21, he had only 3 RBC/HPF and small blood by dripstick which normal. Urine cytology 9/1/21 negative for high grade urothelial carcinoma. Provided blood work from 9/17/21 done by PCP which showed UA slightly cloudy, 3-5 RBC/HPF. PSA 0.2. Wakes 4x at night to urinate. States he has rectal pain when bladder is full.   02/02/2022: Patient presents today for follow up. Reviewed 12/29/21 lab work which showed MCV low at 75.2, WBC 6600, Hb 12.1, platelets 414923, potassium 4.9, creatinine 0.90 upper limits 1.18, BUN high at 25, AST minimally elevated 39 upper limits 34, alk phos normal 60, HbA1c 6.0, UA dipstick positive small blood, moderate leukocyte esterase, few squamous epithelial cells. Has pain when he sits and feels better when standing or laying down. States he must push to urinate. No pain on urination. Nocturia x3-4. Takes tamsulosin 0.4mg BID. Reports his EKG is normal.   03/14/2022: Patient presents today for follow up. Has been doing well, however does have to push urination. Nocturia x4. Erections are poor but not interested in medications. No dysuria. Stream is slow. Continues finasteride 5mg once daily and tamsulosin 0.4mg BID. Does not take tadalafil every day. Does not have any red itchy skin and has not needed to use clotrimazole-betamethasone cream. Reviewed 2/2/22 lab work. Urine culture grew 10,000-40,000 CFU/mL Enterococcus faecalis and patient was treated with amoxicillin 500mg TID and feels better now. PSA 0.24. Free testosterone low 3.9. Lab work of 2/11/22 showed brain natriuretic peptide normal, procalcitonin undetectable <0.10 ng/mL. Will be having evaluation for anorectal pain, with the proposed procedures being anorectal manometry, electromyography, pudendal nerve terminal motor latency, and endorectal ultrasound. Denies bladder pain. Will be having procedure for varicose veins of RLE.   03/30/2022: Patient presents today for follow up. Urination is good. Continues finasteride 5mg once daily and tamsulosin 0.4mg BID. Did not help with urination. Denies dysuria. Only has urinary urgency at night. Notes he eats spicy foods. No pain where he sits. Energy levels are good. Has tried Cialis in the past which did not help much. Erections had been poor since the biopsy. Reviewed 3/13/22 lab work which was WNL except free testosterone low 4.0, LH elevated 19, dihydrotestosterone low 4.1. Total testosterone normal 250. PSA was 0.20. UA normal. Urine cytology showed clusters of atypical urothelial cells with high nuclear cytoplasmic ratio, nuclear hyperchromasia and irregular nuclear contours in a background of moderate acute inflammation. States he went to rectal surgeon for anorectal pain, and had US which was reportedly normal and found muscles are weak, and was advised muscle dysfunction physical therapy which he is currently attending.   04/08/2022: Patient presents today for a follow up telephone visit for which he gave permission for. The visit was mostly conducted with his wife who was present. The patient has been taking finasteride 5 mg once daily and tamsulosin 0.4 mg BID. I prescribed the patient tadalafil 5 mg once daily at the time of the last visit, however he has not taken it. The pt's wife states today that her  told me during his 3/30/2022 visit he had a problems taking tadalafil which included flatulence and severe muscle aches. Both my personal memory and my note from that day do not recall him ever saying that, however there must have been some sort of miscommunication.   04/28/2022: Patient presents today for a follow up visit. Yesterday, he was having pain, but today he is feeling better. On 4/27/22, his PSA was 0.21 ng/mL, creatinine was 1.04. He also is complaining of rectal pain. He currently is taking Silodosin, one capsule daily, Tamsulosin once daily, Proscar once daily and Cefadroxil. His symptoms have improved with Cefadroxil. Previously he took Cipro, but it did not work satisfactorily. He is not taking Tadalafil. He denies urgency, but is experiencing slight urge incontinence. He reports Gabapentin alleviated his pain.  06/07/2022: 's wife called today and spoke to my nurse. She stated that I told her  to take tamsulosin 0.4 mg 2 capsules BID, however there was a misunderstanding and his wife wanted to speak to me. I informed her that it is tamsulosin 0.4 mg one capsule BID and she understood. His wife reports that he has been having difficulties urinating. Has an appt to see me on 6/30/2022.   07/20/2022:  presents today for a follow up. Patient obtained a CT Urogram w/wo contrast on 7/14/2022 to evaluate microscopic hematuria. CT revealed renal cysts and less than 1 cm low-attenuation lesions which are too small to characterize. No enhancing lesions, or renal stones are identified. On the delayed images, there is poor opacification of the proximal and distal ureter and the right distal ureter. No abnormal soft tissue or gross urothelial lesion is appreciated. Bladder was within normal limits. The anterior aspect of the bladder is not opacified on the delayed imaging. No lymphadenopathy was seen. The patient was pleased with the CT findings. We reviewed his prostate symptoms. Has a long hx of chronic prostatitis that initially responded to cipro but sometimes required bactrim. He currently has no acute problems. Denies pain when sitting for long periods of time. Has some minor urinary urgency and frequency at times but is overall pleased with his urologic state. Continues on finasteride 5 mg once daily, tamsulosin 0.4 mg once daily, tadalafil 5 mg once daily, and silodosin 8 mg once daily. Last PSA 4/27/2022 was 0.21. Creatinine at that time was 1.04.   07/27/2022:  presents today for a telephone visit for which he gave permission for. He wanted to review the results of his most recent urine test results. UA was perfect. Culture was no growth. Cytology consisted of mainly mature squamous epithelial cells, acute inflammation, and rare benign urothelial cells. Cytology was negative for high grade urothelial carcinoma. His wife inquired on why he needs to proceed with a cystoscopy.   03/02/2023: Mr. VIVIENNE HANCOCK presents today for a follow up. The pt continues to work as a . Patient provided a urine specimen on 2/28/2023. UA was normal. Culture was no significant growth. Urine cytology was negative for high grade urothelial carcinoma. Pt denies ever smoking or using tobacco products. Pt recently went to gastroenterologist and was told his stomach was distended. He admits to pushing and straining upon urination. His gastroenterologist put in orders for an US which he has scheduled. Patient continues to take finasteride 5 mg once daily and tamsulosin 0.4 mg BID. No longer is taking tadalafil 5 mg once daily.   05/22/2023: Mr. VIVIENNE HANCOCK was scheduled today for a follow up audio only telephone visit for which he gave permission for. I was unable to reach the pt but did reach his wife, Dr.Almas Benjamin who was located at their home, [address] [place] [address], and I was located in my office in [place], [place]. She informs me that the pt has a lot of pain in the rectum. It hurts when he lays down or when standing. The pain is all the time and not just when he moves the bowels. He has a rectal surgeon but he does not think it is a problem with the rectum. Pt has constipation but wife reports he manages it well.   06/15/2023: Mr. VIVIENNE HANCOCK presents today for a follow up and ZENA. He reports no significant changes in urination. Does c/o back pain. Had a CT at [facility]. Is planning on getting an epidural injection for pain. Pt reports he had lab work done yesterday, however the results are not yet back. He also reports perineal pain when he sits. His gastroenterologist is prescribing suppositories.   06/16/2023: Mr. HANCOCK is a 77 year old male presenting today via telehealth using real time 2 way audio only technology.  The patient,  Mr. HANCOCK, was located in his home at [address] [place] [address] at the time of the visit. The provider, FREDERIC BETH, was located in his office on [place], [place]. Verbal Consent was given by the patient on 06/16/2023 and my scribe served as a witness to the verbal consent. The patient provided blood sample on June 13 at [facility] but they seemed not available. I called to inquire and it was determined that an entry mistake occurred where the lab results was misdated and it was reported as being drawn as March 1st. The  was agreeable to correct the mistake. I called the patient again and explained the mishap to the patient. I explain that his CBC on 06/13/2023 shows that his blood count is low. HGB  is low at 9.8. HCT  low at 34.9. Mean Cell Volume  also low at 69.7. He reports that his diet has not changed recently. He says his last colonoscopy was several years ago.   07/17/2023: Mr. VIVIENNE HANCOCK presents today for a follow up audio only telephone visit for which he gave permission for. The pt was located at home, [address] [place] [address] [address] [place] [address], and I was located in my office in [place], [place]. The patient reports that he had some rectal and perineal pain last night. He admits a lot of constipation and hard stool. Pt has not yet spoken to his PCP or gastroenterologist yet about his recent lab work.   09/11/2023: Mr. VIVIENNE HANCOCK presents today for a follow up. He reports ongoing problems with anal/ rectal pain. Denies any testicular pain. Has spoken to his colorectal surgeon and he feels he was not helped enough. Gabapentin 300 mg helps his pain overall. He has not tried taking it in the morning because it makes him a little tired and he is fearful of driving. Pt had lab work done at his PCP on 7/21/2023. Creatinine was normal. Urine tests were normal. PSA was not done. Pt is on an iron supplement which causes some constipation. Eats a diet rich in vegetables. Patient has a pacemaker. Admits pushing and straining on urination.   Dr. Gonzalez- 12/4/23: patient here for urgent visit  for rectal pain  same as described to dr beth in Sept 2023 constipation better seeing colorectal surgeon- to see in Jan 2024- normal eval by report last week urine done wiht PCP : ( nov 25) : nomal - took 3d of levaquin psa 0.17 creat 1.0 normal white count 8.0 no luts- good flow, no strain , feels empty after void  here for eval: 1- check urine 2- exam signif for right sided pelvic pain - 3- to f/u with dr beth-consider PT of pelvic floor  12/12/2023: Mr. HANCOCK presents today for a follow up audio only telehealth visit for which they gave permission for. The patient was located at home [address] [place] [address] [address] [place] [address] and I was located in my office in [place], [place]. Patient's 12/4/23 UA showed Proteinuria 30 mg/dL, trace Ketonuria, moderate blood, 19/HPF of WBC, 64/HPF of RBC and mucus present.  Wife reports the patient is experiencing terrible pain. The patient did take took levofloxacin but the abx resulted in no relief. They desired a stronger antibiotic, but I explained to them, if the microscopic hematuria does not clear I believed it to be suppurative to obtain a cystoscopy. Also explained this may be symptoms of bladder cancer, so it is within their best interest to obtain a Cystoscopy.   01/17/2024: Mr. VIVIENNE HANCOCK presents today for an audio visual telehealth visit for which he gave permission for. The patient was located at home at [address] [place] [address] and I was located at my office in [place], [place]. When I called, the patient was in physical therapy and gave permission for me to discuss with his wife by telephone call. She informed me that he is having a lot of pain in his prostate. At the time of his last suspected episode of prostatitis, I prescribed him doxycycline, however she informs me that it did not help him at all. The patient's last MRI of the pelvis was in April 2021. The patient now has a pacemaker. He is currently taking gabapentin.   01/22/2024: Mr. VIVIENNE HANCOCK presents today for a follow up audio only telephone visit for which he gave permission for. The pt was located at home, [address] [place] [address], and I was located in my office in [place], [place]. The patient has not yet obtained the CT I ordered, nor has he supplied a urine specimen to a [facility] lab. He reports that he is feeling okay this week.   01/31/2024: Mr. VIVIENNE HANCOCK presents today for a follow up audio only telephone visit for which he gave permission for. The pt was located at home, [address] [place] [address], and I was located in my office in [place], [place]. Visit was conducted with patient's wife. Patient had lab work done on 1/29/2024. PSA was very good at 0.19. Creatinine was 0.99. UA was compatible with current infection. The urine appeared cloudy, with 30 mg/dl of protein, large blood by dipstick, moderate LEC, 198 WBC/HPF, 132 RBC/HPF and only 1 epithelial cell/HPF indicating it was a clean catch. However, urine culture was no significant growth. Urine cytology was negative for high grade urothelial carcinoma. Rare benign urothelial cells and squamous cells were seen in a background of severe acute inflammation and RBCs. Patient had PT yesterday which included ZENA and massage. She states that they felt something was wrong with his prostate. I assume they meant it was boggy.   02/29/2024: Mr. HANCOCK presents today for a follow up audio-only telehealth visit for which they gave permission for. The patient was located at home [address] [place] [address] and I was located in my office in [place], [place]. CT Abdomen and Pelvis 2/16/24 that revealed No renal calculus or hydronephrosis. Renal cysts, largest measuring 2.3 cm in the right upper pole. Subcentimeter lesions too small to characterize, likely additional cysts. Nonspecific wall thickening of the bladder with perivesicular fat stranding, which may be seen in the setting of cystitis. Post abx, patient endorses improvement. Denies taking any abx prior to urine testing. After 7 days he discontinued the Cipro due to diarrhea and loose stool. We will consider this for the future.

## 2024-04-25 NOTE — ASSESSMENT
[FreeTextEntry1] : Mr Hancock is a 78 year old man presented for follow up of gross hematuria, urinary frequency and urgency. The patient takes tamsulosin 0.4 mg, one half hour after meal twice daily, finasteride 5 mg once daily, and desipramine 10 mg at bedtime. The patient returned to the office 03/20/17 having gross hematuria with blood clot. Dr Holly Bernardo started the patient on Bactrim DS. He eventually went to the ER 03/21/17 where he had a Spann catheter placed. His creatinine in the ER was 1.01. Cystoscopy 03/23/17 found catheter trauma. Prostate protruded modest distance into the bladder. Bladder has a small capacity. Diffuse oozing of blood from bladder neck and trigone. Scraped up mucosa most likely from introduction of telescope. No bladder stones or tumors. Bladder inflammation from catheter. CT urogram was normal and found no etiology of the gross hematuria. 3/1/18: The patient returned and reported he has pain in his rectum area and penis. PSA from 1/24/18 was 0.39 ng/mL. 2/15/18 Creatinine was 1.01 mg/dL, hemoglobin was 14.1 g/dL. He received and abdomen ultrasound which shows a fatty liver, gallstones, and obscured pancreas. He denies any gross hematuria. He noted one day ago his GI physician put banding on his hemorrhoids. From March 1, 2018 through April 4, 2019 patient had significant complaints of perirectal and rectal pain.  He did have issues with hemorrhoids and an anal fissure.  Some of the symptoms did seem referable to recurrent bouts of prostatitis exacerbation.  He was treated intermittently generally with Bactrim or Cipro and usually with some improvement in symptoms.  He had a May 9, 2019 transrectal ultrasound of the prostate which showed a prostate volume was 23.4 cm and a hypoechoic area at the left apex which was about 1.4 cm in transverse image.  The seminal vesicles were unremarkable and no rectal masses were seen.  The hypoechoic area was compatible with an increased risk of prostate cancer but the patient has a very low PSA being under 0.4 on recurrent readings and by palpation had not felt any suspicious findings in the past.  I asked for a transperineal needle biopsy of the prostate of the hypoechoic region but the patient declined because of his anal pain and the known anal fissure.  The patient underwent a anal fistula repair.  It helped his symptoms but symptoms persist.  And occasionally got worse is colorectal surgeon said that at the current time he had no fistulas no hemorrhoids and as far as the surgery could so there was no actual anal rectal pathology despite the perirectal pain and occasional rectal pain.  The patient I thought possibly that it was related to his residual chronic prostatitis.  On August 25, 2020 the patient reported increasing his nocturia.  He at that time was using metronidazole rectal gel for rectal pains which seem to help.  The November 17, 2020 digital rectal exam showed no a rectal mucosal nodule.  He was asked to follow-up with his gastrointestinal surgeon (colorectal surgeon) and her gastroenterologist.  The patient's colorectal surgeon is Dr. Mustapha cardoso.  A MRI of April 29, 2021 showed no evidence of a perianal fistula despite the patient's complaint of continued rectal pain.  In the past the patient be given desmopressin for his nocturia which did not seem to help and was discontinued.  He he saw his cardiologist  in the late spring 2021 who found no EKG problems.  By September 2021 he was complaining of strain to urinate and having to push at this point he wanted to resume finasteride and tamsulosin which he had stopped previously.  The patient was not on trospium at the time he had stopped tadalafil due to unpleasant feeling.   09/29/2021: Patient presents today for follow up. 9/1/21 UA showed 12 RBC/HPF and trace blood by dipstick. On repeat 9/9/21, he had only 3 RBC/HPF and small blood by dripstick which normal. Urine cytology 9/1/21 negative for high grade urothelial carcinoma. Provided blood work from 9/17/21 done by PCP which showed UA slightly cloudy, 3-5 RBC/HPF. PSA 0.2. Wakes 4x at night to urinate. States he has rectal pain when bladder is full. 02/02/2022: Patient presents today for follow up. Reviewed 12/29/21 lab work which showed MCV low at 75.2, WBC 6600, Hb 12.1, platelets 389161, potassium 4.9, creatinine 0.90 upper limits 1.18, BUN high at 25, AST minimally elevated 39 upper limits 34, alk phos normal 60, HbA1c 6.0, UA dipstick positive small blood, moderate leukocyte esterase, few squamous epithelial cells. No gross hematuria. Has pain when he sits and feels better when standing or laying down. States he must push to urinate. No pain on urination. Nocturia x3-4. Takes tamsulosin 0.4mg BID. Reports his EKG is normal. 03/14/2022: Patient presents today for follow up. Has been doing well, however does have to push urination. Nocturia x4. Erections are poor but not interested in medications. No dysuria. Stream is slow. Continues finasteride 5mg once daily and tamsulosin 0.4mg BID. Does not take tadalafil every day. Does not have any red itchy skin and has not needed to use clotrimazole-betamethasone cream. Reviewed 2/2/22 lab work. Urine culture grew 10,000-40,000 CFU/mL Enterococcus faecalis and patient was treated with amoxicillin 500mg TID and feels better now. PSA 0.24. Free testosterone low 3.9. Lab work of 2/11/22 showed brain natriuretic peptide normal, procalcitonin undetectable <0.10 ng/mL. Will be having evaluation for anorectal pain, with the proposed procedures being anorectal manometry, electromyography, pudendal nerve terminal motor latency, and endorectal ultrasound. Denies bladder pain. Will be having procedure for varicose veins of RLE. 03/30/2022: Patient presents today for follow up. Urination is good. Continues finasteride 5mg once daily and tamsulosin 0.4mg BID. Did not help with urination. Denies dysuria. Only has urinary urgency at night. Notes he eats spicy foods. No pain where he sits. Energy levels are good. Has tried Cialis in the past which did not help much. Erections had been poor since the biopsy. Reviewed 3/13/22 lab work which was WNL except free testosterone low 4.0, LH elevated 19, dihydrotestosterone low 4.1. Total testosterone normal 250. PSA was 0.20. UA normal. Urine cytology showed clusters of atypical urothelial cells with high nuclear cytoplasmic ratio, nuclear hyperchromasia and irregular nuclear contours in a background of moderate acute inflammation. States he went to rectal surgeon for anorectal pain, and had US which was reportedly normal and found muscles are weak, and was advised muscle dysfunction physical therapy which he is currently attending. Reviewed 3/13/22 lab work with patient. Urine cytology showed clusters of atypical urothelial cells with high nuclear cytoplasmic ratio, nuclear hyperchromasia and irregular nuclear contours in a background of moderate acute inflammation, and which we will continue to watch. If cells persist, we will consider imaging test of kidneys, and cystoscopy. Recommend avoid spicy foods as it will cause irritation of both bladder and rectum, but is not harmful. Continue finasteride 5mg once daily and tamsulosin 0.4mg BID. I prescribed the patient tadalafil 5mg once daily for BPH symptoms and ED. I informed the patient that they may experience tingling of the skin, headache, warm flushed feeling, heartburn, backache, and on rare occasion, visual or hearing disturbances. Informed the patient how to use [employer] to  tadalafil at a [employer] or [employer] for a discounted price. I explained it had been previously used as heart medication and that it is safe to take as long as it is not taken with nitroglycerin which would cause BP to drop too low. The patient produced a urine sample which will be sent for urinalysis, urine cytology, and urine culture. RTO in 3 months for follow up or sooner if new urinary symptoms develop.  04/08/2022: Patient presents today for a follow up telephone visit for which he gave permission for. The visit was mostly conducted with his wife who was present. The patient has been taking finasteride 5 mg once daily and tamsulosin 0.4 mg BID. I prescribed the patient tadalafil 5 mg once daily at the time of the last visit, however he has not taken it. The pt's wife states today that her  told me during his 3/30/2022 visit he had a problems taking tadalafil which included flatulence and severe muscle aches. Both my personal memory and my note from that day do not recall him ever saying that, however there must have been some sort of miscommunication.  04/28/2022: Patient presents today for a follow up visit. Yesterday, he was having pain, but today he is feeling better. On 4/27/22, his PSA was 0.21 ng/mL, creatinine was 1.04. He also is complaining of rectal pain. He currently is taking Silodosin, one capsule daily, Tamsulosin once daily, Proscar once daily and Cefadroxil. His symptoms have improved with Cefadroxil. Previously he took Cipro, but it did not work satisfactorily. He is not taking Tadalafil. He denies urgency, but is experiencing slight urge incontinence. He reports Gabapentin alleviated his pain.  The patient produced a urine sample which will be sent for urinalysis, urine cytology, and urine culture. I am prescribing Gabapentin, 300 mg, two capsules at night, one in the morning for his pain. RTO in 1 month, sooner if needed.  06/07/2022: 's wife called today and spoke to my nurse. She stated that I told her  to take tamsulosin 0.4 mg 2 capsules BID, however there was a misunderstanding and his wife wanted to speak to me. I informed her that it is tamsulosin 0.4 mg one capsule BID and she understood. His wife reports that he has been having difficulties urinating. Has an appt to see me on 6/30/2022.  Urine cytology of 4/28/2022 revealed rare atypical cells with nuclear enlargement, prominent nucleoli, and vacuolated cytoplasm. It was my recommendation after reviewing back in April that he schedule a cystoscopy to evaluate atypical urine cytology, however the pt has not scheduled one yet. Last cystoscopy was in 2017. His wife understood and informed me she would schedule him for a cystoscopy with me within the next two weeks. I am sending the patient for a CT Urogram w/wo IV contrast. Creatinine was 1.04 on 4/27/2022. Patient will RTO for cystoscopy and to go over the results of his CT.  07/20/2022:  presents today for a follow up. Patient obtained a CT Urogram w/wo contrast on 7/14/2022 to evaluate microscopic hematuria. CT revealed renal cysts and less than 1 cm low-attenuation lesions which are too small to characterize. No enhancing lesions, or renal stones are identified. On the delayed images, there is poor opacification of the proximal and distal ureter and the right distal ureter. No abnormal soft tissue or gross urothelial lesion is appreciated. Bladder was within normal limits. The anterior aspect of the bladder is not opacified on the delayed imaging. No lymphadenopathy was seen. The patient was pleased with the CT findings. We reviewed his prostate symptoms. Has a long hx of chronic prostatitis that initially responded to cipro but sometimes required bactrim. He currently has no acute problems. Denies pain when sitting for long periods of time. Has some minor urinary urgency and frequency at times but is overall pleased with his urologic state. Continues on finasteride 5 mg once daily, tamsulosin 0.4 mg once daily, tadalafil 5 mg once daily, and silodosin 8 mg once daily. Last PSA 4/27/2022 was 0.21. Creatinine at that time was 1.04.  Clinical findings and CT results were reviewed at length with the patient and his wife. He was pleased with the findings. The patient produced a urine sample which will be sent for urinalysis, urine cytology, and urine culture. Patient will RTO in 6 months or sooner if clinically indicated.  07/27/2022:  presents today for a telephone visit for which he gave permission for. He wanted to review the results of his most recent urine test results. UA was perfect. Culture was no growth. Cytology consisted of mainly mature squamous epithelial cells, acute inflammation, and rare benign urothelial cells. Cytology was negative for high grade urothelial carcinoma. His wife inquired on why he needs to proceed with a cystoscopy  Reviewed and discussed urine test results of 7/20/2022 in detail with the patient. Given that these tests were so good, he does not need to schedule a cystoscopy at this time. I previously requested a cysto based off of a concerning urine cytology back in April. Requested that the patient provide a urine specimen in 2 weeks and another one 2-4 weeks after that to ensure cytologies remain good. So long as they are favorable, he can proceed without cystoscopy. Both the patient and his wife who is a retired pathologist understood. Patient will follow up in January at his scheduled appointment time.  03/02/2023: Mr. VIVIENNE HANCOCK presents today for a follow up. The pt continues to work as a . Patient provided a urine specimen on 2/28/2023. UA was normal. Culture was no significant growth. Urine cytology was negative for high grade urothelial carcinoma. Pt denies ever smoking or using tobacco products. Pt recently went to gastroenterologist and was told his stomach was distended. He admits to pushing and straining upon urination. His gastroenterologist put in orders for an US which he has scheduled. Patient continues to take finasteride 5 mg once daily and tamsulosin 0.4 mg BID. No longer is taking tadalafil 5 mg once daily.  Reviewed outside lab results of 1/8/2023 that was done at an outside facility. Creatinine was 1.10. A1C was 6.0. Urine was considered abnormal yellow, could be due to vitamin. Specific gravity on the lower side. Patient's PSA back in April 2022 was 0.21. Blood work today included repeat PSA. Reviewed urine test results of 2/28/2023. Renewed finasteride 5 mg once daily. I am prescribing the pt Silodosin 8 mg once daily with food to replace one of the tamsulosin. I informed him of the possible side effects of lightheadedness, nasal congestion, and decreased amount of semen when ejaculating. The pt was hesitant to restart tadalafil 5 mg once daily as he feels he is on a lot of medications. I then offered surgical intervention as an option and suggested he talk to one of my colleagues about potential options. He then opted to try tadalafil 5 mg once daily and would consider surgical intervention if it did not help. Patient will RTO in 2-3 weeks for a ZENA and to review how he is doing on the changes in his medications.  05/22/2023: Mr. VIVIENNE HANCOCK was scheduled today for a follow up audio only telephone visit for which he gave permission for. I was unable to reach the pt but did reach his wife, Dr.Almas Benjamin who was located at their home, [address] [place] [address], and I was located in my office in [place], [place]. She informs me that the pt has a lot of pain in the rectum. It hurts when he lays down or when standing. The pain is all the time and not just when he moves the bowels. He has a rectal surgeon but he does not think it is a problem with the rectum. Pt has constipation but wife reports he manages it well.  I prescribed the patient Levofloxacin 500 mg, one tablet daily until finished. I instructed him to avoid strenuous exercise for two weeks as there is a risk of tendonitis. Up to 1-2 months after finishing it, if he experiences some tendonitis he should not work through it and should rest. I also advised him to avoid direct sunlight while taking this medication. Pt was instructed to not take this medication with dairy products. If the pain does not improve within a few days or worsens, they were instructed to call back sooner and we might consider adding a medication for neuropathic pain such as gabapentin. Patient will have a telehealth visit in 2 weeks for reassessment, sooner if clinically indicated.  06/15/2023: Mr. VIVIENNE HANCOCK presents today for a follow up and ZENA. He reports no significant changes in urination. Does c/o back pain. Had a CT at [facility]. Is planning on getting an epidural injection for pain. Pt reports he had lab work done yesterday, however the results are not yet back. He also reports perineal pain when he sits. His gastroenterologist is prescribing suppositories.  The knee-chest position was utilized for the ZENA. Digital rectal exam found no suspicious rectal masses. Normal seminal vesicles. Anal tone is normal. The prostate is non tender, with normal texture, slightly firm, discrete borders, and no nodules. It is a 20 gram transurethral resection size. No rectal mucosal lesions. No gross blood on the examining finger. Reviewed imaging results from the spine specialist which he brought with him today. Has spinal canal stenosis in lumbar spine. Also reviewed most recent US of the abdomen done back in March ordered by his gastroenterologist. Does have a fatty liver but no ascites. Both kidneys were normal with no hydro. Patient will schedule a telehealth visit tomorrow to review results of his lab work.  06/16/2023: Mr. HANCOCK is a 77 year old male presenting today via telehealth using real time 2 way audio only technology. The patient, Mr. HANCOCK, was located in his home at [address] [place] [address] at the time of the visit. The provider, FREDERIC BETH, was located in his office on [place], [place]. Verbal Consent was given by the patient on 06/16/2023 and my scribe served as a witness to the verbal consent. The patient provided blood sample on June 13 at [facility] but they seemed not available. I called to inquire and it was determined that an entry mistake occurred where the lab results was misdated and it was reported as being drawn as March 1st. The  was agreeable to correct the mistake. I called the patient again and explained the mishap to the patient. I explain that his CBC on 06/13/2023 shows that his blood count is low. HGB is low at 9.8. HCT low at 34.9. Mean Cell Volume also low at 69.7. He reports that his diet has not changed recently. He says his last colonoscopy was several years ago.  Plan: Pt will inform his PCP and Gastroenterologist about his low blood count. His last colonoscopy was about 5 years ago. In view of the progressive anemia and the small red blood cells the gastroenterologist might be interested in performing upper GI endoscopy as well as a repeat colonoscopy. If gastroenterology evaluation does not disclose any cause for the anemia I will ask that the primary care physician evaluate the anemia further. If primary care physician would prefer I will refer the patient to a hematologist. I have asked the patient to provide blood specimen for CBC with Iron level and urine sample for Urinalysis, Culture and cytology.  07/17/2023: Mr. VIVIENNE HANCOCK presents today for a follow up audio only telephone visit for which he gave permission for. The pt was located at home, [address] [place] [address], and I was located in my office in [place], [place]. The patient reports that he had some rectal and perineal pain last night. He admits a lot of constipation and hard stool. Pt has not yet spoken to his PCP or gastroenterologist yet about his recent lab work. Patient counseled on constipation management. Pt advised to take 2 docusate or colace and miralax if needed. He was advised to increase his water intake. I believe his perineal pain is from the constipation and not from prostatitis, however if relieving his constipation does not help, we will reassess. Patient will repeat lab work this week at his nearest  lab. Orders were put in today. Patient will have a telehealth visit next week for reassessment, sooner if clinically indicated.  09/11/2023: Mr. VIVIENNE HANCOCK presents today for a follow up. He reports ongoing problems with anal/ rectal pain. Denies any testicular pain. Has spoken to his colorectal surgeon and he feels he was not helped enough. Gabapentin 300 mg helps his pain overall. He has not tried taking it in the morning because it makes him a little tired and he is fearful of driving. Pt had lab work done at his PCP on 7/21/2023. Creatinine was normal. Urine tests were normal. PSA was not done. Pt is on an iron supplement which causes some constipation. Eats a diet rich in vegetables. Pt has a pacemaker. Admits pushing and straining on urination.  I increased the dosage of Tamsulosin 0.4 mg from once daily to twice daily. My recommendation is try taking gabapentin two at night and one in the morning for at least one month and be cautious with driving. If this does not work for him, we can look into a different medication for neuropathic pain. Reviewed and discussed lab work of 7/21/2023 in detail with the pt. Pt strongly advised to increase his water consumption to reduce constipation. I was considering ordering an MRI of the prostate given his perineal pain, however given the patient's pacemaker and that his pain has been found to be mostly neuropathic in origin, he will RTO for an US of the prostate to check for any abscesses.  12/4/23: patient here for urgent visit for rectal pain same as described to dr beth in Sept 2023 constipation betterseeing colorectal surgeon- to see in Jan 2024- normal eval by reportlast week urine done wiht PCP : ( nov 25) : nomal - took 3d of levaquinpsa 0.17creat 1.0normal white count 8.0no luts- good flow, no strain feels empty after void here for eval:1- check urine2- exam signif for right sided pelvic pain -3- to f/u with dr beth-consider PT of pelvic floor  12/12/2023: Mr. HANCOCK presents today for a follow up audio only telehealth visit for which they gave permission for. The patient was located at home [address] [address] and I was located in my office in [place], [place]. Patient's 12/4/23 UA showed Proteinuria 30 mg/dL, trace Ketonuria, moderate blood, 19/HPF of WBC, 64/HPF of RBC and mucus present.  Wife reports the patient is experiencing terrible pain. The patient did take took levofloxacin but the abx resulted in no relief. They desired a stronger antibiotic, but I explained to them, if the microscopic hematuria does not clear I believed it to be suppurative to obtain a cystoscopy. Also explained this may be symptoms of bladder cancer, so it is within their best interest to obtain a Cystoscopy.   Patient is not agreeable to obtaining a cystoscopy as he believes it was a waste of time. I assured patient obtaining one is strongly suggested as his symptoms are ones of bladder cancer. His last Cystoscopy was back in March 23 2017. As further intervention, I prescribed the patient doxycycline 100 mg, one capsule every 12 hours. I informed the patient to avoid excessive amounts of direct sunlight while on this medication. If Cystoscopy is negative and Doxycycline does not relieve pain, I will recommend Pelvic floor therapy.  Pt will schedule a telehealth visit in 2 weeks.   01/17/2024: Mr. VIVIENNE HANCOCK presents today for an audio visual telehealth visit for which he gave permission for. The patient was located at home at [address] [place] [address] and I was located at my office in [place], [place]. When I called, the patient was in physical therapy and gave permission for me to discuss with his wife by telephone call. She informed me that he is having a lot of pain in his prostate. At the time of his last suspected episode of prostatitis, I prescribed him doxycycline, however she informs me that it did not help him at all. The patient's last MRI of the pelvis was in April 2021. The patient now has a pacemaker. He is currently taking gabapentin.   Reviewed and discussed lab work of 12/28/2023 in detail with the pt's wife.  Patient will provide a new urine specimen at his nearest  lab. While he is there he will also have blood drawn for orders I have put in today. If he has pyuria, will consider abx. Advised him to take two gabapentin at night for pain. He was advised to not drive if he is drowsy from the gabapentin.  I am sending the patient for a CT abdomen and pelvis to evaluate prostate pain.  Patient will have a telehealth visit after obtaining the CT to review and discuss results. He will also have a telehealth visit this Friday to discuss urine test results.    01/22/2024: Mr. VIVIENNE HANCOCK presents today for a follow up audio only telephone visit for which he gave permission for. The pt was located at home, [address] [place] [address], and I was located in my office in [place], [place]. The patient has not yet obtained the CT I ordered, nor has he supplied a urine specimen to a [facility] lab. He reports that he is feeling okay this week.    Patient will obtain the CT abdomen and pelvis w/wo contrast.  Patient will have a telehealth visit after obtaining the CT to review and discuss results.    01/31/2024: Mr. VIVIENNE HANCOCK presents today for a follow up audio only telephone visit for which he gave permission for. The pt was located at home, [address] [place] [address], and I was located in my office in [place], [place]. Visit was conducted with patient's wife. Patient had lab work done on 1/29/2024. PSA was very good at 0.19. Creatinine was 0.99. UA was compatible with current infection. The urine appeared cloudy, with 30 mg/dl of protein, large blood by dipstick, moderate LEC, 198 WBC/HPF, 132 RBC/HPF and only 1 epithelial cell/HPF indicating it was a clean catch. However, urine culture was no significant growth. Urine cytology was negative for high grade urothelial carcinoma. Rare benign urothelial cells and squamous cells were seen in a background of severe acute inflammation and RBCs. Patient had PT yesterday which included ZENA and massage. She states that they felt something was wrong with his prostate. I assume they meant it was boggy.    Reviewed and discussed lab work of 1/29/2024 in detail with the pt.  I prescribed the patient Ciprofloxacin 500 mg, one tablet twice a day. I informed the patient that Ciprofloxacin may cause sensitivity to direct sunlight.  Will provide a urine specimen after completion of abx.  Will have a telehealth visit after providing a urine specimen to review and discuss results as well as how he is feeling.    02/29/2024: Mr. HANCOCK presents today for a follow up audio-only telehealth visit for which they gave permission for. The patient was located at home [address] [place] [address] and I was located in my office in [place], [place]. CT Abdomen and Pelvis 2/16/24 that revealed No renal calculus or hydronephrosis. Renal cysts, largest measuring 2.3 cm in the right upper pole. Subcentimeter lesions too small to characterize, likely additional cysts. Nonspecific wall thickening of the bladder with perivesicular fat stranding, which may be seen in the setting of cystitis. Post abx, patient endorses improvement. Denies taking any abx prior to urine testing. After 7 days he discontinued the Cipro due to diarrhea and loose stool. We will consider this for the future.  Clinical findings and CT Abdomen and Pelvis results were reviewed at length with the patient. I personally reviewed the imaging myself. Patient has evidence of bladder inflammation on CT scan but otherwise findings are quite good. Advised patient to increase fluid intake.  Pt will go to his nearest [facility] lab to provide a urine sample which will be sent for urinalysis, urine culture, and urine cytology. The patient will return to office in 3-4 months.  04/26/2024: Mr. HANCOCK presents today for a follow up audio-only telehealth visit for which they gave permission for. The patient was located at home [address] [place] [address] and I was located in my office in [place], [place]. 4/22/24 UA showed trace proteinuria, large blood, small Leukocyte Esterase, 97/HPF of WBC, 187/HPF of RBC. Cytology is negative for high grade urothelial carcinoma. Scattered increased red blood cells and white blood cells are present in a background of benign urothelial cells and rare squamous cells. Culture showed no growth.     Reviewed and discussed laboratory work of 4/22/24 which He obtained prior to today's visit as requested. PSA is undetectable which is excellent.   Pt will schedule a telehealth visit  Duration of call   Minutes.

## 2024-04-25 NOTE — ADDENDUM
[FreeTextEntry1] : This note was authored by Rin Elias working as a scribe for Dr. Collins Duron. I, Dr. Collins Duron have reviewed the content of this note and confirm it is true and accurate. I personally performed the history and physical examination and made all the decisions 04/26/2024.

## 2024-04-26 ENCOUNTER — APPOINTMENT (OUTPATIENT)
Dept: UROLOGY | Facility: CLINIC | Age: 79
End: 2024-04-26
Payer: MEDICARE

## 2024-04-26 DIAGNOSIS — R10.2 PELVIC AND PERINEAL PAIN: ICD-10-CM

## 2024-04-26 DIAGNOSIS — D32.9 BENIGN NEOPLASM OF MENINGES, UNSPECIFIED: ICD-10-CM

## 2024-04-26 DIAGNOSIS — R35.1 NOCTURIA: ICD-10-CM

## 2024-04-26 DIAGNOSIS — R30.0 DYSURIA: ICD-10-CM

## 2024-04-26 DIAGNOSIS — N41.1 CHRONIC PROSTATITIS: ICD-10-CM

## 2024-04-26 DIAGNOSIS — R39.12 POOR URINARY STREAM: ICD-10-CM

## 2024-04-26 DIAGNOSIS — R39.89 OTHER SYMPTOMS AND SIGNS INVOLVING THE GENITOURINARY SYSTEM: ICD-10-CM

## 2024-04-26 DIAGNOSIS — R80.9 PROTEINURIA, UNSPECIFIED: ICD-10-CM

## 2024-04-26 DIAGNOSIS — N13.8 BENIGN PROSTATIC HYPERPLASIA WITH LOWER URINARY TRACT SYMPMS: ICD-10-CM

## 2024-04-26 DIAGNOSIS — N41.9 INFLAMMATORY DISEASE OF PROSTATE, UNSPECIFIED: ICD-10-CM

## 2024-04-26 DIAGNOSIS — N40.1 BENIGN PROSTATIC HYPERPLASIA WITH LOWER URINARY TRACT SYMPMS: ICD-10-CM

## 2024-04-26 DIAGNOSIS — N30.20 OTHER CHRONIC CYSTITIS W/OUT HEMATURIA: ICD-10-CM

## 2024-04-26 DIAGNOSIS — N52.9 MALE ERECTILE DYSFUNCTION, UNSPECIFIED: ICD-10-CM

## 2024-04-26 DIAGNOSIS — K60.2 ANAL FISSURE, UNSPECIFIED: ICD-10-CM

## 2024-04-26 DIAGNOSIS — R39.15 URGENCY OF URINATION: ICD-10-CM

## 2024-04-26 DIAGNOSIS — R82.89 OTHER ABNRM FNDNGS ON CYTOLOGICAL: ICD-10-CM

## 2024-04-26 DIAGNOSIS — R82.81 PYURIA: ICD-10-CM

## 2024-04-26 DIAGNOSIS — R93.89 ABNORMAL FINDINGS ON DIAGNOSTIC IMAGING OF OTHER SPECIFIED BODY STRUCTURES: ICD-10-CM

## 2024-04-26 DIAGNOSIS — R31.29 OTHER MICROSCOPIC HEMATURIA: ICD-10-CM

## 2024-04-26 DIAGNOSIS — K62.89 OTHER SPECIFIED DISEASES OF ANUS AND RECTUM: ICD-10-CM

## 2024-04-26 PROCEDURE — 99443: CPT | Mod: 95

## 2024-09-27 ENCOUNTER — APPOINTMENT (OUTPATIENT)
Dept: UROLOGY | Facility: CLINIC | Age: 79
End: 2024-09-27

## 2024-10-01 ENCOUNTER — APPOINTMENT (OUTPATIENT)
Dept: UROLOGY | Facility: CLINIC | Age: 79
End: 2024-10-01

## 2024-10-02 ENCOUNTER — APPOINTMENT (OUTPATIENT)
Dept: UROLOGY | Facility: CLINIC | Age: 79
End: 2024-10-02

## 2024-10-02 DIAGNOSIS — N40.1 BENIGN PROSTATIC HYPERPLASIA WITH LOWER URINARY TRACT SYMPMS: ICD-10-CM

## 2024-10-02 DIAGNOSIS — J45.909 UNSPECIFIED ASTHMA, UNCOMPLICATED: ICD-10-CM

## 2024-10-02 DIAGNOSIS — N34.2 OTHER URETHRITIS: ICD-10-CM

## 2024-10-02 DIAGNOSIS — N13.8 BENIGN PROSTATIC HYPERPLASIA WITH LOWER URINARY TRACT SYMPMS: ICD-10-CM

## 2024-10-02 DIAGNOSIS — R80.9 PROTEINURIA, UNSPECIFIED: ICD-10-CM

## 2024-10-02 DIAGNOSIS — R29.898 OTHER SYMPTOMS AND SIGNS INVOLVING THE MUSCULOSKELETAL SYSTEM: ICD-10-CM

## 2024-10-02 DIAGNOSIS — K62.89 OTHER SPECIFIED DISEASES OF ANUS AND RECTUM: ICD-10-CM

## 2024-10-02 DIAGNOSIS — R39.89 OTHER SYMPTOMS AND SIGNS INVOLVING THE GENITOURINARY SYSTEM: ICD-10-CM

## 2024-10-02 DIAGNOSIS — K64.9 UNSPECIFIED HEMORRHOIDS: ICD-10-CM

## 2024-10-02 DIAGNOSIS — R39.12 POOR URINARY STREAM: ICD-10-CM

## 2024-10-02 DIAGNOSIS — R82.81 PYURIA: ICD-10-CM

## 2024-10-02 DIAGNOSIS — R82.89 OTHER ABNRM FNDNGS ON CYTOLOGICAL: ICD-10-CM

## 2024-10-02 DIAGNOSIS — R35.0 FREQUENCY OF MICTURITION: ICD-10-CM

## 2024-10-02 DIAGNOSIS — R30.0 DYSURIA: ICD-10-CM

## 2024-10-02 DIAGNOSIS — R10.2 PELVIC AND PERINEAL PAIN: ICD-10-CM

## 2024-10-02 DIAGNOSIS — R31.29 OTHER MICROSCOPIC HEMATURIA: ICD-10-CM

## 2024-10-02 DIAGNOSIS — N41.1 CHRONIC PROSTATITIS: ICD-10-CM

## 2024-10-02 DIAGNOSIS — N41.9 INFLAMMATORY DISEASE OF PROSTATE, UNSPECIFIED: ICD-10-CM

## 2024-10-02 DIAGNOSIS — R39.15 URGENCY OF URINATION: ICD-10-CM

## 2024-10-02 PROCEDURE — 99443: CPT | Mod: 93

## 2024-10-02 NOTE — ASSESSMENT
[FreeTextEntry1] : Mr Hancock is a 78 year old man presented for follow up of gross hematuria, urinary frequency and urgency. The patient takes tamsulosin 0.4 mg, one half hour after meal twice daily, finasteride 5 mg once daily, and desipramine 10 mg at bedtime. The patient returned to the office 03/20/17 having gross hematuria with blood clot. Dr Holly Bernardo started the patient on Bactrim DS. He eventually went to the ER 03/21/17 where he had a Spann catheter placed. His creatinine in the ER was 1.01. Cystoscopy 03/23/17 found catheter trauma. Prostate protruded modest distance into the bladder. Bladder has a small capacity. Diffuse oozing of blood from bladder neck and trigone. Scraped up mucosa most likely from introduction of telescope. No bladder stones or tumors. Bladder inflammation from catheter. CT urogram was normal and found no etiology of the gross hematuria. 3/1/18: The patient returned and reported he has pain in his rectum area and penis. PSA from 1/24/18 was 0.39 ng/mL. 2/15/18 Creatinine was 1.01 mg/dL, hemoglobin was 14.1 g/dL. He received and abdomen ultrasound which shows a fatty liver, gallstones, and obscured pancreas. He denies any gross hematuria. He noted one day ago his GI physician put banding on his hemorrhoids. From March 1, 2018 through April 4, 2019 patient had significant complaints of perirectal and rectal pain.  He did have issues with hemorrhoids and an anal fissure.  Some of the symptoms did seem referable to recurrent bouts of prostatitis exacerbation.  He was treated intermittently generally with Bactrim or Cipro and usually with some improvement in symptoms.  He had a May 9, 2019 transrectal ultrasound of the prostate which showed a prostate volume was 23.4 cm and a hypoechoic area at the left apex which was about 1.4 cm in transverse image.  The seminal vesicles were unremarkable and no rectal masses were seen.  The hypoechoic area was compatible with an increased risk of prostate cancer but the patient has a very low PSA being under 0.4 on recurrent readings and by palpation had not felt any suspicious findings in the past.  I asked for a transperineal needle biopsy of the prostate of the hypoechoic region but the patient declined because of his anal pain and the known anal fissure.  The patient underwent a anal fistula repair.  It helped his symptoms but symptoms persist.  And occasionally got worse is colorectal surgeon said that at the current time he had no fistulas no hemorrhoids and as far as the surgery could so there was no actual anal rectal pathology despite the perirectal pain and occasional rectal pain.  The patient I thought possibly that it was related to his residual chronic prostatitis.  On August 25, 2020 the patient reported increasing his nocturia.  He at that time was using metronidazole rectal gel for rectal pains which seem to help.  The November 17, 2020 digital rectal exam showed no a rectal mucosal nodule.  He was asked to follow-up with his gastrointestinal surgeon (colorectal surgeon) and her gastroenterologist.  The patient's colorectal surgeon is Dr. Mustapha cardoso.  A MRI of April 29, 2021 showed no evidence of a perianal fistula despite the patient's complaint of continued rectal pain.  In the past the patient be given desmopressin for his nocturia which did not seem to help and was discontinued.  He he saw his cardiologist  in the late spring 2021 who found no EKG problems.  By September 2021 he was complaining of strain to urinate and having to push at this point he wanted to resume finasteride and tamsulosin which he had stopped previously.  The patient was not on trospium at the time he had stopped tadalafil due to unpleasant feeling.   09/29/2021: Patient presents today for follow up. 9/1/21 UA showed 12 RBC/HPF and trace blood by dipstick. On repeat 9/9/21, he had only 3 RBC/HPF and small blood by dripstick which normal. Urine cytology 9/1/21 negative for high grade urothelial carcinoma. Provided blood work from 9/17/21 done by PCP which showed UA slightly cloudy, 3-5 RBC/HPF. PSA 0.2. Wakes 4x at night to urinate. States he has rectal pain when bladder is full. 02/02/2022: Patient presents today for follow up. Reviewed 12/29/21 lab work which showed MCV low at 75.2, WBC 6600, Hb 12.1, platelets [id number], potassium 4.9, creatinine 0.90 upper limits 1.18, BUN high at 25, AST minimally elevated 39 upper limits 34, alk phos normal 60, HbA1c 6.0, UA dipstick positive small blood, moderate leukocyte esterase, few squamous epithelial cells. No gross hematuria. Has pain when he sits and feels better when standing or laying down. States he must push to urinate. No pain on urination. Nocturia x3-4. Takes tamsulosin 0.4mg BID. Reports his EKG is normal. 03/14/2022: Patient presents today for follow up. Has been doing well, however does have to push urination. Nocturia x4. Erections are poor but not interested in medications. No dysuria. Stream is slow. Continues finasteride 5mg once daily and tamsulosin 0.4mg BID. Does not take tadalafil every day. Does not have any red itchy skin and has not needed to use clotrimazole-betamethasone cream. Reviewed 2/2/22 lab work. Urine culture grew 10,000-40,000 CFU/mL Enterococcus faecalis and patient was treated with amoxicillin 500mg TID and feels better now. PSA 0.24. Free testosterone low 3.9. Lab work of 2/11/22 showed brain natriuretic peptide normal, procalcitonin undetectable <0.10 ng/mL. Will be having evaluation for anorectal pain, with the proposed procedures being anorectal manometry, electromyography, pudendal nerve terminal motor latency, and endorectal ultrasound. Denies bladder pain. Will be having procedure for varicose veins of RLE. 03/30/2022: Patient presents today for follow up. Urination is good. Continues finasteride 5mg once daily and tamsulosin 0.4mg BID. Did not help with urination. Denies dysuria. Only has urinary urgency at night. Notes he eats spicy foods. No pain where he sits. Energy levels are good. Has tried Cialis in the past which did not help much. Erections had been poor since the biopsy. Reviewed 3/13/22 lab work which was WNL except free testosterone low 4.0, LH elevated 19, dihydrotestosterone low 4.1. Total testosterone normal 250. PSA was 0.20. UA normal. Urine cytology showed clusters of atypical urothelial cells with high nuclear cytoplasmic ratio, nuclear hyperchromasia and irregular nuclear contours in a background of moderate acute inflammation. States he went to rectal surgeon for anorectal pain, and had US which was reportedly normal and found muscles are weak, and was advised muscle dysfunction physical therapy which he is currently attending. Reviewed 3/13/22 lab work with patient. Urine cytology showed clusters of atypical urothelial cells with high nuclear cytoplasmic ratio, nuclear hyperchromasia and irregular nuclear contours in a background of moderate acute inflammation, and which we will continue to watch. If cells persist, we will consider imaging test of kidneys, and cystoscopy. Recommend avoid spicy foods as it will cause irritation of both bladder and rectum, but is not harmful. Continue finasteride 5mg once daily and tamsulosin 0.4mg BID. I prescribed the patient tadalafil 5mg once daily for BPH symptoms and ED. I informed the patient that they may experience tingling of the skin, headache, warm flushed feeling, heartburn, backache, and on rare occasion, visual or hearing disturbances. Informed the patient how to use [employer] to  tadalafil at a [employer] or [employer] for a discounted price. I explained it had been previously used as heart medication and that it is safe to take as long as it is not taken with nitroglycerin which would cause BP to drop too low. The patient produced a urine sample which will be sent for urinalysis, urine cytology, and urine culture. RTO in 3 months for follow up or sooner if new urinary symptoms develop.  04/08/2022: Patient presents today for a follow up telephone visit for which he gave permission for. The visit was mostly conducted with his wife who was present. The patient has been taking finasteride 5 mg once daily and tamsulosin 0.4 mg BID. I prescribed the patient tadalafil 5 mg once daily at the time of the last visit, however he has not taken it. The pt's wife states today that her  told me during his 3/30/2022 visit he had a problems taking tadalafil which included flatulence and severe muscle aches. Both my personal memory and my note from that day do not recall him ever saying that, however there must have been some sort of miscommunication.  04/28/2022: Patient presents today for a follow up visit. Yesterday, he was having pain, but today he is feeling better. On 4/27/22, his PSA was 0.21 ng/mL, creatinine was 1.04. He also is complaining of rectal pain. He currently is taking Silodosin, one capsule daily, Tamsulosin once daily, Proscar once daily and Cefadroxil. His symptoms have improved with Cefadroxil. Previously he took Cipro, but it did not work satisfactorily. He is not taking Tadalafil. He denies urgency, but is experiencing slight urge incontinence. He reports Gabapentin alleviated his pain.  The patient produced a urine sample which will be sent for urinalysis, urine cytology, and urine culture. I am prescribing Gabapentin, 300 mg, two capsules at night, one in the morning for his pain. RTO in 1 month, sooner if needed.  06/07/2022: 's wife called today and spoke to my nurse. She stated that I told her  to take tamsulosin 0.4 mg 2 capsules BID, however there was a misunderstanding and his wife wanted to speak to me. I informed her that it is tamsulosin 0.4 mg one capsule BID and she understood. His wife reports that he has been having difficulties urinating. Has an appt to see me on 6/30/2022.  Urine cytology of 4/28/2022 revealed rare atypical cells with nuclear enlargement, prominent nucleoli, and vacuolated cytoplasm. It was my recommendation after reviewing back in April that he schedule a cystoscopy to evaluate atypical urine cytology, however the pt has not scheduled one yet. Last cystoscopy was in 2017. His wife understood and informed me she would schedule him for a cystoscopy with me within the next two weeks. I am sending the patient for a CT Urogram w/wo IV contrast. Creatinine was 1.04 on 4/27/2022. Patient will RTO for cystoscopy and to go over the results of his CT.  07/20/2022:  presents today for a follow up. Patient obtained a CT Urogram w/wo contrast on 7/14/2022 to evaluate microscopic hematuria. CT revealed renal cysts and less than 1 cm low-attenuation lesions which are too small to characterize. No enhancing lesions, or renal stones are identified. On the delayed images, there is poor opacification of the proximal and distal ureter and the right distal ureter. No abnormal soft tissue or gross urothelial lesion is appreciated. Bladder was within normal limits. The anterior aspect of the bladder is not opacified on the delayed imaging. No lymphadenopathy was seen. The patient was pleased with the CT findings. We reviewed his prostate symptoms. Has a long hx of chronic prostatitis that initially responded to cipro but sometimes required bactrim. He currently has no acute problems. Denies pain when sitting for long periods of time. Has some minor urinary urgency and frequency at times but is overall pleased with his urologic state. Continues on finasteride 5 mg once daily, tamsulosin 0.4 mg once daily, tadalafil 5 mg once daily, and silodosin 8 mg once daily. Last PSA 4/27/2022 was 0.21. Creatinine at that time was 1.04.  Clinical findings and CT results were reviewed at length with the patient and his wife. He was pleased with the findings. The patient produced a urine sample which will be sent for urinalysis, urine cytology, and urine culture. Patient will RTO in 6 months or sooner if clinically indicated.  07/27/2022:  presents today for a telephone visit for which he gave permission for. He wanted to review the results of his most recent urine test results. UA was perfect. Culture was no growth. Cytology consisted of mainly mature squamous epithelial cells, acute inflammation, and rare benign urothelial cells. Cytology was negative for high grade urothelial carcinoma. His wife inquired on why he needs to proceed with a cystoscopy  Reviewed and discussed urine test results of 7/20/2022 in detail with the patient. Given that these tests were so good, he does not need to schedule a cystoscopy at this time. I previously requested a cysto based off of a concerning urine cytology back in April. Requested that the patient provide a urine specimen in 2 weeks and another one 2-4 weeks after that to ensure cytologies remain good. So long as they are favorable, he can proceed without cystoscopy. Both the patient and his wife who is a retired pathologist understood. Patient will follow up in January at his scheduled appointment time.  03/02/2023: Mr. VIVIENNE HANCOCK presents today for a follow up. The pt continues to work as a . Patient provided a urine specimen on 2/28/2023. UA was normal. Culture was no significant growth. Urine cytology was negative for high grade urothelial carcinoma. Pt denies ever smoking or using tobacco products. Pt recently went to gastroenterologist and was told his stomach was distended. He admits to pushing and straining upon urination. His gastroenterologist put in orders for an US which he has scheduled. Patient continues to take finasteride 5 mg once daily and tamsulosin 0.4 mg BID. No longer is taking tadalafil 5 mg once daily.  Reviewed outside lab results of 1/8/2023 that was done at an outside facility. Creatinine was 1.10. A1C was 6.0. Urine was considered abnormal yellow, could be due to vitamin. Specific gravity on the lower side. Patient's PSA back in April 2022 was 0.21. Blood work today included repeat PSA. Reviewed urine test results of 2/28/2023. Renewed finasteride 5 mg once daily. I am prescribing the pt Silodosin 8 mg once daily with food to replace one of the tamsulosin. I informed him of the possible side effects of lightheadedness, nasal congestion, and decreased amount of semen when ejaculating. The pt was hesitant to restart tadalafil 5 mg once daily as he feels he is on a lot of medications. I then offered surgical intervention as an option and suggested he talk to one of my colleagues about potential options. He then opted to try tadalafil 5 mg once daily and would consider surgical intervention if it did not help. Patient will RTO in 2-3 weeks for a ZENA and to review how he is doing on the changes in his medications.  05/22/2023: Mr. VIVIENNE HANCOCK was scheduled today for a follow up audio only telephone visit for which he gave permission for. I was unable to reach the pt but did reach his wife, Dr.Almas Benjamin who was located at their home, [address] [place] [address], and I was located in my office in [place], [place]. She informs me that the pt has a lot of pain in the rectum. It hurts when he lays down or when standing. The pain is all the time and not just when he moves the bowels. He has a rectal surgeon but he does not think it is a problem with the rectum. Pt has constipation but wife reports he manages it well.  I prescribed the patient Levofloxacin 500 mg, one tablet daily until finished. I instructed him to avoid strenuous exercise for two weeks as there is a risk of tendonitis. Up to 1-2 months after finishing it, if he experiences some tendonitis he should not work through it and should rest. I also advised him to avoid direct sunlight while taking this medication. Pt was instructed to not take this medication with dairy products. If the pain does not improve within a few days or worsens, they were instructed to call back sooner and we might consider adding a medication for neuropathic pain such as gabapentin. Patient will have a telehealth visit in 2 weeks for reassessment, sooner if clinically indicated.  06/15/2023: Mr. VIVIENNE HANCOCK presents today for a follow up and ZENA. He reports no significant changes in urination. Does c/o back pain. Had a CT at [facility]. Is planning on getting an epidural injection for pain. Pt reports he had lab work done yesterday, however the results are not yet back. He also reports perineal pain when he sits. His gastroenterologist is prescribing suppositories.  The knee-chest position was utilized for the ZENA. Digital rectal exam found no suspicious rectal masses. Normal seminal vesicles. Anal tone is normal. The prostate is non tender, with normal texture, slightly firm, discrete borders, and no nodules. It is a 20 gram transurethral resection size. No rectal mucosal lesions. No gross blood on the examining finger. Reviewed imaging results from the spine specialist which he brought with him today. Has spinal canal stenosis in lumbar spine. Also reviewed most recent US of the abdomen done back in March ordered by his gastroenterologist. Does have a fatty liver but no ascites. Both kidneys were normal with no hydro. Patient will schedule a telehealth visit tomorrow to review results of his lab work.  06/16/2023: Mr. HANCOCK is a 77 year old male presenting today via telehealth using real time 2 way audio only technology. The patient, Mr. HANCOCK, was located in his home at [address] [place] [address] at the time of the visit. The provider, FREDERIC BETH, was located in his office on [place], [place]. Verbal Consent was given by the patient on 06/16/2023 and my scribe served as a witness to the verbal consent. The patient provided blood sample on June 13 at [facility] but they seemed not available. I called to inquire and it was determined that an entry mistake occurred where the lab results was misdated and it was reported as being drawn as March 1st. The  was agreeable to correct the mistake. I called the patient again and explained the mishap to the patient. I explain that his CBC on 06/13/2023 shows that his blood count is low. HGB is low at 9.8. HCT low at 34.9. Mean Cell Volume also low at 69.7. He reports that his diet has not changed recently. He says his last colonoscopy was several years ago.  Plan: Pt will inform his PCP and Gastroenterologist about his low blood count. His last colonoscopy was about 5 years ago. In view of the progressive anemia and the small red blood cells the gastroenterologist might be interested in performing upper GI endoscopy as well as a repeat colonoscopy. If gastroenterology evaluation does not disclose any cause for the anemia I will ask that the primary care physician evaluate the anemia further. If primary care physician would prefer I will refer the patient to a hematologist. I have asked the patient to provide blood specimen for CBC with Iron level and urine sample for Urinalysis, Culture and cytology.  07/17/2023: Mr. VIVIENNE HANCOCK presents today for a follow up audio only telephone visit for which he gave permission for. The pt was located at home, [address] [place] [address], and I was located in my office in [place], [place]. The patient reports that he had some rectal and perineal pain last night. He admits a lot of constipation and hard stool. Pt has not yet spoken to his PCP or gastroenterologist yet about his recent lab work. Patient counseled on constipation management. Pt advised to take 2 docusate or colace and miralax if needed. He was advised to increase his water intake. I believe his perineal pain is from the constipation and not from prostatitis, however if relieving his constipation does not help, we will reassess. Patient will repeat lab work this week at his nearest  lab. Orders were put in today. Patient will have a telehealth visit next week for reassessment, sooner if clinically indicated.  09/11/2023: Mr. VIVIENNE HANCOCK presents today for a follow up. He reports ongoing problems with anal/ rectal pain. Denies any testicular pain. Has spoken to his colorectal surgeon and he feels he was not helped enough. Gabapentin 300 mg helps his pain overall. He has not tried taking it in the morning because it makes him a little tired and he is fearful of driving. Pt had lab work done at his PCP on 7/21/2023. Creatinine was normal. Urine tests were normal. PSA was not done. Pt is on an iron supplement which causes some constipation. Eats a diet rich in vegetables. Pt has a pacemaker. Admits pushing and straining on urination.  I increased the dosage of Tamsulosin 0.4 mg from once daily to twice daily. My recommendation is try taking gabapentin two at night and one in the morning for at least one month and be cautious with driving. If this does not work for him, we can look into a different medication for neuropathic pain. Reviewed and discussed lab work of 7/21/2023 in detail with the pt. Pt strongly advised to increase his water consumption to reduce constipation. I was considering ordering an MRI of the prostate given his perineal pain, however given the patient's pacemaker and that his pain has been found to be mostly neuropathic in origin, he will RTO for an US of the prostate to check for any abscesses.  12/4/23: patient here for urgent visit for rectal pain same as described to dr beth in Sept 2023 constipation betterseeing colorectal surgeon- to see in Jan 2024- normal eval by reportlast week urine done wiht PCP : ( nov 25) : nomal - took 3d of levaquinpsa 0.17creat 1.0normal white count 8.0no luts- good flow, no strain feels empty after void here for eval:1- check urine2- exam signif for right sided pelvic pain -3- to f/u with dr beth-consider PT of pelvic floor  12/12/2023: Mr. HANCOCK presents today for a follow up audio only telehealth visit for which they gave permission for. The patient was located at home [address] [address] and I was located in my office in [place], [place]. Patient's 12/4/23 UA showed Proteinuria 30 mg/dL, trace Ketonuria, moderate blood, 19/HPF of WBC, 64/HPF of RBC and mucus present.  Wife reports the patient is experiencing terrible pain. The patient did take took levofloxacin but the abx resulted in no relief. They desired a stronger antibiotic, but I explained to them, if the microscopic hematuria does not clear I believed it to be suppurative to obtain a cystoscopy. Also explained this may be symptoms of bladder cancer, so it is within their best interest to obtain a Cystoscopy.   Patient is not agreeable to obtaining a cystoscopy as he believes it was a waste of time. I assured patient obtaining one is strongly suggested as his symptoms are ones of bladder cancer. His last Cystoscopy was back in March 23 2017. As further intervention, I prescribed the patient doxycycline 100 mg, one capsule every 12 hours. I informed the patient to avoid excessive amounts of direct sunlight while on this medication. If Cystoscopy is negative and Doxycycline does not relieve pain, I will recommend Pelvic floor therapy.  Pt will schedule a telehealth visit in 2 weeks.   01/17/2024: Mr. VIVIENNE HANCOCK presents today for an audio visual telehealth visit for which he gave permission for. The patient was located at home at [address] [place] [address] and I was located at my office in [place], [place]. When I called, the patient was in physical therapy and gave permission for me to discuss with his wife by telephone call. She informed me that he is having a lot of pain in his prostate. At the time of his last suspected episode of prostatitis, I prescribed him doxycycline, however she informs me that it did not help him at all. The patient's last MRI of the pelvis was in April 2021. The patient now has a pacemaker. He is currently taking gabapentin.   Reviewed and discussed lab work of 12/28/2023 in detail with the pt's wife.  Patient will provide a new urine specimen at his nearest  lab. While he is there he will also have blood drawn for orders I have put in today. If he has pyuria, will consider abx. Advised him to take two gabapentin at night for pain. He was advised to not drive if he is drowsy from the gabapentin.  I am sending the patient for a CT abdomen and pelvis to evaluate prostate pain.  Patient will have a telehealth visit after obtaining the CT to review and discuss results. He will also have a telehealth visit this Friday to discuss urine test results.    01/22/2024: Mr. VIVIENNE HANCOCK presents today for a follow up audio only telephone visit for which he gave permission for. The pt was located at home, [address] [place] [address], and I was located in my office in [place], [place]. The patient has not yet obtained the CT I ordered, nor has he supplied a urine specimen to a [facility] lab. He reports that he is feeling okay this week.    Patient will obtain the CT abdomen and pelvis w/wo contrast.  Patient will have a telehealth visit after obtaining the CT to review and discuss results.    01/31/2024: Mr. VIVIENNE HANCOCK presents today for a follow up audio only telephone visit for which he gave permission for. The pt was located at home, [address] [place] [address], and I was located in my office in [place], [place]. Visit was conducted with patient's wife. Patient had lab work done on 1/29/2024. PSA was very good at 0.19. Creatinine was 0.99. UA was compatible with current infection. The urine appeared cloudy, with 30 mg/dl of protein, large blood by dipstick, moderate LEC, 198 WBC/HPF, 132 RBC/HPF and only 1 epithelial cell/HPF indicating it was a clean catch. However, urine culture was no significant growth. Urine cytology was negative for high grade urothelial carcinoma. Rare benign urothelial cells and squamous cells were seen in a background of severe acute inflammation and RBCs. Patient had PT yesterday which included ZENA and massage. She states that they felt something was wrong with his prostate. I assume they meant it was boggy.    Reviewed and discussed lab work of 1/29/2024 in detail with the pt.  I prescribed the patient Ciprofloxacin 500 mg, one tablet twice a day. I informed the patient that Ciprofloxacin may cause sensitivity to direct sunlight.  Will provide a urine specimen after completion of abx.  Will have a telehealth visit after providing a urine specimen to review and discuss results as well as how he is feeling.    02/29/2024: Mr. HANCOCK presents today for a follow up audio-only telehealth visit for which they gave permission for. The patient was located at home [address] [place] [address] and I was located in my office in [place], [place]. CT Abdomen and Pelvis 2/16/24 that revealed No renal calculus or hydronephrosis. Renal cysts, largest measuring 2.3 cm in the right upper pole. Subcentimeter lesions too small to characterize, likely additional cysts. Nonspecific wall thickening of the bladder with perivesicular fat stranding, which may be seen in the setting of cystitis. Post abx, patient endorses improvement. Denies taking any abx prior to urine testing. After 7 days he discontinued the Cipro due to diarrhea and loose stool. We will consider this for the future.  Clinical findings and CT Abdomen and Pelvis results were reviewed at length with the patient. I personally reviewed the imaging myself. Patient has evidence of bladder inflammation on CT scan but otherwise findings are quite good. Advised patient to increase fluid intake.  Pt will go to his nearest [facility] lab to provide a urine sample which will be sent for urinalysis, urine culture, and urine cytology. The patient will return to office in 3-4 months.  04/26/2024: Mr. HANCOCK presents today for a follow up audio-only telehealth visit for which they gave permission for. The patient was located at home [address] [place] [address] and I was located in my office in [place], [place].  The patient has a long history of irritative bladder symptoms with bladder pain and dysuria. He had a cystoscopy in 2017 but has declined any further cystoscopies. He declines cystoscopies in the office with lidocaine anesthesia and oral sedation. He declines cystoscopy under anesthesia in the operating room. I have explained to him the risks and benefits of cystoscopy and he adamantly refuses. I have explained things to his wife to administer help him persuading him to undergo cystoscopy. She is a retired pathologist but has not been able to persuade him. We have had a recent CT urogram which was done February 16, 2024 no urinary calculi or hydronephrosis was seen. There is no evidence of an enhancing renal mass or suspicious urothelial lesion. There was nonspecific wall thickening of the bladder with perivesical fat stranding.  The patient recently experienced dysuria which had been quiescent for a while. He provided a urine specimen on April 22, 2024 which demonstrated large blood by dipstick; small leukocyte esterase by dipstick. He had 97 white blood cells per high-power field and 187 red blood cells per high-power field. Nitrites were negative. There is only 1 epithelial cell per high-power field and microscopic examination was negative for bacteria. Urine cytology was negative for high-grade urothelial carcinoma. There was scattered increased red blood cells and white blood cells present in the cytology specimen. There was a background of benign urothelial cells and rare squamous cells. Interestingly urine culture was no growth.  The prior urinalysis was on January 29, 2024. It had 3 mg/dL of protein with specific gravity 1.021. The appearance was cloudy. Blood was large by dipstick. Leukocyte esterase was moderate by dipstick. It was 198 white blood cells per high-power field and 132 red blood cells per high-power field. No bacteria were seen on the microscope and there was 1 epithelial cell per high-power field. Nitrites were negative. In this case the urine culture showed less than 10,000 colony-forming units per milliliter of normal urogenital ok.  The reason for the pyuria is unclear CT scan urogram February 16, 2024 had not shown any evidence of chronic renal tuberculosis. There was no evidence in his urine testing of fungal infection though a urine culture specifically for fungus had not been done recently. The patient is from [place]. His wife denies that he had any known exposure to tuberculosis. Currently he no longer has dysuria and is at baseline symptomatically.  Reviewed and discussed laboratory work of 4/22/24 which He obtained prior to today's visit as requested. PSA is undetectable which is excellent.  4/22/24 UA showed trace proteinuria, large blood, small Leukocyte Esterase, 97/HPF of WBC, 187/HPF of RBC. Cytology is negative for high grade urothelial carcinoma. Scattered increased red blood cells and white blood cells are present in a background of benign urothelial cells and rare squamous cells. Culture showed no growth.   I have asked that the patient submit a first morning urine from 3 different days for acid-fast culture. I will also ask for urine to be cultured for fungus. I explained the reasoning as to why I consider these tests of value with the wife gave assurances that this would be done but again repeated that the patient would not have a cystoscopy. Patient was present in the room as we had this audio only telephone visit. He asked his questions of his wife to relay to me. I answered all their questions. He did ask for an in person appointment which we will provide.  10/02/2024: Mr. VIVIENNE HANCOCK presents today for an audio only telehealth visit for which he gave permission for. The patient was located at home at [address] [place] [address] and I was located at my office in [place], [place]. He is accompanied by his wife, Candido Benjamin. Patient reports that things are stable. Urination is quite good. Denies any pain when he sits. Denies any pain related to the prostate. Patient had lab work done on 9/25/2024. UA was normal other than a trace of LE. Urine culture was no growth. Urine cytology was negative for high grade urothelial carcinoma.    Reviewed and discussed lab work of 9/25/2024 in detail with the pt. We reviewed prior PSA values. Given that he has been stable and currently has no symptoms, I feel it is safe to get another PSA level in December.    Renewed finasteride 5 mg once daily and tamsulosin 0.4 mg BID.  Patient will have lab work done in December, followed by a telehealth visit to review the results.    Duration of telephone visit was 21 minutes.

## 2024-10-02 NOTE — HISTORY OF PRESENT ILLNESS
[FreeTextEntry1] : Mr Hancock is a 78 year old man presented for follow up of gross hematuria, urinary frequency and urgency. The patient takes tamsulosin 0.4 mg, one half hour after meal twice daily, finasteride 5 mg once daily, and desipramine 10 mg at bedtime. The patient returned to the office 03/20/17 having gross hematuria with blood clot. Dr Holly Bernardo started the patient on Bactrim DS. He eventually went to the ER 03/21/17 where he had a Spann catheter placed. His creatinine in the ER was 1.01. Cystoscopy 03/23/17 found catheter trauma. Prostate protruded modest distance into the bladder. Bladder has a small capacity. Diffuse oozing of blood from bladder neck and trigone. Scraped up mucosa most likely from introduction of telescope. No bladder stones or tumors. Bladder inflammation from catheter. CT urogram was normal and found no etiology of the gross hematuria.   05/24/2021: Patient presents today for follow up. Patient went to a rectal surgeon for his rectal pain and had pelvic MRI 4/29/21 which showed no evidence of perianal fistula. States he continues to have pain in rectum after sitting for a long time and then standing. Patient was instructed to take Gabapentin 400mg TID, but skips afternoon dose as it makes him sleepy. Does have constipation. Has not started Tadalafil. No hx of prostate cancer.   From March 1, 2018 through April 4, 2019 patient had significant complaints of perirectal and rectal pain.  He did have issues with hemorrhoids and an anal fissure.  Some of the symptoms did seem referable to recurrent bouts of prostatitis exacerbation.  He was treated intermittently generally with Bactrim or Cipro and usually with some improvement in symptoms.  He had a May 9, 2019 transrectal ultrasound of the prostate which showed a prostate volume was 23.4 cm and a hypoechoic area at the left apex which was about 1.4 cm in transverse image.  The seminal vesicles were unremarkable and no rectal masses were seen.  The hypoechoic area was compatible with an increased risk of prostate cancer but the patient has a very low PSA being under 0.4 on recurrent readings and by palpation had not felt any suspicious findings in the past.  I asked for a transperineal needle biopsy of the prostate of the hypoechoic region but the patient declined because of his anal pain and the known anal fissure.  The patient underwent a anal fistula repair.  It helped his symptoms but symptoms persist.  And occasionally got worse is colorectal surgeon said that at the current time he had no fistulas no hemorrhoids and as far as the surgery could so there was no actual anal rectal pathology despite the perirectal pain and occasional rectal pain.  The patient I thought possibly that it was related to his residual chronic prostatitis.  On August 25, 2020 the patient reported increasing his nocturia.  He at that time was using metronidazole rectal gel for rectal pains which seem to help.  The November 17, 2020 digital rectal exam showed no a rectal mucosal nodule.  He was asked to follow-up with his gastrointestinal surgeon (colorectal surgeon) and her gastroenterologist.  The patient's colorectal surgeon is Dr. Mustapha cardoso.  A MRI of April 29, 2021 showed no evidence of a perianal fistula despite the patient's complaint of continued rectal pain.  In the past the patient be given desmopressin for his nocturia which did not seem to help and was discontinued.  He cardiologistin the late spring 2021 who found no EKG problems.  By September 2021 he was complaining of strain to urinate and having to push at this point he wanted to resume finasteride and tamsulosin which he had stopped previously.  The patient was not on trospium at the time he had stopped tadalafil due to unpleasant feeling.    09/29/2021: Patient presents today for follow up. 9/1/21 UA showed 12 RBC/HPF and trace blood by dipstick. On repeat 9/9/21, he had only 3 RBC/HPF and small blood by dripstick which normal. Urine cytology 9/1/21 negative for high grade urothelial carcinoma. Provided blood work from 9/17/21 done by PCP which showed UA slightly cloudy, 3-5 RBC/HPF. PSA 0.2. Wakes 4x at night to urinate. States he has rectal pain when bladder is full.   02/02/2022: Patient presents today for follow up. Reviewed 12/29/21 lab work which showed MCV low at 75.2, WBC 6600, Hb 12.1, platelets [id number], potassium 4.9, creatinine 0.90 upper limits 1.18, BUN high at 25, AST minimally elevated 39 upper limits 34, alk phos normal 60, HbA1c 6.0, UA dipstick positive small blood, moderate leukocyte esterase, few squamous epithelial cells. Has pain when he sits and feels better when standing or laying down. States he must push to urinate. No pain on urination. Nocturia x3-4. Takes tamsulosin 0.4mg BID. Reports his EKG is normal.   03/14/2022: Patient presents today for follow up. Has been doing well, however does have to push urination. Nocturia x4. Erections are poor but not interested in medications. No dysuria. Stream is slow. Continues finasteride 5mg once daily and tamsulosin 0.4mg BID. Does not take tadalafil every day. Does not have any red itchy skin and has not needed to use clotrimazole-betamethasone cream. Reviewed 2/2/22 lab work. Urine culture grew 10,000-40,000 CFU/mL Enterococcus faecalis and patient was treated with amoxicillin 500mg TID and feels better now. PSA 0.24. Free testosterone low 3.9. Lab work of 2/11/22 showed brain natriuretic peptide normal, procalcitonin undetectable <0.10 ng/mL. Will be having evaluation for anorectal pain, with the proposed procedures being anorectal manometry, electromyography, pudendal nerve terminal motor latency, and endorectal ultrasound. Denies bladder pain. Will be having procedure for varicose veins of RLE.   03/30/2022: Patient presents today for follow up. Urination is good. Continues finasteride 5mg once daily and tamsulosin 0.4mg BID. Did not help with urination. Denies dysuria. Only has urinary urgency at night. Notes he eats spicy foods. No pain where he sits. Energy levels are good. Has tried Cialis in the past which did not help much. Erections had been poor since the biopsy. Reviewed 3/13/22 lab work which was WNL except free testosterone low 4.0, LH elevated 19, dihydrotestosterone low 4.1. Total testosterone normal 250. PSA was 0.20. UA normal. Urine cytology showed clusters of atypical urothelial cells with high nuclear cytoplasmic ratio, nuclear hyperchromasia and irregular nuclear contours in a background of moderate acute inflammation. States he went to rectal surgeon for anorectal pain, and had US which was reportedly normal and found muscles are weak, and was advised muscle dysfunction physical therapy which he is currently attending.   04/08/2022: Patient presents today for a follow up telephone visit for which he gave permission for. The visit was mostly conducted with his wife who was present. The patient has been taking finasteride 5 mg once daily and tamsulosin 0.4 mg BID. I prescribed the patient tadalafil 5 mg once daily at the time of the last visit, however he has not taken it. The pt's wife states today that her  told me during his 3/30/2022 visit he had a problems taking tadalafil which included flatulence and severe muscle aches. Both my personal memory and my note from that day do not recall him ever saying that, however there must have been some sort of miscommunication.   04/28/2022: Patient presents today for a follow up visit. Yesterday, he was having pain, but today he is feeling better. On 4/27/22, his PSA was 0.21 ng/mL, creatinine was 1.04. He also is complaining of rectal pain. He currently is taking Silodosin, one capsule daily, Tamsulosin once daily, Proscar once daily and Cefadroxil. His symptoms have improved with Cefadroxil. Previously he took Cipro, but it did not work satisfactorily. He is not taking Tadalafil. He denies urgency, but is experiencing slight urge incontinence. He reports Gabapentin alleviated his pain.  06/07/2022: 's wife called today and spoke to my nurse. She stated that I told her  to take tamsulosin 0.4 mg 2 capsules BID, however there was a misunderstanding and his wife wanted to speak to me. I informed her that it is tamsulosin 0.4 mg one capsule BID and she understood. His wife reports that he has been having difficulties urinating. Has an appt to see me on 6/30/2022.   07/20/2022:  presents today for a follow up. Patient obtained a CT Urogram w/wo contrast on 7/14/2022 to evaluate microscopic hematuria. CT revealed renal cysts and less than 1 cm low-attenuation lesions which are too small to characterize. No enhancing lesions, or renal stones are identified. On the delayed images, there is poor opacification of the proximal and distal ureter and the right distal ureter. No abnormal soft tissue or gross urothelial lesion is appreciated. Bladder was within normal limits. The anterior aspect of the bladder is not opacified on the delayed imaging. No lymphadenopathy was seen. The patient was pleased with the CT findings. We reviewed his prostate symptoms. Has a long hx of chronic prostatitis that initially responded to cipro but sometimes required bactrim. He currently has no acute problems. Denies pain when sitting for long periods of time. Has some minor urinary urgency and frequency at times but is overall pleased with his urologic state. Continues on finasteride 5 mg once daily, tamsulosin 0.4 mg once daily, tadalafil 5 mg once daily, and silodosin 8 mg once daily. Last PSA 4/27/2022 was 0.21. Creatinine at that time was 1.04.   07/27/2022:  presents today for a telephone visit for which he gave permission for. He wanted to review the results of his most recent urine test results. UA was perfect. Culture was no growth. Cytology consisted of mainly mature squamous epithelial cells, acute inflammation, and rare benign urothelial cells. Cytology was negative for high grade urothelial carcinoma. His wife inquired on why he needs to proceed with a cystoscopy.   03/02/2023: Mr. VIVIENNE HANCOCK presents today for a follow up. The pt continues to work as a . Patient provided a urine specimen on 2/28/2023. UA was normal. Culture was no significant growth. Urine cytology was negative for high grade urothelial carcinoma. Pt denies ever smoking or using tobacco products. Pt recently went to gastroenterologist and was told his stomach was distended. He admits to pushing and straining upon urination. His gastroenterologist put in orders for an US which he has scheduled. Patient continues to take finasteride 5 mg once daily and tamsulosin 0.4 mg BID. No longer is taking tadalafil 5 mg once daily.   05/22/2023: Mr. VIVIENNE HANCOCK was scheduled today for a follow up audio only telephone visit for which he gave permission for. I was unable to reach the pt but did reach his wife, Dr.Almas Benjamin who was located at their home, [address] [place] [address], and I was located in my office in [place], [place]. She informs me that the pt has a lot of pain in the rectum. It hurts when he lays down or when standing. The pain is all the time and not just when he moves the bowels. He has a rectal surgeon but he does not think it is a problem with the rectum. Pt has constipation but wife reports he manages it well.   06/15/2023: Mr. VIVIENNE HANCOCK presents today for a follow up and ZENA. He reports no significant changes in urination. Does c/o back pain. Had a CT at [facility]. Is planning on getting an epidural injection for pain. Pt reports he had lab work done yesterday, however the results are not yet back. He also reports perineal pain when he sits. His gastroenterologist is prescribing suppositories.   06/16/2023: Mr. HANCOCK is a 77 year old male presenting today via telehealth using real time 2 way audio only technology.  The patient,  Mr. HANCOCK, was located in his home at [address] [place] [address] at the time of the visit. The provider, FREDERIC BETH, was located in his office on [place], [place]. Verbal Consent was given by the patient on 06/16/2023 and my scribe served as a witness to the verbal consent. The patient provided blood sample on June 13 at [facility] but they seemed not available. I called to inquire and it was determined that an entry mistake occurred where the lab results was misdated and it was reported as being drawn as March 1st. The  was agreeable to correct the mistake. I called the patient again and explained the mishap to the patient. I explain that his CBC on 06/13/2023 shows that his blood count is low. HGB  is low at 9.8. HCT  low at 34.9. Mean Cell Volume  also low at 69.7. He reports that his diet has not changed recently. He says his last colonoscopy was several years ago.   07/17/2023: Mr. VIVIENNE HANCOCK presents today for a follow up audio only telephone visit for which he gave permission for. The pt was located at home, [address] [place] [address] [address] [place] [address], and I was located in my office in [place], [place]. The patient reports that he had some rectal and perineal pain last night. He admits a lot of constipation and hard stool. Pt has not yet spoken to his PCP or gastroenterologist yet about his recent lab work.   09/11/2023: Mr. VIVIENNE HANCOCK presents today for a follow up. He reports ongoing problems with anal/ rectal pain. Denies any testicular pain. Has spoken to his colorectal surgeon and he feels he was not helped enough. Gabapentin 300 mg helps his pain overall. He has not tried taking it in the morning because it makes him a little tired and he is fearful of driving. Pt had lab work done at his PCP on 7/21/2023. Creatinine was normal. Urine tests were normal. PSA was not done. Pt is on an iron supplement which causes some constipation. Eats a diet rich in vegetables. Patient has a pacemaker. Admits pushing and straining on urination.   Dr. Gonzalez- 12/4/23: patient here for urgent visit  for rectal pain  same as described to dr beth in Sept 2023 constipation better seeing colorectal surgeon- to see in Jan 2024- normal eval by report last week urine done wiht PCP : ( nov 25) : nomal - took 3d of levaquin psa 0.17 creat 1.0 normal white count 8.0 no luts- good flow, no strain , feels empty after void  here for eval: 1- check urine 2- exam signif for right sided pelvic pain - 3- to f/u with dr beth-consider PT of pelvic floor  12/12/2023: Mr. HANCOCK presents today for a follow up audio only telehealth visit for which they gave permission for. The patient was located at home [address] [place] [address] [address] [place] [address] and I was located in my office in [place], [place]. Patient's 12/4/23 UA showed Proteinuria 30 mg/dL, trace Ketonuria, moderate blood, 19/HPF of WBC, 64/HPF of RBC and mucus present.  Wife reports the patient is experiencing terrible pain. The patient did take took levofloxacin but the abx resulted in no relief. They desired a stronger antibiotic, but I explained to them, if the microscopic hematuria does not clear I believed it to be suppurative to obtain a cystoscopy. Also explained this may be symptoms of bladder cancer, so it is within their best interest to obtain a Cystoscopy.   01/17/2024: Mr. VIVIENNE HANCOCK presents today for an audio visual telehealth visit for which he gave permission for. The patient was located at home at [address] [place] [address] and I was located at my office in [place], [place]. When I called, the patient was in physical therapy and gave permission for me to discuss with his wife by telephone call. She informed me that he is having a lot of pain in his prostate. At the time of his last suspected episode of prostatitis, I prescribed him doxycycline, however she informs me that it did not help him at all. The patient's last MRI of the pelvis was in April 2021. The patient now has a pacemaker. He is currently taking gabapentin.   01/22/2024: Mr. VIVIENNE HANCOCK presents today for a follow up audio only telephone visit for which he gave permission for. The pt was located at home, [address] [place] [address], and I was located in my office in [place], [place]. The patient has not yet obtained the CT I ordered, nor has he supplied a urine specimen to a [facility] lab. He reports that he is feeling okay this week.   01/31/2024: Mr. VIVIENNE HANCOCK presents today for a follow up audio only telephone visit for which he gave permission for. The pt was located at home, [address] [place] [address], and I was located in my office in [place], [place]. Visit was conducted with patient's wife. Patient had lab work done on 1/29/2024. PSA was very good at 0.19. Creatinine was 0.99. UA was compatible with current infection. The urine appeared cloudy, with 30 mg/dl of protein, large blood by dipstick, moderate LEC, 198 WBC/HPF, 132 RBC/HPF and only 1 epithelial cell/HPF indicating it was a clean catch. However, urine culture was no significant growth. Urine cytology was negative for high grade urothelial carcinoma. Rare benign urothelial cells and squamous cells were seen in a background of severe acute inflammation and RBCs. Patient had PT yesterday which included ZENA and massage. She states that they felt something was wrong with his prostate. I assume they meant it was boggy.   02/29/2024: Mr. HANCOCK presents today for a follow up audio-only telehealth visit for which they gave permission for. The patient was located at home [address] [place] [address] and I was located in my office in [place], [place]. CT Abdomen and Pelvis 2/16/24 that revealed No renal calculus or hydronephrosis. Renal cysts, largest measuring 2.3 cm in the right upper pole. Subcentimeter lesions too small to characterize, likely additional cysts. Nonspecific wall thickening of the bladder with perivesicular fat stranding, which may be seen in the setting of cystitis. Post abx, patient endorses improvement. Denies taking any abx prior to urine testing. After 7 days he discontinued the Cipro due to diarrhea and loose stool. We will consider this for the future.  04/26/2024: Mr. HANCOCK presents today for a follow up audio-only telehealth visit for which they gave permission for. The patient was located at home [address] [place] [address] and I was located in my office in [place], [place].  The patient has a long history of irritative bladder symptoms with bladder pain and dysuria. He had a cystoscopy in 2017 but has declined any further cystoscopies. He declines cystoscopies in the office with lidocaine anesthesia and oral sedation. He declines cystoscopy under anesthesia in the operating room. I have explained to him the risks and benefits of cystoscopy and he adamantly refuses. I have explained things to his wife to administer help him persuading him to undergo cystoscopy. She is a retired pathologist but has not been able to persuade him. We have had a recent CT urogram which was done February 16, 2024 no urinary calculi or hydronephrosis was seen. There is no evidence of an enhancing renal mass or suspicious urothelial lesion. There was nonspecific wall thickening of the bladder with perivesical fat stranding.  The patient recently experienced dysuria which had been quiescent for a while. He provided a urine specimen on April 22, 2024 which demonstrated large blood by dipstick; small leukocyte esterase by dipstick. He had 97 white blood cells per high-power field and 187 red blood cells per high-power field. Nitrites were negative. There is only 1 epithelial cell per high-power field and microscopic examination was negative for bacteria. Urine cytology was negative for high-grade urothelial carcinoma. There was scattered increased red blood cells and white blood cells present in the cytology specimen. There was a background of benign urothelial cells and rare squamous cells. Interestingly urine culture was no growth.  The prior urinalysis was on January 29, 2024. It had 3 mg/dL of protein with specific gravity 1.021. The appearance was cloudy. Blood was large by dipstick. Leukocyte esterase was moderate by dipstick. It was 198 white blood cells per high-power field and 132 red blood cells per high-power field. No bacteria were seen on the microscope and there was 1 epithelial cell per high-power field. Nitrites were negative. In this case the urine culture showed less than 10,000 colony-forming units per milliliter of normal urogenital ok.  The reason for the pyuria is unclear CT scan urogram February 16, 2024 had not shown any evidence of chronic renal tuberculosis. There was no evidence in his urine testing of fungal infection though a urine culture specifically for fungus had not been done recently. The patient is from [place]. His wife denies that he had any known exposure to tuberculosis. Currently he no longer has dysuria and is at baseline symptomatically.  Reviewed and discussed laboratory work of 4/22/24 which He obtained prior to today's visit as requested. PSA is undetectable which is excellent.  4/22/24 UA showed trace proteinuria, large blood, small Leukocyte Esterase, 97/HPF of WBC, 187/HPF of RBC. Cytology is negative for high grade urothelial carcinoma. Scattered increased red blood cells and white blood cells are present in a background of benign urothelial cells and rare squamous cells. Culture showed no growth.  10/02/2024: Mr. VIVIENNE HANCOCK presents today for an audio only telehealth visit for which he gave permission for. The patient was located at home at [address] [place] [address] and I was located at my office in [place], [place]. He is accompanied by his wife, Candido Benjamin. Patient reports that things are stable. Urination is quite good. Denies any pain when he sits. Denies any pain related to the prostate. Patient had lab work done on 9/25/2024. UA was normal other than a trace of LE. Urine culture was no growth. Urine cytology was negative for high grade urothelial carcinoma.

## 2024-10-02 NOTE — HISTORY OF PRESENT ILLNESS
[FreeTextEntry1] : Mr Hancock is a 78 year old man presented for follow up of gross hematuria, urinary frequency and urgency. The patient takes tamsulosin 0.4 mg, one half hour after meal twice daily, finasteride 5 mg once daily, and desipramine 10 mg at bedtime. The patient returned to the office 03/20/17 having gross hematuria with blood clot. Dr Holly Bernardo started the patient on Bactrim DS. He eventually went to the ER 03/21/17 where he had a Spann catheter placed. His creatinine in the ER was 1.01. Cystoscopy 03/23/17 found catheter trauma. Prostate protruded modest distance into the bladder. Bladder has a small capacity. Diffuse oozing of blood from bladder neck and trigone. Scraped up mucosa most likely from introduction of telescope. No bladder stones or tumors. Bladder inflammation from catheter. CT urogram was normal and found no etiology of the gross hematuria.   05/24/2021: Patient presents today for follow up. Patient went to a rectal surgeon for his rectal pain and had pelvic MRI 4/29/21 which showed no evidence of perianal fistula. States he continues to have pain in rectum after sitting for a long time and then standing. Patient was instructed to take Gabapentin 400mg TID, but skips afternoon dose as it makes him sleepy. Does have constipation. Has not started Tadalafil. No hx of prostate cancer.   From March 1, 2018 through April 4, 2019 patient had significant complaints of perirectal and rectal pain.  He did have issues with hemorrhoids and an anal fissure.  Some of the symptoms did seem referable to recurrent bouts of prostatitis exacerbation.  He was treated intermittently generally with Bactrim or Cipro and usually with some improvement in symptoms.  He had a May 9, 2019 transrectal ultrasound of the prostate which showed a prostate volume was 23.4 cm and a hypoechoic area at the left apex which was about 1.4 cm in transverse image.  The seminal vesicles were unremarkable and no rectal masses were seen.  The hypoechoic area was compatible with an increased risk of prostate cancer but the patient has a very low PSA being under 0.4 on recurrent readings and by palpation had not felt any suspicious findings in the past.  I asked for a transperineal needle biopsy of the prostate of the hypoechoic region but the patient declined because of his anal pain and the known anal fissure.  The patient underwent a anal fistula repair.  It helped his symptoms but symptoms persist.  And occasionally got worse is colorectal surgeon said that at the current time he had no fistulas no hemorrhoids and as far as the surgery could so there was no actual anal rectal pathology despite the perirectal pain and occasional rectal pain.  The patient I thought possibly that it was related to his residual chronic prostatitis.  On August 25, 2020 the patient reported increasing his nocturia.  He at that time was using metronidazole rectal gel for rectal pains which seem to help.  The November 17, 2020 digital rectal exam showed no a rectal mucosal nodule.  He was asked to follow-up with his gastrointestinal surgeon (colorectal surgeon) and her gastroenterologist.  The patient's colorectal surgeon is Dr. Mustapha cardoso.  A MRI of April 29, 2021 showed no evidence of a perianal fistula despite the patient's complaint of continued rectal pain.  In the past the patient be given desmopressin for his nocturia which did not seem to help and was discontinued.  He cardiologistin the late spring 2021 who found no EKG problems.  By September 2021 he was complaining of strain to urinate and having to push at this point he wanted to resume finasteride and tamsulosin which he had stopped previously.  The patient was not on trospium at the time he had stopped tadalafil due to unpleasant feeling.    09/29/2021: Patient presents today for follow up. 9/1/21 UA showed 12 RBC/HPF and trace blood by dipstick. On repeat 9/9/21, he had only 3 RBC/HPF and small blood by dripstick which normal. Urine cytology 9/1/21 negative for high grade urothelial carcinoma. Provided blood work from 9/17/21 done by PCP which showed UA slightly cloudy, 3-5 RBC/HPF. PSA 0.2. Wakes 4x at night to urinate. States he has rectal pain when bladder is full.   02/02/2022: Patient presents today for follow up. Reviewed 12/29/21 lab work which showed MCV low at 75.2, WBC 6600, Hb 12.1, platelets 626799, potassium 4.9, creatinine 0.90 upper limits 1.18, BUN high at 25, AST minimally elevated 39 upper limits 34, alk phos normal 60, HbA1c 6.0, UA dipstick positive small blood, moderate leukocyte esterase, few squamous epithelial cells. Has pain when he sits and feels better when standing or laying down. States he must push to urinate. No pain on urination. Nocturia x3-4. Takes tamsulosin 0.4mg BID. Reports his EKG is normal.   03/14/2022: Patient presents today for follow up. Has been doing well, however does have to push urination. Nocturia x4. Erections are poor but not interested in medications. No dysuria. Stream is slow. Continues finasteride 5mg once daily and tamsulosin 0.4mg BID. Does not take tadalafil every day. Does not have any red itchy skin and has not needed to use clotrimazole-betamethasone cream. Reviewed 2/2/22 lab work. Urine culture grew 10,000-40,000 CFU/mL Enterococcus faecalis and patient was treated with amoxicillin 500mg TID and feels better now. PSA 0.24. Free testosterone low 3.9. Lab work of 2/11/22 showed brain natriuretic peptide normal, procalcitonin undetectable <0.10 ng/mL. Will be having evaluation for anorectal pain, with the proposed procedures being anorectal manometry, electromyography, pudendal nerve terminal motor latency, and endorectal ultrasound. Denies bladder pain. Will be having procedure for varicose veins of RLE.   03/30/2022: Patient presents today for follow up. Urination is good. Continues finasteride 5mg once daily and tamsulosin 0.4mg BID. Did not help with urination. Denies dysuria. Only has urinary urgency at night. Notes he eats spicy foods. No pain where he sits. Energy levels are good. Has tried Cialis in the past which did not help much. Erections had been poor since the biopsy. Reviewed 3/13/22 lab work which was WNL except free testosterone low 4.0, LH elevated 19, dihydrotestosterone low 4.1. Total testosterone normal 250. PSA was 0.20. UA normal. Urine cytology showed clusters of atypical urothelial cells with high nuclear cytoplasmic ratio, nuclear hyperchromasia and irregular nuclear contours in a background of moderate acute inflammation. States he went to rectal surgeon for anorectal pain, and had US which was reportedly normal and found muscles are weak, and was advised muscle dysfunction physical therapy which he is currently attending.   04/08/2022: Patient presents today for a follow up telephone visit for which he gave permission for. The visit was mostly conducted with his wife who was present. The patient has been taking finasteride 5 mg once daily and tamsulosin 0.4 mg BID. I prescribed the patient tadalafil 5 mg once daily at the time of the last visit, however he has not taken it. The pt's wife states today that her  told me during his 3/30/2022 visit he had a problems taking tadalafil which included flatulence and severe muscle aches. Both my personal memory and my note from that day do not recall him ever saying that, however there must have been some sort of miscommunication.   04/28/2022: Patient presents today for a follow up visit. Yesterday, he was having pain, but today he is feeling better. On 4/27/22, his PSA was 0.21 ng/mL, creatinine was 1.04. He also is complaining of rectal pain. He currently is taking Silodosin, one capsule daily, Tamsulosin once daily, Proscar once daily and Cefadroxil. His symptoms have improved with Cefadroxil. Previously he took Cipro, but it did not work satisfactorily. He is not taking Tadalafil. He denies urgency, but is experiencing slight urge incontinence. He reports Gabapentin alleviated his pain.  06/07/2022: 's wife called today and spoke to my nurse. She stated that I told her  to take tamsulosin 0.4 mg 2 capsules BID, however there was a misunderstanding and his wife wanted to speak to me. I informed her that it is tamsulosin 0.4 mg one capsule BID and she understood. His wife reports that he has been having difficulties urinating. Has an appt to see me on 6/30/2022.   07/20/2022:  presents today for a follow up. Patient obtained a CT Urogram w/wo contrast on 7/14/2022 to evaluate microscopic hematuria. CT revealed renal cysts and less than 1 cm low-attenuation lesions which are too small to characterize. No enhancing lesions, or renal stones are identified. On the delayed images, there is poor opacification of the proximal and distal ureter and the right distal ureter. No abnormal soft tissue or gross urothelial lesion is appreciated. Bladder was within normal limits. The anterior aspect of the bladder is not opacified on the delayed imaging. No lymphadenopathy was seen. The patient was pleased with the CT findings. We reviewed his prostate symptoms. Has a long hx of chronic prostatitis that initially responded to cipro but sometimes required bactrim. He currently has no acute problems. Denies pain when sitting for long periods of time. Has some minor urinary urgency and frequency at times but is overall pleased with his urologic state. Continues on finasteride 5 mg once daily, tamsulosin 0.4 mg once daily, tadalafil 5 mg once daily, and silodosin 8 mg once daily. Last PSA 4/27/2022 was 0.21. Creatinine at that time was 1.04.   07/27/2022:  presents today for a telephone visit for which he gave permission for. He wanted to review the results of his most recent urine test results. UA was perfect. Culture was no growth. Cytology consisted of mainly mature squamous epithelial cells, acute inflammation, and rare benign urothelial cells. Cytology was negative for high grade urothelial carcinoma. His wife inquired on why he needs to proceed with a cystoscopy.   03/02/2023: Mr. VIVIENNE HANCOCK presents today for a follow up. The pt continues to work as a . Patient provided a urine specimen on 2/28/2023. UA was normal. Culture was no significant growth. Urine cytology was negative for high grade urothelial carcinoma. Pt denies ever smoking or using tobacco products. Pt recently went to gastroenterologist and was told his stomach was distended. He admits to pushing and straining upon urination. His gastroenterologist put in orders for an US which he has scheduled. Patient continues to take finasteride 5 mg once daily and tamsulosin 0.4 mg BID. No longer is taking tadalafil 5 mg once daily.   05/22/2023: Mr. VIVIENNE HANCOCK was scheduled today for a follow up audio only telephone visit for which he gave permission for. I was unable to reach the pt but did reach his wife, Dr.Almas Benjamin who was located at their home, [address] [place] [address], and I was located in my office in [place], [place]. She informs me that the pt has a lot of pain in the rectum. It hurts when he lays down or when standing. The pain is all the time and not just when he moves the bowels. He has a rectal surgeon but he does not think it is a problem with the rectum. Pt has constipation but wife reports he manages it well.   06/15/2023: Mr. VIVIENNE HANCOCK presents today for a follow up and ZENA. He reports no significant changes in urination. Does c/o back pain. Had a CT at [facility]. Is planning on getting an epidural injection for pain. Pt reports he had lab work done yesterday, however the results are not yet back. He also reports perineal pain when he sits. His gastroenterologist is prescribing suppositories.   06/16/2023: Mr. HANCOCK is a 77 year old male presenting today via telehealth using real time 2 way audio only technology.  The patient,  Mr. HANCOCK, was located in his home at [address] [place] [address] at the time of the visit. The provider, FREDERIC BETH, was located in his office on [place], [place]. Verbal Consent was given by the patient on 06/16/2023 and my scribe served as a witness to the verbal consent. The patient provided blood sample on June 13 at [facility] but they seemed not available. I called to inquire and it was determined that an entry mistake occurred where the lab results was misdated and it was reported as being drawn as March 1st. The  was agreeable to correct the mistake. I called the patient again and explained the mishap to the patient. I explain that his CBC on 06/13/2023 shows that his blood count is low. HGB  is low at 9.8. HCT  low at 34.9. Mean Cell Volume  also low at 69.7. He reports that his diet has not changed recently. He says his last colonoscopy was several years ago.   07/17/2023: Mr. VIVIENNE HANCOCK presents today for a follow up audio only telephone visit for which he gave permission for. The pt was located at home, [address] [place] [address] [address] [place] [address], and I was located in my office in [place], [place]. The patient reports that he had some rectal and perineal pain last night. He admits a lot of constipation and hard stool. Pt has not yet spoken to his PCP or gastroenterologist yet about his recent lab work.   09/11/2023: Mr. VIVIENNE HANCOCK presents today for a follow up. He reports ongoing problems with anal/ rectal pain. Denies any testicular pain. Has spoken to his colorectal surgeon and he feels he was not helped enough. Gabapentin 300 mg helps his pain overall. He has not tried taking it in the morning because it makes him a little tired and he is fearful of driving. Pt had lab work done at his PCP on 7/21/2023. Creatinine was normal. Urine tests were normal. PSA was not done. Pt is on an iron supplement which causes some constipation. Eats a diet rich in vegetables. Patient has a pacemaker. Admits pushing and straining on urination.   Dr. Gonzalez- 12/4/23: patient here for urgent visit  for rectal pain  same as described to dr beth in Sept 2023 constipation better seeing colorectal surgeon- to see in Jan 2024- normal eval by report last week urine done wiht PCP : ( nov 25) : nomal - took 3d of levaquin psa 0.17 creat 1.0 normal white count 8.0 no luts- good flow, no strain , feels empty after void  here for eval: 1- check urine 2- exam signif for right sided pelvic pain - 3- to f/u with dr beth-consider PT of pelvic floor  12/12/2023: Mr. HANCOCK presents today for a follow up audio only telehealth visit for which they gave permission for. The patient was located at home [address] [place] [address] [address] [place] [address] and I was located in my office in [place], [place]. Patient's 12/4/23 UA showed Proteinuria 30 mg/dL, trace Ketonuria, moderate blood, 19/HPF of WBC, 64/HPF of RBC and mucus present.  Wife reports the patient is experiencing terrible pain. The patient did take took levofloxacin but the abx resulted in no relief. They desired a stronger antibiotic, but I explained to them, if the microscopic hematuria does not clear I believed it to be suppurative to obtain a cystoscopy. Also explained this may be symptoms of bladder cancer, so it is within their best interest to obtain a Cystoscopy.   01/17/2024: Mr. VIVIENNE HANCOCK presents today for an audio visual telehealth visit for which he gave permission for. The patient was located at home at [address] [place] [address] and I was located at my office in [place], [place]. When I called, the patient was in physical therapy and gave permission for me to discuss with his wife by telephone call. She informed me that he is having a lot of pain in his prostate. At the time of his last suspected episode of prostatitis, I prescribed him doxycycline, however she informs me that it did not help him at all. The patient's last MRI of the pelvis was in April 2021. The patient now has a pacemaker. He is currently taking gabapentin.   01/22/2024: Mr. VIVIENNE HANCOCK presents today for a follow up audio only telephone visit for which he gave permission for. The pt was located at home, [address] [place] [address], and I was located in my office in [place], [place]. The patient has not yet obtained the CT I ordered, nor has he supplied a urine specimen to a [facility] lab. He reports that he is feeling okay this week.   01/31/2024: Mr. VIVIENNE HANCOCK presents today for a follow up audio only telephone visit for which he gave permission for. The pt was located at home, [address] [place] [address], and I was located in my office in [place], [place]. Visit was conducted with patient's wife. Patient had lab work done on 1/29/2024. PSA was very good at 0.19. Creatinine was 0.99. UA was compatible with current infection. The urine appeared cloudy, with 30 mg/dl of protein, large blood by dipstick, moderate LEC, 198 WBC/HPF, 132 RBC/HPF and only 1 epithelial cell/HPF indicating it was a clean catch. However, urine culture was no significant growth. Urine cytology was negative for high grade urothelial carcinoma. Rare benign urothelial cells and squamous cells were seen in a background of severe acute inflammation and RBCs. Patient had PT yesterday which included ZENA and massage. She states that they felt something was wrong with his prostate. I assume they meant it was boggy.   02/29/2024: Mr. HANCOCK presents today for a follow up audio-only telehealth visit for which they gave permission for. The patient was located at home [address] [place] [address] and I was located in my office in [place], [place]. CT Abdomen and Pelvis 2/16/24 that revealed No renal calculus or hydronephrosis. Renal cysts, largest measuring 2.3 cm in the right upper pole. Subcentimeter lesions too small to characterize, likely additional cysts. Nonspecific wall thickening of the bladder with perivesicular fat stranding, which may be seen in the setting of cystitis. Post abx, patient endorses improvement. Denies taking any abx prior to urine testing. After 7 days he discontinued the Cipro due to diarrhea and loose stool. We will consider this for the future.  04/26/2024: Mr. HANCOCK presents today for a follow up audio-only telehealth visit for which they gave permission for. The patient was located at home [address] [place] [address] and I was located in my office in [place], [place].  The patient has a long history of irritative bladder symptoms with bladder pain and dysuria. He had a cystoscopy in 2017 but has declined any further cystoscopies. He declines cystoscopies in the office with lidocaine anesthesia and oral sedation. He declines cystoscopy under anesthesia in the operating room. I have explained to him the risks and benefits of cystoscopy and he adamantly refuses. I have explained things to his wife to administer help him persuading him to undergo cystoscopy. She is a retired pathologist but has not been able to persuade him. We have had a recent CT urogram which was done February 16, 2024 no urinary calculi or hydronephrosis was seen. There is no evidence of an enhancing renal mass or suspicious urothelial lesion. There was nonspecific wall thickening of the bladder with perivesical fat stranding.  The patient recently experienced dysuria which had been quiescent for a while. He provided a urine specimen on April 22, 2024 which demonstrated large blood by dipstick; small leukocyte esterase by dipstick. He had 97 white blood cells per high-power field and 187 red blood cells per high-power field. Nitrites were negative. There is only 1 epithelial cell per high-power field and microscopic examination was negative for bacteria. Urine cytology was negative for high-grade urothelial carcinoma. There was scattered increased red blood cells and white blood cells present in the cytology specimen. There was a background of benign urothelial cells and rare squamous cells. Interestingly urine culture was no growth.  The prior urinalysis was on January 29, 2024. It had 3 mg/dL of protein with specific gravity 1.021. The appearance was cloudy. Blood was large by dipstick. Leukocyte esterase was moderate by dipstick. It was 198 white blood cells per high-power field and 132 red blood cells per high-power field. No bacteria were seen on the microscope and there was 1 epithelial cell per high-power field. Nitrites were negative. In this case the urine culture showed less than 10,000 colony-forming units per milliliter of normal urogenital ok.  The reason for the pyuria is unclear CT scan urogram February 16, 2024 had not shown any evidence of chronic renal tuberculosis. There was no evidence in his urine testing of fungal infection though a urine culture specifically for fungus had not been done recently. The patient is from [place]. His wife denies that he had any known exposure to tuberculosis. Currently he no longer has dysuria and is at baseline symptomatically.  Reviewed and discussed laboratory work of 4/22/24 which He obtained prior to today's visit as requested. PSA is undetectable which is excellent.  4/22/24 UA showed trace proteinuria, large blood, small Leukocyte Esterase, 97/HPF of WBC, 187/HPF of RBC. Cytology is negative for high grade urothelial carcinoma. Scattered increased red blood cells and white blood cells are present in a background of benign urothelial cells and rare squamous cells. Culture showed no growth.  10/02/2024: Mr. VIVIENNE HANCOCK presents today for an audio only telehealth visit for which he gave permission for. The patient was located at home at [address] [place] [address] and I was located at my office in [place], [place]. He is accompanied by his wife, Candido Benjamin. Patient reports that things are stable. Urination is quite good. Denies any pain when he sits. Denies any pain related to the prostate. Patient had lab work done on 9/25/2024. UA was normal other than a trace of LE. Urine culture was no growth. Urine cytology was negative for high grade urothelial carcinoma.

## 2024-10-02 NOTE — ADDENDUM
[FreeTextEntry1] : This note was authored by Guerline Gold working as a scribe for Dr.Gary Duron. I, Dr. Collins Duron have reviewed the content of this note and confirm it is true and accurate. I personally performed the history and physical examination and made all the decisions 10/02/2024

## 2024-11-08 ENCOUNTER — APPOINTMENT (OUTPATIENT)
Dept: ORTHOPEDIC SURGERY | Facility: CLINIC | Age: 79
End: 2024-11-08
Payer: MEDICARE

## 2024-11-08 DIAGNOSIS — M48.07 SPINAL STENOSIS, LUMBOSACRAL REGION: ICD-10-CM

## 2024-11-08 DIAGNOSIS — M54.16 RADICULOPATHY, LUMBAR REGION: ICD-10-CM

## 2024-11-08 PROCEDURE — 99203 OFFICE O/P NEW LOW 30 MIN: CPT

## 2025-01-24 ENCOUNTER — APPOINTMENT (OUTPATIENT)
Dept: UROLOGY | Facility: CLINIC | Age: 80
End: 2025-01-24

## 2025-02-04 ENCOUNTER — EMERGENCY (EMERGENCY)
Facility: HOSPITAL | Age: 80
LOS: 1 days | Discharge: HOME | End: 2025-02-04
Attending: EMERGENCY MEDICINE | Admitting: EMERGENCY MEDICINE
Payer: MEDICARE

## 2025-02-04 VITALS
OXYGEN SATURATION: 98 % | DIASTOLIC BLOOD PRESSURE: 61 MMHG | SYSTOLIC BLOOD PRESSURE: 141 MMHG | HEART RATE: 79 BPM | TEMPERATURE: 98 F | RESPIRATION RATE: 15 BRPM

## 2025-02-04 VITALS
OXYGEN SATURATION: 97 % | TEMPERATURE: 98 F | HEIGHT: 64 IN | WEIGHT: 184.09 LBS | SYSTOLIC BLOOD PRESSURE: 161 MMHG | RESPIRATION RATE: 18 BRPM | DIASTOLIC BLOOD PRESSURE: 69 MMHG | HEART RATE: 83 BPM

## 2025-02-04 DIAGNOSIS — Z87.19 PERSONAL HISTORY OF OTHER DISEASES OF THE DIGESTIVE SYSTEM: Chronic | ICD-10-CM

## 2025-02-04 DIAGNOSIS — Z90.49 ACQUIRED ABSENCE OF OTHER SPECIFIED PARTS OF DIGESTIVE TRACT: Chronic | ICD-10-CM

## 2025-02-04 LAB
ALBUMIN SERPL ELPH-MCNC: 3.8 G/DL — SIGNIFICANT CHANGE UP (ref 3.3–5)
ALP SERPL-CCNC: 66 U/L — SIGNIFICANT CHANGE UP (ref 40–120)
ALT FLD-CCNC: 23 U/L — SIGNIFICANT CHANGE UP (ref 4–41)
ANION GAP SERPL CALC-SCNC: 15 MMOL/L — HIGH (ref 7–14)
AST SERPL-CCNC: 30 U/L — SIGNIFICANT CHANGE UP (ref 4–40)
BASOPHILS # BLD AUTO: 0.03 K/UL — SIGNIFICANT CHANGE UP (ref 0–0.2)
BASOPHILS NFR BLD AUTO: 0.4 % — SIGNIFICANT CHANGE UP (ref 0–2)
BILIRUB SERPL-MCNC: 0.2 MG/DL — SIGNIFICANT CHANGE UP (ref 0.2–1.2)
BLOOD GAS VENOUS COMPREHENSIVE RESULT: SIGNIFICANT CHANGE UP
BUN SERPL-MCNC: 28 MG/DL — HIGH (ref 7–23)
CALCIUM SERPL-MCNC: 9.1 MG/DL — SIGNIFICANT CHANGE UP (ref 8.4–10.5)
CHLORIDE SERPL-SCNC: 100 MMOL/L — SIGNIFICANT CHANGE UP (ref 98–107)
CO2 SERPL-SCNC: 21 MMOL/L — LOW (ref 22–31)
CREAT SERPL-MCNC: 0.93 MG/DL — SIGNIFICANT CHANGE UP (ref 0.5–1.3)
EGFR: 84 ML/MIN/1.73M2 — SIGNIFICANT CHANGE UP
EOSINOPHIL # BLD AUTO: 0.01 K/UL — SIGNIFICANT CHANGE UP (ref 0–0.5)
EOSINOPHIL NFR BLD AUTO: 0.1 % — SIGNIFICANT CHANGE UP (ref 0–6)
FLUAV AG NPH QL: DETECTED
FLUBV AG NPH QL: SIGNIFICANT CHANGE UP
GLUCOSE SERPL-MCNC: 125 MG/DL — HIGH (ref 70–99)
HCT VFR BLD CALC: 41 % — SIGNIFICANT CHANGE UP (ref 39–50)
HGB BLD-MCNC: 12.5 G/DL — LOW (ref 13–17)
IANC: 6.59 K/UL — SIGNIFICANT CHANGE UP (ref 1.8–7.4)
IMM GRANULOCYTES NFR BLD AUTO: 0.2 % — SIGNIFICANT CHANGE UP (ref 0–0.9)
LYMPHOCYTES # BLD AUTO: 0.92 K/UL — LOW (ref 1–3.3)
LYMPHOCYTES # BLD AUTO: 11 % — LOW (ref 13–44)
MCHC RBC-ENTMCNC: 25.1 PG — LOW (ref 27–34)
MCHC RBC-ENTMCNC: 30.5 G/DL — LOW (ref 32–36)
MCV RBC AUTO: 82.3 FL — SIGNIFICANT CHANGE UP (ref 80–100)
MONOCYTES # BLD AUTO: 0.82 K/UL — SIGNIFICANT CHANGE UP (ref 0–0.9)
MONOCYTES NFR BLD AUTO: 9.8 % — SIGNIFICANT CHANGE UP (ref 2–14)
NEUTROPHILS # BLD AUTO: 6.59 K/UL — SIGNIFICANT CHANGE UP (ref 1.8–7.4)
NEUTROPHILS NFR BLD AUTO: 78.5 % — HIGH (ref 43–77)
NRBC # BLD AUTO: 0 K/UL — SIGNIFICANT CHANGE UP (ref 0–0)
NRBC # BLD: 0 /100 WBCS — SIGNIFICANT CHANGE UP (ref 0–0)
NRBC # FLD: 0 K/UL — SIGNIFICANT CHANGE UP (ref 0–0)
NRBC BLD-RTO: 0 /100 WBCS — SIGNIFICANT CHANGE UP (ref 0–0)
NT-PROBNP SERPL-SCNC: 130 PG/ML — SIGNIFICANT CHANGE UP
PLATELET # BLD AUTO: 176 K/UL — SIGNIFICANT CHANGE UP (ref 150–400)
POTASSIUM SERPL-MCNC: 4.2 MMOL/L — SIGNIFICANT CHANGE UP (ref 3.5–5.3)
POTASSIUM SERPL-SCNC: 4.2 MMOL/L — SIGNIFICANT CHANGE UP (ref 3.5–5.3)
PROT SERPL-MCNC: 6.8 G/DL — SIGNIFICANT CHANGE UP (ref 6–8.3)
RBC # BLD: 4.98 M/UL — SIGNIFICANT CHANGE UP (ref 4.2–5.8)
RBC # FLD: 15.5 % — HIGH (ref 10.3–14.5)
RSV RNA NPH QL NAA+NON-PROBE: SIGNIFICANT CHANGE UP
SARS-COV-2 RNA SPEC QL NAA+PROBE: SIGNIFICANT CHANGE UP
SODIUM SERPL-SCNC: 136 MMOL/L — SIGNIFICANT CHANGE UP (ref 135–145)
TROPONIN T, HIGH SENSITIVITY RESULT: 16 NG/L — SIGNIFICANT CHANGE UP
TROPONIN T, HIGH SENSITIVITY RESULT: 18 NG/L — SIGNIFICANT CHANGE UP
WBC # BLD: 8.39 K/UL — SIGNIFICANT CHANGE UP (ref 3.8–10.5)
WBC # FLD AUTO: 8.39 K/UL — SIGNIFICANT CHANGE UP (ref 3.8–10.5)

## 2025-02-04 PROCEDURE — 99285 EMERGENCY DEPT VISIT HI MDM: CPT | Mod: 25

## 2025-02-04 PROCEDURE — 93010 ELECTROCARDIOGRAM REPORT: CPT

## 2025-02-04 PROCEDURE — 71046 X-RAY EXAM CHEST 2 VIEWS: CPT | Mod: 26

## 2025-02-04 RX ORDER — IPRATROPIUM BROMIDE AND ALBUTEROL SULFATE .5; 2.5 MG/3ML; MG/3ML
3 SOLUTION RESPIRATORY (INHALATION) ONCE
Refills: 0 | Status: COMPLETED | OUTPATIENT
Start: 2025-02-04 | End: 2025-02-04

## 2025-02-04 RX ADMIN — Medication 20 MILLIGRAM(S): at 06:59

## 2025-02-04 RX ADMIN — IPRATROPIUM BROMIDE AND ALBUTEROL SULFATE 3 MILLILITER(S): .5; 2.5 SOLUTION RESPIRATORY (INHALATION) at 05:59

## 2025-02-04 NOTE — ED ADULT NURSE REASSESSMENT NOTE - NS ED NURSE REASSESS COMMENT FT1
Patient wife gave patient home dose of AM steroids with banana; MD Finn made aware. Patient and family member made aware on NPO status and to not take medication from home. Patient medicated per chart.

## 2025-02-04 NOTE — ED ADULT TRIAGE NOTE - CHIEF COMPLAINT QUOTE
Pt arrives to ED c/o cough and SOB for 1 week.  Pt completed Prednisone and Azithromycin with no improvement.  Pt also used nebulizer at home.  Pt unable to sleep laying down.  No respiratory distress observed in triage, pt not utilizing accessory muscles and is able to speak in complete sentences.  Hx: HTN, pacemaker, Hemangioma, hypothyroid, asthma, cystitis, arthritis

## 2025-02-04 NOTE — ED PROVIDER NOTE - OBJECTIVE STATEMENT
Continue diuresis with goal of euvolemia.   The patient is a 78 y/o M with past medical history of asthma, HTN, hypothyroidism, pacemaker, presenting with cough and shortness of breath for the past week. Patient prescribed z-pack which he finished as well as a prednisone taper (2 days left). Has also tried albuterol with no significant relief. No improvement in symptoms, in past couple of days patient states symptoms have actually worsened and notes he is no longer able to lie flat comfortably. Denies any fever, chills, chest pain, abdominal pain, nausea, vomiting, diarrhea, dysuria, or any other symptoms.

## 2025-02-04 NOTE — ED PROVIDER NOTE - PATIENT PORTAL LINK FT
You can access the FollowMyHealth Patient Portal offered by [facility] by registering at the following website: [email or web address]. By joining [facility]’s FollowMyHealth portal, you will also be able to view your health information using other applications (apps) compatible with our system.

## 2025-02-04 NOTE — ED ADULT NURSE NOTE - NSFALLUNIVINTERV_ED_ALL_ED
Bed/Stretcher in lowest position, wheels locked, appropriate side rails in place/Call bell, personal items and telephone in reach/Instruct patient to call for assistance before getting out of bed/chair/stretcher/Non-slip footwear applied when patient is off stretcher/[place] to call system/Physically safe environment - no spills, clutter or unnecessary equipment/Purposeful proactive rounding/Room/bathroom lighting operational, light cord in reach

## 2025-02-04 NOTE — ED ADULT NURSE NOTE - OBJECTIVE STATEMENT
Patient received in room 16. Patient is AOx4, ambulatory, able to speak in full sentences, c/o SOB. Patient has a past medical history of  HTN, pacemaker, Hemangioma, hypothyroid, asthma, cystitis, and  arthritis. Patient c/o SOB and cough for 1 week.  Patient states PCP out him on Prednisone and Azithromycin with no improvement in his symptoms.  Patient states he in unable to sleep laying flat. Patient states they went to urgent care and was referred here to r/o PNA. 20G placed in RAC; labs drawn and sent. Wife also states patient was placed on new HTN medication which thinks has not been working for him. Patient AV paced on tele. EKG  in chart. Respirations even and unlabored, chest rise symmetrical b/l. Patient pending imaging. Denies chest pain, N/V, fever or chills. Comfort measures maintained. Patient appears comfortable on stretcher in no signs of acute distress. Bed in lowest position. Safety maintained.

## 2025-02-04 NOTE — ED PROVIDER NOTE - CLINICAL SUMMARY MEDICAL DECISION MAKING FREE TEXT BOX
GORDON Sotelo PGY2- patient here with 1 week worsening shortness of breath and cough, now with orthopnea, no relief with z-pack, prednisone, albuterol. not hypoxic on exam, with diffuse crackles and end exp wheezing, 1+ bilateral pitting LE edema. ddx including viral URI, asthma, pneumonia, chf exacerbation, less likely acs. will obtain labs, CXR, give albuterol. dispo pending clinical course.

## 2025-02-04 NOTE — ED PROVIDER NOTE - ATTENDING CONTRIBUTION TO CARE
80 y/o M with h/o HTN, asthma, bradycardia with PPM p/w 1 week SOB, cough.  Pt started on a z-jyothi and steroid course x 2 days without improvement.  (+)subjective fever and chills, malaise, nasal congestion.  No cp, abd pain, n/v/c=d, leg pain or swellling.  No recent travel or known sick contacts.    Well appearing, sitting comfortably in stretcher, awake and alert, nontoxic.  AF/VSS.  Speaking in full sentences, (+)coughing but no increased work of breathing.  Lungs cta bl with decreased air entry throughout.  Cards nl S1/S2, RRR, no MRG.  Abd soft ntnd.  Trace pedal edema, no unilateral swelling or calf tenderness.    Asthma exacerbation/RAD in setting of infection vs cardiac etiology, also consider anemia, metabolic disturbance.  Do not suspect PE.  Pt is alreadty on day 2 of steroid course, will give duoneb; check ekg, labs, cxr rvp, reassess.

## 2025-02-04 NOTE — ED PROVIDER NOTE - PHYSICAL EXAMINATION
General: no acute distress  Psych: mood appropriate  Head: normocephalic; atraumatic  Eyes: conjunctivae clear bilaterally, sclerae anicteric  ENT: no nasal flaring, patent nares  Cardio: regular rate and rhythm; normal heart sounds  Resp: diffuse crackles with faint end expiratory wheezing  GI: abdomen soft, nontender, nondistended  Neuro: A&Ox3  Skin: no rashes or bruising noted  MSK: normal movement of extremities  Lymph/Vasc: 1+ bilateral pitting edema

## 2025-02-04 NOTE — ED PROVIDER NOTE - NSCAREINITIATED _GEN_ER
Precious Finn(Attending) [Joint Pain] : joint pain [Joint Stiffness] : joint stiffness [Negative] : Heme/Lymph

## 2025-02-04 NOTE — ED PROVIDER NOTE - NSICDXPASTSURGICALHX_GEN_ALL_CORE_FT
PAST SURGICAL HISTORY:  History of anal fissures s/p sphincterocotomy 6/2019    History of appendectomy 2012    History of craniotomy, resection of meningioma Jan 2012    S/P B/L cataract surgery 2002

## 2025-02-04 NOTE — ED PROVIDER NOTE - PROGRESS NOTE DETAILS
Juan Sanchez, PGY3 - Patient was signed out to me pending strep repeat.  This is stable.  Will DC at this time.  Patient has flu A.  Will DC.

## 2025-02-04 NOTE — ED ADULT NURSE REASSESSMENT NOTE - NS ED NURSE REASSESS COMMENT FT1
Pt is A+O4 and resting in stretcher. Paced rhythm noted on cardiac monitor. Spontaneous respirations are even and unlabored on room air. Speech is clear, able to speak in full sentences. Pt denies chest pain, n/v/d, dizziness. Endorses SOB. VS as noted. Awaiting dc.

## 2025-02-04 NOTE — ED ADULT NURSE NOTE - PAIN RATING/NUMBER SCALE (0-10): ACTIVITY
Reveal linq EVENT    2- Pause  [place] : 35.7%       0 (no pain/absence of nonverbal indicators of pain)

## 2025-02-04 NOTE — ED PROVIDER NOTE - NSFOLLOWUPINSTRUCTIONS_ED_ALL_ED_FT
You were seen for cough.  You are diagnosed with influenza A. you can take ibuprofen 400mg every 6-8 hours and/or Tylenol 1000mg every 6-8 hours as needed for pain please follow-up with your primary care doctor.  Please return to emergency room for increasing pain, vomiting, fever, or any new/concerning symptoms.

## 2025-05-21 ENCOUNTER — NON-APPOINTMENT (OUTPATIENT)
Age: 80
End: 2025-05-21

## 2025-05-22 ENCOUNTER — NON-APPOINTMENT (OUTPATIENT)
Age: 80
End: 2025-05-22

## 2025-05-22 ENCOUNTER — APPOINTMENT (OUTPATIENT)
Dept: ALLERGY | Facility: CLINIC | Age: 80
End: 2025-05-22
Payer: MEDICARE

## 2025-05-22 VITALS
DIASTOLIC BLOOD PRESSURE: 72 MMHG | HEART RATE: 71 BPM | SYSTOLIC BLOOD PRESSURE: 114 MMHG | OXYGEN SATURATION: 98 % | TEMPERATURE: 98.3 F

## 2025-05-22 PROCEDURE — 99204 OFFICE O/P NEW MOD 45 MIN: CPT | Mod: 25

## 2025-05-22 PROCEDURE — 95004 PERQ TESTS W/ALRGNC XTRCS: CPT

## 2025-05-22 RX ORDER — AMLODIPINE BESYLATE 5 MG/1
TABLET ORAL
Refills: 0 | Status: ACTIVE | COMMUNITY

## 2025-05-22 RX ORDER — METOPROLOL TARTRATE 75 MG/1
TABLET, FILM COATED ORAL
Refills: 0 | Status: ACTIVE | COMMUNITY